# Patient Record
Sex: FEMALE | Race: WHITE | NOT HISPANIC OR LATINO | URBAN - METROPOLITAN AREA
[De-identification: names, ages, dates, MRNs, and addresses within clinical notes are randomized per-mention and may not be internally consistent; named-entity substitution may affect disease eponyms.]

---

## 2017-01-31 ENCOUNTER — OUTPATIENT (OUTPATIENT)
Dept: OUTPATIENT SERVICES | Facility: HOSPITAL | Age: 77
LOS: 1 days | Discharge: HOME | End: 2017-01-31

## 2017-01-31 DIAGNOSIS — C85.88 OTHER SPECIFIED TYPES OF NON-HODGKIN LYMPHOMA, LYMPH NODES OF MULTIPLE SITES: ICD-10-CM

## 2017-01-31 DIAGNOSIS — Z98.89 OTHER SPECIFIED POSTPROCEDURAL STATES: Chronic | ICD-10-CM

## 2017-01-31 DIAGNOSIS — L72.9 FOLLICULAR CYST OF THE SKIN AND SUBCUTANEOUS TISSUE, UNSPECIFIED: Chronic | ICD-10-CM

## 2017-02-01 ENCOUNTER — RESULT REVIEW (OUTPATIENT)
Age: 77
End: 2017-02-01

## 2017-02-02 ENCOUNTER — APPOINTMENT (OUTPATIENT)
Dept: HEMATOLOGY ONCOLOGY | Facility: CLINIC | Age: 77
End: 2017-02-02

## 2017-02-02 VITALS
BODY MASS INDEX: 31.87 KG/M2 | OXYGEN SATURATION: 100 % | DIASTOLIC BLOOD PRESSURE: 71 MMHG | WEIGHT: 147.27 LBS | SYSTOLIC BLOOD PRESSURE: 143 MMHG | HEART RATE: 59 BPM | RESPIRATION RATE: 16 BRPM | TEMPERATURE: 98.3 F

## 2017-02-02 LAB
BASOPHILS # BLD AUTO: 0 K/UL — SIGNIFICANT CHANGE UP (ref 0–0.2)
BASOPHILS NFR BLD AUTO: 0.1 % — SIGNIFICANT CHANGE UP (ref 0–2)
EOSINOPHIL # BLD AUTO: 0.2 K/UL — SIGNIFICANT CHANGE UP (ref 0–0.5)
EOSINOPHIL NFR BLD AUTO: 2.8 % — SIGNIFICANT CHANGE UP (ref 0–6)
HCT VFR BLD CALC: 44.3 % — SIGNIFICANT CHANGE UP (ref 34.5–45)
HGB BLD-MCNC: 14.3 G/DL — SIGNIFICANT CHANGE UP (ref 11.5–15.5)
LYMPHOCYTES # BLD AUTO: 1.4 K/UL — SIGNIFICANT CHANGE UP (ref 1–3.3)
LYMPHOCYTES # BLD AUTO: 21.5 % — SIGNIFICANT CHANGE UP (ref 13–44)
MCHC RBC-ENTMCNC: 28.4 PG — SIGNIFICANT CHANGE UP (ref 27–34)
MCHC RBC-ENTMCNC: 32.2 GM/DL — SIGNIFICANT CHANGE UP (ref 32–36)
MCV RBC AUTO: 88.1 FL — SIGNIFICANT CHANGE UP (ref 80–100)
MONOCYTES # BLD AUTO: 0.5 K/UL — SIGNIFICANT CHANGE UP (ref 0–0.9)
MONOCYTES NFR BLD AUTO: 7.7 % — SIGNIFICANT CHANGE UP (ref 2–14)
NEUTROPHILS # BLD AUTO: 4.6 K/UL — SIGNIFICANT CHANGE UP (ref 1.8–7.4)
NEUTROPHILS NFR BLD AUTO: 67.8 % — SIGNIFICANT CHANGE UP (ref 43–77)
PLATELET # BLD AUTO: 235 K/UL — SIGNIFICANT CHANGE UP (ref 150–400)
RBC # BLD: 5.02 M/UL — SIGNIFICANT CHANGE UP (ref 3.8–5.2)
RBC # FLD: 12.3 % — SIGNIFICANT CHANGE UP (ref 10.3–14.5)
WBC # BLD: 6.8 K/UL — SIGNIFICANT CHANGE UP (ref 3.8–10.5)
WBC # FLD AUTO: 6.8 K/UL — SIGNIFICANT CHANGE UP (ref 3.8–10.5)

## 2017-02-04 LAB
ALBUMIN MFR SERPL ELPH: 61.1 %
ALBUMIN SERPL ELPH-MCNC: 4.2 G/DL
ALBUMIN SERPL-MCNC: 3.9 G/DL
ALBUMIN/GLOB SERPL: 1.6 RATIO
ALP BLD-CCNC: 58 U/L
ALPHA1 GLOB MFR SERPL ELPH: 4.5 %
ALPHA1 GLOB SERPL ELPH-MCNC: 0.3 G/DL
ALPHA2 GLOB MFR SERPL ELPH: 10.4 %
ALPHA2 GLOB SERPL ELPH-MCNC: 0.7 G/DL
ALT SERPL-CCNC: 18 U/L
ANION GAP SERPL CALC-SCNC: 14 MMOL/L
AST SERPL-CCNC: 18 U/L
B-GLOBULIN MFR SERPL ELPH: 11.6 %
B-GLOBULIN SERPL ELPH-MCNC: 0.7 G/DL
B2 MICROGLOB SERPL-MCNC: 2 MG/L
BILIRUB SERPL-MCNC: 0.4 MG/DL
BUN SERPL-MCNC: 17 MG/DL
CALCIUM SERPL-MCNC: 9.4 MG/DL
CHLORIDE SERPL-SCNC: 101 MMOL/L
CO2 SERPL-SCNC: 29 MMOL/L
CREAT SERPL-MCNC: 0.64 MG/DL
GAMMA GLOB FLD ELPH-MCNC: 0.8 G/DL
GAMMA GLOB MFR SERPL ELPH: 12.4 %
GLUCOSE SERPL-MCNC: 149 MG/DL
IGA SER QL IEP: 219 MG/DL
IGG SER QL IEP: 722 MG/DL
IGM SER QL IEP: 183 MG/DL
INTERPRETATION SERPL IEP-IMP: NORMAL
LDH SERPL-CCNC: 110 U/L
POTASSIUM SERPL-SCNC: 4.1 MMOL/L
PROT SERPL-MCNC: 6.4 G/DL
SODIUM SERPL-SCNC: 144 MMOL/L

## 2017-04-25 ENCOUNTER — APPOINTMENT (OUTPATIENT)
Dept: MAMMOGRAPHY | Facility: IMAGING CENTER | Age: 77
End: 2017-04-25

## 2017-04-25 ENCOUNTER — APPOINTMENT (OUTPATIENT)
Dept: CT IMAGING | Facility: IMAGING CENTER | Age: 77
End: 2017-04-25

## 2017-04-25 ENCOUNTER — OUTPATIENT (OUTPATIENT)
Dept: OUTPATIENT SERVICES | Facility: HOSPITAL | Age: 77
LOS: 1 days | End: 2017-04-25
Payer: MEDICARE

## 2017-04-25 DIAGNOSIS — Z00.8 ENCOUNTER FOR OTHER GENERAL EXAMINATION: ICD-10-CM

## 2017-04-25 DIAGNOSIS — Z98.89 OTHER SPECIFIED POSTPROCEDURAL STATES: Chronic | ICD-10-CM

## 2017-04-25 DIAGNOSIS — L72.9 FOLLICULAR CYST OF THE SKIN AND SUBCUTANEOUS TISSUE, UNSPECIFIED: Chronic | ICD-10-CM

## 2017-04-25 PROCEDURE — 82565 ASSAY OF CREATININE: CPT

## 2017-04-25 PROCEDURE — 74177 CT ABD & PELVIS W/CONTRAST: CPT

## 2017-04-25 PROCEDURE — 77067 SCR MAMMO BI INCL CAD: CPT

## 2017-04-25 PROCEDURE — 77063 BREAST TOMOSYNTHESIS BI: CPT

## 2017-07-31 ENCOUNTER — OUTPATIENT (OUTPATIENT)
Dept: OUTPATIENT SERVICES | Facility: HOSPITAL | Age: 77
LOS: 1 days | Discharge: HOME | End: 2017-07-31

## 2017-07-31 DIAGNOSIS — L72.9 FOLLICULAR CYST OF THE SKIN AND SUBCUTANEOUS TISSUE, UNSPECIFIED: Chronic | ICD-10-CM

## 2017-07-31 DIAGNOSIS — C85.88 OTHER SPECIFIED TYPES OF NON-HODGKIN LYMPHOMA, LYMPH NODES OF MULTIPLE SITES: ICD-10-CM

## 2017-07-31 DIAGNOSIS — Z98.89 OTHER SPECIFIED POSTPROCEDURAL STATES: Chronic | ICD-10-CM

## 2017-08-03 ENCOUNTER — LABORATORY RESULT (OUTPATIENT)
Age: 77
End: 2017-08-03

## 2017-08-03 ENCOUNTER — RESULT REVIEW (OUTPATIENT)
Age: 77
End: 2017-08-03

## 2017-08-03 ENCOUNTER — APPOINTMENT (OUTPATIENT)
Dept: HEMATOLOGY ONCOLOGY | Facility: CLINIC | Age: 77
End: 2017-08-03
Payer: MEDICARE

## 2017-08-03 VITALS
SYSTOLIC BLOOD PRESSURE: 152 MMHG | DIASTOLIC BLOOD PRESSURE: 74 MMHG | RESPIRATION RATE: 16 BRPM | TEMPERATURE: 98.1 F | HEART RATE: 57 BPM | OXYGEN SATURATION: 96 % | HEIGHT: 57.01 IN | WEIGHT: 151.68 LBS | BODY MASS INDEX: 32.72 KG/M2

## 2017-08-03 LAB
BASOPHILS # BLD AUTO: 0 K/UL — SIGNIFICANT CHANGE UP (ref 0–0.2)
BASOPHILS NFR BLD AUTO: 0.3 % — SIGNIFICANT CHANGE UP (ref 0–2)
EOSINOPHIL # BLD AUTO: 0.2 K/UL — SIGNIFICANT CHANGE UP (ref 0–0.5)
EOSINOPHIL NFR BLD AUTO: 3.1 % — SIGNIFICANT CHANGE UP (ref 0–6)
HCT VFR BLD CALC: 43 % — SIGNIFICANT CHANGE UP (ref 34.5–45)
HGB BLD-MCNC: 14 G/DL — SIGNIFICANT CHANGE UP (ref 11.5–15.5)
LYMPHOCYTES # BLD AUTO: 1.3 K/UL — SIGNIFICANT CHANGE UP (ref 1–3.3)
LYMPHOCYTES # BLD AUTO: 19.1 % — SIGNIFICANT CHANGE UP (ref 13–44)
MCHC RBC-ENTMCNC: 28.6 PG — SIGNIFICANT CHANGE UP (ref 27–34)
MCHC RBC-ENTMCNC: 32.5 G/DL — SIGNIFICANT CHANGE UP (ref 32–36)
MCV RBC AUTO: 87.8 FL — SIGNIFICANT CHANGE UP (ref 80–100)
MONOCYTES # BLD AUTO: 0.8 K/UL — SIGNIFICANT CHANGE UP (ref 0–0.9)
MONOCYTES NFR BLD AUTO: 11.5 % — SIGNIFICANT CHANGE UP (ref 2–14)
NEUTROPHILS # BLD AUTO: 4.4 K/UL — SIGNIFICANT CHANGE UP (ref 1.8–7.4)
NEUTROPHILS NFR BLD AUTO: 66.1 % — SIGNIFICANT CHANGE UP (ref 43–77)
PLATELET # BLD AUTO: 220 K/UL — SIGNIFICANT CHANGE UP (ref 150–400)
RBC # BLD: 4.9 M/UL — SIGNIFICANT CHANGE UP (ref 3.8–5.2)
RBC # FLD: 13.6 % — SIGNIFICANT CHANGE UP (ref 10.3–14.5)
WBC # BLD: 6.6 K/UL — SIGNIFICANT CHANGE UP (ref 3.8–10.5)
WBC # FLD AUTO: 6.6 K/UL — SIGNIFICANT CHANGE UP (ref 3.8–10.5)

## 2017-08-03 PROCEDURE — 99213 OFFICE O/P EST LOW 20 MIN: CPT

## 2018-01-31 ENCOUNTER — OUTPATIENT (OUTPATIENT)
Dept: OUTPATIENT SERVICES | Facility: HOSPITAL | Age: 78
LOS: 1 days | Discharge: HOME | End: 2018-01-31

## 2018-01-31 DIAGNOSIS — Z98.89 OTHER SPECIFIED POSTPROCEDURAL STATES: Chronic | ICD-10-CM

## 2018-01-31 DIAGNOSIS — L72.9 FOLLICULAR CYST OF THE SKIN AND SUBCUTANEOUS TISSUE, UNSPECIFIED: Chronic | ICD-10-CM

## 2018-01-31 DIAGNOSIS — C85.88 OTHER SPECIFIED TYPES OF NON-HODGKIN LYMPHOMA, LYMPH NODES OF MULTIPLE SITES: ICD-10-CM

## 2018-02-05 ENCOUNTER — RESULT REVIEW (OUTPATIENT)
Age: 78
End: 2018-02-05

## 2018-02-05 ENCOUNTER — APPOINTMENT (OUTPATIENT)
Dept: HEMATOLOGY ONCOLOGY | Facility: CLINIC | Age: 78
End: 2018-02-05
Payer: MEDICARE

## 2018-02-05 VITALS
RESPIRATION RATE: 16 BRPM | DIASTOLIC BLOOD PRESSURE: 70 MMHG | SYSTOLIC BLOOD PRESSURE: 156 MMHG | BODY MASS INDEX: 31.95 KG/M2 | HEART RATE: 55 BPM | OXYGEN SATURATION: 98 % | TEMPERATURE: 97.9 F | WEIGHT: 147.71 LBS

## 2018-02-05 LAB
ALBUMIN SERPL ELPH-MCNC: 4 G/DL
ALP BLD-CCNC: 57 U/L
ALT SERPL-CCNC: 18 U/L
ANION GAP SERPL CALC-SCNC: 12 MMOL/L
AST SERPL-CCNC: 19 U/L
B2 MICROGLOB SERPL-MCNC: 2 MG/L
BASOPHILS # BLD AUTO: 0 K/UL — SIGNIFICANT CHANGE UP (ref 0–0.2)
BASOPHILS NFR BLD AUTO: 0.1 % — SIGNIFICANT CHANGE UP (ref 0–2)
BILIRUB SERPL-MCNC: 0.4 MG/DL
BUN SERPL-MCNC: 14 MG/DL
CALCIUM SERPL-MCNC: 9.5 MG/DL
CHLORIDE SERPL-SCNC: 102 MMOL/L
CO2 SERPL-SCNC: 30 MMOL/L
CREAT SERPL-MCNC: 0.62 MG/DL
EOSINOPHIL # BLD AUTO: 0.2 K/UL — SIGNIFICANT CHANGE UP (ref 0–0.5)
EOSINOPHIL NFR BLD AUTO: 3.5 % — SIGNIFICANT CHANGE UP (ref 0–6)
GLUCOSE SERPL-MCNC: 137 MG/DL
HCT VFR BLD CALC: 41.7 % — SIGNIFICANT CHANGE UP (ref 34.5–45)
HGB BLD-MCNC: 14.5 G/DL — SIGNIFICANT CHANGE UP (ref 11.5–15.5)
LDH SERPL-CCNC: 103 U/L
LYMPHOCYTES # BLD AUTO: 1.2 K/UL — SIGNIFICANT CHANGE UP (ref 1–3.3)
LYMPHOCYTES # BLD AUTO: 18.5 % — SIGNIFICANT CHANGE UP (ref 13–44)
MCHC RBC-ENTMCNC: 30.3 PG — SIGNIFICANT CHANGE UP (ref 27–34)
MCHC RBC-ENTMCNC: 34.7 G/DL — SIGNIFICANT CHANGE UP (ref 32–36)
MCV RBC AUTO: 87.3 FL — SIGNIFICANT CHANGE UP (ref 80–100)
MONOCYTES # BLD AUTO: 0.7 K/UL — SIGNIFICANT CHANGE UP (ref 0–0.9)
MONOCYTES NFR BLD AUTO: 10.5 % — SIGNIFICANT CHANGE UP (ref 2–14)
NEUTROPHILS # BLD AUTO: 4.3 K/UL — SIGNIFICANT CHANGE UP (ref 1.8–7.4)
NEUTROPHILS NFR BLD AUTO: 67.3 % — SIGNIFICANT CHANGE UP (ref 43–77)
PLATELET # BLD AUTO: 205 K/UL — SIGNIFICANT CHANGE UP (ref 150–400)
POTASSIUM SERPL-SCNC: 4.5 MMOL/L
PROT SERPL-MCNC: 6.3 G/DL
RBC # BLD: 4.78 M/UL — SIGNIFICANT CHANGE UP (ref 3.8–5.2)
RBC # FLD: 12.2 % — SIGNIFICANT CHANGE UP (ref 10.3–14.5)
SODIUM SERPL-SCNC: 144 MMOL/L
WBC # BLD: 6.3 K/UL — SIGNIFICANT CHANGE UP (ref 3.8–10.5)
WBC # FLD AUTO: 6.3 K/UL — SIGNIFICANT CHANGE UP (ref 3.8–10.5)

## 2018-02-05 PROCEDURE — 99214 OFFICE O/P EST MOD 30 MIN: CPT

## 2018-02-09 ENCOUNTER — FORM ENCOUNTER (OUTPATIENT)
Age: 78
End: 2018-02-09

## 2018-02-10 ENCOUNTER — APPOINTMENT (OUTPATIENT)
Dept: CT IMAGING | Facility: IMAGING CENTER | Age: 78
End: 2018-02-10
Payer: MEDICARE

## 2018-02-10 ENCOUNTER — OUTPATIENT (OUTPATIENT)
Dept: OUTPATIENT SERVICES | Facility: HOSPITAL | Age: 78
LOS: 1 days | End: 2018-02-10
Payer: MEDICARE

## 2018-02-10 DIAGNOSIS — C82.00 FOLLICULAR LYMPHOMA GRADE I, UNSPECIFIED SITE: ICD-10-CM

## 2018-02-10 DIAGNOSIS — Z98.89 OTHER SPECIFIED POSTPROCEDURAL STATES: Chronic | ICD-10-CM

## 2018-02-10 DIAGNOSIS — L72.9 FOLLICULAR CYST OF THE SKIN AND SUBCUTANEOUS TISSUE, UNSPECIFIED: Chronic | ICD-10-CM

## 2018-02-10 PROCEDURE — 74177 CT ABD & PELVIS W/CONTRAST: CPT | Mod: 26

## 2018-02-10 PROCEDURE — 71260 CT THORAX DX C+: CPT

## 2018-02-10 PROCEDURE — 74177 CT ABD & PELVIS W/CONTRAST: CPT

## 2018-02-10 PROCEDURE — 71260 CT THORAX DX C+: CPT | Mod: 26

## 2018-05-05 ENCOUNTER — APPOINTMENT (OUTPATIENT)
Dept: MAMMOGRAPHY | Facility: IMAGING CENTER | Age: 78
End: 2018-05-05
Payer: MEDICARE

## 2018-05-05 ENCOUNTER — OUTPATIENT (OUTPATIENT)
Dept: OUTPATIENT SERVICES | Facility: HOSPITAL | Age: 78
LOS: 1 days | End: 2018-05-05
Payer: MEDICARE

## 2018-05-05 DIAGNOSIS — Z00.8 ENCOUNTER FOR OTHER GENERAL EXAMINATION: ICD-10-CM

## 2018-05-05 DIAGNOSIS — Z98.89 OTHER SPECIFIED POSTPROCEDURAL STATES: Chronic | ICD-10-CM

## 2018-05-05 DIAGNOSIS — L72.9 FOLLICULAR CYST OF THE SKIN AND SUBCUTANEOUS TISSUE, UNSPECIFIED: Chronic | ICD-10-CM

## 2018-05-05 PROCEDURE — 77063 BREAST TOMOSYNTHESIS BI: CPT | Mod: 26

## 2018-05-05 PROCEDURE — 77067 SCR MAMMO BI INCL CAD: CPT | Mod: 26

## 2018-05-05 PROCEDURE — 77063 BREAST TOMOSYNTHESIS BI: CPT

## 2018-05-05 PROCEDURE — 77067 SCR MAMMO BI INCL CAD: CPT

## 2018-07-19 ENCOUNTER — OUTPATIENT (OUTPATIENT)
Dept: OUTPATIENT SERVICES | Facility: HOSPITAL | Age: 78
LOS: 1 days | Discharge: HOME | End: 2018-07-19

## 2018-07-19 DIAGNOSIS — L72.9 FOLLICULAR CYST OF THE SKIN AND SUBCUTANEOUS TISSUE, UNSPECIFIED: Chronic | ICD-10-CM

## 2018-07-19 DIAGNOSIS — Z98.89 OTHER SPECIFIED POSTPROCEDURAL STATES: Chronic | ICD-10-CM

## 2018-07-19 DIAGNOSIS — C85.88 OTHER SPECIFIED TYPES OF NON-HODGKIN LYMPHOMA, LYMPH NODES OF MULTIPLE SITES: ICD-10-CM

## 2018-07-25 ENCOUNTER — RECORD ABSTRACTING (OUTPATIENT)
Age: 78
End: 2018-07-25

## 2018-07-25 ENCOUNTER — RESULT REVIEW (OUTPATIENT)
Age: 78
End: 2018-07-25

## 2018-07-25 DIAGNOSIS — M94.9 DISORDER OF BONE, UNSPECIFIED: ICD-10-CM

## 2018-07-25 DIAGNOSIS — M89.9 DISORDER OF BONE, UNSPECIFIED: ICD-10-CM

## 2018-07-25 DIAGNOSIS — I51.9 HEART DISEASE, UNSPECIFIED: ICD-10-CM

## 2018-07-25 DIAGNOSIS — I05.9 RHEUMATIC MITRAL VALVE DISEASE, UNSPECIFIED: ICD-10-CM

## 2018-07-25 LAB
ALBUMIN MFR SERPL ELPH: 60.4 %
ALBUMIN SERPL-MCNC: 3.8 G/DL
ALBUMIN/GLOB SERPL: 1.5 RATIO
ALPHA1 GLOB MFR SERPL ELPH: 4.8 %
ALPHA1 GLOB SERPL ELPH-MCNC: 0.3 G/DL
ALPHA2 GLOB MFR SERPL ELPH: 10.9 %
ALPHA2 GLOB SERPL ELPH-MCNC: 0.7 G/DL
B-GLOBULIN MFR SERPL ELPH: 11.7 %
B-GLOBULIN SERPL ELPH-MCNC: 0.7 G/DL
DEPRECATED KAPPA LC FREE/LAMBDA SER: 0.87 RATIO
GAMMA GLOB FLD ELPH-MCNC: 0.8 G/DL
GAMMA GLOB MFR SERPL ELPH: 12.2 %
IGA SER QL IEP: 214 MG/DL
IGG SER QL IEP: 749 MG/DL
IGM SER QL IEP: 188 MG/DL
INTERPRETATION SERPL IEP-IMP: NORMAL
KAPPA LC CSF-MCNC: 1.39 MG/DL
KAPPA LC SERPL-MCNC: 1.21 MG/DL
PROT SERPL-MCNC: 6.3 G/DL
PROT SERPL-MCNC: 6.3 G/DL

## 2018-07-26 ENCOUNTER — RESULT REVIEW (OUTPATIENT)
Age: 78
End: 2018-07-26

## 2018-07-26 ENCOUNTER — APPOINTMENT (OUTPATIENT)
Dept: HEMATOLOGY ONCOLOGY | Facility: CLINIC | Age: 78
End: 2018-07-26
Payer: MEDICARE

## 2018-07-26 VITALS
DIASTOLIC BLOOD PRESSURE: 70 MMHG | RESPIRATION RATE: 18 BRPM | BODY MASS INDEX: 32.91 KG/M2 | SYSTOLIC BLOOD PRESSURE: 140 MMHG | WEIGHT: 152.12 LBS | OXYGEN SATURATION: 95 % | TEMPERATURE: 98.2 F | HEART RATE: 61 BPM

## 2018-07-26 DIAGNOSIS — Z82.49 FAMILY HISTORY OF ISCHEMIC HEART DISEASE AND OTHER DISEASES OF THE CIRCULATORY SYSTEM: ICD-10-CM

## 2018-07-26 LAB
BASOPHILS # BLD AUTO: 0 K/UL — SIGNIFICANT CHANGE UP (ref 0–0.2)
BASOPHILS NFR BLD AUTO: 0.6 % — SIGNIFICANT CHANGE UP (ref 0–2)
EOSINOPHIL # BLD AUTO: 0.2 K/UL — SIGNIFICANT CHANGE UP (ref 0–0.5)
EOSINOPHIL NFR BLD AUTO: 3.1 % — SIGNIFICANT CHANGE UP (ref 0–6)
HCT VFR BLD CALC: 42.4 % — SIGNIFICANT CHANGE UP (ref 34.5–45)
HGB BLD-MCNC: 14.2 G/DL — SIGNIFICANT CHANGE UP (ref 11.5–15.5)
LYMPHOCYTES # BLD AUTO: 1.4 K/UL — SIGNIFICANT CHANGE UP (ref 1–3.3)
LYMPHOCYTES # BLD AUTO: 21.4 % — SIGNIFICANT CHANGE UP (ref 13–44)
MCHC RBC-ENTMCNC: 29.7 PG — SIGNIFICANT CHANGE UP (ref 27–34)
MCHC RBC-ENTMCNC: 33.5 G/DL — SIGNIFICANT CHANGE UP (ref 32–36)
MCV RBC AUTO: 88.4 FL — SIGNIFICANT CHANGE UP (ref 80–100)
MONOCYTES # BLD AUTO: 0.7 K/UL — SIGNIFICANT CHANGE UP (ref 0–0.9)
MONOCYTES NFR BLD AUTO: 10.5 % — SIGNIFICANT CHANGE UP (ref 2–14)
NEUTROPHILS # BLD AUTO: 4.1 K/UL — SIGNIFICANT CHANGE UP (ref 1.8–7.4)
NEUTROPHILS NFR BLD AUTO: 64.4 % — SIGNIFICANT CHANGE UP (ref 43–77)
PLATELET # BLD AUTO: 226 K/UL — SIGNIFICANT CHANGE UP (ref 150–400)
RBC # BLD: 4.8 M/UL — SIGNIFICANT CHANGE UP (ref 3.8–5.2)
RBC # FLD: 12.3 % — SIGNIFICANT CHANGE UP (ref 10.3–14.5)
WBC # BLD: 6.4 K/UL — SIGNIFICANT CHANGE UP (ref 3.8–10.5)
WBC # FLD AUTO: 6.4 K/UL — SIGNIFICANT CHANGE UP (ref 3.8–10.5)

## 2018-07-26 PROCEDURE — 99214 OFFICE O/P EST MOD 30 MIN: CPT

## 2018-08-13 ENCOUNTER — RX RENEWAL (OUTPATIENT)
Age: 78
End: 2018-08-13

## 2018-08-14 ENCOUNTER — APPOINTMENT (OUTPATIENT)
Dept: PULMONOLOGY | Facility: CLINIC | Age: 78
End: 2018-08-14
Payer: MEDICARE

## 2018-08-14 VITALS
TEMPERATURE: 98.1 F | OXYGEN SATURATION: 96 % | RESPIRATION RATE: 16 BRPM | DIASTOLIC BLOOD PRESSURE: 69 MMHG | WEIGHT: 150 LBS | HEIGHT: 57.1 IN | SYSTOLIC BLOOD PRESSURE: 126 MMHG | BODY MASS INDEX: 32.36 KG/M2 | HEART RATE: 59 BPM

## 2018-08-14 PROCEDURE — 99213 OFFICE O/P EST LOW 20 MIN: CPT | Mod: 25

## 2018-08-14 PROCEDURE — 36415 COLL VENOUS BLD VENIPUNCTURE: CPT

## 2018-08-15 LAB
ALBUMIN SERPL ELPH-MCNC: 4.3 G/DL
ALP BLD-CCNC: 62 U/L
ALT SERPL-CCNC: 18 U/L
ANION GAP SERPL CALC-SCNC: 16 MMOL/L
AST SERPL-CCNC: 19 U/L
BILIRUB SERPL-MCNC: 0.4 MG/DL
BUN SERPL-MCNC: 15 MG/DL
CALCIUM SERPL-MCNC: 9.6 MG/DL
CHLORIDE SERPL-SCNC: 101 MMOL/L
CHOLEST SERPL-MCNC: 167 MG/DL
CHOLEST/HDLC SERPL: 3.2 RATIO
CO2 SERPL-SCNC: 25 MMOL/L
CREAT SERPL-MCNC: 0.62 MG/DL
GLUCOSE SERPL-MCNC: 103 MG/DL
HBA1C MFR BLD HPLC: 6.1 %
HDLC SERPL-MCNC: 52 MG/DL
LDLC SERPL CALC-MCNC: 86 MG/DL
POTASSIUM SERPL-SCNC: 4.2 MMOL/L
PROT SERPL-MCNC: 6.2 G/DL
SODIUM SERPL-SCNC: 142 MMOL/L
TRIGL SERPL-MCNC: 146 MG/DL

## 2018-09-24 ENCOUNTER — APPOINTMENT (OUTPATIENT)
Dept: PULMONOLOGY | Facility: CLINIC | Age: 78
End: 2018-09-24
Payer: MEDICARE

## 2018-09-24 VITALS
TEMPERATURE: 98.1 F | SYSTOLIC BLOOD PRESSURE: 138 MMHG | HEIGHT: 57 IN | BODY MASS INDEX: 32.36 KG/M2 | OXYGEN SATURATION: 97 % | RESPIRATION RATE: 16 BRPM | DIASTOLIC BLOOD PRESSURE: 72 MMHG | WEIGHT: 150 LBS | HEART RATE: 60 BPM

## 2018-09-24 PROCEDURE — 90662 IIV NO PRSV INCREASED AG IM: CPT

## 2018-09-24 PROCEDURE — G0008: CPT

## 2018-09-24 PROCEDURE — 99213 OFFICE O/P EST LOW 20 MIN: CPT | Mod: 25

## 2018-12-03 ENCOUNTER — RX RENEWAL (OUTPATIENT)
Age: 78
End: 2018-12-03

## 2018-12-24 ENCOUNTER — RX RENEWAL (OUTPATIENT)
Age: 78
End: 2018-12-24

## 2019-01-07 ENCOUNTER — OUTPATIENT (OUTPATIENT)
Dept: OUTPATIENT SERVICES | Facility: HOSPITAL | Age: 79
LOS: 1 days | Discharge: HOME | End: 2019-01-07

## 2019-01-07 DIAGNOSIS — L72.9 FOLLICULAR CYST OF THE SKIN AND SUBCUTANEOUS TISSUE, UNSPECIFIED: Chronic | ICD-10-CM

## 2019-01-07 DIAGNOSIS — Z98.89 OTHER SPECIFIED POSTPROCEDURAL STATES: Chronic | ICD-10-CM

## 2019-01-07 DIAGNOSIS — C85.88 OTHER SPECIFIED TYPES OF NON-HODGKIN LYMPHOMA, LYMPH NODES OF MULTIPLE SITES: ICD-10-CM

## 2019-01-10 ENCOUNTER — RESULT REVIEW (OUTPATIENT)
Age: 79
End: 2019-01-10

## 2019-01-10 ENCOUNTER — APPOINTMENT (OUTPATIENT)
Dept: HEMATOLOGY ONCOLOGY | Facility: CLINIC | Age: 79
End: 2019-01-10
Payer: MEDICARE

## 2019-01-10 VITALS
HEART RATE: 60 BPM | DIASTOLIC BLOOD PRESSURE: 76 MMHG | TEMPERATURE: 98.2 F | RESPIRATION RATE: 16 BRPM | SYSTOLIC BLOOD PRESSURE: 125 MMHG | OXYGEN SATURATION: 98 % | BODY MASS INDEX: 32.39 KG/M2 | WEIGHT: 149.67 LBS

## 2019-01-10 DIAGNOSIS — Z86.018 PERSONAL HISTORY OF OTHER BENIGN NEOPLASM: ICD-10-CM

## 2019-01-10 LAB
ALBUMIN SERPL ELPH-MCNC: 4.2 G/DL
ALP BLD-CCNC: 57 U/L
ALT SERPL-CCNC: 19 U/L
ANION GAP SERPL CALC-SCNC: 15 MMOL/L
AST SERPL-CCNC: 21 U/L
B2 MICROGLOB SERPL-MCNC: 2.1 MG/L
BASOPHILS # BLD AUTO: 0 K/UL — SIGNIFICANT CHANGE UP (ref 0–0.2)
BASOPHILS NFR BLD AUTO: 0.2 % — SIGNIFICANT CHANGE UP (ref 0–2)
BILIRUB SERPL-MCNC: 0.4 MG/DL
BUN SERPL-MCNC: 17 MG/DL
CALCIUM SERPL-MCNC: 9.3 MG/DL
CHLORIDE SERPL-SCNC: 101 MMOL/L
CO2 SERPL-SCNC: 27 MMOL/L
CREAT SERPL-MCNC: 0.63 MG/DL
EOSINOPHIL # BLD AUTO: 0.2 K/UL — SIGNIFICANT CHANGE UP (ref 0–0.5)
EOSINOPHIL NFR BLD AUTO: 2.6 % — SIGNIFICANT CHANGE UP (ref 0–6)
GLUCOSE SERPL-MCNC: 119 MG/DL
HCT VFR BLD CALC: 44.5 % — SIGNIFICANT CHANGE UP (ref 34.5–45)
HGB BLD-MCNC: 15 G/DL — SIGNIFICANT CHANGE UP (ref 11.5–15.5)
LDH SERPL-CCNC: 125 U/L
LYMPHOCYTES # BLD AUTO: 1.4 K/UL — SIGNIFICANT CHANGE UP (ref 1–3.3)
LYMPHOCYTES # BLD AUTO: 15.3 % — SIGNIFICANT CHANGE UP (ref 13–44)
MCHC RBC-ENTMCNC: 29.1 PG — SIGNIFICANT CHANGE UP (ref 27–34)
MCHC RBC-ENTMCNC: 33.7 G/DL — SIGNIFICANT CHANGE UP (ref 32–36)
MCV RBC AUTO: 86.3 FL — SIGNIFICANT CHANGE UP (ref 80–100)
MONOCYTES # BLD AUTO: 0.8 K/UL — SIGNIFICANT CHANGE UP (ref 0–0.9)
MONOCYTES NFR BLD AUTO: 8.6 % — SIGNIFICANT CHANGE UP (ref 2–14)
NEUTROPHILS # BLD AUTO: 6.8 K/UL — SIGNIFICANT CHANGE UP (ref 1.8–7.4)
NEUTROPHILS NFR BLD AUTO: 73.2 % — SIGNIFICANT CHANGE UP (ref 43–77)
PLATELET # BLD AUTO: 251 K/UL — SIGNIFICANT CHANGE UP (ref 150–400)
POTASSIUM SERPL-SCNC: 4 MMOL/L
PROT SERPL-MCNC: 6.3 G/DL
RBC # BLD: 5.16 M/UL — SIGNIFICANT CHANGE UP (ref 3.8–5.2)
RBC # FLD: 12.2 % — SIGNIFICANT CHANGE UP (ref 10.3–14.5)
SODIUM SERPL-SCNC: 143 MMOL/L
WBC # BLD: 9.3 K/UL — SIGNIFICANT CHANGE UP (ref 3.8–10.5)
WBC # FLD AUTO: 9.3 K/UL — SIGNIFICANT CHANGE UP (ref 3.8–10.5)

## 2019-01-10 PROCEDURE — 99214 OFFICE O/P EST MOD 30 MIN: CPT

## 2019-01-13 PROBLEM — Z86.018 HISTORY OF ADENOMA OF RIGHT ADRENAL GLAND: Status: RESOLVED | Noted: 2019-01-13 | Resolved: 2019-01-13

## 2019-01-13 LAB
ALBUMIN SERPL ELPH-MCNC: 4.3 G/DL
ALP BLD-CCNC: 64 U/L
ALT SERPL-CCNC: 18 U/L
ANION GAP SERPL CALC-SCNC: 17 MMOL/L
AST SERPL-CCNC: 16 U/L
B2 MICROGLOB SERPL-MCNC: 2.1 MG/L
BILIRUB SERPL-MCNC: 0.3 MG/DL
BUN SERPL-MCNC: 16 MG/DL
CALCIUM SERPL-MCNC: 9.3 MG/DL
CHLORIDE SERPL-SCNC: 101 MMOL/L
CO2 SERPL-SCNC: 25 MMOL/L
CREAT SERPL-MCNC: 0.58 MG/DL
DEPRECATED KAPPA LC FREE/LAMBDA SER: 0.88 RATIO
GLUCOSE SERPL-MCNC: 113 MG/DL
IGA SER QL IEP: 167 MG/DL
IGG SER QL IEP: 699 MG/DL
IGM SER QL IEP: 193 MG/DL
KAPPA LC CSF-MCNC: 1.39 MG/DL
KAPPA LC SERPL-MCNC: 1.22 MG/DL
LDH SERPL-CCNC: 107 U/L
POTASSIUM SERPL-SCNC: 4.4 MMOL/L
PROT SERPL-MCNC: 6.3 G/DL
SODIUM SERPL-SCNC: 143 MMOL/L

## 2019-01-13 NOTE — HISTORY OF PRESENT ILLNESS
[Disease:__________________________] : Disease: [unfilled] [de-identified] : Mrs. Upton is a 74 year old woman with follicular lymphoma, grade 1-2, stage IA, who received radiotherapy to a > 5 cm left inguinal node in late 2014.  In October, 2014 she noted a left inguinal lump associated with some discomfort.  CT scan showed a 4.8 x 3,2 cm left inguinal node.  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  PET/CT showed that the inguinal node had grown over a few weeks to 5.2 cm.  SUV was 10.1.  The adrenal nodule was FDG(-).  Marrow was (-). Iron was present.  Excisional biopsy of the inguinal node showed findings most c/w FL, with dim CD10(+), CD20, BCL-6 and BCL-2  (partial).  FISH for BCL-IgH was (-). \par She was treated with RT which was well tolerated.\par \par  [de-identified] : IA [de-identified] : FL, gr 1-2 [de-identified] : Radiotherapy was completed more than four years ago; she was treated by Dr. Brian Decker, 30 Gy in 15 fractions completed 1/9/15.\par Baseline CT scans showed small renal  (1.3 cm);,splenic artery aneurysms. \par CT scans C/A/P 2/10/18 - no adenopathy, H/S megaly.  Splenic and renal artery aneurysms unchanged.\par HTN well controlled with HCTZ\par She reports a stable energy level and appetite without any recent weight loss. \par She has right sided back pain- saw ortho in 6/2017.  Takes tylenol PRN and went for PT with some improvement in pain. \par No constitutional c/o. \par WBC 9.3, Hgb 15, Plt 251,000.  Nl diff, LDH, beta-2 MG, creat, LFTs.\par HTN, hyperlipidemia medically controlled\par

## 2019-01-13 NOTE — CONSULT LETTER
[Dear  ___] : Dear  [unfilled], [Courtesy Letter:] : I had the pleasure of seeing your patient, [unfilled], in my office today. [Please see my note below.] : Please see my note below. [Consult Closing:] : Thank you very much for allowing me to participate in the care of this patient.  If you have any questions, please do not hesitate to contact me. [Sincerely,] : Sincerely, [FreeTextEntry2] : Darnell Maravilla M.D. [FreeTextEntry3] : Shaun Camacho M.D., [facility]

## 2019-01-13 NOTE — ASSESSMENT
[FreeTextEntry1] : Mrs. Upton remains clinically DUC.  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 She also had a 1 cm FDG(-) indeterminate probable adrenal adenoma.\par F/u with orthopedics for back pain if needed\par \par CBC, CMP, LDH, beta-2 microglobulin all stable\par RV 6 months\par  [Curative] : Goals of care discussed with patient: Curative [Palliative Care Plan] : not applicable at this time

## 2019-01-13 NOTE — PHYSICAL EXAM
[Fully active, able to carry on all pre-disease performance without restriction] : Status 0 - Fully active, able to carry on all pre-disease performance without restriction [Normal] : affect appropriate [de-identified] :  tr pedal edema [de-identified] : no CVAT [de-identified] : No cervical, axillary or inguinal LAD noted

## 2019-01-15 ENCOUNTER — RX RENEWAL (OUTPATIENT)
Age: 79
End: 2019-01-15

## 2019-02-15 ENCOUNTER — LABORATORY RESULT (OUTPATIENT)
Age: 79
End: 2019-02-15

## 2019-02-15 ENCOUNTER — APPOINTMENT (OUTPATIENT)
Dept: PULMONOLOGY | Facility: CLINIC | Age: 79
End: 2019-02-15
Payer: MEDICARE

## 2019-02-15 ENCOUNTER — NON-APPOINTMENT (OUTPATIENT)
Age: 79
End: 2019-02-15

## 2019-02-15 VITALS
RESPIRATION RATE: 16 BRPM | OXYGEN SATURATION: 98 % | WEIGHT: 150 LBS | SYSTOLIC BLOOD PRESSURE: 165 MMHG | DIASTOLIC BLOOD PRESSURE: 69 MMHG | BODY MASS INDEX: 29.45 KG/M2 | HEART RATE: 58 BPM | HEIGHT: 60 IN

## 2019-02-15 VITALS — DIASTOLIC BLOOD PRESSURE: 80 MMHG | SYSTOLIC BLOOD PRESSURE: 160 MMHG

## 2019-02-15 DIAGNOSIS — Z11.59 ENCOUNTER FOR SCREENING FOR OTHER VIRAL DISEASES: ICD-10-CM

## 2019-02-15 LAB
ALBUMIN: 10
BILIRUB UR QL STRIP: NEGATIVE
CLARITY UR: CLEAR
COLLECTION METHOD: NORMAL
CREATININE: 50
GLUCOSE UR-MCNC: NEGATIVE
HCG UR QL: 0.2 EU/DL
HGB UR QL STRIP.AUTO: NEGATIVE
KETONES UR-MCNC: NEGATIVE
LEUKOCYTE ESTERASE UR QL STRIP: NORMAL
MICROALBUMIN/CREAT UR TEST STR-RTO: 30
NITRITE UR QL STRIP: NEGATIVE
PH UR STRIP: 7
PROT UR STRIP-MCNC: NEGATIVE
SP GR UR STRIP: 1

## 2019-02-15 PROCEDURE — 93000 ELECTROCARDIOGRAM COMPLETE: CPT

## 2019-02-15 PROCEDURE — 36415 COLL VENOUS BLD VENIPUNCTURE: CPT

## 2019-02-15 PROCEDURE — 82570 ASSAY OF URINE CREATININE: CPT | Mod: QW

## 2019-02-15 PROCEDURE — 99215 OFFICE O/P EST HI 40 MIN: CPT | Mod: 25

## 2019-02-15 PROCEDURE — 82044 UR ALBUMIN SEMIQUANTITATIVE: CPT | Mod: QW

## 2019-02-15 PROCEDURE — 81003 URINALYSIS AUTO W/O SCOPE: CPT | Mod: QW

## 2019-02-16 NOTE — PHYSICAL EXAM
[General Appearance - Well Developed] : well developed [General Appearance - Well Nourished] : well nourished [Normal Oropharynx] : normal oropharynx [Neck Cervical Mass (___cm)] : no neck mass was observed [Jugular Venous Distention Increased] : there was no jugular-venous distention [Thyroid Diffuse Enlargement] : the thyroid was not enlarged [Heart Sounds] : normal S1 and S2 [Murmurs] : no murmurs present [Auscultation Breath Sounds / Voice Sounds] : lungs were clear to auscultation bilaterally [Lungs Percussion] : the lungs were normal to percussion [Abdomen Soft] : soft [Abdomen Tenderness] : non-tender [Abdomen Mass (___ Cm)] : no abdominal mass palpated [Skin Color & Pigmentation] : normal skin color and pigmentation [] : no rash [No Venous Stasis] : no venous stasis [Skin Lesions] : no skin lesions [No Skin Ulcers] : no skin ulcer [No Xanthoma] : no  xanthoma was observed [Deep Tendon Reflexes (DTR)] : deep tendon reflexes were 2+ and symmetric [Sensation] : the sensory exam was normal to light touch and pinprick [No Focal Deficits] : no focal deficits [Oriented To Time, Place, And Person] : oriented to person, place, and time [Impaired Insight] : insight and judgment were intact [Affect] : the affect was normal [FreeTextEntry1] : Overweight [FreeTextEntry2] : trace to 1 plus edema right. Varicose veins.

## 2019-02-16 NOTE — ASSESSMENT
[FreeTextEntry1] : Patient with multiple medical problems. All medical problems as well his medications were reviewed. Medications were renewed.\par \par There is no significant need for change in present medication or therapy.\par \par Labs drawn in office today\par \par Seeing heme onc.\par For mammo\par Refuses colonoscopy for cologuard.

## 2019-02-19 LAB
25(OH)D3 SERPL-MCNC: 42.1 NG/ML
ALBUMIN SERPL ELPH-MCNC: 4.3 G/DL
ALP BLD-CCNC: 56 U/L
ALT SERPL-CCNC: 22 U/L
ANION GAP SERPL CALC-SCNC: 13 MMOL/L
AST SERPL-CCNC: 21 U/L
BASOPHILS # BLD AUTO: 0.03 K/UL
BASOPHILS NFR BLD AUTO: 0.5 %
BILIRUB DIRECT SERPL-MCNC: 0.1 MG/DL
BILIRUB INDIRECT SERPL-MCNC: 0.3 MG/DL
BILIRUB SERPL-MCNC: 0.4 MG/DL
BUN SERPL-MCNC: 15 MG/DL
CALCIUM SERPL-MCNC: 9.8 MG/DL
CHLORIDE SERPL-SCNC: 102 MMOL/L
CHOLEST SERPL-MCNC: 171 MG/DL
CHOLEST/HDLC SERPL: 3 RATIO
CO2 SERPL-SCNC: 28 MMOL/L
CREAT SERPL-MCNC: 0.61 MG/DL
EOSINOPHIL # BLD AUTO: 0.22 K/UL
EOSINOPHIL NFR BLD AUTO: 3.5 %
GLUCOSE SERPL-MCNC: 102 MG/DL
HBA1C MFR BLD HPLC: 6.2 %
HCT VFR BLD CALC: 46.9 %
HCV AB SER QL: NONREACTIVE
HCV S/CO RATIO: 0.16 S/CO
HDLC SERPL-MCNC: 57 MG/DL
HGB BLD-MCNC: 14.4 G/DL
IMM GRANULOCYTES NFR BLD AUTO: 0.2 %
LDLC SERPL CALC-MCNC: 92 MG/DL
LYMPHOCYTES # BLD AUTO: 1.23 K/UL
LYMPHOCYTES NFR BLD AUTO: 19.4 %
MAN DIFF?: NORMAL
MCHC RBC-ENTMCNC: 28.6 PG
MCHC RBC-ENTMCNC: 30.7 GM/DL
MCV RBC AUTO: 93.1 FL
MONOCYTES # BLD AUTO: 0.56 K/UL
MONOCYTES NFR BLD AUTO: 8.8 %
NEUTROPHILS # BLD AUTO: 4.28 K/UL
NEUTROPHILS NFR BLD AUTO: 67.6 %
PLATELET # BLD AUTO: 270 K/UL
POTASSIUM SERPL-SCNC: 4.3 MMOL/L
PROT SERPL-MCNC: 6.7 G/DL
RBC # BLD: 5.04 M/UL
RBC # FLD: 14.7 %
SODIUM SERPL-SCNC: 143 MMOL/L
T3 SERPL-MCNC: 126 NG/DL
T3RU NFR SERPL: 1.1 INDEX
T4 FREE SERPL-MCNC: 1.3 NG/DL
T4 SERPL-MCNC: 8.6 UG/DL
TRIGL SERPL-MCNC: 110 MG/DL
TSH SERPL-ACNC: 1.35 UIU/ML
WBC # FLD AUTO: 6.33 K/UL

## 2019-03-13 NOTE — PHYSICAL EXAM
[Fully active, able to carry on all pre-disease performance without restriction] : Status 0 - Fully active, able to carry on all pre-disease performance without restriction [Normal] : affect appropriate [de-identified] :  trace pedal edema, multiple varicosities bilat LE [de-identified] : No cervical, axillary or inguinal LAD noted

## 2019-03-13 NOTE — ASSESSMENT
[FreeTextEntry1] : Mrs. Upton is clinically DUC.  \par Renal, splenic are small aneurysms- stable. Last imaging 2/2018. She also had a 1 cm FDG(-) adenoma.\par \par Check CBC, CMP, LDH, beta-2 microglobulin\par Continue HCTZ.  If swelling worsens, follow-up with PMD. Rec leg elevation. \par RV 6 months\par F/u with orthopedics for back pain if needed\par Patient seen with Dr. Camacho. \par

## 2019-03-13 NOTE — REASON FOR VISIT
[Follow-Up Visit] : a follow-up visit for [Lymphoma] : lymphoma [FreeTextEntry2] : follicular lymphoma, stage IA

## 2019-03-13 NOTE — REVIEW OF SYSTEMS
[Negative] : Allergic/Immunologic [Fatigue] : fatigue [FreeTextEntry2] : occasional fatigue [FreeTextEntry9] : back pain

## 2019-03-13 NOTE — HISTORY OF PRESENT ILLNESS
[Disease:__________________________] : Disease: [unfilled] [de-identified] : Mrs. Upton is a 74 year old woman with follicular lymphoma, grade 1-2, stage IA, who received radiotherapy to a > 5 cm left inguinal node in late 2014.  In October, 2014 she noted a left inguinal lump associated with some discomfort.  CT scan showed a 4.8 x 3,2 cm left inguinal node.  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  PET/CT showed that the inguinal node had grown over a few weeks to 5.2 cm.  SUV was 10.1.  The adrenal nodule was FDG(-).  Marrow was (-). Iron was present.  Excisional biopsy of the inguinal node showed findings most c/w FL, with dim CD10(+), CD20, BCL-6 and BCL-2  (partial).  FISH for BCL-IgH was (-). \par She was treated with RT which was well tolerated.\par \par  [de-identified] : IA [de-identified] : FL, gr 1-2 [de-identified] : 7/26/2018 Office Visit:\par Here for follow-up for follicular lymphoma. \par \par Patient reports occas fatigue-unchanged and a stable appetite without recent weight loss.  \par She denies any recent infections, fevers, night sweats or swollen glands.  She denies any CP, SOB, abd pain, dizziness.\par \par Radiotherapy was completed more than two years ago; she was treated by Dr. Brian Decker, 30 Gy in 15 fractions completed 1/9/15.\par Baseline CT scans (last CT scan 2/2018) showed small renal (1.3 cm), splenic artery aneurysms and stable aortic and coronary calcifications. \par HTN well controlled with HCTZ\par She has reports right sided back pain resolved. She saw ortho in 6/2017.  \par Last mammogram 5/5/2018- normal

## 2019-03-31 ENCOUNTER — RX RENEWAL (OUTPATIENT)
Age: 79
End: 2019-03-31

## 2019-05-10 ENCOUNTER — APPOINTMENT (OUTPATIENT)
Dept: PULMONOLOGY | Facility: CLINIC | Age: 79
End: 2019-05-10
Payer: MEDICARE

## 2019-05-10 VITALS
RESPIRATION RATE: 16 BRPM | HEART RATE: 61 BPM | DIASTOLIC BLOOD PRESSURE: 71 MMHG | OXYGEN SATURATION: 97 % | SYSTOLIC BLOOD PRESSURE: 147 MMHG

## 2019-05-10 VITALS — DIASTOLIC BLOOD PRESSURE: 70 MMHG | SYSTOLIC BLOOD PRESSURE: 130 MMHG

## 2019-05-10 PROCEDURE — 36415 COLL VENOUS BLD VENIPUNCTURE: CPT

## 2019-05-10 PROCEDURE — 99213 OFFICE O/P EST LOW 20 MIN: CPT | Mod: 25

## 2019-05-10 NOTE — PHYSICAL EXAM
[General Appearance - Well Developed] : well developed [General Appearance - Well Nourished] : well nourished [Normal Conjunctiva] : the conjunctiva exhibited no abnormalities [Normal Oropharynx] : normal oropharynx [Neck Cervical Mass (___cm)] : no neck mass was observed [Jugular Venous Distention Increased] : there was no jugular-venous distention [Thyroid Diffuse Enlargement] : the thyroid was not enlarged [Murmurs] : no murmurs present [Heart Sounds] : normal S1 and S2 [Lungs Percussion] : the lungs were normal to percussion [Auscultation Breath Sounds / Voice Sounds] : lungs were clear to auscultation bilaterally [Abdomen Soft] : soft [Abdomen Tenderness] : non-tender [Abdomen Mass (___ Cm)] : no abdominal mass palpated [Abnormal Walk] : normal gait [Skin Color & Pigmentation] : normal skin color and pigmentation [No Venous Stasis] : no venous stasis [] : no rash [Skin Lesions] : no skin lesions [No Skin Ulcers] : no skin ulcer [No Xanthoma] : no  xanthoma was observed [Deep Tendon Reflexes (DTR)] : deep tendon reflexes were 2+ and symmetric [Sensation] : the sensory exam was normal to light touch and pinprick [No Focal Deficits] : no focal deficits [Oriented To Time, Place, And Person] : oriented to person, place, and time [Impaired Insight] : insight and judgment were intact [Affect] : the affect was normal [FreeTextEntry1] : Overweight [FreeTextEntry2] : trace to 1 plus edema right. Varicose veins.

## 2019-05-10 NOTE — ASSESSMENT
[FreeTextEntry1] : Patient with multiple medical problems. All medical problems as well his medications were reviewed. Medications were renewed.\par \par There is no significant need for change in present medication or therapy.\par \par Labs drawn in office today\par

## 2019-05-14 LAB
ALBUMIN SERPL ELPH-MCNC: 4.3 G/DL
ALP BLD-CCNC: 57 U/L
ALT SERPL-CCNC: 19 U/L
AST SERPL-CCNC: 21 U/L
BILIRUB DIRECT SERPL-MCNC: 0.1 MG/DL
BILIRUB INDIRECT SERPL-MCNC: 0.2 MG/DL
BILIRUB SERPL-MCNC: 0.3 MG/DL
CHOLEST SERPL-MCNC: 157 MG/DL
CHOLEST/HDLC SERPL: 3.3 RATIO
HDLC SERPL-MCNC: 48 MG/DL
LDLC SERPL CALC-MCNC: 74 MG/DL
PROT SERPL-MCNC: 6.4 G/DL
TRIGL SERPL-MCNC: 177 MG/DL

## 2019-06-09 PROBLEM — I51.9 HEART DISEASE: Status: ACTIVE | Noted: 2018-07-25

## 2019-06-12 ENCOUNTER — FORM ENCOUNTER (OUTPATIENT)
Age: 79
End: 2019-06-12

## 2019-06-13 ENCOUNTER — OUTPATIENT (OUTPATIENT)
Dept: OUTPATIENT SERVICES | Facility: HOSPITAL | Age: 79
LOS: 1 days | End: 2019-06-13
Payer: MEDICARE

## 2019-06-13 ENCOUNTER — APPOINTMENT (OUTPATIENT)
Dept: MAMMOGRAPHY | Facility: IMAGING CENTER | Age: 79
End: 2019-06-13
Payer: MEDICARE

## 2019-06-13 DIAGNOSIS — L72.9 FOLLICULAR CYST OF THE SKIN AND SUBCUTANEOUS TISSUE, UNSPECIFIED: Chronic | ICD-10-CM

## 2019-06-13 DIAGNOSIS — Z00.8 ENCOUNTER FOR OTHER GENERAL EXAMINATION: ICD-10-CM

## 2019-06-13 DIAGNOSIS — Z98.89 OTHER SPECIFIED POSTPROCEDURAL STATES: Chronic | ICD-10-CM

## 2019-06-13 PROCEDURE — 77067 SCR MAMMO BI INCL CAD: CPT | Mod: 26

## 2019-06-13 PROCEDURE — 77063 BREAST TOMOSYNTHESIS BI: CPT | Mod: 26

## 2019-06-13 PROCEDURE — 77067 SCR MAMMO BI INCL CAD: CPT

## 2019-06-13 PROCEDURE — 77063 BREAST TOMOSYNTHESIS BI: CPT

## 2019-07-09 ENCOUNTER — OUTPATIENT (OUTPATIENT)
Dept: OUTPATIENT SERVICES | Facility: HOSPITAL | Age: 79
LOS: 1 days | Discharge: HOME | End: 2019-07-09

## 2019-07-09 DIAGNOSIS — L72.9 FOLLICULAR CYST OF THE SKIN AND SUBCUTANEOUS TISSUE, UNSPECIFIED: Chronic | ICD-10-CM

## 2019-07-09 DIAGNOSIS — Z98.89 OTHER SPECIFIED POSTPROCEDURAL STATES: Chronic | ICD-10-CM

## 2019-07-09 DIAGNOSIS — C85.88 OTHER SPECIFIED TYPES OF NON-HODGKIN LYMPHOMA, LYMPH NODES OF MULTIPLE SITES: ICD-10-CM

## 2019-07-11 ENCOUNTER — RESULT REVIEW (OUTPATIENT)
Age: 79
End: 2019-07-11

## 2019-07-11 ENCOUNTER — APPOINTMENT (OUTPATIENT)
Dept: HEMATOLOGY ONCOLOGY | Facility: CLINIC | Age: 79
End: 2019-07-11
Payer: MEDICARE

## 2019-07-11 VITALS
TEMPERATURE: 97.7 F | BODY MASS INDEX: 29.28 KG/M2 | RESPIRATION RATE: 16 BRPM | SYSTOLIC BLOOD PRESSURE: 132 MMHG | DIASTOLIC BLOOD PRESSURE: 78 MMHG | OXYGEN SATURATION: 98 % | WEIGHT: 149.91 LBS | HEART RATE: 62 BPM

## 2019-07-11 DIAGNOSIS — Z83.1 FAMILY HISTORY OF OTHER INFECTIOUS AND PARASITIC DISEASES: ICD-10-CM

## 2019-07-11 LAB
ALBUMIN SERPL ELPH-MCNC: 4.3 G/DL
ALP BLD-CCNC: 60 U/L
ALT SERPL-CCNC: 16 U/L
AST SERPL-CCNC: 18 U/L
B2 MICROGLOB SERPL-MCNC: 2.3 MG/L
BASOPHILS # BLD AUTO: 0 K/UL — SIGNIFICANT CHANGE UP (ref 0–0.2)
BASOPHILS NFR BLD AUTO: 0 % — SIGNIFICANT CHANGE UP (ref 0–2)
BILIRUB SERPL-MCNC: 0.4 MG/DL
BUN SERPL-MCNC: 15 MG/DL
CALCIUM SERPL-MCNC: 9.9 MG/DL
CHLORIDE SERPL-SCNC: 99 MMOL/L
CO2 SERPL-SCNC: 30 MMOL/L
CREAT SERPL-MCNC: 0.66 MG/DL
EOSINOPHIL # BLD AUTO: 0.3 K/UL — SIGNIFICANT CHANGE UP (ref 0–0.5)
EOSINOPHIL NFR BLD AUTO: 3.9 % — SIGNIFICANT CHANGE UP (ref 0–6)
GLUCOSE SERPL-MCNC: 105 MG/DL
HCT VFR BLD CALC: 42.6 % — SIGNIFICANT CHANGE UP (ref 34.5–45)
HGB BLD-MCNC: 14.3 G/DL — SIGNIFICANT CHANGE UP (ref 11.5–15.5)
LDH SERPL-CCNC: 110 U/L
LYMPHOCYTES # BLD AUTO: 1.4 K/UL — SIGNIFICANT CHANGE UP (ref 1–3.3)
LYMPHOCYTES # BLD AUTO: 19.7 % — SIGNIFICANT CHANGE UP (ref 13–44)
MCHC RBC-ENTMCNC: 29.8 PG — SIGNIFICANT CHANGE UP (ref 27–34)
MCHC RBC-ENTMCNC: 33.5 G/DL — SIGNIFICANT CHANGE UP (ref 32–36)
MCV RBC AUTO: 88.9 FL — SIGNIFICANT CHANGE UP (ref 80–100)
MONOCYTES # BLD AUTO: 0.7 K/UL — SIGNIFICANT CHANGE UP (ref 0–0.9)
MONOCYTES NFR BLD AUTO: 10.1 % — SIGNIFICANT CHANGE UP (ref 2–14)
NEUTROPHILS # BLD AUTO: 4.6 K/UL — SIGNIFICANT CHANGE UP (ref 1.8–7.4)
NEUTROPHILS NFR BLD AUTO: 66.3 % — SIGNIFICANT CHANGE UP (ref 43–77)
PLATELET # BLD AUTO: 227 K/UL — SIGNIFICANT CHANGE UP (ref 150–400)
POTASSIUM SERPL-SCNC: 4.4 MMOL/L
PROT SERPL-MCNC: 6.3 G/DL
RBC # BLD: 4.79 M/UL — SIGNIFICANT CHANGE UP (ref 3.8–5.2)
RBC # FLD: 12.1 % — SIGNIFICANT CHANGE UP (ref 10.3–14.5)
SODIUM SERPL-SCNC: 141 MMOL/L
WBC # BLD: 6.9 K/UL — SIGNIFICANT CHANGE UP (ref 3.8–10.5)
WBC # FLD AUTO: 6.9 K/UL — SIGNIFICANT CHANGE UP (ref 3.8–10.5)

## 2019-07-11 PROCEDURE — 99213 OFFICE O/P EST LOW 20 MIN: CPT

## 2019-07-11 NOTE — REVIEW OF SYSTEMS
[Lower Ext Edema] : lower extremity edema [Negative] : Neurological [Fever] : no fever [Night Sweats] : no night sweats [Chills] : no chills [Recent Change In Weight] : ~T no recent weight change [Fatigue] : no fatigue [Eye Pain] : no eye pain [Dysphagia] : no dysphagia [Nosebleeds] : no nosebleeds [Chest Pain] : no chest pain [Shortness Of Breath] : no shortness of breath [Cough] : no cough [SOB on Exertion] : no shortness of breath during exertion [Vomiting] : no vomiting [Abdominal Pain] : no abdominal pain [Skin Rash] : no skin rash [Muscle Pain] : no muscle pain [Skin Wound] : no skin wound [Depression] : no depression [Easy Bleeding] : no tendency for easy bleeding [Easy Bruising] : no tendency for easy bruising

## 2019-07-11 NOTE — PHYSICAL EXAM
[Fully active, able to carry on all pre-disease performance without restriction] : Status 0 - Fully active, able to carry on all pre-disease performance without restriction [Normal] : affect appropriate [de-identified] : No peripheral lymphadenopathy appreciated, no cervical, axillary or inguinal adenopathy appreciated on exam [de-identified] : non-pitting bilateral equal lower extremity edema [de-identified] : lower extremity varicosities. No other abnormalities observed on exposed areas

## 2019-07-11 NOTE — ASSESSMENT
[Palliative Care Plan] : not applicable at this time [FreeTextEntry1] : Mrs. Upton remains clinically DUC. Currently with no complaints. Potentially cured by RT for CSIA FL.\par Renal, splenic are small aneurysms were re imaged 2/2018 - no change in last two yrs. \par She also had a 1 cm FDG(-) indeterminate probable adrenal adenoma.\par \par CBC reviewed and stable, Beta2-microglobulin is mildly elevated to 2.3 (6 months previously was 2.1, LDH within normal limits at 110\par RV 6 months

## 2019-07-11 NOTE — HISTORY OF PRESENT ILLNESS
[de-identified] : Mrs. Upton is a 74 year old woman with follicular lymphoma, grade 1-2, stage IA, who received radiotherapy to a > 5 cm left inguinal node in late 2014. In October, 2014 she noted a left inguinal lump associated with some discomfort. CT scan showed a 4.8 x 3,2 cm left inguinal node. 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 PET/CT showed that the inguinal node had grown over a few weeks to 5.2 cm. SUV was 10.1. The adrenal nodule was FDG(-). Marrow was (-). Iron was present. Excisional biopsy of the inguinal node showed findings most c/w FL, with dim CD10(+), CD20, BCL-6 and BCL-2 (partial). FISH for BCL-IgH was (-). \par She was treated with RT which was well tolerated.\par \par  \par \par Disease: follicular lymphoma, gr 1-2 \par Stage:. IA. \par Pathology: FL, gr 1-2.  [de-identified] : Radiotherapy was completed in Jan 2015; she was treated by Dr. Brian Decker, 30 Gy in 15 fractions completed 1/9/15.\par Baseline CT scans showed small renal (1.3 cm);,splenic artery aneurysms. \par CT scans C/A/P 2/10/18 - no adenopathy, H/S megaly. Splenic and renal artery aneurysms unchanged.\par She reports a stable energy level and appetite without any recent weight loss. \par At this time she denies any pain, including back pain which she previously had reported in prior visits\par No constitutional c/o: weight stable, no fevers or recent illnesses, no night sweats\par WBC 6.9, Hgb 14.3, Plt 227,000. Nl diff\par HTN, hyperlipidemia medically controlled\par HTN well controlled with HCTZ, toprol XL, ARB

## 2019-08-14 LAB
DEPRECATED KAPPA LC FREE/LAMBDA SER: 0.99 RATIO
IGA SER QL IEP: 176 MG/DL
IGG SER QL IEP: 692 MG/DL
IGM SER QL IEP: 179 MG/DL
KAPPA LC CSF-MCNC: 1.58 MG/DL
KAPPA LC SERPL-MCNC: 1.57 MG/DL

## 2019-09-10 ENCOUNTER — APPOINTMENT (OUTPATIENT)
Dept: PULMONOLOGY | Facility: CLINIC | Age: 79
End: 2019-09-10
Payer: MEDICARE

## 2019-09-10 VITALS
WEIGHT: 149 LBS | BODY MASS INDEX: 29.25 KG/M2 | HEIGHT: 60 IN | OXYGEN SATURATION: 98 % | DIASTOLIC BLOOD PRESSURE: 74 MMHG | TEMPERATURE: 98 F | HEART RATE: 62 BPM | SYSTOLIC BLOOD PRESSURE: 138 MMHG | RESPIRATION RATE: 16 BRPM

## 2019-09-10 PROCEDURE — 90662 IIV NO PRSV INCREASED AG IM: CPT

## 2019-09-10 PROCEDURE — G0008: CPT

## 2019-09-10 PROCEDURE — 36415 COLL VENOUS BLD VENIPUNCTURE: CPT

## 2019-09-10 PROCEDURE — 99213 OFFICE O/P EST LOW 20 MIN: CPT | Mod: 25

## 2019-09-10 NOTE — PHYSICAL EXAM
[General Appearance - Well Nourished] : well nourished [General Appearance - Well Developed] : well developed [Normal Conjunctiva] : the conjunctiva exhibited no abnormalities [Normal Oropharynx] : normal oropharynx [Neck Cervical Mass (___cm)] : no neck mass was observed [Jugular Venous Distention Increased] : there was no jugular-venous distention [Heart Sounds] : normal S1 and S2 [Thyroid Diffuse Enlargement] : the thyroid was not enlarged [Murmurs] : no murmurs present [Auscultation Breath Sounds / Voice Sounds] : lungs were clear to auscultation bilaterally [Abdomen Tenderness] : non-tender [Abdomen Soft] : soft [Lungs Percussion] : the lungs were normal to percussion [Abdomen Mass (___ Cm)] : no abdominal mass palpated [Abnormal Walk] : normal gait [] : no rash [Skin Color & Pigmentation] : normal skin color and pigmentation [No Venous Stasis] : no venous stasis [Skin Lesions] : no skin lesions [No Skin Ulcers] : no skin ulcer [Deep Tendon Reflexes (DTR)] : deep tendon reflexes were 2+ and symmetric [No Xanthoma] : no  xanthoma was observed [Sensation] : the sensory exam was normal to light touch and pinprick [No Focal Deficits] : no focal deficits [Oriented To Time, Place, And Person] : oriented to person, place, and time [Affect] : the affect was normal [Impaired Insight] : insight and judgment were intact [FreeTextEntry1] : Overweight [FreeTextEntry2] : trace to 1 plus edema right. Varicose veins.

## 2019-09-11 LAB
ALBUMIN SERPL ELPH-MCNC: 4.2 G/DL
ALP BLD-CCNC: 61 U/L
ALT SERPL-CCNC: 14 U/L
AST SERPL-CCNC: 18 U/L
BILIRUB DIRECT SERPL-MCNC: 0.1 MG/DL
BILIRUB INDIRECT SERPL-MCNC: 0.2 MG/DL
BILIRUB SERPL-MCNC: 0.3 MG/DL
CHOLEST SERPL-MCNC: 157 MG/DL
CHOLEST/HDLC SERPL: 3.2 RATIO
ESTIMATED AVERAGE GLUCOSE: 131 MG/DL
HBA1C MFR BLD HPLC: 6.2 %
HDLC SERPL-MCNC: 49 MG/DL
LDLC SERPL CALC-MCNC: 80 MG/DL
PROT SERPL-MCNC: 6.5 G/DL
TRIGL SERPL-MCNC: 142 MG/DL

## 2019-09-16 ENCOUNTER — APPOINTMENT (OUTPATIENT)
Dept: PULMONOLOGY | Facility: CLINIC | Age: 79
End: 2019-09-16

## 2019-09-16 ENCOUNTER — OTHER (OUTPATIENT)
Age: 79
End: 2019-09-16

## 2019-09-16 ENCOUNTER — MEDICATION RENEWAL (OUTPATIENT)
Age: 79
End: 2019-09-16

## 2019-12-10 ENCOUNTER — APPOINTMENT (OUTPATIENT)
Dept: PULMONOLOGY | Facility: CLINIC | Age: 79
End: 2019-12-10
Payer: MEDICARE

## 2019-12-10 VITALS
HEART RATE: 63 BPM | TEMPERATURE: 98.1 F | DIASTOLIC BLOOD PRESSURE: 72 MMHG | OXYGEN SATURATION: 95 % | SYSTOLIC BLOOD PRESSURE: 135 MMHG | RESPIRATION RATE: 14 BRPM

## 2019-12-10 VITALS — WEIGHT: 140 LBS | BODY MASS INDEX: 27.34 KG/M2

## 2019-12-10 LAB — HBA1C MFR BLD HPLC: 5.8

## 2019-12-10 PROCEDURE — 36415 COLL VENOUS BLD VENIPUNCTURE: CPT

## 2019-12-10 PROCEDURE — 99213 OFFICE O/P EST LOW 20 MIN: CPT | Mod: 25

## 2019-12-10 PROCEDURE — 83036 HEMOGLOBIN GLYCOSYLATED A1C: CPT | Mod: QW

## 2019-12-10 RX ORDER — OLMESARTAN MEDOXOMIL AND HYDROCHLOROTHIAZIDE 40; 25 MG/1; MG/1
40-25 TABLET ORAL DAILY
Qty: 30 | Refills: 3 | Status: DISCONTINUED | COMMUNITY
Start: 2019-09-16 | End: 2019-12-10

## 2019-12-10 RX ORDER — LOSARTAN POTASSIUM AND HYDROCHLOROTHIAZIDE 25; 100 MG/1; MG/1
100-25 TABLET ORAL
Qty: 90 | Refills: 3 | Status: DISCONTINUED | COMMUNITY
Start: 2019-09-10 | End: 2019-12-10

## 2019-12-10 NOTE — PHYSICAL EXAM
[General Appearance - Well Developed] : well developed [General Appearance - Well Nourished] : well nourished [Normal Conjunctiva] : the conjunctiva exhibited no abnormalities [Normal Oropharynx] : normal oropharynx [Neck Cervical Mass (___cm)] : no neck mass was observed [Jugular Venous Distention Increased] : there was no jugular-venous distention [Thyroid Diffuse Enlargement] : the thyroid was not enlarged [Heart Sounds] : normal S1 and S2 [Murmurs] : no murmurs present [Auscultation Breath Sounds / Voice Sounds] : lungs were clear to auscultation bilaterally [Lungs Percussion] : the lungs were normal to percussion [Abdomen Soft] : soft [Abdomen Tenderness] : non-tender [Abdomen Mass (___ Cm)] : no abdominal mass palpated [Abnormal Walk] : normal gait [Skin Color & Pigmentation] : normal skin color and pigmentation [] : no rash [No Venous Stasis] : no venous stasis [Skin Lesions] : no skin lesions [No Skin Ulcers] : no skin ulcer [No Xanthoma] : no  xanthoma was observed [Deep Tendon Reflexes (DTR)] : deep tendon reflexes were 2+ and symmetric [Sensation] : the sensory exam was normal to light touch and pinprick [No Focal Deficits] : no focal deficits [Oriented To Time, Place, And Person] : oriented to person, place, and time [Impaired Insight] : insight and judgment were intact [Affect] : the affect was normal [FreeTextEntry1] : Overweight [FreeTextEntry2] : trace to 1 plus edema right. Varicose veins.

## 2019-12-10 NOTE — HISTORY OF PRESENT ILLNESS
[FreeTextEntry1] : patient didn’t like the way she felt had blurry vision and stopped the olmesartan/HCTZ 40/25 and just taking metoprolol and hydrochlorothiazide 25 and feels better . taking bp at home and it is under 120 /80. did lose weight with stress of

## 2019-12-10 NOTE — ASSESSMENT
[FreeTextEntry1] : Medications reviewed and renewed.\par will call if b/p increases\par Follow wt.\par r/t 3 month

## 2019-12-11 LAB
ALBUMIN SERPL ELPH-MCNC: 4.1 G/DL
ALP BLD-CCNC: 51 U/L
ALT SERPL-CCNC: 14 U/L
ANION GAP SERPL CALC-SCNC: 16 MMOL/L
AST SERPL-CCNC: 18 U/L
BILIRUB SERPL-MCNC: 0.3 MG/DL
BUN SERPL-MCNC: 20 MG/DL
CALCIUM SERPL-MCNC: 9.6 MG/DL
CHLORIDE SERPL-SCNC: 102 MMOL/L
CHOLEST SERPL-MCNC: 148 MG/DL
CHOLEST/HDLC SERPL: 3 RATIO
CO2 SERPL-SCNC: 26 MMOL/L
CREAT SERPL-MCNC: 0.81 MG/DL
GLUCOSE SERPL-MCNC: 112 MG/DL
HDLC SERPL-MCNC: 50 MG/DL
LDLC SERPL CALC-MCNC: 70 MG/DL
POTASSIUM SERPL-SCNC: 4 MMOL/L
PROT SERPL-MCNC: 6.4 G/DL
SODIUM SERPL-SCNC: 144 MMOL/L
TRIGL SERPL-MCNC: 139 MG/DL

## 2019-12-20 ENCOUNTER — APPOINTMENT (OUTPATIENT)
Dept: PULMONOLOGY | Facility: CLINIC | Age: 79
End: 2019-12-20
Payer: MEDICARE

## 2019-12-20 VITALS
WEIGHT: 140 LBS | DIASTOLIC BLOOD PRESSURE: 68 MMHG | HEIGHT: 60 IN | HEART RATE: 61 BPM | SYSTOLIC BLOOD PRESSURE: 158 MMHG | RESPIRATION RATE: 15 BRPM | BODY MASS INDEX: 27.48 KG/M2 | TEMPERATURE: 98.3 F | OXYGEN SATURATION: 97 %

## 2019-12-20 VITALS — SYSTOLIC BLOOD PRESSURE: 131 MMHG | DIASTOLIC BLOOD PRESSURE: 70 MMHG

## 2019-12-20 PROCEDURE — 99213 OFFICE O/P EST LOW 20 MIN: CPT

## 2019-12-22 NOTE — PHYSICAL EXAM
[General Appearance - Well Nourished] : well nourished [General Appearance - Well Developed] : well developed [Normal Conjunctiva] : the conjunctiva exhibited no abnormalities [Normal Oropharynx] : normal oropharynx [Jugular Venous Distention Increased] : there was no jugular-venous distention [Neck Cervical Mass (___cm)] : no neck mass was observed [Thyroid Diffuse Enlargement] : the thyroid was not enlarged [FreeTextEntry1] : Swollen gland right likely submandibular ? salivary [Murmurs] : no murmurs present [Heart Sounds] : normal S1 and S2 [Abdomen Soft] : soft [Auscultation Breath Sounds / Voice Sounds] : lungs were clear to auscultation bilaterally [Lungs Percussion] : the lungs were normal to percussion [Abdomen Tenderness] : non-tender [Abnormal Walk] : normal gait [Abdomen Mass (___ Cm)] : no abdominal mass palpated [] : no rash [FreeTextEntry2] : trace to 1 plus edema right. Varicose veins.  [Skin Color & Pigmentation] : normal skin color and pigmentation [Skin Lesions] : no skin lesions [No Venous Stasis] : no venous stasis [No Xanthoma] : no  xanthoma was observed [No Skin Ulcers] : no skin ulcer [No Focal Deficits] : no focal deficits [Sensation] : the sensory exam was normal to light touch and pinprick [Deep Tendon Reflexes (DTR)] : deep tendon reflexes were 2+ and symmetric [Impaired Insight] : insight and judgment were intact [Oriented To Time, Place, And Person] : oriented to person, place, and time [Affect] : the affect was normal

## 2019-12-22 NOTE — HISTORY OF PRESENT ILLNESS
[FreeTextEntry1] : Yesterday noticed a swelling under right jaw . Minimal pain to touch . Has not changed in size

## 2020-01-01 ENCOUNTER — FORM ENCOUNTER (OUTPATIENT)
Age: 80
End: 2020-01-01

## 2020-01-02 ENCOUNTER — APPOINTMENT (OUTPATIENT)
Dept: ULTRASOUND IMAGING | Facility: CLINIC | Age: 80
End: 2020-01-02
Payer: MEDICARE

## 2020-01-02 ENCOUNTER — OUTPATIENT (OUTPATIENT)
Dept: OUTPATIENT SERVICES | Facility: HOSPITAL | Age: 80
LOS: 1 days | End: 2020-01-02
Payer: MEDICARE

## 2020-01-02 DIAGNOSIS — R59.0 LOCALIZED ENLARGED LYMPH NODES: ICD-10-CM

## 2020-01-02 DIAGNOSIS — Z98.89 OTHER SPECIFIED POSTPROCEDURAL STATES: Chronic | ICD-10-CM

## 2020-01-02 DIAGNOSIS — L72.9 FOLLICULAR CYST OF THE SKIN AND SUBCUTANEOUS TISSUE, UNSPECIFIED: Chronic | ICD-10-CM

## 2020-01-02 PROCEDURE — 76536 US EXAM OF HEAD AND NECK: CPT | Mod: 26

## 2020-01-02 PROCEDURE — 76536 US EXAM OF HEAD AND NECK: CPT

## 2020-01-08 ENCOUNTER — OUTPATIENT (OUTPATIENT)
Dept: OUTPATIENT SERVICES | Facility: HOSPITAL | Age: 80
LOS: 1 days | Discharge: HOME | End: 2020-01-08

## 2020-01-08 DIAGNOSIS — Z98.89 OTHER SPECIFIED POSTPROCEDURAL STATES: Chronic | ICD-10-CM

## 2020-01-08 DIAGNOSIS — L72.9 FOLLICULAR CYST OF THE SKIN AND SUBCUTANEOUS TISSUE, UNSPECIFIED: Chronic | ICD-10-CM

## 2020-01-08 DIAGNOSIS — C85.88 OTHER SPECIFIED TYPES OF NON-HODGKIN LYMPHOMA, LYMPH NODES OF MULTIPLE SITES: ICD-10-CM

## 2020-01-09 ENCOUNTER — APPOINTMENT (OUTPATIENT)
Dept: HEMATOLOGY ONCOLOGY | Facility: CLINIC | Age: 80
End: 2020-01-09
Payer: MEDICARE

## 2020-01-09 ENCOUNTER — RESULT REVIEW (OUTPATIENT)
Age: 80
End: 2020-01-09

## 2020-01-09 VITALS
TEMPERATURE: 97.8 F | DIASTOLIC BLOOD PRESSURE: 74 MMHG | RESPIRATION RATE: 17 BRPM | BODY MASS INDEX: 26.78 KG/M2 | SYSTOLIC BLOOD PRESSURE: 157 MMHG | WEIGHT: 137.13 LBS | OXYGEN SATURATION: 98 % | HEART RATE: 57 BPM

## 2020-01-09 DIAGNOSIS — R59.0 LOCALIZED ENLARGED LYMPH NODES: ICD-10-CM

## 2020-01-09 LAB
BASOPHILS # BLD AUTO: 0 K/UL — SIGNIFICANT CHANGE UP (ref 0–0.2)
BASOPHILS NFR BLD AUTO: 0.4 % — SIGNIFICANT CHANGE UP (ref 0–2)
EOSINOPHIL # BLD AUTO: 0.2 K/UL — SIGNIFICANT CHANGE UP (ref 0–0.5)
EOSINOPHIL NFR BLD AUTO: 2.9 % — SIGNIFICANT CHANGE UP (ref 0–6)
HCT VFR BLD CALC: 44.1 % — SIGNIFICANT CHANGE UP (ref 34.5–45)
HGB BLD-MCNC: 14.9 G/DL — SIGNIFICANT CHANGE UP (ref 11.5–15.5)
LYMPHOCYTES # BLD AUTO: 1.4 K/UL — SIGNIFICANT CHANGE UP (ref 1–3.3)
LYMPHOCYTES # BLD AUTO: 18.5 % — SIGNIFICANT CHANGE UP (ref 13–44)
MCHC RBC-ENTMCNC: 30.1 PG — SIGNIFICANT CHANGE UP (ref 27–34)
MCHC RBC-ENTMCNC: 33.7 G/DL — SIGNIFICANT CHANGE UP (ref 32–36)
MCV RBC AUTO: 89.3 FL — SIGNIFICANT CHANGE UP (ref 80–100)
MONOCYTES # BLD AUTO: 0.7 K/UL — SIGNIFICANT CHANGE UP (ref 0–0.9)
MONOCYTES NFR BLD AUTO: 9.5 % — SIGNIFICANT CHANGE UP (ref 2–14)
NEUTROPHILS # BLD AUTO: 5.2 K/UL — SIGNIFICANT CHANGE UP (ref 1.8–7.4)
NEUTROPHILS NFR BLD AUTO: 68.6 % — SIGNIFICANT CHANGE UP (ref 43–77)
PLATELET # BLD AUTO: 204 K/UL — SIGNIFICANT CHANGE UP (ref 150–400)
RBC # BLD: 4.94 M/UL — SIGNIFICANT CHANGE UP (ref 3.8–5.2)
RBC # FLD: 12.1 % — SIGNIFICANT CHANGE UP (ref 10.3–14.5)
WBC # BLD: 7.6 K/UL — SIGNIFICANT CHANGE UP (ref 3.8–10.5)
WBC # FLD AUTO: 7.6 K/UL — SIGNIFICANT CHANGE UP (ref 3.8–10.5)

## 2020-01-09 PROCEDURE — 99214 OFFICE O/P EST MOD 30 MIN: CPT

## 2020-03-10 ENCOUNTER — EMERGENCY (EMERGENCY)
Facility: HOSPITAL | Age: 80
LOS: 1 days | Discharge: HOME | End: 2020-03-10
Admitting: EMERGENCY MEDICINE
Payer: MEDICARE

## 2020-03-10 VITALS
HEART RATE: 60 BPM | TEMPERATURE: 98 F | RESPIRATION RATE: 18 BRPM | SYSTOLIC BLOOD PRESSURE: 151 MMHG | OXYGEN SATURATION: 98 % | DIASTOLIC BLOOD PRESSURE: 64 MMHG

## 2020-03-10 DIAGNOSIS — L72.9 FOLLICULAR CYST OF THE SKIN AND SUBCUTANEOUS TISSUE, UNSPECIFIED: Chronic | ICD-10-CM

## 2020-03-10 DIAGNOSIS — Z98.89 OTHER SPECIFIED POSTPROCEDURAL STATES: Chronic | ICD-10-CM

## 2020-03-10 LAB
BASOPHILS # BLD AUTO: 0.04 K/UL — SIGNIFICANT CHANGE UP (ref 0–0.2)
BASOPHILS NFR BLD AUTO: 0.4 % — SIGNIFICANT CHANGE UP (ref 0–2)
EOSINOPHIL # BLD AUTO: 0.27 K/UL — SIGNIFICANT CHANGE UP (ref 0–0.5)
EOSINOPHIL NFR BLD AUTO: 2.9 % — SIGNIFICANT CHANGE UP (ref 0–6)
HCT VFR BLD CALC: 44.3 % — SIGNIFICANT CHANGE UP (ref 34.5–45)
HGB BLD-MCNC: 14.2 G/DL — SIGNIFICANT CHANGE UP (ref 11.5–15.5)
IMM GRANULOCYTES NFR BLD AUTO: 0.1 % — SIGNIFICANT CHANGE UP (ref 0–1.5)
LYMPHOCYTES # BLD AUTO: 1.54 K/UL — SIGNIFICANT CHANGE UP (ref 1–3.3)
LYMPHOCYTES # BLD AUTO: 16.6 % — SIGNIFICANT CHANGE UP (ref 13–44)
MCHC RBC-ENTMCNC: 28 PG — SIGNIFICANT CHANGE UP (ref 27–34)
MCHC RBC-ENTMCNC: 32.1 % — SIGNIFICANT CHANGE UP (ref 32–36)
MCV RBC AUTO: 87.2 FL — SIGNIFICANT CHANGE UP (ref 80–100)
MONOCYTES # BLD AUTO: 1.22 K/UL — HIGH (ref 0–0.9)
MONOCYTES NFR BLD AUTO: 13.1 % — SIGNIFICANT CHANGE UP (ref 2–14)
NEUTROPHILS # BLD AUTO: 6.2 K/UL — SIGNIFICANT CHANGE UP (ref 1.8–7.4)
NEUTROPHILS NFR BLD AUTO: 66.9 % — SIGNIFICANT CHANGE UP (ref 43–77)
NRBC # FLD: 0 K/UL — SIGNIFICANT CHANGE UP (ref 0–0)
PLATELET # BLD AUTO: 240 K/UL — SIGNIFICANT CHANGE UP (ref 150–400)
PMV BLD: 13.1 FL — HIGH (ref 7–13)
RBC # BLD: 5.08 M/UL — SIGNIFICANT CHANGE UP (ref 3.8–5.2)
RBC # FLD: 13.3 % — SIGNIFICANT CHANGE UP (ref 10.3–14.5)
WBC # BLD: 9.28 K/UL — SIGNIFICANT CHANGE UP (ref 3.8–10.5)
WBC # FLD AUTO: 9.28 K/UL — SIGNIFICANT CHANGE UP (ref 3.8–10.5)

## 2020-03-10 PROCEDURE — 99284 EMERGENCY DEPT VISIT MOD MDM: CPT | Mod: 25

## 2020-03-10 PROCEDURE — 10060 I&D ABSCESS SIMPLE/SINGLE: CPT

## 2020-03-10 RX ADMIN — Medication 1 TABLET(S): at 23:41

## 2020-03-10 NOTE — ED PROVIDER NOTE - NSFOLLOWUPCLINICS_GEN_ALL_ED_FT
[facility]  General Surgery  [address] [place] [address] - [address] [place] [address]  Phone: [phone]  Fax:   Follow Up Time:

## 2020-03-10 NOTE — ED PROVIDER NOTE - PATIENT PORTAL LINK FT
You can access the FollowMyHealth Patient Portal offered by [facility] by registering at the following website: [email or web address]. By joining [employer]’s FollowMyHealth portal, you will also be able to view your health information using other applications (apps) compatible with our system.

## 2020-03-10 NOTE — ED ADULT TRIAGE NOTE - CHIEF COMPLAINT QUOTE
Patient send to ED by GYN for abscess in buttock x 4 days, was told needs IV antibiotics. Denies fevers or chills. Hx HTN, HDL, on ASA.

## 2020-03-10 NOTE — ED ADULT NURSE NOTE - OBJECTIVE STATEMENT
pt a&ox3, calm and cooperative, c/o of abscess to buttock x 4 days, bloody discharge today. pt denies fever and chills. Respirations even/unlabored, nad noted. 20g iv to right ac, labs drawn and sent.

## 2020-03-10 NOTE — ED PROVIDER NOTE - OBJECTIVE STATEMENT
Patient is a 80 y/o F with pmhx of HTN presenting with c/o right gluteal abscess x 2 days. She denies hx of abscess, recenting shaving, trauma, insect bite prior to the onset of symptoms. She notes that she has drainage from wound She additionally denies fever, chills, n/c,

## 2020-03-10 NOTE — ED PROVIDER NOTE - SKIN, MLM
right buttock: 4cm x5cm erythema and induration with 1cm central fluctuance with spontanous drainage

## 2020-03-10 NOTE — ED PROVIDER NOTE - CLINICAL SUMMARY MEDICAL DECISION MAKING FREE TEXT BOX
right glutueal abscess 80 y/o F with rght gluteal abscess x 2 days. plan for routine labs, CT of pelvis, PO bactrim, I&D. patient will rx bactrim and advised to f/u in surgical clinic for wound check in 2 days.

## 2020-03-11 VITALS
HEART RATE: 67 BPM | TEMPERATURE: 98 F | RESPIRATION RATE: 14 BRPM | DIASTOLIC BLOOD PRESSURE: 82 MMHG | OXYGEN SATURATION: 98 % | SYSTOLIC BLOOD PRESSURE: 159 MMHG

## 2020-03-11 LAB
ALBUMIN SERPL ELPH-MCNC: 4 G/DL — SIGNIFICANT CHANGE UP (ref 3.3–5)
ALP SERPL-CCNC: 55 U/L — SIGNIFICANT CHANGE UP (ref 40–120)
ALT FLD-CCNC: 16 U/L — SIGNIFICANT CHANGE UP (ref 4–33)
ANION GAP SERPL CALC-SCNC: 14 MMO/L — SIGNIFICANT CHANGE UP (ref 7–14)
AST SERPL-CCNC: 17 U/L — SIGNIFICANT CHANGE UP (ref 4–32)
BILIRUB SERPL-MCNC: 0.5 MG/DL — SIGNIFICANT CHANGE UP (ref 0.2–1.2)
BUN SERPL-MCNC: 19 MG/DL — SIGNIFICANT CHANGE UP (ref 7–23)
CALCIUM SERPL-MCNC: 9.6 MG/DL — SIGNIFICANT CHANGE UP (ref 8.4–10.5)
CHLORIDE SERPL-SCNC: 96 MMOL/L — LOW (ref 98–107)
CO2 SERPL-SCNC: 29 MMOL/L — SIGNIFICANT CHANGE UP (ref 22–31)
CREAT SERPL-MCNC: 0.54 MG/DL — SIGNIFICANT CHANGE UP (ref 0.5–1.3)
GLUCOSE SERPL-MCNC: 107 MG/DL — HIGH (ref 70–99)
POTASSIUM SERPL-MCNC: 3.6 MMOL/L — SIGNIFICANT CHANGE UP (ref 3.5–5.3)
POTASSIUM SERPL-SCNC: 3.6 MMOL/L — SIGNIFICANT CHANGE UP (ref 3.5–5.3)
PROT SERPL-MCNC: 7.1 G/DL — SIGNIFICANT CHANGE UP (ref 6–8.3)
SODIUM SERPL-SCNC: 139 MMOL/L — SIGNIFICANT CHANGE UP (ref 135–145)

## 2020-03-11 PROCEDURE — 72193 CT PELVIS W/DYE: CPT | Mod: 26

## 2020-03-11 RX ORDER — IBUPROFEN 200 MG
1 TABLET ORAL
Qty: 20 | Refills: 0
Start: 2020-03-11

## 2020-03-12 NOTE — ED PROCEDURE NOTE - CPROC ED POST PROC CARE GUIDE1
Instructed patient/caregiver regarding signs and symptoms of infection./Verbal/written post procedure instructions were given to patient/caregiver./instructed to follow up in surgical clinic

## 2020-03-24 ENCOUNTER — RX RENEWAL (OUTPATIENT)
Age: 80
End: 2020-03-24

## 2020-04-07 PROBLEM — R59.0 ADENOPATHY, CERVICAL: Status: ACTIVE | Noted: 2019-12-20

## 2020-04-21 NOTE — ASSESSMENT
[Palliative Care Plan] : not applicable at this time [FreeTextEntry1] : Mrs. Upton remains clinically DUC. Currently with no complaints. Potentially cured by RT for CSIA FL.\par Renal, splenic are small aneurysms were re imaged 2/2018 - no change in last two yrs. \par She also had a 1 cm FDG(-) indeterminate probable adrenal adenoma.\par CBC reviewed and stable, Beta2-microglobulin is mildly elevated to 2.3 (6 months previously was 2.1, LDH within normal limits at 110\par \par Plan:\par Check CBC, CMP, LDH, B2M, immunoglobulin panel\par No further imaging needed at this time\par RV 6 months\par Patient seen with Dr. Camacho.

## 2020-04-21 NOTE — REVIEW OF SYSTEMS
[Lower Ext Edema] : lower extremity edema [Negative] : Neurological [Recent Change In Weight] : ~T recent weight change [Fever] : no fever [Night Sweats] : no night sweats [Chills] : no chills [Dysphagia] : no dysphagia [Eye Pain] : no eye pain [Fatigue] : no fatigue [Cough] : no cough [Shortness Of Breath] : no shortness of breath [Chest Pain] : no chest pain [Nosebleeds] : no nosebleeds [Abdominal Pain] : no abdominal pain [Vomiting] : no vomiting [SOB on Exertion] : no shortness of breath during exertion [Muscle Pain] : no muscle pain [Skin Rash] : no skin rash [Skin Wound] : no skin wound [Easy Bleeding] : no tendency for easy bleeding [Depression] : no depression [Easy Bruising] : no tendency for easy bruising [FreeTextEntry4] : swelling under right jaw resolved

## 2020-04-21 NOTE — HISTORY OF PRESENT ILLNESS
[de-identified] : Mrs. Upton is a 74 year old woman with follicular lymphoma, grade 1-2, stage IA, who received radiotherapy to a > 5 cm left inguinal node in late 2014. In October, 2014 she noted a left inguinal lump associated with some discomfort. CT scan showed a 4.8 x 3,2 cm left inguinal node. 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 PET/CT showed that the inguinal node had grown over a few weeks to 5.2 cm. SUV was 10.1. The adrenal nodule was FDG(-). Marrow was (-). Iron was present. Excisional biopsy of the inguinal node showed findings most c/w FL, with dim CD10(+), CD20, BCL-6 and BCL-2 (partial). FISH for BCL-IgH was (-). \par She was treated with RT which was well tolerated.\par \par  \par \par Disease: follicular lymphoma, gr 1-2 \par Stage:. IA. \par Pathology: FL, gr 1-2.  [de-identified] : 1/9/2020 Office Visit:\rohith Here for follow-up for FL.  \par \par Patient reports a stable energy level and appetite with a 10-12 pound non-deliberate weight loss over the past 6 months.  She had recent swelling under right jaw that resolved. Sonogram of neck on 1/2/2020 showed no adenopathy. She denies recent infections, fevers, CP, SOB, abd pain, frequent HAs, dizziness, night sweats or swollen glands. \par \par Radiotherapy was completed in Jan 2015; she was treated by Dr. Brian Decker, 30 Gy in 15 fractions completed 1/9/15.\par Baseline CT scans showed small renal (1.3 cm);,splenic artery aneurysms. Last CT scans C/A/P 2/10/18. She has not seen vascular. \par HTN, hyperlipidemia medically controlled\par HTN well controlled with HCTZ, Toprol XL, ARB\par Received Influenza vaccine this season and Shingrix vaccine

## 2020-04-21 NOTE — PHYSICAL EXAM
[Fully active, able to carry on all pre-disease performance without restriction] : Status 0 - Fully active, able to carry on all pre-disease performance without restriction [Normal] : no peripheral adenopathy appreciated [de-identified] : No cervical, axillary or inguinal adenopathy appreciated on exam [de-identified] : lower extremity varicosities. [de-identified] : non-pitting bilateral equal lower extremity edema

## 2020-05-11 ENCOUNTER — APPOINTMENT (OUTPATIENT)
Dept: PULMONOLOGY | Facility: CLINIC | Age: 80
End: 2020-05-11
Payer: MEDICARE

## 2020-05-11 PROCEDURE — 99213 OFFICE O/P EST LOW 20 MIN: CPT | Mod: 95

## 2020-05-11 NOTE — HISTORY OF PRESENT ILLNESS
[TextBox_4] : This visit was provided via telehealth using real-time 2-way audio visual technology. The patient, ARON SCHAEFER , was located at home, [address] [facility]\[place] [address]  at the time of the visit.\par The provider, Darnell Maravilla, was located at office [address] [place] [address] at the time of the visit. \par \par  The patient, Ms. ARON SCHAEFER  and Physician Darnell Bynum participated in the telehealth encounter.\par \par Verbal consent obtained by  from patient\par \par Staying home, staying safe.\par /63\par \par No complaints.\par \par Compliant to meds.

## 2020-05-11 NOTE — ASSESSMENT
[FreeTextEntry1] : Medications reviewed and renewed.\par Office f/u after pandemic.\par \par Duration discussion and decision making 15 minutes.\par

## 2020-07-14 ENCOUNTER — APPOINTMENT (OUTPATIENT)
Dept: PULMONOLOGY | Facility: CLINIC | Age: 80
End: 2020-07-14
Payer: MEDICARE

## 2020-07-14 VITALS
HEIGHT: 60 IN | HEART RATE: 56 BPM | BODY MASS INDEX: 27.48 KG/M2 | DIASTOLIC BLOOD PRESSURE: 70 MMHG | TEMPERATURE: 98 F | WEIGHT: 140 LBS | OXYGEN SATURATION: 96 % | SYSTOLIC BLOOD PRESSURE: 154 MMHG

## 2020-07-14 VITALS — SYSTOLIC BLOOD PRESSURE: 149 MMHG | DIASTOLIC BLOOD PRESSURE: 72 MMHG

## 2020-07-14 PROCEDURE — 99213 OFFICE O/P EST LOW 20 MIN: CPT | Mod: 25

## 2020-07-14 PROCEDURE — 36415 COLL VENOUS BLD VENIPUNCTURE: CPT

## 2020-07-14 NOTE — HISTORY OF PRESENT ILLNESS
[Never] : never [TextBox_4] : Feeling well \par only c/o right leg pain with walking , has varicose veins\par did go to [facility] for gluteal abscess on ct and treated with Bactrim,sent by GYN. resolved saw gyn

## 2020-07-14 NOTE — PHYSICAL EXAM
[No Acute Distress] : no acute distress [Normal Oropharynx] : normal oropharynx [Supple] : supple [No JVD] : no jvd [Normal S1, S2] : normal s1, s2 [Clear to Auscultation Bilaterally] : clear to auscultation bilaterally [Normal to Percussion] : normal to percussion [Benign] : benign [No Clubbing] : no clubbing [No Cyanosis] : no cyanosis [No Edema] : no edema

## 2020-07-14 NOTE — ASSESSMENT
[FreeTextEntry1] : Medications reviewed and renewed.\par Labs drawn in office today\par \par \par \par

## 2020-07-15 LAB
ALBUMIN SERPL ELPH-MCNC: 4.6 G/DL
ALP BLD-CCNC: 61 U/L
ALT SERPL-CCNC: 22 U/L
ANION GAP SERPL CALC-SCNC: 13 MMOL/L
AST SERPL-CCNC: 25 U/L
BILIRUB SERPL-MCNC: 0.3 MG/DL
BUN SERPL-MCNC: 20 MG/DL
CALCIUM SERPL-MCNC: 9.7 MG/DL
CHLORIDE SERPL-SCNC: 99 MMOL/L
CHOLEST SERPL-MCNC: 164 MG/DL
CHOLEST/HDLC SERPL: 2.8 RATIO
CO2 SERPL-SCNC: 32 MMOL/L
CREAT SERPL-MCNC: 0.63 MG/DL
ESTIMATED AVERAGE GLUCOSE: 128 MG/DL
GLUCOSE SERPL-MCNC: 86 MG/DL
HBA1C MFR BLD HPLC: 6.1 %
HDLC SERPL-MCNC: 59 MG/DL
LDLC SERPL CALC-MCNC: 83 MG/DL
POTASSIUM SERPL-SCNC: 4.1 MMOL/L
PROT SERPL-MCNC: 6.7 G/DL
SODIUM SERPL-SCNC: 144 MMOL/L
TRIGL SERPL-MCNC: 109 MG/DL

## 2020-07-16 ENCOUNTER — APPOINTMENT (OUTPATIENT)
Dept: HEMATOLOGY ONCOLOGY | Facility: CLINIC | Age: 80
End: 2020-07-16

## 2020-08-24 ENCOUNTER — RX RENEWAL (OUTPATIENT)
Age: 80
End: 2020-08-24

## 2020-09-14 ENCOUNTER — RX RENEWAL (OUTPATIENT)
Age: 80
End: 2020-09-14

## 2020-10-12 ENCOUNTER — APPOINTMENT (OUTPATIENT)
Dept: PULMONOLOGY | Facility: CLINIC | Age: 80
End: 2020-10-12
Payer: MEDICARE

## 2020-10-12 VITALS — DIASTOLIC BLOOD PRESSURE: 73 MMHG | SYSTOLIC BLOOD PRESSURE: 196 MMHG

## 2020-10-12 VITALS — DIASTOLIC BLOOD PRESSURE: 76 MMHG | SYSTOLIC BLOOD PRESSURE: 144 MMHG

## 2020-10-12 PROCEDURE — 90662 IIV NO PRSV INCREASED AG IM: CPT

## 2020-10-12 PROCEDURE — 99214 OFFICE O/P EST MOD 30 MIN: CPT | Mod: 25

## 2020-10-12 PROCEDURE — 36415 COLL VENOUS BLD VENIPUNCTURE: CPT

## 2020-10-12 PROCEDURE — G0008: CPT

## 2020-10-13 LAB
ALBUMIN SERPL ELPH-MCNC: 4.1 G/DL
ALP BLD-CCNC: 61 U/L
ALT SERPL-CCNC: 18 U/L
ANION GAP SERPL CALC-SCNC: 11 MMOL/L
AST SERPL-CCNC: 22 U/L
BILIRUB SERPL-MCNC: 0.2 MG/DL
BUN SERPL-MCNC: 17 MG/DL
CALCIUM SERPL-MCNC: 9.6 MG/DL
CHLORIDE SERPL-SCNC: 99 MMOL/L
CHOLEST SERPL-MCNC: 159 MG/DL
CHOLEST/HDLC SERPL: 2.7 RATIO
CO2 SERPL-SCNC: 32 MMOL/L
CREAT SERPL-MCNC: 0.53 MG/DL
ESTIMATED AVERAGE GLUCOSE: 128 MG/DL
GLUCOSE SERPL-MCNC: 85 MG/DL
HBA1C MFR BLD HPLC: 6.1 %
HDLC SERPL-MCNC: 58 MG/DL
LDLC SERPL CALC-MCNC: 78 MG/DL
POTASSIUM SERPL-SCNC: 3.9 MMOL/L
PROT SERPL-MCNC: 6.3 G/DL
SODIUM SERPL-SCNC: 141 MMOL/L
TRIGL SERPL-MCNC: 111 MG/DL
TSH SERPL-ACNC: 1.04 UIU/ML

## 2020-10-15 NOTE — HISTORY OF PRESENT ILLNESS
[Never] : never [TextBox_4] : Lost  recently , having a hard time coming into office. Has 2 daughters helping\par Medically with no c/o\par needs flu shot and labs

## 2020-10-15 NOTE — ASSESSMENT
[FreeTextEntry1] : Medications reviewed and renewed.\par Labs drawn in office today\par ADA diet.\par r/t PE in February \par \par \par

## 2020-10-15 NOTE — DISCUSSION/SUMMARY
[FreeTextEntry1] : HTN.  Probable component of white coat hypertension.\par Hyperlipidemia\par Prediabetes.

## 2020-10-15 NOTE — PHYSICAL EXAM
[No Acute Distress] : no acute distress [Normal Oropharynx] : normal oropharynx [Supple] : supple [No JVD] : no jvd [Normal S1, S2] : normal s1, s2 [Clear to Auscultation Bilaterally] : clear to auscultation bilaterally [Normal to Percussion] : normal to percussion [Benign] : benign [No Clubbing] : no clubbing [No Cyanosis] : no cyanosis [No Edema] : no edema [Normal Appearance] : normal appearance

## 2021-01-01 LAB
ALBUMIN SERPL ELPH-MCNC: 4.2 G/DL
ALP BLD-CCNC: 51 U/L
ALT SERPL-CCNC: 19 U/L
ANION GAP SERPL CALC-SCNC: 15 MMOL/L
AST SERPL-CCNC: 21 U/L
B2 MICROGLOB SERPL-MCNC: 2.2 MG/L
BILIRUB SERPL-MCNC: 0.3 MG/DL
BUN SERPL-MCNC: 16 MG/DL
CALCIUM SERPL-MCNC: 9.5 MG/DL
CHLORIDE SERPL-SCNC: 99 MMOL/L
CO2 SERPL-SCNC: 28 MMOL/L
CREAT SERPL-MCNC: 0.58 MG/DL
DEPRECATED KAPPA LC FREE/LAMBDA SER: 1 RATIO
GLUCOSE SERPL-MCNC: 93 MG/DL
IGA SER QL IEP: 185 MG/DL
IGG SER QL IEP: 736 MG/DL
IGM SER QL IEP: 186 MG/DL
KAPPA LC CSF-MCNC: 1.42 MG/DL
KAPPA LC SERPL-MCNC: 1.42 MG/DL
LDH SERPL-CCNC: 115 U/L
POTASSIUM SERPL-SCNC: 4.1 MMOL/L
PROT SERPL-MCNC: 6.4 G/DL
SODIUM SERPL-SCNC: 142 MMOL/L

## 2021-02-09 ENCOUNTER — RESULT CHARGE (OUTPATIENT)
Age: 81
End: 2021-02-09

## 2021-02-09 ENCOUNTER — APPOINTMENT (OUTPATIENT)
Dept: PULMONOLOGY | Facility: CLINIC | Age: 81
End: 2021-02-09
Payer: MEDICARE

## 2021-02-09 ENCOUNTER — LABORATORY RESULT (OUTPATIENT)
Age: 81
End: 2021-02-09

## 2021-02-09 ENCOUNTER — NON-APPOINTMENT (OUTPATIENT)
Age: 81
End: 2021-02-09

## 2021-02-09 VITALS — DIASTOLIC BLOOD PRESSURE: 70 MMHG | SYSTOLIC BLOOD PRESSURE: 140 MMHG

## 2021-02-09 VITALS
HEART RATE: 61 BPM | TEMPERATURE: 98.3 F | DIASTOLIC BLOOD PRESSURE: 67 MMHG | SYSTOLIC BLOOD PRESSURE: 158 MMHG | OXYGEN SATURATION: 95 %

## 2021-02-09 DIAGNOSIS — Z87.39 PERSONAL HISTORY OF OTHER DISEASES OF THE MUSCULOSKELETAL SYSTEM AND CONNECTIVE TISSUE: ICD-10-CM

## 2021-02-09 LAB
ALBUMIN: 10
BILIRUB UR QL STRIP: NORMAL
CLARITY UR: CLEAR
COLLECTION METHOD: NORMAL
CREATININE: 50
GLUCOSE UR-MCNC: NORMAL
HCG UR QL: 0.2 EU/DL
HGB UR QL STRIP.AUTO: NORMAL
KETONES UR-MCNC: NORMAL
LEUKOCYTE ESTERASE UR QL STRIP: NORMAL
MICROALBUMIN/CREAT UR TEST STR-RTO: 30
NITRITE UR QL STRIP: NORMAL
PH UR STRIP: 5.5
PROT UR STRIP-MCNC: NORMAL
SP GR UR STRIP: 1.02

## 2021-02-09 PROCEDURE — 82044 UR ALBUMIN SEMIQUANTITATIVE: CPT | Mod: QW

## 2021-02-09 PROCEDURE — 99214 OFFICE O/P EST MOD 30 MIN: CPT | Mod: 25

## 2021-02-09 PROCEDURE — 93000 ELECTROCARDIOGRAM COMPLETE: CPT

## 2021-02-09 PROCEDURE — 77085 DXA BONE DENSITY AXL VRT FX: CPT

## 2021-02-09 PROCEDURE — 36415 COLL VENOUS BLD VENIPUNCTURE: CPT

## 2021-02-09 NOTE — ASSESSMENT
[FreeTextEntry1] : Has appt COVID vaccine\par Medications reviewed and renewed.\par Labs drawn in office today\par Should repeat echo and carotids.\par Calcium/VITamin D/Exercise as treatment for bone loss discussed.\par \par Follow-up in 6 months or sooner on a as needed basis.\par

## 2021-02-09 NOTE — HISTORY OF PRESENT ILLNESS
[TextBox_4] : cologaurd 2 years ago\par Colonoscopy refuses\par Mammo 2 years ago\par GYN going next month\par Optho needs to go\par Derm never\par BMD today\par \par seeing oncologist \par Notices mole on  left side of face, wants to see a dermatologist\par \par feeling ok , less grief after husbands death

## 2021-02-09 NOTE — DISCUSSION/SUMMARY
[FreeTextEntry1] : Pretension on therapy.\par Hyperlipidemia on treatment protocol including medications, diet, and exercise program as tolerated.\par Continue present therapy\par Lymphoma followed by oncology.\par Osteopenia

## 2021-02-09 NOTE — PHYSICAL EXAM
[General Appearance - Well Developed] : well developed [General Appearance - Well Nourished] : well nourished [Normal Oropharynx] : normal oropharynx [Neck Cervical Mass (___cm)] : no neck mass was observed [Jugular Venous Distention Increased] : there was no jugular-venous distention [Thyroid Diffuse Enlargement] : the thyroid was not enlarged [Heart Sounds] : normal S1 and S2 [Auscultation Breath Sounds / Voice Sounds] : lungs were clear to auscultation bilaterally [Lungs Percussion] : the lungs were normal to percussion [Abdomen Soft] : soft [Abdomen Tenderness] : non-tender [Abdomen Mass (___ Cm)] : no abdominal mass palpated [] : no rash [Skin Color & Pigmentation] : normal skin color and pigmentation [No Venous Stasis] : no venous stasis [Skin Lesions] : no skin lesions [No Skin Ulcers] : no skin ulcer [No Xanthoma] : no  xanthoma was observed [Deep Tendon Reflexes (DTR)] : deep tendon reflexes were 2+ and symmetric [Sensation] : the sensory exam was normal to light touch and pinprick [No Focal Deficits] : no focal deficits [Oriented To Time, Place, And Person] : oriented to person, place, and time [Impaired Insight] : insight and judgment were intact [Affect] : the affect was normal [Nail Clubbing] : no clubbing of the fingernails [Cyanosis, Localized] : no localized cyanosis [FreeTextEntry1] : 1-2/6 syst. m [FreeTextEntry2] :  Varicose veins.

## 2021-02-09 NOTE — PROCEDURE
[FreeTextEntry1] : Venipuncture for labs\par Echocardiogram reveals normal sinus rhythm with no significant change from prior examination.\par \par BMD attached osteopenia

## 2021-02-10 LAB
25(OH)D3 SERPL-MCNC: 42.1 NG/ML
ALBUMIN SERPL ELPH-MCNC: 4.4 G/DL
ALP BLD-CCNC: 67 U/L
ALT SERPL-CCNC: 22 U/L
ANION GAP SERPL CALC-SCNC: 16 MMOL/L
AST SERPL-CCNC: 22 U/L
BASOPHILS # BLD AUTO: 0.04 K/UL
BASOPHILS NFR BLD AUTO: 0.6 %
BILIRUB DIRECT SERPL-MCNC: 0.1 MG/DL
BILIRUB INDIRECT SERPL-MCNC: 0.2 MG/DL
BILIRUB SERPL-MCNC: 0.2 MG/DL
BUN SERPL-MCNC: 20 MG/DL
CALCIUM SERPL-MCNC: 9.7 MG/DL
CHLORIDE SERPL-SCNC: 101 MMOL/L
CHOLEST SERPL-MCNC: 165 MG/DL
CO2 SERPL-SCNC: 26 MMOL/L
CREAT SERPL-MCNC: 0.66 MG/DL
EOSINOPHIL # BLD AUTO: 0.25 K/UL
EOSINOPHIL NFR BLD AUTO: 3.9 %
ESTIMATED AVERAGE GLUCOSE: 126 MG/DL
GLUCOSE SERPL-MCNC: 95 MG/DL
HBA1C MFR BLD HPLC: 6 %
HCT VFR BLD CALC: 44.3 %
HDLC SERPL-MCNC: 63 MG/DL
HGB BLD-MCNC: 14 G/DL
IMM GRANULOCYTES NFR BLD AUTO: 0.2 %
LDLC SERPL CALC-MCNC: 84 MG/DL
LYMPHOCYTES # BLD AUTO: 1.47 K/UL
LYMPHOCYTES NFR BLD AUTO: 22.7 %
MAN DIFF?: NORMAL
MCHC RBC-ENTMCNC: 28.5 PG
MCHC RBC-ENTMCNC: 31.6 GM/DL
MCV RBC AUTO: 90.2 FL
MONOCYTES # BLD AUTO: 0.75 K/UL
MONOCYTES NFR BLD AUTO: 11.6 %
NEUTROPHILS # BLD AUTO: 3.95 K/UL
NEUTROPHILS NFR BLD AUTO: 61 %
NONHDLC SERPL-MCNC: 102 MG/DL
PLATELET # BLD AUTO: 221 K/UL
POTASSIUM SERPL-SCNC: 4.3 MMOL/L
PROT SERPL-MCNC: 6.2 G/DL
RBC # BLD: 4.91 M/UL
RBC # FLD: 13.4 %
SODIUM SERPL-SCNC: 142 MMOL/L
T3 SERPL-MCNC: 119 NG/DL
T3RU NFR SERPL: 1.1 TBI
T4 FREE SERPL-MCNC: 1.1 NG/DL
T4 SERPL-MCNC: 7.7 UG/DL
TRIGL SERPL-MCNC: 90 MG/DL
TSH SERPL-ACNC: 1.31 UIU/ML
WBC # FLD AUTO: 6.47 K/UL

## 2021-03-17 ENCOUNTER — EMERGENCY (EMERGENCY)
Facility: HOSPITAL | Age: 81
LOS: 1 days | Discharge: HOME | End: 2021-03-17
Attending: EMERGENCY MEDICINE
Payer: MEDICARE

## 2021-03-17 VITALS
DIASTOLIC BLOOD PRESSURE: 74 MMHG | WEIGHT: 145.06 LBS | TEMPERATURE: 98 F | SYSTOLIC BLOOD PRESSURE: 175 MMHG | RESPIRATION RATE: 18 BRPM | HEART RATE: 60 BPM | HEIGHT: 57 IN | OXYGEN SATURATION: 97 %

## 2021-03-17 VITALS
RESPIRATION RATE: 16 BRPM | OXYGEN SATURATION: 99 % | HEART RATE: 67 BPM | SYSTOLIC BLOOD PRESSURE: 172 MMHG | TEMPERATURE: 98 F | DIASTOLIC BLOOD PRESSURE: 73 MMHG

## 2021-03-17 DIAGNOSIS — L72.9 FOLLICULAR CYST OF THE SKIN AND SUBCUTANEOUS TISSUE, UNSPECIFIED: Chronic | ICD-10-CM

## 2021-03-17 DIAGNOSIS — Z98.89 OTHER SPECIFIED POSTPROCEDURAL STATES: Chronic | ICD-10-CM

## 2021-03-17 PROCEDURE — 71045 X-RAY EXAM CHEST 1 VIEW: CPT | Mod: 26

## 2021-03-17 PROCEDURE — 71045 X-RAY EXAM CHEST 1 VIEW: CPT

## 2021-03-17 PROCEDURE — 72170 X-RAY EXAM OF PELVIS: CPT

## 2021-03-17 PROCEDURE — 72125 CT NECK SPINE W/O DYE: CPT | Mod: 26,MH

## 2021-03-17 PROCEDURE — 70450 CT HEAD/BRAIN W/O DYE: CPT | Mod: 26,MH

## 2021-03-17 PROCEDURE — 70450 CT HEAD/BRAIN W/O DYE: CPT

## 2021-03-17 PROCEDURE — 99284 EMERGENCY DEPT VISIT MOD MDM: CPT | Mod: 25

## 2021-03-17 PROCEDURE — 99284 EMERGENCY DEPT VISIT MOD MDM: CPT

## 2021-03-17 PROCEDURE — 72125 CT NECK SPINE W/O DYE: CPT

## 2021-03-17 PROCEDURE — 72170 X-RAY EXAM OF PELVIS: CPT | Mod: 26

## 2021-03-17 NOTE — ED PROVIDER NOTE - PATIENT PORTAL LINK FT
You can access the FollowMyHealth Patient Portal offered by [facility] by registering at the following website: [email or web address]. By joining [employer]’s FollowMyHealth portal, you will also be able to view your health information using other applications (apps) compatible with our system.

## 2021-03-17 NOTE — ED PROVIDER NOTE - ATTENDING CONTRIBUTION TO CARE
79 y/o f with pmhx HTN, HLD, on ASA, presents by EMS from MD office for episode of fall. Patient is Maltese speaking and states she was at the office putting her pants on, lost her balance and fell backwards and hit the back of her head. no loc. witnessed episode. no fever or chills no numbness. no incontinence of bowel or bladder. patient denies any chest pain.   GCS: 15  Primary Survey ABCDE intact  Secondary Survey:  Gen:  No respiratory Distress  /no distress from pain, well appearing elderly woman.   HEENT: pupils 3 mm reactive to light equally,   EOMI  NO Raccoon Eyes/ Coe Sign/ Neck: C- spine non tender. no step off or deformity. tm clear. no scalp hematoma palpated  Lungs: breath sounds:   CVS: S1S2,    Distal Pulses: 2+ radial  Abd: soft non tender no distention  Extremities: no edema or erythema. no tenderness.   MSK: strength: 5/5 b/l upper and lower ext, moving all ext spontaneously  Back: no midline tend or step off  Neuro: aaox3 no foal deficits.

## 2021-03-17 NOTE — ED ADULT TRIAGE NOTE - CHIEF COMPLAINT QUOTE
Mechanical trip and fall while getting dressed after Gyno appointment, hit back of head on door, no LOC, pt is on asa.  No dizziness, chest pain, palpitations prior. Pt currently denies headache, dizziness, vision changes.

## 2021-03-17 NOTE — ED PROVIDER NOTE - CARE PLAN
Principal Discharge DX:	Fall   Principal Discharge DX:	Blunt head injury, initial encounter  Secondary Diagnosis:	Fall

## 2021-03-17 NOTE — ED ADULT NURSE NOTE - OBJECTIVE STATEMENT
81 y/o female reports head injury.  Pt reports she was at the MD's office, while getting dressed, she was putting on her pants when she fell back and hit the back oh her head on the door.  Pt reports no LOC, no dizziness and no lightheadedness prior to the fall, no vision changes, no chest pain, no SOB, no fever, no chills.  Respiration easy and unlabored.  Extremities mobile, sensation intact, full ROM.  No acute respiratory distress noted. 79 y/o female reports head injury.  Pt reports she was at the MD's office, while getting dressed, she was putting on her pants when she fell back and hit the back oh her head on the door.  No scalp laceration noted.  Pt reports no LOC, no dizziness and no lightheadedness prior to the fall, no vision changes, no chest pain, no SOB, no fever, no chills.  Respiration easy and unlabored.  Extremities mobile, sensation intact, full ROM.  No acute respiratory distress noted. 81 y/o female reports head injury.  Pt reports she was at the MD's office, while getting dressed, she was putting on her pants when she fell back and hit the back oh her head on the door.  No head laceration noted.  Pt reports no LOC, no dizziness and no lightheadedness prior to the fall, no vision changes, no chest pain, no SOB, no fever, no chills.  Respiration easy and unlabored.  Extremities mobile, sensation intact, full ROM.  No acute respiratory distress noted. 81 y/o female reports head injury.  Pt reports she was at the MD's office, while getting dressed, she was putting on her pants when she fell back and hit the back oh her head on the door.  No head laceration noted.  Pt reports no LOC, no dizziness, no lightheadedness, no vision changes, no chest pain, no SOB, no fever, no chills.  Respiration easy and unlabored.  Extremities mobile, sensation intact, full ROM.  No acute respiratory distress noted.

## 2021-03-17 NOTE — ED PROVIDER NOTE - OBJECTIVE STATEMENT
81 y/o female PMHx HTN, HLD on ASA now presenting to the ED after witnessed mechanical this morning while in doctors office. Patient reported she was putting her pants on and then fell backwards hitting the 81 y/o female PMHx HTN, HLD on ASA now presenting to the ED after witnessed mechanical this morning while in doctors office. Patient reported she was putting her pants on and then fell backwards hitting the back of her head without laceration or abrasion. Patient denied CP, SOB, abdominal pain, N/V/D/, dizziness, headache, blurry vision, back pain, urinary or fecal incontinence

## 2021-03-17 NOTE — ED ADULT NURSE NOTE - CAS EDN DISCHARGE ASSESSMENT
Duration Of Freeze Thaw-Cycle (Seconds): 0
Post-Care Instructions: I reviewed with the patient in detail post-care instructions. Patient is to wear sunprotection, and avoid picking at any of the treated lesions. Pt may apply Vaseline to crusted or scabbing areas.
Number Of Freeze-Thaw Cycles: 2 freeze-thaw cycles
Render Post-Care Instructions In Note?: no
Consent: The patient's consent was obtained including but not limited to risks of crusting, scabbing, blistering, scarring, darker or lighter pigmentary change, recurrence, incomplete removal and infection.
Detail Level: Detailed
Alert and oriented to person, place and time

## 2021-04-07 ENCOUNTER — RX RENEWAL (OUTPATIENT)
Age: 81
End: 2021-04-07

## 2021-05-27 ENCOUNTER — APPOINTMENT (OUTPATIENT)
Dept: CARDIOLOGY | Facility: CLINIC | Age: 81
End: 2021-05-27
Payer: MEDICARE

## 2021-05-27 ENCOUNTER — NON-APPOINTMENT (OUTPATIENT)
Age: 81
End: 2021-05-27

## 2021-05-27 VITALS
HEIGHT: 60 IN | HEART RATE: 62 BPM | OXYGEN SATURATION: 98 % | DIASTOLIC BLOOD PRESSURE: 79 MMHG | BODY MASS INDEX: 29.06 KG/M2 | WEIGHT: 148 LBS | SYSTOLIC BLOOD PRESSURE: 130 MMHG

## 2021-05-27 PROCEDURE — 99204 OFFICE O/P NEW MOD 45 MIN: CPT

## 2021-05-27 PROCEDURE — 93000 ELECTROCARDIOGRAM COMPLETE: CPT

## 2021-05-27 NOTE — PHYSICAL EXAM
[Well Developed] : well developed [Well Nourished] : well nourished [No Acute Distress] : no acute distress [Normal Conjunctiva] : normal conjunctiva [Normal Venous Pressure] : normal venous pressure [No Carotid Bruit] : no carotid bruit [Normal S1, S2] : normal S1, S2 [No Rub] : no rub [No Gallop] : no gallop [Clear Lung Fields] : clear lung fields [Good Air Entry] : good air entry [No Respiratory Distress] : no respiratory distress  [Soft] : abdomen soft [Non Tender] : non-tender [No Masses/organomegaly] : no masses/organomegaly [Normal Bowel Sounds] : normal bowel sounds [Normal Gait] : normal gait [No Edema] : no edema [No Cyanosis] : no cyanosis [No Clubbing] : no clubbing [No Varicosities] : no varicosities [No Rash] : no rash [No Skin Lesions] : no skin lesions [Moves all extremities] : moves all extremities [No Focal Deficits] : no focal deficits [Normal Speech] : normal speech [Alert and Oriented] : alert and oriented [Normal memory] : normal memory [de-identified] : 1/6  diastolic murmur llsb

## 2021-05-27 NOTE — HISTORY OF PRESENT ILLNESS
[FreeTextEntry1] : This is an 80 year old lady with a PMH of HTN, HLD, DM, Osteopenia, Lymphoma, AV Disorder and Carotid Atherosclerosis presents today for cardiac evaluation. Patient was referred by her PMD. Patient states that she is feeling good and has no complaints at this time. Patient denies dyspnea, palpitations, chest pain, nausea, vomiting, dizziness and lightheadedness.\par

## 2021-05-28 ENCOUNTER — APPOINTMENT (OUTPATIENT)
Dept: CARDIOLOGY | Facility: CLINIC | Age: 81
End: 2021-05-28
Payer: MEDICARE

## 2021-05-28 PROCEDURE — 93306 TTE W/DOPPLER COMPLETE: CPT

## 2021-05-28 PROCEDURE — 93880 EXTRACRANIAL BILAT STUDY: CPT

## 2021-06-08 ENCOUNTER — APPOINTMENT (OUTPATIENT)
Dept: PULMONOLOGY | Facility: CLINIC | Age: 81
End: 2021-06-08
Payer: MEDICARE

## 2021-06-08 VITALS
TEMPERATURE: 98.3 F | HEART RATE: 65 BPM | OXYGEN SATURATION: 93 % | DIASTOLIC BLOOD PRESSURE: 66 MMHG | SYSTOLIC BLOOD PRESSURE: 149 MMHG

## 2021-06-08 DIAGNOSIS — K82.8 OTHER SPECIFIED DISEASES OF GALLBLADDER: ICD-10-CM

## 2021-06-08 DIAGNOSIS — I65.23 OCCLUSION AND STENOSIS OF BILATERAL CAROTID ARTERIES: ICD-10-CM

## 2021-06-08 PROCEDURE — 36415 COLL VENOUS BLD VENIPUNCTURE: CPT

## 2021-06-08 PROCEDURE — 83036 HEMOGLOBIN GLYCOSYLATED A1C: CPT | Mod: QW

## 2021-06-08 PROCEDURE — 99213 OFFICE O/P EST LOW 20 MIN: CPT | Mod: 25

## 2021-06-09 LAB
ALBUMIN SERPL ELPH-MCNC: 4.4 G/DL
ALP BLD-CCNC: 73 U/L
ALT SERPL-CCNC: 15 U/L
ANION GAP SERPL CALC-SCNC: 14 MMOL/L
AST SERPL-CCNC: 17 U/L
BILIRUB SERPL-MCNC: 0.3 MG/DL
BUN SERPL-MCNC: 19 MG/DL
CALCIUM SERPL-MCNC: 10.1 MG/DL
CHLORIDE SERPL-SCNC: 101 MMOL/L
CHOLEST SERPL-MCNC: 164 MG/DL
CO2 SERPL-SCNC: 28 MMOL/L
CREAT SERPL-MCNC: 0.65 MG/DL
ESTIMATED AVERAGE GLUCOSE: 137 MG/DL
GLUCOSE SERPL-MCNC: 96 MG/DL
HBA1C MFR BLD HPLC: 6.4 %
HDLC SERPL-MCNC: 55 MG/DL
LDLC SERPL CALC-MCNC: 89 MG/DL
NONHDLC SERPL-MCNC: 109 MG/DL
POTASSIUM SERPL-SCNC: 4.2 MMOL/L
PROT SERPL-MCNC: 6.9 G/DL
SODIUM SERPL-SCNC: 144 MMOL/L
TRIGL SERPL-MCNC: 98 MG/DL

## 2021-06-09 NOTE — DISCUSSION/SUMMARY
[FreeTextEntry1] : Hypertension on therapy.\par Hyperlipidemia on treatment protocol including medications, diet, and exercise program as tolerated.\par Continue present therapy\par Lymphoma followed by oncology.\par Osteopenia\par DM

## 2021-06-09 NOTE — PHYSICAL EXAM
[General Appearance - Well Developed] : well developed [General Appearance - Well Nourished] : well nourished [Normal Oropharynx] : normal oropharynx [Neck Cervical Mass (___cm)] : no neck mass was observed [Jugular Venous Distention Increased] : there was no jugular-venous distention [Thyroid Diffuse Enlargement] : the thyroid was not enlarged [Heart Sounds] : normal S1 and S2 [Auscultation Breath Sounds / Voice Sounds] : lungs were clear to auscultation bilaterally [Lungs Percussion] : the lungs were normal to percussion [Abdomen Soft] : soft [Abdomen Tenderness] : non-tender [Abdomen Mass (___ Cm)] : no abdominal mass palpated [Nail Clubbing] : no clubbing of the fingernails [Cyanosis, Localized] : no localized cyanosis [Skin Color & Pigmentation] : normal skin color and pigmentation [] : no rash [No Venous Stasis] : no venous stasis [Skin Lesions] : no skin lesions [No Skin Ulcers] : no skin ulcer [No Xanthoma] : no  xanthoma was observed [Deep Tendon Reflexes (DTR)] : deep tendon reflexes were 2+ and symmetric [No Focal Deficits] : no focal deficits [Sensation] : the sensory exam was normal to light touch and pinprick [Oriented To Time, Place, And Person] : oriented to person, place, and time [Impaired Insight] : insight and judgment were intact [Affect] : the affect was normal [FreeTextEntry1] : 1-2/6 syst. m [FreeTextEntry2] :  Varicose veins.

## 2021-06-10 ENCOUNTER — OUTPATIENT (OUTPATIENT)
Dept: OUTPATIENT SERVICES | Facility: HOSPITAL | Age: 81
LOS: 1 days | End: 2021-06-10
Payer: MEDICARE

## 2021-06-10 ENCOUNTER — APPOINTMENT (OUTPATIENT)
Dept: MAMMOGRAPHY | Facility: IMAGING CENTER | Age: 81
End: 2021-06-10
Payer: MEDICARE

## 2021-06-10 DIAGNOSIS — L72.9 FOLLICULAR CYST OF THE SKIN AND SUBCUTANEOUS TISSUE, UNSPECIFIED: Chronic | ICD-10-CM

## 2021-06-10 DIAGNOSIS — Z00.8 ENCOUNTER FOR OTHER GENERAL EXAMINATION: ICD-10-CM

## 2021-06-10 DIAGNOSIS — Z98.89 OTHER SPECIFIED POSTPROCEDURAL STATES: Chronic | ICD-10-CM

## 2021-06-10 PROCEDURE — 77063 BREAST TOMOSYNTHESIS BI: CPT

## 2021-06-10 PROCEDURE — 77067 SCR MAMMO BI INCL CAD: CPT | Mod: 26

## 2021-06-10 PROCEDURE — 77067 SCR MAMMO BI INCL CAD: CPT

## 2021-06-10 PROCEDURE — 77063 BREAST TOMOSYNTHESIS BI: CPT | Mod: 26

## 2021-06-25 ENCOUNTER — APPOINTMENT (OUTPATIENT)
Dept: PULMONOLOGY | Facility: CLINIC | Age: 81
End: 2021-06-25
Payer: MEDICARE

## 2021-06-25 VITALS
BODY MASS INDEX: 28.66 KG/M2 | DIASTOLIC BLOOD PRESSURE: 69 MMHG | SYSTOLIC BLOOD PRESSURE: 143 MMHG | HEIGHT: 60 IN | HEART RATE: 78 BPM | TEMPERATURE: 98 F | OXYGEN SATURATION: 95 % | WEIGHT: 146 LBS

## 2021-06-25 LAB
BILIRUB UR QL STRIP: NEGATIVE
CLARITY UR: CLEAR
COLLECTION METHOD: NORMAL
GLUCOSE UR-MCNC: NEGATIVE
HCG UR QL: 0.2 EU/DL
HGB UR QL STRIP.AUTO: NEGATIVE
KETONES UR-MCNC: NEGATIVE
LEUKOCYTE ESTERASE UR QL STRIP: NORMAL
NITRITE UR QL STRIP: NEGATIVE
PH UR STRIP: 5.5
PROT UR STRIP-MCNC: NEGATIVE
SP GR UR STRIP: 1.01

## 2021-06-25 PROCEDURE — 81003 URINALYSIS AUTO W/O SCOPE: CPT | Mod: QW

## 2021-06-25 PROCEDURE — 71046 X-RAY EXAM CHEST 2 VIEWS: CPT

## 2021-06-25 PROCEDURE — 99214 OFFICE O/P EST MOD 30 MIN: CPT | Mod: 25

## 2021-06-25 PROCEDURE — 36415 COLL VENOUS BLD VENIPUNCTURE: CPT

## 2021-06-25 NOTE — DISCUSSION/SUMMARY
[FreeTextEntry1] : 3 days of fever with no definitive source.\par May be viral in origin.\par Did have Covid vaccine.

## 2021-06-25 NOTE — ASSESSMENT
[FreeTextEntry1] : Close observation.\par Labs drawn in office today\par RVP\par Urine culture.\par Decision on antibiotics pending results and clinical status.

## 2021-06-25 NOTE — HISTORY OF PRESENT ILLNESS
[TextBox_4] : With few days of fever.\par T-max is 101.  Today 100.\par Response to Tylenol.\par No significant cough, wheezing, chest pain or SOB.\par Denies GI or urinary symptoms.\par No recent dental work.

## 2021-06-26 ENCOUNTER — TRANSCRIPTION ENCOUNTER (OUTPATIENT)
Age: 81
End: 2021-06-26

## 2021-06-28 LAB
ANION GAP SERPL CALC-SCNC: 12 MMOL/L
APPEARANCE: CLEAR
BACTERIA UR CULT: NORMAL
BACTERIA: NEGATIVE
BASOPHILS # BLD AUTO: 0.04 K/UL
BASOPHILS NFR BLD AUTO: 0.7 %
BILIRUBIN URINE: NEGATIVE
BLOOD URINE: NEGATIVE
BUN SERPL-MCNC: 17 MG/DL
CALCIUM SERPL-MCNC: 10.2 MG/DL
CHLORIDE SERPL-SCNC: 99 MMOL/L
CO2 SERPL-SCNC: 30 MMOL/L
COLOR: YELLOW
CREAT SERPL-MCNC: 0.6 MG/DL
CREAT SPEC-SCNC: 72 MG/DL
EOSINOPHIL # BLD AUTO: 0.23 K/UL
EOSINOPHIL NFR BLD AUTO: 3.9 %
GLUCOSE QUALITATIVE U: NEGATIVE
GLUCOSE SERPL-MCNC: 124 MG/DL
HCT VFR BLD CALC: 42.6 %
HGB BLD-MCNC: 13.9 G/DL
HYALINE CASTS: 2 /LPF
IMM GRANULOCYTES NFR BLD AUTO: 0.3 %
KETONES URINE: NEGATIVE
LEUKOCYTE ESTERASE URINE: ABNORMAL
LYMPHOCYTES # BLD AUTO: 1.3 K/UL
LYMPHOCYTES NFR BLD AUTO: 22.1 %
MAN DIFF?: NORMAL
MCHC RBC-ENTMCNC: 30.1 PG
MCHC RBC-ENTMCNC: 32.6 GM/DL
MCV RBC AUTO: 92.2 FL
MICROALBUMIN 24H UR DL<=1MG/L-MCNC: 1.7 MG/DL
MICROALBUMIN/CREAT 24H UR-RTO: 24 MG/G
MICROSCOPIC-UA: NORMAL
MONOCYTES # BLD AUTO: 0.72 K/UL
MONOCYTES NFR BLD AUTO: 12.2 %
NEUTROPHILS # BLD AUTO: 3.58 K/UL
NEUTROPHILS NFR BLD AUTO: 60.8 %
NITRITE URINE: NEGATIVE
PH URINE: 6
PLATELET # BLD AUTO: 294 K/UL
POTASSIUM SERPL-SCNC: 4.3 MMOL/L
PROTEIN URINE: NEGATIVE
RAPID RVP RESULT: NOT DETECTED
RBC # BLD: 4.62 M/UL
RBC # FLD: 13.2 %
RED BLOOD CELLS URINE: 1 /HPF
SARS-COV-2 RNA PNL RESP NAA+PROBE: NOT DETECTED
SODIUM SERPL-SCNC: 141 MMOL/L
SPECIFIC GRAVITY URINE: 1.02
SQUAMOUS EPITHELIAL CELLS: 2 /HPF
UROBILINOGEN URINE: NORMAL
WBC # FLD AUTO: 5.89 K/UL
WHITE BLOOD CELLS URINE: 4 /HPF

## 2021-06-29 ENCOUNTER — APPOINTMENT (OUTPATIENT)
Dept: PULMONOLOGY | Facility: CLINIC | Age: 81
End: 2021-06-29
Payer: MEDICARE

## 2021-06-29 PROCEDURE — 99213 OFFICE O/P EST LOW 20 MIN: CPT | Mod: 25

## 2021-06-29 PROCEDURE — 81003 URINALYSIS AUTO W/O SCOPE: CPT | Mod: QW

## 2021-06-29 NOTE — ASSESSMENT
[FreeTextEntry1] :  await u/a\par told to but new thermometer\par Close observation.\par \par advised pt daughter to make appt. to f/u with oncology\par \par \par \par

## 2021-06-29 NOTE — HISTORY OF PRESENT ILLNESS
[TextBox_4] : patient brought in thermometer from store and has a memory that shows temperatures of 103, 104\par  patient in office temperature was normal, used the thermometer on self also over 102\par No significant cough, wheezing, chest pain or SOB.\par Denies GI or urinary symptoms, but last weeks urine was contaminated \par No recent dental work.

## 2021-06-30 LAB
BILIRUB UR QL STRIP: NORMAL
CLARITY UR: CLEAR
COLLECTION METHOD: NORMAL
GLUCOSE UR-MCNC: NORMAL
HCG UR QL: 0.2 EU/DL
HGB UR QL STRIP.AUTO: NORMAL
KETONES UR-MCNC: NORMAL
LEUKOCYTE ESTERASE UR QL STRIP: NORMAL
NITRITE UR QL STRIP: NORMAL
PH UR STRIP: 5.5
PROT UR STRIP-MCNC: NORMAL
SP GR UR STRIP: 1.01

## 2021-07-02 LAB — BACTERIA UR CULT: NORMAL

## 2021-07-03 ENCOUNTER — APPOINTMENT (OUTPATIENT)
Dept: ULTRASOUND IMAGING | Facility: IMAGING CENTER | Age: 81
End: 2021-07-03
Payer: MEDICARE

## 2021-07-03 ENCOUNTER — OUTPATIENT (OUTPATIENT)
Dept: OUTPATIENT SERVICES | Facility: HOSPITAL | Age: 81
LOS: 1 days | End: 2021-07-03
Payer: MEDICARE

## 2021-07-03 DIAGNOSIS — K82.8 OTHER SPECIFIED DISEASES OF GALLBLADDER: ICD-10-CM

## 2021-07-03 DIAGNOSIS — L72.9 FOLLICULAR CYST OF THE SKIN AND SUBCUTANEOUS TISSUE, UNSPECIFIED: Chronic | ICD-10-CM

## 2021-07-03 DIAGNOSIS — Z98.89 OTHER SPECIFIED POSTPROCEDURAL STATES: Chronic | ICD-10-CM

## 2021-07-03 PROCEDURE — 76700 US EXAM ABDOM COMPLETE: CPT | Mod: 26

## 2021-07-03 PROCEDURE — 76700 US EXAM ABDOM COMPLETE: CPT

## 2021-07-06 ENCOUNTER — APPOINTMENT (OUTPATIENT)
Dept: ULTRASOUND IMAGING | Facility: IMAGING CENTER | Age: 81
End: 2021-07-06
Payer: MEDICARE

## 2021-07-06 ENCOUNTER — OUTPATIENT (OUTPATIENT)
Dept: OUTPATIENT SERVICES | Facility: HOSPITAL | Age: 81
LOS: 1 days | End: 2021-07-06
Payer: MEDICARE

## 2021-07-06 DIAGNOSIS — Z00.8 ENCOUNTER FOR OTHER GENERAL EXAMINATION: ICD-10-CM

## 2021-07-06 DIAGNOSIS — Z98.89 OTHER SPECIFIED POSTPROCEDURAL STATES: Chronic | ICD-10-CM

## 2021-07-06 DIAGNOSIS — L72.9 FOLLICULAR CYST OF THE SKIN AND SUBCUTANEOUS TISSUE, UNSPECIFIED: Chronic | ICD-10-CM

## 2021-07-06 PROCEDURE — 76641 ULTRASOUND BREAST COMPLETE: CPT

## 2021-07-06 PROCEDURE — 76641 ULTRASOUND BREAST COMPLETE: CPT | Mod: 26,50

## 2021-07-14 RX ORDER — LOSARTAN POTASSIUM 50 MG/1
50 TABLET, FILM COATED ORAL
Qty: 90 | Refills: 3 | Status: DISCONTINUED | COMMUNITY
Start: 2021-06-29 | End: 2021-07-14

## 2021-08-04 ENCOUNTER — OUTPATIENT (OUTPATIENT)
Dept: OUTPATIENT SERVICES | Facility: HOSPITAL | Age: 81
LOS: 1 days | Discharge: HOME | End: 2021-08-04

## 2021-08-04 DIAGNOSIS — C85.88 OTHER SPECIFIED TYPES OF NON-HODGKIN LYMPHOMA, LYMPH NODES OF MULTIPLE SITES: ICD-10-CM

## 2021-08-04 DIAGNOSIS — Z98.89 OTHER SPECIFIED POSTPROCEDURAL STATES: Chronic | ICD-10-CM

## 2021-08-04 DIAGNOSIS — L72.9 FOLLICULAR CYST OF THE SKIN AND SUBCUTANEOUS TISSUE, UNSPECIFIED: Chronic | ICD-10-CM

## 2021-08-05 ENCOUNTER — APPOINTMENT (OUTPATIENT)
Dept: HEMATOLOGY ONCOLOGY | Facility: CLINIC | Age: 81
End: 2021-08-05
Payer: MEDICARE

## 2021-08-05 ENCOUNTER — RESULT REVIEW (OUTPATIENT)
Age: 81
End: 2021-08-05

## 2021-08-05 VITALS
SYSTOLIC BLOOD PRESSURE: 145 MMHG | BODY MASS INDEX: 29.28 KG/M2 | RESPIRATION RATE: 14 BRPM | TEMPERATURE: 96.4 F | HEART RATE: 61 BPM | HEIGHT: 58.58 IN | DIASTOLIC BLOOD PRESSURE: 75 MMHG | WEIGHT: 143.3 LBS | OXYGEN SATURATION: 97 %

## 2021-08-05 LAB
BASOPHILS # BLD AUTO: 0.04 K/UL — SIGNIFICANT CHANGE UP (ref 0–0.2)
BASOPHILS NFR BLD AUTO: 0.6 % — SIGNIFICANT CHANGE UP (ref 0–2)
EOSINOPHIL # BLD AUTO: 0.25 K/UL — SIGNIFICANT CHANGE UP (ref 0–0.5)
EOSINOPHIL NFR BLD AUTO: 3.5 % — SIGNIFICANT CHANGE UP (ref 0–6)
HCT VFR BLD CALC: 45.2 % — HIGH (ref 34.5–45)
HGB BLD-MCNC: 14.5 G/DL — SIGNIFICANT CHANGE UP (ref 11.5–15.5)
IMM GRANULOCYTES NFR BLD AUTO: 0.3 % — SIGNIFICANT CHANGE UP (ref 0–1.5)
LYMPHOCYTES # BLD AUTO: 1.22 K/UL — SIGNIFICANT CHANGE UP (ref 1–3.3)
LYMPHOCYTES # BLD AUTO: 17.1 % — SIGNIFICANT CHANGE UP (ref 13–44)
MCHC RBC-ENTMCNC: 28.4 PG — SIGNIFICANT CHANGE UP (ref 27–34)
MCHC RBC-ENTMCNC: 32.1 G/DL — SIGNIFICANT CHANGE UP (ref 32–36)
MCV RBC AUTO: 88.5 FL — SIGNIFICANT CHANGE UP (ref 80–100)
MONOCYTES # BLD AUTO: 0.64 K/UL — SIGNIFICANT CHANGE UP (ref 0–0.9)
MONOCYTES NFR BLD AUTO: 9 % — SIGNIFICANT CHANGE UP (ref 2–14)
NEUTROPHILS # BLD AUTO: 4.95 K/UL — SIGNIFICANT CHANGE UP (ref 1.8–7.4)
NEUTROPHILS NFR BLD AUTO: 69.5 % — SIGNIFICANT CHANGE UP (ref 43–77)
NRBC # BLD: 0 /100 WBCS — SIGNIFICANT CHANGE UP (ref 0–0)
PLATELET # BLD AUTO: 260 K/UL — SIGNIFICANT CHANGE UP (ref 150–400)
RBC # BLD: 5.11 M/UL — SIGNIFICANT CHANGE UP (ref 3.8–5.2)
RBC # FLD: 13.3 % — SIGNIFICANT CHANGE UP (ref 10.3–14.5)
WBC # BLD: 7.12 K/UL — SIGNIFICANT CHANGE UP (ref 3.8–10.5)
WBC # FLD AUTO: 7.12 K/UL — SIGNIFICANT CHANGE UP (ref 3.8–10.5)

## 2021-08-05 PROCEDURE — 99213 OFFICE O/P EST LOW 20 MIN: CPT

## 2021-08-05 RX ORDER — AMLODIPINE BESYLATE 5 MG/1
5 TABLET ORAL
Qty: 90 | Refills: 3 | Status: DISCONTINUED | COMMUNITY
Start: 2021-06-08 | End: 2021-08-05

## 2021-08-05 NOTE — PHYSICAL EXAM
[Fully active, able to carry on all pre-disease performance without restriction] : Status 0 - Fully active, able to carry on all pre-disease performance without restriction [Normal] : no JVD, no calf tenderness, venous stasis changes, varices [de-identified] : bilateral lower extremity edema [de-identified] : No cervical, axillary or inguinal adenopathy appreciated on exam [de-identified] : lower extremity varicosities.

## 2021-08-05 NOTE — REVIEW OF SYSTEMS
[Recent Change In Weight] : ~T recent weight change [Lower Ext Edema] : lower extremity edema [Negative] : Allergic/Immunologic [Fever] : no fever [Chills] : no chills [Night Sweats] : no night sweats [Fatigue] : no fatigue [Eye Pain] : no eye pain [Dysphagia] : no dysphagia [Nosebleeds] : no nosebleeds [Chest Pain] : no chest pain [Shortness Of Breath] : no shortness of breath [Cough] : no cough [SOB on Exertion] : no shortness of breath during exertion [Abdominal Pain] : no abdominal pain [Vomiting] : no vomiting [Muscle Pain] : no muscle pain [Skin Rash] : no skin rash [Skin Wound] : no skin wound [Depression] : no depression [Easy Bleeding] : no tendency for easy bleeding [Easy Bruising] : no tendency for easy bruising

## 2021-08-05 NOTE — HISTORY OF PRESENT ILLNESS
[de-identified] : Mrs. Upton is a 74 year old woman with follicular lymphoma, grade 1-2, stage IA, who received radiotherapy to a > 5 cm left inguinal node in late 2014. In October, 2014 she noted a left inguinal lump associated with some discomfort. CT scan showed a 4.8 x 3,2 cm left inguinal node. 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 PET/CT showed that the inguinal node had grown over a few weeks to 5.2 cm. SUV was 10.1. The adrenal nodule was FDG(-). Marrow was (-). Iron was present. Excisional biopsy of the inguinal node showed findings most c/w FL, with dim CD10(+), CD20, BCL-6 and BCL-2 (partial). FISH for BCL-IgH was (-). \par She was treated with RT which was well tolerated.\par \par  \par \par Disease: follicular lymphoma, gr 1-2 \par Stage:. IA. \par Pathology: FL, gr 1-2.  [de-identified] : Doing well except incr in LE edema.  Recently started taking lisinopril.\par /75 today\par [place] warm few wks ago, had temp at home, ? if thermometer accurate, feels well now\par Wt stable\par  \par Radiotherapy to left inguinal node - 30 Gy in 15 fractions -completed 1/9/15.\par Baseline CT scans showed small renal (1.3 cm);,splenic artery aneurysms. Last CT scans C/A/P 2/10/18. \par  \par

## 2021-08-05 NOTE — ASSESSMENT
[Palliative Care Plan] : not applicable at this time [FreeTextEntry1] : Clinically DUC. Currently with no complaints. 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 \par She also had a 1 cm FDG(-) indeterminate probable adrenal adenoma.\par CBC reviewed and stable, WBC 7.12, Hgb 14.5, Plt 260K\par Received Pfizer Covid vaccines 2/17/21 and 3/10/21\par CMP, LDH, B2M, immunoglobulin panel\par Consider repeat abd imaging post next visit\par to see internist re edema ? related to lisinopril\par \par RV 6 months\par .

## 2021-08-23 ENCOUNTER — RX RENEWAL (OUTPATIENT)
Age: 81
End: 2021-08-23

## 2021-09-03 ENCOUNTER — APPOINTMENT (OUTPATIENT)
Dept: PULMONOLOGY | Facility: CLINIC | Age: 81
End: 2021-09-03
Payer: MEDICARE

## 2021-09-03 VITALS — DIASTOLIC BLOOD PRESSURE: 70 MMHG | SYSTOLIC BLOOD PRESSURE: 164 MMHG

## 2021-09-03 VITALS
WEIGHT: 145 LBS | HEIGHT: 58 IN | HEART RATE: 56 BPM | TEMPERATURE: 97.6 F | BODY MASS INDEX: 30.44 KG/M2 | OXYGEN SATURATION: 98 %

## 2021-09-03 DIAGNOSIS — M79.89 OTHER SPECIFIED SOFT TISSUE DISORDERS: ICD-10-CM

## 2021-09-03 LAB
GLUCOSE BLDC GLUCOMTR-MCNC: 95
HBA1C MFR BLD HPLC: 6.4

## 2021-09-03 PROCEDURE — 99213 OFFICE O/P EST LOW 20 MIN: CPT | Mod: 25

## 2021-09-03 PROCEDURE — 82962 GLUCOSE BLOOD TEST: CPT

## 2021-09-03 PROCEDURE — 83036 HEMOGLOBIN GLYCOSYLATED A1C: CPT | Mod: QW

## 2021-09-03 NOTE — DISCUSSION/SUMMARY
[FreeTextEntry1] : Hypertension ,elevated\par Hyperlipidemia on treatment protocol including medications, diet, and exercise program as tolerated.\par Continue present therapy\par Lymphoma followed by oncology.\par Osteopenia\par DM mild elevation

## 2021-09-03 NOTE — HISTORY OF PRESENT ILLNESS
[TextBox_4] : In general feeling well\par  some ankle swelling\par  b/p higher in office\par  Had changed b/p medication because could not tolerate losartan and was changed to lisinopril 10 mg\par \par

## 2021-09-03 NOTE — ASSESSMENT
[FreeTextEntry1] : Medications reviewed and renewed.\par Change to brand Toprol\par increase lisinopril to 20 mg daily\par ADA diet\par \par r/t 3 months\par \par \par

## 2021-09-13 ENCOUNTER — APPOINTMENT (OUTPATIENT)
Dept: PULMONOLOGY | Facility: CLINIC | Age: 81
End: 2021-09-13

## 2021-10-26 ENCOUNTER — APPOINTMENT (OUTPATIENT)
Dept: PULMONOLOGY | Facility: CLINIC | Age: 81
End: 2021-10-26
Payer: MEDICARE

## 2021-10-26 VITALS — SYSTOLIC BLOOD PRESSURE: 160 MMHG | DIASTOLIC BLOOD PRESSURE: 70 MMHG

## 2021-10-26 VITALS
OXYGEN SATURATION: 99 % | SYSTOLIC BLOOD PRESSURE: 184 MMHG | DIASTOLIC BLOOD PRESSURE: 80 MMHG | HEART RATE: 61 BPM | TEMPERATURE: 98 F

## 2021-10-26 PROCEDURE — 83036 HEMOGLOBIN GLYCOSYLATED A1C: CPT | Mod: QW

## 2021-10-26 PROCEDURE — G0008: CPT

## 2021-10-26 PROCEDURE — 90662 IIV NO PRSV INCREASED AG IM: CPT

## 2021-10-26 PROCEDURE — 99213 OFFICE O/P EST LOW 20 MIN: CPT | Mod: 25

## 2021-10-26 RX ORDER — METOPROLOL SUCCINATE 100 MG/1
100 TABLET, EXTENDED RELEASE ORAL
Qty: 90 | Refills: 3 | Status: DISCONTINUED | COMMUNITY
Start: 2019-09-10 | End: 2021-10-26

## 2021-10-26 RX ORDER — LISINOPRIL 20 MG/1
20 TABLET ORAL
Qty: 90 | Refills: 2 | Status: DISCONTINUED | COMMUNITY
Start: 2021-07-14 | End: 2021-10-26

## 2021-10-26 RX ORDER — LISINOPRIL 10 MG/1
10 TABLET ORAL TWICE DAILY
Qty: 60 | Refills: 0 | Status: DISCONTINUED | COMMUNITY
Start: 2021-09-13 | End: 2021-10-26

## 2021-10-27 LAB
ALBUMIN SERPL ELPH-MCNC: 4.6 G/DL
ALP BLD-CCNC: 60 U/L
ALT SERPL-CCNC: 23 U/L
ANION GAP SERPL CALC-SCNC: 17 MMOL/L
AST SERPL-CCNC: 25 U/L
BILIRUB SERPL-MCNC: 0.4 MG/DL
BUN SERPL-MCNC: 17 MG/DL
CALCIUM SERPL-MCNC: 9.8 MG/DL
CHLORIDE SERPL-SCNC: 99 MMOL/L
CHOLEST SERPL-MCNC: 192 MG/DL
CO2 SERPL-SCNC: 25 MMOL/L
CREAT SERPL-MCNC: 0.63 MG/DL
GLUCOSE SERPL-MCNC: 89 MG/DL
HBA1C MFR BLD HPLC: 6.2
HDLC SERPL-MCNC: 60 MG/DL
LDLC SERPL CALC-MCNC: 104 MG/DL
NONHDLC SERPL-MCNC: 132 MG/DL
POTASSIUM SERPL-SCNC: 4.3 MMOL/L
PROT SERPL-MCNC: 7 G/DL
SODIUM SERPL-SCNC: 141 MMOL/L
TRIGL SERPL-MCNC: 141 MG/DL

## 2021-10-27 NOTE — DISCUSSION/SUMMARY
[FreeTextEntry1] : Hypertension ,elevated in office but normal repeatedly at home.  Component of whitecoat.\par Hyperlipidemia on treatment protocol including medications, diet, and exercise program as tolerated.\par Continue present therapy\par Lymphoma followed by oncology.\par Osteopenia\par DM

## 2021-10-27 NOTE — HISTORY OF PRESENT ILLNESS
[TextBox_4] : having some back and right foot issues, seeing chiro and podiatry\par did not tolerate lisinopril 10 and stopped it\par b/p elevated in office but at home 124 -132/58- 61. Followed regularly and good. \par \par  b/p higher in office\par  Had changed b/p medication because could not tolerate losartan and was changed to lisinopril 10 mg\par \par

## 2021-10-27 NOTE — ASSESSMENT
[FreeTextEntry1] : Medications reviewed and renewed.\par ADA diet\par Continue meds and follow BP. \par \par \par \par \par

## 2021-10-27 NOTE — PHYSICAL EXAM
[No Acute Distress] : no acute distress [Supple] : supple [No JVD] : no jvd [Normal S1, S2] : normal s1, s2 [No Murmurs] : no murmurs [Clear to Auscultation Bilaterally] : clear to auscultation bilaterally [Normal to Percussion] : normal to percussion [No Abnormalities] : no abnormalities [Benign] : benign [No Clubbing] : no clubbing [No Cyanosis] : no cyanosis [Oriented x3] : oriented x3 [TextBox_105] : trace- 1 plus edema bilat.

## 2022-02-14 ENCOUNTER — APPOINTMENT (OUTPATIENT)
Dept: PULMONOLOGY | Facility: CLINIC | Age: 82
End: 2022-02-14
Payer: MEDICARE

## 2022-02-14 ENCOUNTER — LABORATORY RESULT (OUTPATIENT)
Age: 82
End: 2022-02-14

## 2022-02-14 ENCOUNTER — NON-APPOINTMENT (OUTPATIENT)
Age: 82
End: 2022-02-14

## 2022-02-14 VITALS
OXYGEN SATURATION: 93 % | WEIGHT: 144 LBS | HEART RATE: 74 BPM | HEIGHT: 58 IN | TEMPERATURE: 97.6 F | BODY MASS INDEX: 30.23 KG/M2

## 2022-02-14 DIAGNOSIS — E66.3 OVERWEIGHT: ICD-10-CM

## 2022-02-14 DIAGNOSIS — M62.838 OTHER MUSCLE SPASM: ICD-10-CM

## 2022-02-14 PROCEDURE — 99214 OFFICE O/P EST MOD 30 MIN: CPT | Mod: 25

## 2022-02-14 PROCEDURE — 36415 COLL VENOUS BLD VENIPUNCTURE: CPT

## 2022-02-14 PROCEDURE — 81003 URINALYSIS AUTO W/O SCOPE: CPT | Mod: QW

## 2022-02-14 PROCEDURE — 82044 UR ALBUMIN SEMIQUANTITATIVE: CPT | Mod: QW

## 2022-02-14 PROCEDURE — 93000 ELECTROCARDIOGRAM COMPLETE: CPT

## 2022-02-14 NOTE — PHYSICAL EXAM
[General Appearance - Well Developed] : well developed [General Appearance - Well Nourished] : well nourished [Normal Oropharynx] : normal oropharynx [Neck Cervical Mass (___cm)] : no neck mass was observed [Jugular Venous Distention Increased] : there was no jugular-venous distention [Thyroid Diffuse Enlargement] : the thyroid was not enlarged [Heart Sounds] : normal S1 and S2 [Auscultation Breath Sounds / Voice Sounds] : lungs were clear to auscultation bilaterally [Lungs Percussion] : the lungs were normal to percussion [Abdomen Tenderness] : non-tender [Abdomen Soft] : soft [Abdomen Mass (___ Cm)] : no abdominal mass palpated [Nail Clubbing] : no clubbing of the fingernails [Cyanosis, Localized] : no localized cyanosis [Skin Color & Pigmentation] : normal skin color and pigmentation [] : no rash [No Venous Stasis] : no venous stasis [Skin Lesions] : no skin lesions [No Skin Ulcers] : no skin ulcer [No Xanthoma] : no  xanthoma was observed [Deep Tendon Reflexes (DTR)] : deep tendon reflexes were 2+ and symmetric [Sensation] : the sensory exam was normal to light touch and pinprick [No Focal Deficits] : no focal deficits [Oriented To Time, Place, And Person] : oriented to person, place, and time [Impaired Insight] : insight and judgment were intact [Affect] : the affect was normal [1+ Pitting] : 1+  pitting [FreeTextEntry1] : 1-2/6 syst. m [FreeTextEntry2] :  Varicose veins.

## 2022-02-14 NOTE — DISCUSSION/SUMMARY
[FreeTextEntry1] : Hypertension on therapy.  Elevation systolic pressure today.\par Hyperlipidemia on treatment protocol including medications, diet, and exercise program as tolerated.\par Continue present therapy\par Lymphoma followed by oncology.\par Osteopenia

## 2022-02-14 NOTE — HISTORY OF PRESENT ILLNESS
[TextBox_4] : cologuard 3 years ago\par Colonoscopy refuses\par Mammo last year\par GYN last year\par Optho Y\par Derm never\par BMD last year 2021\par \par last year had echo and carotids\par \par seeing oncologist \par \par using cane now pain left leg seeing neuro sending for MRI\par feeling ok , less grief after husbands death\par \par BP at home 135/82. Pulse 70

## 2022-02-14 NOTE — ASSESSMENT
[FreeTextEntry1] : Follow-up blood pressure at home and monitor.  May need increase in therapy.\par Medications reviewed and renewed.\par Labs drawn in office today\par \par Calcium/VITamin D/Exercise as treatment for bone loss discussed.\par \par Follow-up in 6 months or sooner on a as needed basis.\par \par Duration of evaluation and management 35 minutes.\par

## 2022-02-14 NOTE — PROCEDURE
[FreeTextEntry1] : Venipuncture for labs\par Echocardiogram reveals normal sinus rhythm with no significant change from prior examination.\par \par \par Echo and carotids reviewed with patient.\par \par

## 2022-02-16 ENCOUNTER — OUTPATIENT (OUTPATIENT)
Dept: OUTPATIENT SERVICES | Facility: HOSPITAL | Age: 82
LOS: 1 days | Discharge: HOME | End: 2022-02-16

## 2022-02-16 DIAGNOSIS — Z98.89 OTHER SPECIFIED POSTPROCEDURAL STATES: Chronic | ICD-10-CM

## 2022-02-16 DIAGNOSIS — L72.9 FOLLICULAR CYST OF THE SKIN AND SUBCUTANEOUS TISSUE, UNSPECIFIED: Chronic | ICD-10-CM

## 2022-02-16 DIAGNOSIS — C85.88 OTHER SPECIFIED TYPES OF NON-HODGKIN LYMPHOMA, LYMPH NODES OF MULTIPLE SITES: ICD-10-CM

## 2022-02-16 LAB
25(OH)D3 SERPL-MCNC: 34.5 NG/ML
ALBUMIN SERPL ELPH-MCNC: 4.4 G/DL
ALBUMIN: 10
ALP BLD-CCNC: 61 U/L
ALT SERPL-CCNC: 13 U/L
ANION GAP SERPL CALC-SCNC: 11 MMOL/L
AST SERPL-CCNC: 19 U/L
BASOPHILS # BLD AUTO: 0.03 K/UL
BASOPHILS NFR BLD AUTO: 0.4 %
BILIRUB DIRECT SERPL-MCNC: 0.1 MG/DL
BILIRUB INDIRECT SERPL-MCNC: 0.2 MG/DL
BILIRUB SERPL-MCNC: 0.3 MG/DL
BILIRUB UR QL STRIP: NEGATIVE
BUN SERPL-MCNC: 17 MG/DL
CALCIUM SERPL-MCNC: 9.5 MG/DL
CHLORIDE SERPL-SCNC: 102 MMOL/L
CHOLEST SERPL-MCNC: 151 MG/DL
CLARITY UR: CLEAR
CO2 SERPL-SCNC: 29 MMOL/L
COLLECTION METHOD: NORMAL
CREAT SERPL-MCNC: 0.55 MG/DL
CREATININE: 50
EOSINOPHIL # BLD AUTO: 0.26 K/UL
EOSINOPHIL NFR BLD AUTO: 3.8 %
ESTIMATED AVERAGE GLUCOSE: 131 MG/DL
GLUCOSE SERPL-MCNC: 97 MG/DL
GLUCOSE UR-MCNC: NEGATIVE
HBA1C MFR BLD HPLC: 6.2 %
HCG UR QL: 0.2 EU/DL
HCT VFR BLD CALC: 42.9 %
HDLC SERPL-MCNC: 51 MG/DL
HGB BLD-MCNC: 13.9 G/DL
HGB UR QL STRIP.AUTO: NORMAL
IMM GRANULOCYTES NFR BLD AUTO: 0.3 %
KETONES UR-MCNC: NEGATIVE
LDLC SERPL CALC-MCNC: 53 MG/DL
LEUKOCYTE ESTERASE UR QL STRIP: NORMAL
LYMPHOCYTES # BLD AUTO: 1.22 K/UL
LYMPHOCYTES NFR BLD AUTO: 17.7 %
MAN DIFF?: NORMAL
MCHC RBC-ENTMCNC: 29.5 PG
MCHC RBC-ENTMCNC: 32.4 GM/DL
MCV RBC AUTO: 91.1 FL
MICROALBUMIN/CREAT UR TEST STR-RTO: NORMAL
MONOCYTES # BLD AUTO: 0.72 K/UL
MONOCYTES NFR BLD AUTO: 10.4 %
NEUTROPHILS # BLD AUTO: 4.66 K/UL
NEUTROPHILS NFR BLD AUTO: 67.4 %
NITRITE UR QL STRIP: NEGATIVE
NONHDLC SERPL-MCNC: 100 MG/DL
PH UR STRIP: 5.5
PLATELET # BLD AUTO: 235 K/UL
POTASSIUM SERPL-SCNC: 4 MMOL/L
PROT SERPL-MCNC: 6.1 G/DL
PROT UR STRIP-MCNC: NEGATIVE
RBC # BLD: 4.71 M/UL
RBC # FLD: 13 %
SODIUM SERPL-SCNC: 143 MMOL/L
SP GR UR STRIP: 1.01
T3 SERPL-MCNC: 122 NG/DL
T3RU NFR SERPL: 1.2 TBI
T4 FREE SERPL-MCNC: 0.9 NG/DL
T4 SERPL-MCNC: 6.6 UG/DL
TRIGL SERPL-MCNC: 233 MG/DL
TSH SERPL-ACNC: 5.8 UIU/ML
WBC # FLD AUTO: 6.91 K/UL

## 2022-02-17 ENCOUNTER — RESULT REVIEW (OUTPATIENT)
Age: 82
End: 2022-02-17

## 2022-02-17 ENCOUNTER — APPOINTMENT (OUTPATIENT)
Dept: HEMATOLOGY ONCOLOGY | Facility: CLINIC | Age: 82
End: 2022-02-17
Payer: MEDICARE

## 2022-02-17 VITALS
WEIGHT: 148.81 LBS | TEMPERATURE: 97.3 F | SYSTOLIC BLOOD PRESSURE: 137 MMHG | OXYGEN SATURATION: 97 % | BODY MASS INDEX: 31.1 KG/M2 | HEART RATE: 64 BPM | DIASTOLIC BLOOD PRESSURE: 74 MMHG | RESPIRATION RATE: 16 BRPM

## 2022-02-17 LAB
BASOPHILS # BLD AUTO: 0.04 K/UL — SIGNIFICANT CHANGE UP (ref 0–0.2)
BASOPHILS NFR BLD AUTO: 0.5 % — SIGNIFICANT CHANGE UP (ref 0–2)
EOSINOPHIL # BLD AUTO: 0.22 K/UL — SIGNIFICANT CHANGE UP (ref 0–0.5)
EOSINOPHIL NFR BLD AUTO: 2.9 % — SIGNIFICANT CHANGE UP (ref 0–6)
HCT VFR BLD CALC: 43.5 % — SIGNIFICANT CHANGE UP (ref 34.5–45)
HGB BLD-MCNC: 14.1 G/DL — SIGNIFICANT CHANGE UP (ref 11.5–15.5)
IMM GRANULOCYTES NFR BLD AUTO: 0.3 % — SIGNIFICANT CHANGE UP (ref 0–1.5)
LYMPHOCYTES # BLD AUTO: 1.21 K/UL — SIGNIFICANT CHANGE UP (ref 1–3.3)
LYMPHOCYTES # BLD AUTO: 16.1 % — SIGNIFICANT CHANGE UP (ref 13–44)
MCHC RBC-ENTMCNC: 29.6 PG — SIGNIFICANT CHANGE UP (ref 27–34)
MCHC RBC-ENTMCNC: 32.4 G/DL — SIGNIFICANT CHANGE UP (ref 32–36)
MCV RBC AUTO: 91.2 FL — SIGNIFICANT CHANGE UP (ref 80–100)
MONOCYTES # BLD AUTO: 0.77 K/UL — SIGNIFICANT CHANGE UP (ref 0–0.9)
MONOCYTES NFR BLD AUTO: 10.2 % — SIGNIFICANT CHANGE UP (ref 2–14)
NEUTROPHILS # BLD AUTO: 5.27 K/UL — SIGNIFICANT CHANGE UP (ref 1.8–7.4)
NEUTROPHILS NFR BLD AUTO: 70 % — SIGNIFICANT CHANGE UP (ref 43–77)
NRBC # BLD: 0 /100 WBCS — SIGNIFICANT CHANGE UP (ref 0–0)
PLATELET # BLD AUTO: 247 K/UL — SIGNIFICANT CHANGE UP (ref 150–400)
RBC # BLD: 4.77 M/UL — SIGNIFICANT CHANGE UP (ref 3.8–5.2)
RBC # FLD: 12.7 % — SIGNIFICANT CHANGE UP (ref 10.3–14.5)
WBC # BLD: 7.53 K/UL — SIGNIFICANT CHANGE UP (ref 3.8–10.5)
WBC # FLD AUTO: 7.53 K/UL — SIGNIFICANT CHANGE UP (ref 3.8–10.5)

## 2022-02-17 PROCEDURE — 99213 OFFICE O/P EST LOW 20 MIN: CPT

## 2022-02-17 RX ORDER — METAXALONE 800 MG/1
800 TABLET ORAL TWICE DAILY
Qty: 60 | Refills: 0 | Status: COMPLETED | COMMUNITY
Start: 2021-11-09 | End: 2022-02-17

## 2022-02-17 RX ORDER — NEOMYCIN SULFATE, POLYMYXIN B SULFATE AND DEXAMETHASONE 3.5; 10000; 1 MG/ML; [USP'U]/ML; MG/ML
3.5-10000-0.1 SUSPENSION OPHTHALMIC
Qty: 5 | Refills: 0 | Status: DISCONTINUED | COMMUNITY
Start: 2021-05-28 | End: 2022-02-17

## 2022-02-19 ENCOUNTER — APPOINTMENT (OUTPATIENT)
Dept: MRI IMAGING | Facility: IMAGING CENTER | Age: 82
End: 2022-02-19
Payer: MEDICARE

## 2022-02-19 ENCOUNTER — OUTPATIENT (OUTPATIENT)
Dept: OUTPATIENT SERVICES | Facility: HOSPITAL | Age: 82
LOS: 1 days | End: 2022-02-19
Payer: MEDICARE

## 2022-02-19 DIAGNOSIS — Z98.89 OTHER SPECIFIED POSTPROCEDURAL STATES: Chronic | ICD-10-CM

## 2022-02-19 DIAGNOSIS — Z00.8 ENCOUNTER FOR OTHER GENERAL EXAMINATION: ICD-10-CM

## 2022-02-19 DIAGNOSIS — L72.9 FOLLICULAR CYST OF THE SKIN AND SUBCUTANEOUS TISSUE, UNSPECIFIED: Chronic | ICD-10-CM

## 2022-02-19 PROCEDURE — 72148 MRI LUMBAR SPINE W/O DYE: CPT | Mod: MH

## 2022-02-19 PROCEDURE — 72148 MRI LUMBAR SPINE W/O DYE: CPT | Mod: 26,MH

## 2022-03-02 NOTE — HISTORY OF PRESENT ILLNESS
[de-identified] : Mrs. Upton is a 74 year old woman with follicular lymphoma, grade 1-2, stage IA, who received radiotherapy to a > 5 cm left inguinal node in late 2014. In October, 2014 she noted a left inguinal lump associated with some discomfort. CT scan showed a 4.8 x 3,2 cm left inguinal node. 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 PET/CT showed that the inguinal node had grown over a few weeks to 5.2 cm. SUV was 10.1. The adrenal nodule was FDG(-). Marrow was (-). Iron was present. Excisional biopsy of the inguinal node showed findings most c/w FL, with dim CD10(+), CD20, BCL-6 and BCL-2 (partial). FISH for BCL-IgH was (-). \par She was treated with RT which was well tolerated.\par \par  \par \par Disease: follicular lymphoma, gr 1-2 \par Stage:. IA. \par Pathology: FL, gr 1-2.  [de-identified] : Doing well .  on Toprol and HCTZ  had MRI for foot and ankle \par COVID vaccine 11/1721 booster\par \par  \par Radiotherapy to left inguinal node - 30 Gy in 15 fractions -completed 1/9/15.\par Baseline CT scans showed small renal (1.3 cm);,splenic artery aneurysms. Last CT scans C/A/P 2/10/18. denies any constitutional issues\par  \par

## 2022-03-02 NOTE — ASSESSMENT
[Palliative Care Plan] : not applicable at this time [FreeTextEntry1] : Clinically DUC. Currently with no complaints. 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 \par She also had a 1 cm FDG(-) indeterminate probable adrenal adenoma.\par CBC reviewed and stable\par Received Pfizer Covid vaccines 2/17/21 and 3/10/21, booster 11/17/21 \par CMP, LDH, B2M, immunoglobulin panel\par schedule Ct scan of abd/pelvic with contrast\par \par RV 6 months\par .  [Palliative] : Goals of care discussed with patient: Palliative

## 2022-03-21 ENCOUNTER — RESULT REVIEW (OUTPATIENT)
Age: 82
End: 2022-03-21

## 2022-04-02 ENCOUNTER — APPOINTMENT (OUTPATIENT)
Dept: CT IMAGING | Facility: IMAGING CENTER | Age: 82
End: 2022-04-02
Payer: MEDICARE

## 2022-04-02 ENCOUNTER — OUTPATIENT (OUTPATIENT)
Dept: OUTPATIENT SERVICES | Facility: HOSPITAL | Age: 82
LOS: 1 days | End: 2022-04-02
Payer: MEDICARE

## 2022-04-02 DIAGNOSIS — L72.9 FOLLICULAR CYST OF THE SKIN AND SUBCUTANEOUS TISSUE, UNSPECIFIED: Chronic | ICD-10-CM

## 2022-04-02 DIAGNOSIS — C82.00 FOLLICULAR LYMPHOMA GRADE I, UNSPECIFIED SITE: ICD-10-CM

## 2022-04-02 DIAGNOSIS — Z98.89 OTHER SPECIFIED POSTPROCEDURAL STATES: Chronic | ICD-10-CM

## 2022-04-02 PROCEDURE — 74177 CT ABD & PELVIS W/CONTRAST: CPT | Mod: MG

## 2022-04-02 PROCEDURE — 74177 CT ABD & PELVIS W/CONTRAST: CPT | Mod: 26,MG

## 2022-04-02 PROCEDURE — G1004: CPT

## 2022-04-04 ENCOUNTER — TRANSCRIPTION ENCOUNTER (OUTPATIENT)
Age: 82
End: 2022-04-04

## 2022-04-04 ENCOUNTER — RX RENEWAL (OUTPATIENT)
Age: 82
End: 2022-04-04

## 2022-05-16 ENCOUNTER — LABORATORY RESULT (OUTPATIENT)
Age: 82
End: 2022-05-16

## 2022-05-16 ENCOUNTER — APPOINTMENT (OUTPATIENT)
Dept: PULMONOLOGY | Facility: CLINIC | Age: 82
End: 2022-05-16
Payer: MEDICARE

## 2022-05-16 VITALS
TEMPERATURE: 97.8 F | RESPIRATION RATE: 16 BRPM | SYSTOLIC BLOOD PRESSURE: 150 MMHG | OXYGEN SATURATION: 97 % | HEART RATE: 61 BPM | DIASTOLIC BLOOD PRESSURE: 70 MMHG

## 2022-05-16 VITALS
OXYGEN SATURATION: 97 % | DIASTOLIC BLOOD PRESSURE: 70 MMHG | SYSTOLIC BLOOD PRESSURE: 150 MMHG | HEART RATE: 61 BPM | RESPIRATION RATE: 18 BRPM

## 2022-05-16 DIAGNOSIS — Z20.822 CONTACT WITH AND (SUSPECTED) EXPOSURE TO COVID-19: ICD-10-CM

## 2022-05-16 PROCEDURE — 36415 COLL VENOUS BLD VENIPUNCTURE: CPT

## 2022-05-16 PROCEDURE — 99213 OFFICE O/P EST LOW 20 MIN: CPT | Mod: CS,25

## 2022-05-16 RX ORDER — METOPROLOL SUCCINATE 100 MG/1
100 TABLET, EXTENDED RELEASE ORAL
Qty: 90 | Refills: 3 | Status: DISCONTINUED | COMMUNITY
Start: 2021-09-14 | End: 2022-05-16

## 2022-05-16 NOTE — ASSESSMENT
[FreeTextEntry1] : Follow-up blood pressure at home and monitor.  May need increase in therapy.\par Medications reviewed and renewed.\par Labs drawn in office today\par See endo\par \par \par Follow-up in 6 months  for PE\par \par \par

## 2022-05-16 NOTE — PHYSICAL EXAM
[General Appearance - Well Developed] : well developed [General Appearance - Well Nourished] : well nourished [Normal Oropharynx] : normal oropharynx [Neck Cervical Mass (___cm)] : no neck mass was observed [Jugular Venous Distention Increased] : there was no jugular-venous distention [Thyroid Diffuse Enlargement] : the thyroid was not enlarged [Heart Sounds] : normal S1 and S2 [Auscultation Breath Sounds / Voice Sounds] : lungs were clear to auscultation bilaterally [Lungs Percussion] : the lungs were normal to percussion [Abdomen Soft] : soft [Abdomen Tenderness] : non-tender [Abdomen Mass (___ Cm)] : no abdominal mass palpated [Nail Clubbing] : no clubbing of the fingernails [Cyanosis, Localized] : no localized cyanosis [1+ Pitting] : 1+  pitting [Skin Color & Pigmentation] : normal skin color and pigmentation [] : no rash [No Venous Stasis] : no venous stasis [Skin Lesions] : no skin lesions [No Skin Ulcers] : no skin ulcer [No Xanthoma] : no  xanthoma was observed [Deep Tendon Reflexes (DTR)] : deep tendon reflexes were 2+ and symmetric [Sensation] : the sensory exam was normal to light touch and pinprick [No Focal Deficits] : no focal deficits [Oriented To Time, Place, And Person] : oriented to person, place, and time [Impaired Insight] : insight and judgment were intact [Affect] : the affect was normal [FreeTextEntry1] : 1-2/6 syst. m [FreeTextEntry2] :  Varicose veins.

## 2022-05-16 NOTE — HISTORY OF PRESENT ILLNESS
[TextBox_4] : \par seeing oncologist \par biggest c/o fatigue , today feeling better\par b/p at home 128/64\par elevated in office\par \par \par

## 2022-05-18 LAB
ALBUMIN SERPL ELPH-MCNC: 4.5 G/DL
ALP BLD-CCNC: 59 U/L
ALT SERPL-CCNC: 27 U/L
ANION GAP SERPL CALC-SCNC: 11 MMOL/L
AST SERPL-CCNC: 25 U/L
BASOPHILS # BLD AUTO: 0.04 K/UL
BASOPHILS NFR BLD AUTO: 0.7 %
BILIRUB SERPL-MCNC: 0.3 MG/DL
BUN SERPL-MCNC: 15 MG/DL
CALCIUM SERPL-MCNC: 9.6 MG/DL
CHLORIDE SERPL-SCNC: 101 MMOL/L
CHOLEST SERPL-MCNC: 159 MG/DL
CO2 SERPL-SCNC: 29 MMOL/L
COVID-19 NUCLEOCAPSID  GAM ANTIBODY INTERPRETATION: NEGATIVE
COVID-19 SPIKE DOMAIN ANTIBODY INTERPRETATION: POSITIVE
CREAT SERPL-MCNC: 0.57 MG/DL
EGFR: 91 ML/MIN/1.73M2
EOSINOPHIL # BLD AUTO: 0.22 K/UL
EOSINOPHIL NFR BLD AUTO: 3.9 %
ESTIMATED AVERAGE GLUCOSE: 137 MG/DL
GLUCOSE SERPL-MCNC: 103 MG/DL
HBA1C MFR BLD HPLC: 6.4 %
HCT VFR BLD CALC: 45.1 %
HDLC SERPL-MCNC: 54 MG/DL
HGB BLD-MCNC: 14.4 G/DL
IMM GRANULOCYTES NFR BLD AUTO: 0.4 %
LDLC SERPL CALC-MCNC: 82 MG/DL
LYMPHOCYTES # BLD AUTO: 1.38 K/UL
LYMPHOCYTES NFR BLD AUTO: 24.2 %
MAN DIFF?: NORMAL
MCHC RBC-ENTMCNC: 28.7 PG
MCHC RBC-ENTMCNC: 31.9 GM/DL
MCV RBC AUTO: 89.8 FL
MONOCYTES # BLD AUTO: 0.63 K/UL
MONOCYTES NFR BLD AUTO: 11.1 %
NEUTROPHILS # BLD AUTO: 3.41 K/UL
NEUTROPHILS NFR BLD AUTO: 59.7 %
NONHDLC SERPL-MCNC: 105 MG/DL
PLATELET # BLD AUTO: 244 K/UL
POTASSIUM SERPL-SCNC: 4.4 MMOL/L
PROT SERPL-MCNC: 6.6 G/DL
RBC # BLD: 5.02 M/UL
RBC # FLD: 13.3 %
SARS-COV-2 AB SERPL IA-ACNC: >250 U/ML
SARS-COV-2 AB SERPL QL IA: 0.07 INDEX
SODIUM SERPL-SCNC: 141 MMOL/L
T3 SERPL-MCNC: 121 NG/DL
T3RU NFR SERPL: 1.2 TBI
T4 FREE SERPL-MCNC: 1.1 NG/DL
T4 SERPL-MCNC: 8.1 UG/DL
TRIGL SERPL-MCNC: 118 MG/DL
TSH SERPL-ACNC: 2.48 UIU/ML
WBC # FLD AUTO: 5.7 K/UL

## 2022-07-04 ENCOUNTER — RX RENEWAL (OUTPATIENT)
Age: 82
End: 2022-07-04

## 2022-07-05 ENCOUNTER — NON-APPOINTMENT (OUTPATIENT)
Age: 82
End: 2022-07-05

## 2022-07-05 ENCOUNTER — APPOINTMENT (OUTPATIENT)
Dept: CARDIOLOGY | Facility: CLINIC | Age: 82
End: 2022-07-05

## 2022-07-05 VITALS
WEIGHT: 143.44 LBS | SYSTOLIC BLOOD PRESSURE: 160 MMHG | DIASTOLIC BLOOD PRESSURE: 70 MMHG | BODY MASS INDEX: 30.11 KG/M2 | HEIGHT: 58 IN | OXYGEN SATURATION: 96 % | HEART RATE: 62 BPM | TEMPERATURE: 98 F

## 2022-07-05 DIAGNOSIS — R03.0 ELEVATED BLOOD-PRESSURE READING, W/OUT DIAGNOSIS OF HYPERTENSION: ICD-10-CM

## 2022-07-05 DIAGNOSIS — R00.1 BRADYCARDIA, UNSPECIFIED: ICD-10-CM

## 2022-07-05 PROCEDURE — 99214 OFFICE O/P EST MOD 30 MIN: CPT

## 2022-07-05 PROCEDURE — 93000 ELECTROCARDIOGRAM COMPLETE: CPT

## 2022-07-05 NOTE — HISTORY OF PRESENT ILLNESS
[FreeTextEntry1] : This is an 81 year old female with PMHX of HTN, HLD, DM, carotid atherosclerosis here today for fu.  Pt reports neck pain. Pt denies chest pain, dyspnea, palpitations or dizziness.

## 2022-07-09 ENCOUNTER — APPOINTMENT (OUTPATIENT)
Dept: PULMONOLOGY | Facility: CLINIC | Age: 82
End: 2022-07-09

## 2022-07-09 PROCEDURE — 99442: CPT | Mod: CS,95

## 2022-07-11 ENCOUNTER — APPOINTMENT (OUTPATIENT)
Dept: PULMONOLOGY | Facility: CLINIC | Age: 82
End: 2022-07-11

## 2022-07-11 PROCEDURE — 99442: CPT | Mod: CS,95

## 2022-07-14 NOTE — HISTORY OF PRESENT ILLNESS
[FreeTextEntry1] : Telephone visit.\par \par Became ill 3 days ago\par Called and obtained Paxlovid for 5 days\par Feeling OK\par No fever\par Some cough\par No SOB.\par Oxygen levels good\par Holding Simvastatin\par Denies GI symptoms or loss of taste or smell.\par \par COVID virus infection on Paxlovid\par Comorbidity hypertension hyperlipidemia mitral valve prolapse borderline diabetes carotid disease.\par \par Close observation.\par Complete course of therapy.\par Holding simvastatin.\par Follow oxygen saturation parameters discussed.\par Will follow.\par

## 2022-07-15 ENCOUNTER — NON-APPOINTMENT (OUTPATIENT)
Age: 82
End: 2022-07-15

## 2022-07-15 LAB
ALBUMIN SERPL ELPH-MCNC: 4.2 G/DL
ALP BLD-CCNC: 61 U/L
ALT SERPL-CCNC: 19 U/L
ANION GAP SERPL CALC-SCNC: 14 MMOL/L
AST SERPL-CCNC: 22 U/L
BILIRUB DIRECT SERPL-MCNC: 0.1 MG/DL
BILIRUB INDIRECT SERPL-MCNC: 0.2 MG/DL
BILIRUB SERPL-MCNC: 0.3 MG/DL
BUN SERPL-MCNC: 15 MG/DL
CALCIUM SERPL-MCNC: 9.3 MG/DL
CHLORIDE SERPL-SCNC: 100 MMOL/L
CHOLEST SERPL-MCNC: 169 MG/DL
CK SERPL-CCNC: 58 U/L
CO2 SERPL-SCNC: 28 MMOL/L
CREAT SERPL-MCNC: 0.59 MG/DL
EGFR: 90 ML/MIN/1.73M2
ESTIMATED AVERAGE GLUCOSE: 131 MG/DL
GLUCOSE SERPL-MCNC: 74 MG/DL
HBA1C MFR BLD HPLC: 6.2 %
HDLC SERPL-MCNC: 55 MG/DL
LDLC SERPL CALC-MCNC: 91 MG/DL
LDLC SERPL DIRECT ASSAY-MCNC: 92 MG/DL
NONHDLC SERPL-MCNC: 114 MG/DL
POTASSIUM SERPL-SCNC: 3.6 MMOL/L
PROT SERPL-MCNC: 7 G/DL
SODIUM SERPL-SCNC: 142 MMOL/L
TRIGL SERPL-MCNC: 114 MG/DL

## 2022-07-22 ENCOUNTER — APPOINTMENT (OUTPATIENT)
Dept: CARDIOLOGY | Facility: CLINIC | Age: 82
End: 2022-07-22

## 2022-07-22 PROCEDURE — 93306 TTE W/DOPPLER COMPLETE: CPT

## 2022-08-17 ENCOUNTER — NON-APPOINTMENT (OUTPATIENT)
Age: 82
End: 2022-08-17

## 2022-08-17 ENCOUNTER — APPOINTMENT (OUTPATIENT)
Dept: CARDIOLOGY | Facility: CLINIC | Age: 82
End: 2022-08-17

## 2022-08-17 ENCOUNTER — APPOINTMENT (OUTPATIENT)
Dept: PULMONOLOGY | Facility: CLINIC | Age: 82
End: 2022-08-17

## 2022-08-17 VITALS — DIASTOLIC BLOOD PRESSURE: 60 MMHG | SYSTOLIC BLOOD PRESSURE: 140 MMHG

## 2022-08-17 VITALS
TEMPERATURE: 97.3 F | HEIGHT: 58 IN | SYSTOLIC BLOOD PRESSURE: 180 MMHG | HEART RATE: 66 BPM | BODY MASS INDEX: 29.81 KG/M2 | WEIGHT: 142 LBS | DIASTOLIC BLOOD PRESSURE: 70 MMHG | OXYGEN SATURATION: 98 %

## 2022-08-17 PROCEDURE — 71046 X-RAY EXAM CHEST 2 VIEWS: CPT

## 2022-08-17 PROCEDURE — 93000 ELECTROCARDIOGRAM COMPLETE: CPT

## 2022-08-17 PROCEDURE — 99214 OFFICE O/P EST MOD 30 MIN: CPT | Mod: CS

## 2022-08-17 NOTE — PHYSICAL EXAM

## 2022-08-17 NOTE — HISTORY OF PRESENT ILLNESS
[FreeTextEntry1] : This is an 83 y/o female with a pmhx of HTN, HLD, DM, carotid atherosclerosis here today for a follow up. Pt was was last seen in the office on 7/5/22 with elevated BP. BP reports BP at home as 120s/60s. Pt reports she had covid infection 7/9/22, was treated with paxlovid and was following with pulm, Dr. cortez. Pt reports she is feeling fatigued since infection. Pt also reports edema in b/L LE. Denies pain. Patient denies chest pain, dyspnea, palpitations, dizziness, syncope, changes in bowel/bladder habits or appetite.

## 2022-08-17 NOTE — REVIEW OF SYSTEMS
[Negative] : Heme/Lymph [Feeling Fatigued] : feeling fatigued [Lower Ext Edema] : lower extremity edema [Fever] : no fever [Headache] : no headache [Chills] : no chills [SOB] : no shortness of breath [Dyspnea on exertion] : not dyspnea during exertion [Chest Discomfort] : no chest discomfort [Palpitations] : no palpitations

## 2022-08-18 ENCOUNTER — OUTPATIENT (OUTPATIENT)
Dept: OUTPATIENT SERVICES | Facility: HOSPITAL | Age: 82
LOS: 1 days | End: 2022-08-18
Payer: MEDICARE

## 2022-08-18 ENCOUNTER — APPOINTMENT (OUTPATIENT)
Dept: MAMMOGRAPHY | Facility: IMAGING CENTER | Age: 82
End: 2022-08-18

## 2022-08-18 ENCOUNTER — APPOINTMENT (OUTPATIENT)
Dept: ULTRASOUND IMAGING | Facility: IMAGING CENTER | Age: 82
End: 2022-08-18

## 2022-08-18 ENCOUNTER — APPOINTMENT (OUTPATIENT)
Dept: CARDIOLOGY | Facility: CLINIC | Age: 82
End: 2022-08-18

## 2022-08-18 DIAGNOSIS — Z00.8 ENCOUNTER FOR OTHER GENERAL EXAMINATION: ICD-10-CM

## 2022-08-18 DIAGNOSIS — Z98.89 OTHER SPECIFIED POSTPROCEDURAL STATES: Chronic | ICD-10-CM

## 2022-08-18 DIAGNOSIS — L72.9 FOLLICULAR CYST OF THE SKIN AND SUBCUTANEOUS TISSUE, UNSPECIFIED: Chronic | ICD-10-CM

## 2022-08-18 PROCEDURE — 77067 SCR MAMMO BI INCL CAD: CPT

## 2022-08-18 PROCEDURE — 76641 ULTRASOUND BREAST COMPLETE: CPT

## 2022-08-18 PROCEDURE — 77067 SCR MAMMO BI INCL CAD: CPT | Mod: 26

## 2022-08-18 PROCEDURE — 76641 ULTRASOUND BREAST COMPLETE: CPT | Mod: 26,50

## 2022-08-18 PROCEDURE — 77063 BREAST TOMOSYNTHESIS BI: CPT | Mod: 26

## 2022-08-18 PROCEDURE — 93970 EXTREMITY STUDY: CPT

## 2022-08-18 PROCEDURE — 77063 BREAST TOMOSYNTHESIS BI: CPT

## 2022-09-01 ENCOUNTER — OUTPATIENT (OUTPATIENT)
Dept: OUTPATIENT SERVICES | Facility: HOSPITAL | Age: 82
LOS: 1 days | Discharge: HOME | End: 2022-09-01

## 2022-09-01 DIAGNOSIS — L72.9 FOLLICULAR CYST OF THE SKIN AND SUBCUTANEOUS TISSUE, UNSPECIFIED: Chronic | ICD-10-CM

## 2022-09-01 DIAGNOSIS — Z98.89 OTHER SPECIFIED POSTPROCEDURAL STATES: Chronic | ICD-10-CM

## 2022-09-01 DIAGNOSIS — C85.88 OTHER SPECIFIED TYPES OF NON-HODGKIN LYMPHOMA, LYMPH NODES OF MULTIPLE SITES: ICD-10-CM

## 2022-09-06 ENCOUNTER — APPOINTMENT (OUTPATIENT)
Dept: HEMATOLOGY ONCOLOGY | Facility: CLINIC | Age: 82
End: 2022-09-06

## 2022-09-06 ENCOUNTER — RESULT REVIEW (OUTPATIENT)
Age: 82
End: 2022-09-06

## 2022-09-06 DIAGNOSIS — I73.9 PERIPHERAL VASCULAR DISEASE, UNSPECIFIED: ICD-10-CM

## 2022-09-06 LAB
BASOPHILS # BLD AUTO: 0.04 K/UL — SIGNIFICANT CHANGE UP (ref 0–0.2)
BASOPHILS NFR BLD AUTO: 0.6 % — SIGNIFICANT CHANGE UP (ref 0–2)
EOSINOPHIL # BLD AUTO: 0.29 K/UL — SIGNIFICANT CHANGE UP (ref 0–0.5)
EOSINOPHIL NFR BLD AUTO: 4.5 % — SIGNIFICANT CHANGE UP (ref 0–6)
HCT VFR BLD CALC: 42.1 % — SIGNIFICANT CHANGE UP (ref 34.5–45)
HGB BLD-MCNC: 13.7 G/DL — SIGNIFICANT CHANGE UP (ref 11.5–15.5)
IMM GRANULOCYTES NFR BLD AUTO: 0.3 % — SIGNIFICANT CHANGE UP (ref 0–1.5)
LYMPHOCYTES # BLD AUTO: 1.32 K/UL — SIGNIFICANT CHANGE UP (ref 1–3.3)
LYMPHOCYTES # BLD AUTO: 20.3 % — SIGNIFICANT CHANGE UP (ref 13–44)
MCHC RBC-ENTMCNC: 29 PG — SIGNIFICANT CHANGE UP (ref 27–34)
MCHC RBC-ENTMCNC: 32.5 G/DL — SIGNIFICANT CHANGE UP (ref 32–36)
MCV RBC AUTO: 89 FL — SIGNIFICANT CHANGE UP (ref 80–100)
MONOCYTES # BLD AUTO: 0.63 K/UL — SIGNIFICANT CHANGE UP (ref 0–0.9)
MONOCYTES NFR BLD AUTO: 9.7 % — SIGNIFICANT CHANGE UP (ref 2–14)
NEUTROPHILS # BLD AUTO: 4.21 K/UL — SIGNIFICANT CHANGE UP (ref 1.8–7.4)
NEUTROPHILS NFR BLD AUTO: 64.6 % — SIGNIFICANT CHANGE UP (ref 43–77)
NRBC # BLD: 0 /100 WBCS — SIGNIFICANT CHANGE UP (ref 0–0)
PLATELET # BLD AUTO: 229 K/UL — SIGNIFICANT CHANGE UP (ref 150–400)
RBC # BLD: 4.73 M/UL — SIGNIFICANT CHANGE UP (ref 3.8–5.2)
RBC # FLD: 13.6 % — SIGNIFICANT CHANGE UP (ref 10.3–14.5)
WBC # BLD: 6.51 K/UL — SIGNIFICANT CHANGE UP (ref 3.8–10.5)
WBC # FLD AUTO: 6.51 K/UL — SIGNIFICANT CHANGE UP (ref 3.8–10.5)

## 2022-09-06 PROCEDURE — 99213 OFFICE O/P EST LOW 20 MIN: CPT | Mod: CS

## 2022-09-06 RX ORDER — FUROSEMIDE 40 MG/1
40 TABLET ORAL
Qty: 30 | Refills: 1 | Status: DISCONTINUED | COMMUNITY
Start: 2022-08-17 | End: 2022-09-06

## 2022-09-09 LAB
ALBUMIN SERPL ELPH-MCNC: 4 G/DL
ALBUMIN SERPL ELPH-MCNC: 4.2 G/DL
ALBUMIN SERPL ELPH-MCNC: 4.3 G/DL
ALP BLD-CCNC: 51 U/L
ALP BLD-CCNC: 57 U/L
ALP BLD-CCNC: 62 U/L
ALT SERPL-CCNC: 17 U/L
ALT SERPL-CCNC: 18 U/L
ALT SERPL-CCNC: 23 U/L
ANION GAP SERPL CALC-SCNC: 11 MMOL/L
ANION GAP SERPL CALC-SCNC: 11 MMOL/L
ANION GAP SERPL CALC-SCNC: 14 MMOL/L
AST SERPL-CCNC: 19 U/L
AST SERPL-CCNC: 22 U/L
AST SERPL-CCNC: 24 U/L
B2 MICROGLOB SERPL-MCNC: 2.4 MG/L
BILIRUB SERPL-MCNC: 0.2 MG/DL
BILIRUB SERPL-MCNC: 0.3 MG/DL
BILIRUB SERPL-MCNC: 0.3 MG/DL
BUN SERPL-MCNC: 15 MG/DL
BUN SERPL-MCNC: 16 MG/DL
BUN SERPL-MCNC: 17 MG/DL
CALCIUM SERPL-MCNC: 9.4 MG/DL
CALCIUM SERPL-MCNC: 9.6 MG/DL
CALCIUM SERPL-MCNC: 9.8 MG/DL
CHLORIDE SERPL-SCNC: 100 MMOL/L
CHLORIDE SERPL-SCNC: 101 MMOL/L
CHLORIDE SERPL-SCNC: 102 MMOL/L
CO2 SERPL-SCNC: 28 MMOL/L
CO2 SERPL-SCNC: 30 MMOL/L
CO2 SERPL-SCNC: 30 MMOL/L
CREAT SERPL-MCNC: 0.54 MG/DL
CREAT SERPL-MCNC: 0.59 MG/DL
CREAT SERPL-MCNC: 0.64 MG/DL
DEPRECATED KAPPA LC FREE/LAMBDA SER: 1.1 RATIO
DEPRECATED KAPPA LC FREE/LAMBDA SER: 1.1 RATIO
DEPRECATED KAPPA LC FREE/LAMBDA SER: 1.17 RATIO
EGFR: 92 ML/MIN/1.73M2
GLUCOSE SERPL-MCNC: 100 MG/DL
GLUCOSE SERPL-MCNC: 93 MG/DL
GLUCOSE SERPL-MCNC: 94 MG/DL
IGA SER QL IEP: 153 MG/DL
IGA SER QL IEP: 178 MG/DL
IGA SER QL IEP: 178 MG/DL
IGG SER QL IEP: 698 MG/DL
IGG SER QL IEP: 721 MG/DL
IGG SER QL IEP: 844 MG/DL
IGM SER QL IEP: 203 MG/DL
IGM SER QL IEP: 209 MG/DL
IGM SER QL IEP: 211 MG/DL
KAPPA LC CSF-MCNC: 1.36 MG/DL
KAPPA LC CSF-MCNC: 1.36 MG/DL
KAPPA LC CSF-MCNC: 1.37 MG/DL
KAPPA LC SERPL-MCNC: 1.5 MG/DL
KAPPA LC SERPL-MCNC: 1.51 MG/DL
KAPPA LC SERPL-MCNC: 1.59 MG/DL
LDH SERPL-CCNC: 114 U/L
LDH SERPL-CCNC: 116 U/L
LDH SERPL-CCNC: 119 U/L
POTASSIUM SERPL-SCNC: 3.6 MMOL/L
POTASSIUM SERPL-SCNC: 4.1 MMOL/L
POTASSIUM SERPL-SCNC: 4.5 MMOL/L
PROT SERPL-MCNC: 5.9 G/DL
PROT SERPL-MCNC: 6.4 G/DL
PROT SERPL-MCNC: 6.6 G/DL
SODIUM SERPL-SCNC: 141 MMOL/L
SODIUM SERPL-SCNC: 143 MMOL/L
SODIUM SERPL-SCNC: 143 MMOL/L

## 2022-09-09 NOTE — ASSESSMENT
[Palliative] : Goals of care discussed with patient: Palliative [Palliative Care Plan] : not applicable at this time [FreeTextEntry1] : Clinically DUC. Currently with no complaints. 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 are small aneurysms were re imaged 2/2018 - no change in prior two yrs. Reimaged 2/2022\par with no change vs. 2014.\par She alsohas  had a 1 cm FDG(-) indeterminate probable adrenal adenoma.\par CBC reviewed and d/w pt:\par WBC 6.51, Hgb 13.7, Plt \par Received Pfizer Covid vaccines 2/17/21 and 3/10/21, booster 11/17/21 \par CMP, LDH, B2M, immunoglobulin panel\par \par RV 6 months\par .

## 2022-09-09 NOTE — HISTORY OF PRESENT ILLNESS
[de-identified] : Mrs. Upton is a 74 year old woman with follicular lymphoma, grade 1-2, stage IA, who received radiotherapy to a > 5 cm left inguinal node in late 2014. In October, 2014 she noted a left inguinal lump associated with some discomfort. CT scan showed a 4.8 x 3,2 cm left inguinal node. 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 PET/CT showed that the inguinal node had grown over a few weeks to 5.2 cm. SUV was 10.1. The adrenal nodule was FDG(-). Marrow was (-). Iron was present. Excisional biopsy of the inguinal node showed findings most c/w FL, with dim CD10(+), CD20, BCL-6 and BCL-2 (partial). FISH for BCL-IgH was (-). \par She was treated with RT which was well tolerated.\par \par  \par \par Disease: follicular lymphoma, gr 1-2 \par Stage:. IA. \par Pathology: FL, gr 1-2.  [de-identified] : Remains w/o constitutional c/o\par Had  uncomplicated Covid 7/2022; paxlovid taken x 5d  \par \par Radiotherapy to left inguinal node - 30 Gy in 15 fractions -completed 1/9/15.\par Baseline CT scans showed small renal (1.3 cm);,splenic artery aneurysms. \par CT A/P 4/2/2022: vs 2014 no change in 1.7 cm right renal a. aneurysm or\par in 0.8 cm splenic a. aneurysm. No LAD or H/S megaly.\par \par

## 2022-09-09 NOTE — REVIEW OF SYSTEMS
[Negative] : Allergic/Immunologic [Fever] : no fever [Night Sweats] : no night sweats [Fatigue] : fatigue

## 2022-09-09 NOTE — PHYSICAL EXAM
[Fully active, able to carry on all pre-disease performance without restriction] : Status 0 - Fully active, able to carry on all pre-disease performance without restriction [Normal] : affect appropriate [de-identified] : Mild LE edema bilat, + venous stasis chages

## 2022-09-28 ENCOUNTER — APPOINTMENT (OUTPATIENT)
Dept: CARDIOLOGY | Facility: CLINIC | Age: 82
End: 2022-09-28

## 2022-09-28 VITALS
BODY MASS INDEX: 29.6 KG/M2 | SYSTOLIC BLOOD PRESSURE: 140 MMHG | HEIGHT: 58 IN | OXYGEN SATURATION: 97 % | WEIGHT: 141 LBS | HEART RATE: 64 BPM | TEMPERATURE: 97.9 F | DIASTOLIC BLOOD PRESSURE: 75 MMHG

## 2022-09-28 PROCEDURE — 99213 OFFICE O/P EST LOW 20 MIN: CPT

## 2022-09-28 NOTE — HISTORY OF PRESENT ILLNESS
[FreeTextEntry1] : This is an 83 y/o female with a pmhx of  HTN, HLD, DM, carotid atherosclerosis here today for a follow up. Pt was last seen on 8/17/22 and was noted to have edema on last visit, hctz was changed to lasix 40. Pt stated she felt confused on medication and stopped it and restarted HCTZ.

## 2022-10-06 ENCOUNTER — NON-APPOINTMENT (OUTPATIENT)
Age: 82
End: 2022-10-06

## 2022-10-06 LAB
ANION GAP SERPL CALC-SCNC: 12 MMOL/L
BUN SERPL-MCNC: 19 MG/DL
CALCIUM SERPL-MCNC: 9.6 MG/DL
CHLORIDE SERPL-SCNC: 101 MMOL/L
CO2 SERPL-SCNC: 29 MMOL/L
CREAT SERPL-MCNC: 0.61 MG/DL
EGFR: 89 ML/MIN/1.73M2
GLUCOSE SERPL-MCNC: 103 MG/DL
NT-PROBNP SERPL-MCNC: 465 PG/ML
POTASSIUM SERPL-SCNC: 4.2 MMOL/L
SODIUM SERPL-SCNC: 143 MMOL/L

## 2022-10-10 ENCOUNTER — APPOINTMENT (OUTPATIENT)
Dept: PULMONOLOGY | Facility: CLINIC | Age: 82
End: 2022-10-10

## 2022-10-10 ENCOUNTER — LABORATORY RESULT (OUTPATIENT)
Age: 82
End: 2022-10-10

## 2022-10-10 VITALS — HEART RATE: 67 BPM | DIASTOLIC BLOOD PRESSURE: 69 MMHG | OXYGEN SATURATION: 97 % | SYSTOLIC BLOOD PRESSURE: 152 MMHG

## 2022-10-10 VITALS — DIASTOLIC BLOOD PRESSURE: 60 MMHG | SYSTOLIC BLOOD PRESSURE: 150 MMHG

## 2022-10-10 PROCEDURE — 99213 OFFICE O/P EST LOW 20 MIN: CPT | Mod: 25

## 2022-10-10 PROCEDURE — G0008: CPT

## 2022-10-10 PROCEDURE — 90662 IIV NO PRSV INCREASED AG IM: CPT

## 2022-10-12 LAB
ALBUMIN SERPL ELPH-MCNC: 4.1 G/DL
ALP BLD-CCNC: 59 U/L
ALT SERPL-CCNC: 15 U/L
AST SERPL-CCNC: 20 U/L
BILIRUB DIRECT SERPL-MCNC: 0.1 MG/DL
BILIRUB INDIRECT SERPL-MCNC: 0.2 MG/DL
BILIRUB SERPL-MCNC: 0.3 MG/DL
CHOLEST SERPL-MCNC: 172 MG/DL
ESTIMATED AVERAGE GLUCOSE: 128 MG/DL
HBA1C MFR BLD HPLC: 6.1 %
HDLC SERPL-MCNC: 52 MG/DL
LDLC SERPL CALC-MCNC: 96 MG/DL
NONHDLC SERPL-MCNC: 119 MG/DL
PROT SERPL-MCNC: 6.2 G/DL
T3 SERPL-MCNC: 108 NG/DL
T3RU NFR SERPL: 1.1 TBI
T4 SERPL-MCNC: 7 UG/DL
TRIGL SERPL-MCNC: 119 MG/DL
TSH SERPL-ACNC: 2.38 UIU/ML

## 2022-10-16 NOTE — DISCUSSION/SUMMARY
[FreeTextEntry1] : Hypertension on therapy.  Mild elevation systolic pressure today.\par Hyperlipidemia on treatment protocol including medications, diet, and exercise program as tolerated.\par Continue present therapy\par Lymphoma followed by oncology.\par Osteopenia\par Status post COVID virus infection without evidence of sequela.

## 2022-10-16 NOTE — HISTORY OF PRESENT ILLNESS
[TextBox_4] : Had COVID in July took paxloivd\par had negative CXR\par no breathing issues\par saw cardio \par seeing oncologist \par , today feeling better\par b/p at home is 130/70\par saw Dr Dozier and tried Lasix but could not tolerate it\par elevated in office\par \par \par

## 2022-12-05 ENCOUNTER — APPOINTMENT (OUTPATIENT)
Dept: CARDIOLOGY | Facility: CLINIC | Age: 82
End: 2022-12-05

## 2022-12-09 ENCOUNTER — RX RENEWAL (OUTPATIENT)
Age: 82
End: 2022-12-09

## 2022-12-22 ENCOUNTER — NON-APPOINTMENT (OUTPATIENT)
Age: 82
End: 2022-12-22

## 2022-12-22 ENCOUNTER — APPOINTMENT (OUTPATIENT)
Dept: CARDIOLOGY | Facility: CLINIC | Age: 82
End: 2022-12-22

## 2022-12-22 VITALS
SYSTOLIC BLOOD PRESSURE: 155 MMHG | WEIGHT: 144 LBS | TEMPERATURE: 97.9 F | OXYGEN SATURATION: 91 % | HEIGHT: 58 IN | DIASTOLIC BLOOD PRESSURE: 70 MMHG | HEART RATE: 56 BPM | BODY MASS INDEX: 30.23 KG/M2

## 2022-12-22 DIAGNOSIS — R53.83 OTHER FATIGUE: ICD-10-CM

## 2022-12-22 PROCEDURE — 99213 OFFICE O/P EST LOW 20 MIN: CPT

## 2022-12-22 PROCEDURE — 93000 ELECTROCARDIOGRAM COMPLETE: CPT

## 2022-12-22 RX ORDER — NIRMATRELVIR AND RITONAVIR 300-100 MG
20 X 150 MG & KIT ORAL
Qty: 1 | Refills: 0 | Status: DISCONTINUED | COMMUNITY
Start: 2022-07-09 | End: 2022-12-22

## 2022-12-22 NOTE — HISTORY OF PRESENT ILLNESS
[FreeTextEntry1] : This is an 83 y/o female with a pmhx of of HTN, HLD, DM, carotid atherosclerosis here today for a follow up. PAtient reports LE edema is improving. Patient denies chest pain, dyspnea, palpitations, dizziness, syncope, changes in bowel/bladder habits or appetite. pt with rare fatigue \par

## 2022-12-22 NOTE — PHYSICAL EXAM

## 2022-12-27 ENCOUNTER — APPOINTMENT (OUTPATIENT)
Dept: PULMONOLOGY | Facility: CLINIC | Age: 82
End: 2022-12-27
Payer: MEDICARE

## 2022-12-27 VITALS — SYSTOLIC BLOOD PRESSURE: 190 MMHG | HEART RATE: 68 BPM | DIASTOLIC BLOOD PRESSURE: 69 MMHG | OXYGEN SATURATION: 98 %

## 2022-12-27 VITALS — SYSTOLIC BLOOD PRESSURE: 160 MMHG | DIASTOLIC BLOOD PRESSURE: 78 MMHG

## 2022-12-27 VITALS — DIASTOLIC BLOOD PRESSURE: 82 MMHG | SYSTOLIC BLOOD PRESSURE: 160 MMHG

## 2022-12-27 DIAGNOSIS — H26.9 UNSPECIFIED CATARACT: ICD-10-CM

## 2022-12-27 LAB — HBA1C MFR BLD HPLC: 6

## 2022-12-27 PROCEDURE — 83036 HEMOGLOBIN GLYCOSYLATED A1C: CPT | Mod: QW

## 2022-12-27 PROCEDURE — 99214 OFFICE O/P EST MOD 30 MIN: CPT | Mod: 25

## 2022-12-27 PROCEDURE — 36415 COLL VENOUS BLD VENIPUNCTURE: CPT

## 2022-12-30 DIAGNOSIS — R35.0 FREQUENCY OF MICTURITION: ICD-10-CM

## 2022-12-30 NOTE — DISCUSSION/SUMMARY
[FreeTextEntry1] : Preop cataract surgery.\par Hypertension on therapy. Probable component of white coat. \par Hyperlipidemia on treatment protocol including medications, diet, and exercise program as tolerated.\par Continue present therapy\par Lymphoma followed by oncology.\par Osteopenia\par Status post COVID virus infection without evidence of sequela.

## 2022-12-30 NOTE — ASSESSMENT
[FreeTextEntry1] : Follow-up blood pressure at home and monitor.  May need increase in therapy.\par Medications reviewed and renewed.\par Labs drawn in office today\par Endocrinology evaluation.\par \par For repeat UA and culture.  We will treat accordingly.\par \par Medical problems as above. Medical status maximized. No medical contraindications to surgery.\par \par \par \par \par

## 2022-12-30 NOTE — HISTORY OF PRESENT ILLNESS
[TextBox_4] : Needs medical clearance for cataract sx on 1/16/22 , first left and 1 month  later ,right Dr Jarred Mitchell fax [phone]\par had negative CXR\par no breathing issues\par saw cardio ,had recent labs \par seeing oncologist \par b/p at home is 130/70, 127/70\par elevated in office\par Had COVID in July took paxloivd, mild\par \par

## 2023-01-03 LAB
APPEARANCE: CLEAR
BACTERIA UR CULT: NORMAL
BACTERIA: NEGATIVE
BILIRUBIN URINE: NEGATIVE
BLOOD URINE: NEGATIVE
COLOR: YELLOW
GLUCOSE QUALITATIVE U: NEGATIVE
HYALINE CASTS: 0 /LPF
KETONES URINE: NEGATIVE
LEUKOCYTE ESTERASE URINE: ABNORMAL
MICROSCOPIC-UA: NORMAL
NITRITE URINE: NEGATIVE
PH URINE: 6
PROTEIN URINE: NORMAL
RED BLOOD CELLS URINE: 1 /HPF
SPECIFIC GRAVITY URINE: 1.01
SQUAMOUS EPITHELIAL CELLS: 2 /HPF
UROBILINOGEN URINE: NORMAL
WHITE BLOOD CELLS URINE: 2 /HPF

## 2023-03-01 ENCOUNTER — OUTPATIENT (OUTPATIENT)
Dept: OUTPATIENT SERVICES | Facility: HOSPITAL | Age: 83
LOS: 1 days | Discharge: HOME | End: 2023-03-01

## 2023-03-01 DIAGNOSIS — Z98.89 OTHER SPECIFIED POSTPROCEDURAL STATES: Chronic | ICD-10-CM

## 2023-03-01 DIAGNOSIS — C85.88 OTHER SPECIFIED TYPES OF NON-HODGKIN LYMPHOMA, LYMPH NODES OF MULTIPLE SITES: ICD-10-CM

## 2023-03-01 DIAGNOSIS — L72.9 FOLLICULAR CYST OF THE SKIN AND SUBCUTANEOUS TISSUE, UNSPECIFIED: Chronic | ICD-10-CM

## 2023-03-08 ENCOUNTER — INPATIENT (INPATIENT)
Facility: HOSPITAL | Age: 83
LOS: 0 days | Discharge: HOME | DRG: 310 | End: 2023-03-09
Attending: INTERNAL MEDICINE | Admitting: INTERNAL MEDICINE
Payer: COMMERCIAL

## 2023-03-08 VITALS
TEMPERATURE: 98 F | HEIGHT: 60 IN | SYSTOLIC BLOOD PRESSURE: 152 MMHG | WEIGHT: 145.95 LBS | DIASTOLIC BLOOD PRESSURE: 111 MMHG | HEART RATE: 136 BPM | RESPIRATION RATE: 20 BRPM | OXYGEN SATURATION: 96 %

## 2023-03-08 DIAGNOSIS — Z98.89 OTHER SPECIFIED POSTPROCEDURAL STATES: Chronic | ICD-10-CM

## 2023-03-08 DIAGNOSIS — I48.0 PAROXYSMAL ATRIAL FIBRILLATION: ICD-10-CM

## 2023-03-08 DIAGNOSIS — Z87.898 PERSONAL HISTORY OF OTHER SPECIFIED CONDITIONS: ICD-10-CM

## 2023-03-08 DIAGNOSIS — R91.8 OTHER NONSPECIFIC ABNORMAL FINDING OF LUNG FIELD: ICD-10-CM

## 2023-03-08 DIAGNOSIS — I48.91 UNSPECIFIED ATRIAL FIBRILLATION: ICD-10-CM

## 2023-03-08 DIAGNOSIS — R53.83 OTHER FATIGUE: ICD-10-CM

## 2023-03-08 DIAGNOSIS — L72.9 FOLLICULAR CYST OF THE SKIN AND SUBCUTANEOUS TISSUE, UNSPECIFIED: Chronic | ICD-10-CM

## 2023-03-08 DIAGNOSIS — E87.6 HYPOKALEMIA: ICD-10-CM

## 2023-03-08 LAB
ALBUMIN SERPL ELPH-MCNC: 4.1 G/DL — SIGNIFICANT CHANGE UP (ref 3.3–5)
ALP SERPL-CCNC: 66 U/L — SIGNIFICANT CHANGE UP (ref 40–120)
ALT FLD-CCNC: 42 U/L — SIGNIFICANT CHANGE UP (ref 10–45)
ANION GAP SERPL CALC-SCNC: 12 MMOL/L — SIGNIFICANT CHANGE UP (ref 5–17)
ANION GAP SERPL CALC-SCNC: 8 MMOL/L — SIGNIFICANT CHANGE UP (ref 5–17)
APPEARANCE UR: CLEAR — SIGNIFICANT CHANGE UP
APTT BLD: 27.7 SEC — SIGNIFICANT CHANGE UP (ref 27.5–35.5)
AST SERPL-CCNC: 32 U/L — SIGNIFICANT CHANGE UP (ref 10–40)
BACTERIA # UR AUTO: NEGATIVE — SIGNIFICANT CHANGE UP
BASOPHILS # BLD AUTO: 0.03 K/UL — SIGNIFICANT CHANGE UP (ref 0–0.2)
BASOPHILS # BLD AUTO: 0.03 K/UL — SIGNIFICANT CHANGE UP (ref 0–0.2)
BASOPHILS NFR BLD AUTO: 0.4 % — SIGNIFICANT CHANGE UP (ref 0–2)
BASOPHILS NFR BLD AUTO: 0.5 % — SIGNIFICANT CHANGE UP (ref 0–2)
BILIRUB SERPL-MCNC: 0.2 MG/DL — SIGNIFICANT CHANGE UP (ref 0.2–1.2)
BILIRUB UR-MCNC: NEGATIVE — SIGNIFICANT CHANGE UP
BUN SERPL-MCNC: 10 MG/DL — SIGNIFICANT CHANGE UP (ref 7–23)
BUN SERPL-MCNC: 14 MG/DL — SIGNIFICANT CHANGE UP (ref 7–23)
CALCIUM SERPL-MCNC: 9.3 MG/DL — SIGNIFICANT CHANGE UP (ref 8.4–10.5)
CALCIUM SERPL-MCNC: 9.3 MG/DL — SIGNIFICANT CHANGE UP (ref 8.4–10.5)
CHLORIDE SERPL-SCNC: 104 MMOL/L — SIGNIFICANT CHANGE UP (ref 96–108)
CHLORIDE SERPL-SCNC: 105 MMOL/L — SIGNIFICANT CHANGE UP (ref 96–108)
CK MB CFR SERPL CALC: 4.3 NG/ML — HIGH (ref 0–3.8)
CK MB CFR SERPL CALC: 5.1 NG/ML — HIGH (ref 0–3.8)
CK SERPL-CCNC: 72 U/L — SIGNIFICANT CHANGE UP (ref 25–170)
CK SERPL-CCNC: 82 U/L — SIGNIFICANT CHANGE UP (ref 25–170)
CO2 SERPL-SCNC: 26 MMOL/L — SIGNIFICANT CHANGE UP (ref 22–31)
CO2 SERPL-SCNC: 30 MMOL/L — SIGNIFICANT CHANGE UP (ref 22–31)
COLOR SPEC: COLORLESS — SIGNIFICANT CHANGE UP
CREAT SERPL-MCNC: 0.51 MG/DL — SIGNIFICANT CHANGE UP (ref 0.5–1.3)
CREAT SERPL-MCNC: 0.52 MG/DL — SIGNIFICANT CHANGE UP (ref 0.5–1.3)
DIFF PNL FLD: NEGATIVE — SIGNIFICANT CHANGE UP
EGFR: 93 ML/MIN/1.73M2 — SIGNIFICANT CHANGE UP
EGFR: 93 ML/MIN/1.73M2 — SIGNIFICANT CHANGE UP
EOSINOPHIL # BLD AUTO: 0.17 K/UL — SIGNIFICANT CHANGE UP (ref 0–0.5)
EOSINOPHIL # BLD AUTO: 0.25 K/UL — SIGNIFICANT CHANGE UP (ref 0–0.5)
EOSINOPHIL NFR BLD AUTO: 2.8 % — SIGNIFICANT CHANGE UP (ref 0–6)
EOSINOPHIL NFR BLD AUTO: 3.4 % — SIGNIFICANT CHANGE UP (ref 0–6)
EPI CELLS # UR: 1 /HPF — SIGNIFICANT CHANGE UP
GLUCOSE SERPL-MCNC: 118 MG/DL — HIGH (ref 70–99)
GLUCOSE SERPL-MCNC: 130 MG/DL — HIGH (ref 70–99)
GLUCOSE UR QL: NEGATIVE — SIGNIFICANT CHANGE UP
HCT VFR BLD CALC: 45.1 % — HIGH (ref 34.5–45)
HCT VFR BLD CALC: 46 % — HIGH (ref 34.5–45)
HGB BLD-MCNC: 14.5 G/DL — SIGNIFICANT CHANGE UP (ref 11.5–15.5)
HGB BLD-MCNC: 15.1 G/DL — SIGNIFICANT CHANGE UP (ref 11.5–15.5)
HYALINE CASTS # UR AUTO: 1 /LPF — SIGNIFICANT CHANGE UP (ref 0–2)
IMM GRANULOCYTES NFR BLD AUTO: 0.3 % — SIGNIFICANT CHANGE UP (ref 0–0.9)
IMM GRANULOCYTES NFR BLD AUTO: 0.3 % — SIGNIFICANT CHANGE UP (ref 0–0.9)
INR BLD: 1.05 RATIO — SIGNIFICANT CHANGE UP (ref 0.88–1.16)
KETONES UR-MCNC: NEGATIVE — SIGNIFICANT CHANGE UP
LEUKOCYTE ESTERASE UR-ACNC: ABNORMAL
LYMPHOCYTES # BLD AUTO: 1.2 K/UL — SIGNIFICANT CHANGE UP (ref 1–3.3)
LYMPHOCYTES # BLD AUTO: 1.43 K/UL — SIGNIFICANT CHANGE UP (ref 1–3.3)
LYMPHOCYTES # BLD AUTO: 19.7 % — SIGNIFICANT CHANGE UP (ref 13–44)
LYMPHOCYTES # BLD AUTO: 20 % — SIGNIFICANT CHANGE UP (ref 13–44)
MAGNESIUM SERPL-MCNC: 1.8 MG/DL — SIGNIFICANT CHANGE UP (ref 1.6–2.6)
MCHC RBC-ENTMCNC: 28.7 PG — SIGNIFICANT CHANGE UP (ref 27–34)
MCHC RBC-ENTMCNC: 28.7 PG — SIGNIFICANT CHANGE UP (ref 27–34)
MCHC RBC-ENTMCNC: 32.2 GM/DL — SIGNIFICANT CHANGE UP (ref 32–36)
MCHC RBC-ENTMCNC: 32.8 GM/DL — SIGNIFICANT CHANGE UP (ref 32–36)
MCV RBC AUTO: 87.3 FL — SIGNIFICANT CHANGE UP (ref 80–100)
MCV RBC AUTO: 89.3 FL — SIGNIFICANT CHANGE UP (ref 80–100)
MONOCYTES # BLD AUTO: 0.56 K/UL — SIGNIFICANT CHANGE UP (ref 0–0.9)
MONOCYTES # BLD AUTO: 0.68 K/UL — SIGNIFICANT CHANGE UP (ref 0–0.9)
MONOCYTES NFR BLD AUTO: 9.3 % — SIGNIFICANT CHANGE UP (ref 2–14)
MONOCYTES NFR BLD AUTO: 9.4 % — SIGNIFICANT CHANGE UP (ref 2–14)
NEUTROPHILS # BLD AUTO: 4.03 K/UL — SIGNIFICANT CHANGE UP (ref 1.8–7.4)
NEUTROPHILS # BLD AUTO: 4.84 K/UL — SIGNIFICANT CHANGE UP (ref 1.8–7.4)
NEUTROPHILS NFR BLD AUTO: 66.8 % — SIGNIFICANT CHANGE UP (ref 43–77)
NEUTROPHILS NFR BLD AUTO: 67.1 % — SIGNIFICANT CHANGE UP (ref 43–77)
NITRITE UR-MCNC: NEGATIVE — SIGNIFICANT CHANGE UP
NRBC # BLD: 0 /100 WBCS — SIGNIFICANT CHANGE UP (ref 0–0)
NRBC # BLD: 0 /100 WBCS — SIGNIFICANT CHANGE UP (ref 0–0)
PH UR: 7 — SIGNIFICANT CHANGE UP (ref 5–8)
PHOSPHATE SERPL-MCNC: 2.9 MG/DL — SIGNIFICANT CHANGE UP (ref 2.5–4.5)
PLATELET # BLD AUTO: 241 K/UL — SIGNIFICANT CHANGE UP (ref 150–400)
PLATELET # BLD AUTO: 257 K/UL — SIGNIFICANT CHANGE UP (ref 150–400)
POTASSIUM SERPL-MCNC: 3.2 MMOL/L — LOW (ref 3.5–5.3)
POTASSIUM SERPL-MCNC: 4 MMOL/L — SIGNIFICANT CHANGE UP (ref 3.5–5.3)
POTASSIUM SERPL-SCNC: 3.2 MMOL/L — LOW (ref 3.5–5.3)
POTASSIUM SERPL-SCNC: 4 MMOL/L — SIGNIFICANT CHANGE UP (ref 3.5–5.3)
PROCALCITONIN SERPL-MCNC: <0.03 NG/ML — SIGNIFICANT CHANGE UP (ref 0.02–0.1)
PROT SERPL-MCNC: 6.6 G/DL — SIGNIFICANT CHANGE UP (ref 6–8.3)
PROT UR-MCNC: NEGATIVE — SIGNIFICANT CHANGE UP
PROTHROM AB SERPL-ACNC: 12.2 SEC — SIGNIFICANT CHANGE UP (ref 10.5–13.4)
RAPID RVP RESULT: SIGNIFICANT CHANGE UP
RBC # BLD: 5.05 M/UL — SIGNIFICANT CHANGE UP (ref 3.8–5.2)
RBC # BLD: 5.27 M/UL — HIGH (ref 3.8–5.2)
RBC # FLD: 13.2 % — SIGNIFICANT CHANGE UP (ref 10.3–14.5)
RBC # FLD: 13.3 % — SIGNIFICANT CHANGE UP (ref 10.3–14.5)
RBC CASTS # UR COMP ASSIST: 0 /HPF — SIGNIFICANT CHANGE UP (ref 0–4)
SARS-COV-2 RNA SPEC QL NAA+PROBE: SIGNIFICANT CHANGE UP
SODIUM SERPL-SCNC: 142 MMOL/L — SIGNIFICANT CHANGE UP (ref 135–145)
SODIUM SERPL-SCNC: 143 MMOL/L — SIGNIFICANT CHANGE UP (ref 135–145)
SP GR SPEC: 1 — LOW (ref 1.01–1.02)
TROPONIN T, HIGH SENSITIVITY RESULT: 13 NG/L — SIGNIFICANT CHANGE UP (ref 0–51)
TROPONIN T, HIGH SENSITIVITY RESULT: 23 NG/L — SIGNIFICANT CHANGE UP (ref 0–51)
TROPONIN T, HIGH SENSITIVITY RESULT: 27 NG/L — SIGNIFICANT CHANGE UP (ref 0–51)
UROBILINOGEN FLD QL: NEGATIVE — SIGNIFICANT CHANGE UP
WBC # BLD: 6.01 K/UL — SIGNIFICANT CHANGE UP (ref 3.8–10.5)
WBC # BLD: 7.25 K/UL — SIGNIFICANT CHANGE UP (ref 3.8–10.5)
WBC # FLD AUTO: 6.01 K/UL — SIGNIFICANT CHANGE UP (ref 3.8–10.5)
WBC # FLD AUTO: 7.25 K/UL — SIGNIFICANT CHANGE UP (ref 3.8–10.5)
WBC UR QL: 2 /HPF — SIGNIFICANT CHANGE UP (ref 0–5)

## 2023-03-08 PROCEDURE — 71250 CT THORAX DX C-: CPT | Mod: 26

## 2023-03-08 PROCEDURE — 71045 X-RAY EXAM CHEST 1 VIEW: CPT | Mod: 26

## 2023-03-08 PROCEDURE — 93306 TTE W/DOPPLER COMPLETE: CPT | Mod: 26

## 2023-03-08 PROCEDURE — 70450 CT HEAD/BRAIN W/O DYE: CPT | Mod: 26

## 2023-03-08 PROCEDURE — 99223 1ST HOSP IP/OBS HIGH 75: CPT

## 2023-03-08 PROCEDURE — 99285 EMERGENCY DEPT VISIT HI MDM: CPT | Mod: FS

## 2023-03-08 PROCEDURE — 93010 ELECTROCARDIOGRAM REPORT: CPT

## 2023-03-08 RX ORDER — SIMVASTATIN 20 MG/1
10 TABLET, FILM COATED ORAL AT BEDTIME
Refills: 0 | Status: DISCONTINUED | OUTPATIENT
Start: 2023-03-08 | End: 2023-03-09

## 2023-03-08 RX ORDER — METOPROLOL TARTRATE 50 MG
100 TABLET ORAL DAILY
Refills: 0 | Status: DISCONTINUED | OUTPATIENT
Start: 2023-03-08 | End: 2023-03-09

## 2023-03-08 RX ORDER — ASPIRIN/CALCIUM CARB/MAGNESIUM 324 MG
81 TABLET ORAL DAILY
Refills: 0 | Status: DISCONTINUED | OUTPATIENT
Start: 2023-03-08 | End: 2023-03-09

## 2023-03-08 RX ORDER — POTASSIUM CHLORIDE 20 MEQ
40 PACKET (EA) ORAL ONCE
Refills: 0 | Status: COMPLETED | OUTPATIENT
Start: 2023-03-08 | End: 2023-03-08

## 2023-03-08 RX ORDER — SODIUM CHLORIDE 9 MG/ML
500 INJECTION INTRAMUSCULAR; INTRAVENOUS; SUBCUTANEOUS ONCE
Refills: 0 | Status: COMPLETED | OUTPATIENT
Start: 2023-03-08 | End: 2023-03-08

## 2023-03-08 RX ADMIN — Medication 81 MILLIGRAM(S): at 13:05

## 2023-03-08 RX ADMIN — SODIUM CHLORIDE 500 MILLILITER(S): 9 INJECTION INTRAMUSCULAR; INTRAVENOUS; SUBCUTANEOUS at 04:25

## 2023-03-08 RX ADMIN — Medication 40 MILLIEQUIVALENT(S): at 04:25

## 2023-03-08 RX ADMIN — Medication 100 MILLIGRAM(S): at 23:00

## 2023-03-08 RX ADMIN — SIMVASTATIN 10 MILLIGRAM(S): 20 TABLET, FILM COATED ORAL at 23:00

## 2023-03-08 NOTE — ED PROVIDER NOTE - CROS ED RESP ALL NEG
General Sunscreen Counseling: I recommended a broad spectrum sunscreen with a SPF of 30 or higher. Detail Level: Zone negative...

## 2023-03-08 NOTE — H&P ADULT - NSICDXPASTMEDICALHX_GEN_ALL_CORE_FT
PAST MEDICAL HISTORY:  HTN (hypertension)     Hyperlipidemia     Lymph node enlargement left inguinal    Lymphoma     Osteoporosis

## 2023-03-08 NOTE — ED PROVIDER NOTE - NSICDXFAMILYHX_GEN_ALL_CORE_FT
FAMILY HISTORY:  Father  Still living? No  Family history of hypertension, Age at diagnosis: Age Unknown    Mother  Still living? No  Family history of tuberculosis, Age at diagnosis: Age Unknown

## 2023-03-08 NOTE — CONSULT NOTE ADULT - ASSESSMENT
Ms. Upton is a 83 y/o female with HTN, presents to the ED with complaint of elevated BP. states she woke up around 12am and states she "didn't feel right." states took her BP and it was elevated, and came to the ER.  Upon arrival, she was found to be in AF with fast rates, though converted to sr on her own.    - initially in af, now into sr  - has had gi symptoms of N/V and diarrhea lately, so most likely dehydration triggered  - no prior history of af per patient and daughter  - monitor on telemetry  - echocardiogram  - cont bb  - if recurrs, will need ac    - cxr with possible pna (though no convincing symptoms), and would consider ct chest  to confirm    - bp labile, though better  - cont with her home regimen of diuretic and bb    - dvt prophylaxis  - trend creatinine and electrolytes. Keep K>4, mg>2  - will follow with you

## 2023-03-08 NOTE — CONSULT NOTE ADULT - CONSULT REASON
"The following letter pertains to your most recent diagnostic tests:    -Your HIV test is negative.     -The PSA continues to climb.  This is very suspicious for prostate cancer.       -Your total cholesterol is 219 which is above your goal of total cholesterol less than 200.    -Your triglycerides are 100 which are at your goal of triglycerides less than 150.    -Your HDL or \"good cholesterol\" is 54 which is at your goal of HDL cholesterol greater than 40.    -Your LDL cholesterol or \"bad cholesterol\" is 145 which is above your goal of LDL cholesterol less than <130.  Your LDL goal is based on your risk factors for artery disease.     -Liver and gallbladder tests are normal for you. (ALT,AST, Alk phos, bilirubin), kidney function is normal for you (Creatinine, GFR), Sodium is normal, Potassium is normal for you, Calcium is normal for you, Glucose (blood sugar) is normal for you.      -Your complete blood counts including your hemoglobin returned normal for you.         Bottom line:      1.  The PSA continues to elevate.  I am concerned for prostate cancer.  I think the MRI will add useful information, but I recommend that you consult with a urologist to discuss the results and appropriate next steps.  I recommend a Dr. Dewitt or Dr. Hampton.  Please call [facility] [phone] ([email or web address]) to schedule an appointment with either of those providers.       2.  The cholesterol is a little higher than last check.  You can reduce your total and LDL cholesterol levels by eating less saturated fats.  This means you should eat less fried foods and meat.  If you eat meat, you should try to eat more chicken and fish and less beef or pork.  Also, you should increase dietary fiber intake by eating more fruits, vegetables and whole grains.  A diet high in fiber reduces total and LDL cholesterol levels. We should recheck in one year.   I think you can get this down to where " it has been in the past without using medications.          Follow up:  Schedule your prostate MRI as soon as you can and schedule an appointment with a urologist to discuss the results and next steps.   Even if you have prostate cancer, it is entirely possible that surgery or radiation may not be necessary, but I think you should at least get a solid diagnosis so you know the prognosis and can exercise all your options.       Schedule an appointment with me for a physical examination with fasting blood tests in one year's time, or return sooner if new questions, symptoms or problems arise.       Sincerely,    Dr. Tineo    The 10-year ASCVD risk score ([place] TAYE Jaime., et al., 2013) is: 13.3%   af, htn

## 2023-03-08 NOTE — H&P ADULT - HISTORY OF PRESENT ILLNESS
82 F h/o HTN 82 F h/o HTN, lymphoma presented bec of "high readings" for BP and HR on her home BP monitor. Pt reports that she developed a 2-3/10, left sided HA at midnight, lasting a few minutes and resolved after acetaminophen. She was concerned that the HA was bec of high blood pressure so she used a BP monitor to check her BP which output "high reading" for BP and HR so patient presented here. Pt reports that 4-5 days PTA she had 1 episode of NB/NB emesis and few episodes of diarrhea which has all resolved. +fatigue since diarrhea but improving. No cough/MARTIN/CP/abd pain/back pain or dysuria. No focal neurologic deficits. In ED, found with rapid Afib 130s-140s.

## 2023-03-08 NOTE — ED PROVIDER NOTE - NSICDXPASTMEDICALHX_GEN_ALL_CORE_FT
PAST MEDICAL HISTORY:  HTN (hypertension)     Hyperlipidemia     Lymph node enlargement left inguinal    Osteoporosis

## 2023-03-08 NOTE — H&P ADULT - PROBLEM SELECTOR PLAN 1
tele. repeat trop. echo. cards eval reviewed. cont Metoprolol ER. tele. repeat trop. echo. cards eval reviewed. cont Metoprolol ER. check EKG as none in chart

## 2023-03-08 NOTE — ED ADULT TRIAGE NOTE - CHIEF COMPLAINT QUOTE
patient states tonight that she "didn't feel well", and when she took her BP she was hypertensive. denies CP/SOB

## 2023-03-08 NOTE — ED ADULT NURSE REASSESSMENT NOTE - NS ED NURSE REASSESS COMMENT FT1
Report received from GILBERT Lui . Pt AAOx4, NAD, resp nonlabored, skin warm/dry, resting comfortably in bed with family at bedside. Pt presented to ED c/o Hypertension. Pt denies headache, dizziness, chest pain, palpitations, SOB, abd pain, n/v/d, urinary symptoms, fevers, chills, weakness at this time. Pt awaiting bed in tele. PT on CM, normal sinus noted. Safety maintained with call bell within reach.

## 2023-03-08 NOTE — CONSULT NOTE ADULT - SUBJECTIVE AND OBJECTIVE BOX
[facility] - Hayder Pickett Pannella, Patel, Savella  Office Number: [phone]    Initial Consult Note    CHIEF COMPLAINT: Patient is a 82y old  Female who presents with a chief complaint of feeling unwell.    HPI:  81 y/o female, hx of HTN, presents to the ED with complaint of elevated BP. states she woke up around 12am and states she "didn't feel right." states took her  BP and it was elevated but does not remember what it was. denies any other complaints at time. denies f/n/v/d, CP, SOB, LOC, abdominal pain, HA, dizziness, numbness, tingling. Of note, patient states several days ago she had a "stomach bug" that lasted a couple of days with vomiting and diarrhea, now mostly resolved.    initially found to be in af with rvr, converted to sr on own.  now feeling well  gi symptoms resolved  reports chronic le edema    PAST MEDICAL & SURGICAL HISTORY:  HTN (hypertension)      Hyperlipidemia      Osteoporosis      Lymph node enlargement  left inguinal      History of appendectomy      Cyst of skin  abdomen          SOCIAL HISTORY:  No tobacco, ethanol, or drug abuse.    FAMILY HISTORY:  Family history of tuberculosis (Mother)  mother  at 25 yr old    Family history of hypertension (Father)  father  at 86 yr old        MEDICATIONS  (STANDING):    MEDICATIONS  (PRN):      Allergies    No Known Allergies    Intolerances        REVIEW OF SYSTEMS:    CONSTITUTIONAL: No weakness, fevers or chills  EYES/ENT: No visual changes;  No vertigo or throat pain   NECK: No pain or stiffness  RESPIRATORY: No cough, wheezing, hemoptysis; No shortness of breath  CARDIOVASCULAR: No chest pain or palpitations  GASTROINTESTINAL: No abdominal pain. No nausea, vomiting, or hematemesis; No diarrhea or constipation. No melena or hematochezia.  GENITOURINARY: No dysuria, frequency or hematuria  NEUROLOGICAL: No numbness or weakness  SKIN: No itching or rash  All other review of systems is negative unless indicated above    VITAL SIGNS:   Vital Signs Last 24 Hrs  T(C): 36.6 (08 Mar 2023 08:15), Max: 37.1 (08 Mar 2023 07:27)  T(F): 97.9 (08 Mar 2023 08:15), Max: 98.7 (08 Mar 2023 07:27)  HR: 61 (08 Mar 2023 08:15) (61 - 143)  BP: 155/79 (08 Mar 2023 08:15) (133/54 - 155/79)  BP(mean): 75 (08 Mar 2023 03:30) (75 - 75)  RR: 18 (08 Mar 2023 08:15) (17 - 25)  SpO2: 97% (08 Mar 2023 08:15) (96% - 99%)    Parameters below as of 08 Mar 2023 08:15  Patient On (Oxygen Delivery Method): room air        I&O's Summary      On Exam:    Constitutional: NAD, alert and oriented x 3  Lungs:  Non-labored, breath sounds are clear bilaterally, No wheezing, rales or rhonchi  Cardiovascular: RRR.  S1 and S2 positive.  No murmurs, rubs, gallops or clicks  Gastrointestinal: Bowel Sounds present, soft, nontender.   Lymph: mild peripheral edema. No cervical lymphadenopathy.  Neurological: Alert, no focal deficits  Skin: No rashes or ulcers   Psych:  Mood & affect appropriate.    LABS: All Labs Reviewed:                        15.1   7.25  )-----------( 257      ( 08 Mar 2023 02:00 )             46.0     08 Mar 2023 02:00    143    |  105    |  14     ----------------------------<  130    3.2     |  26     |  0.52     Ca    9.3        08 Mar 2023 02:00  Phos  3.1       08 Mar 2023 02:00  Mg     1.8       08 Mar 2023 02:00    TPro  6.6    /  Alb  4.1    /  TBili  0.2    /  DBili  x      /  AST  32     /  ALT  42     /  AlkPhos  66     08 Mar 2023 02:00    PT/INR - ( 08 Mar 2023 02:00 )   PT: 12.2 sec;   INR: 1.05 ratio         PTT - ( 08 Mar 2023 02:00 )  PTT:27.7 sec      Blood Culture:         RADIOLOGY:    tele: sr  initially in af, though converted to sr

## 2023-03-08 NOTE — ED ADULT NURSE NOTE - OBJECTIVE STATEMENT
83 y/o Female presents to ED from home with c/o HTN. PMH of HTN. Pt states she was feeling "off" so she decided to check her Blood pressure noticing it was high and came to ED. Pt took 1 BP medication prescribed at home. Denies SOB, CP, HA, fever, chills, cough, dizziness, vision changes, N/V/D, abd pain, back pain, urinary s/s. Pt is A&Ox3, well appearing/ speaking full sentences without difficulty. Airway patent with spontaneous unlabored breathing, skin is warm and normal color for race. Pt placed on continuous cardiac monitor. IV inserted labs drawn and sent. Safety maintained bed is in the lowest position, locked and call bell in reach.

## 2023-03-08 NOTE — H&P ADULT - ASSESSMENT
82 F h/o HTN, lymphoma presented bec of "high readings" for BP and HR on her home BP monitor. Found with rapid Afib and RLL opacity

## 2023-03-08 NOTE — H&P ADULT - GASTROINTESTINAL
Admission Reconciliation is Completed  Discharge Reconciliation is Completed soft/nontender/nondistended/normal active bowel sounds/no guarding/no rigidity/no masses palpable

## 2023-03-08 NOTE — H&P ADULT - NSHPREVIEWOFSYSTEMS_GEN_ALL_CORE
REVIEW OF SYMPTOMS:  CONSTITUTIONAL: No fever, weight loss, or fatigue  EYES: No eye pain, visual disturbances, or discharge  ENMT:  No difficulty hearing, tinnitus, vertigo; No sinus or throat pain  NECK: No pain or stiffness  BREASTS: No pain, masses, or nipple discharge  RESPIRATORY: No cough, wheezing, chills or hemoptysis; No shortness of breath  CARDIOVASCULAR: No chest pain, palpitations, dizziness, or leg swelling  GASTROINTESTINAL: per HPI  GENITOURINARY: No dysuria, frequency, hematuria, or incontinence  NEUROLOGICAL: per HPI  SKIN: No itching, burning, rashes, or lesions   LYMPH NODES: No enlarged glands  ENDOCRINE: No heat or cold intolerance; No hair loss  MUSCULOSKELETAL: No joint pain or swelling; No muscle, back, or extremity pain  PSYCHIATRIC: No depression, anxiety, mood swings, or difficulty sleeping  HEME/LYMPH: No easy bruising, or bleeding gums  ALLERGIC AND IMMUNOLOGIC: No hives or eczema

## 2023-03-08 NOTE — PATIENT PROFILE ADULT - NSPROPTRIGHTBILLOFRIGHTS_GEN_A_NUR
patient Crescentic Advancement Flap Text: The defect edges were debeveled with a #15 scalpel blade.  Given the location of the defect and the proximity to free margins a crescentic advancement flap was deemed most appropriate.  Using a sterile surgical marker, the appropriate advancement flap was drawn incorporating the defect and placing the expected incisions within the relaxed skin tension lines where possible.    The area thus outlined was incised deep to adipose tissue with a #15 scalpel blade.  The skin margins were undermined to an appropriate distance in all directions utilizing iris scissors.

## 2023-03-08 NOTE — H&P ADULT - PROBLEM SELECTOR PLAN 2
check CT chest. If sig for PNA, will start Ceftriaxone/Doxy and check urine legionella. check CT chest. If sig for PNA, will start Ceftriaxone/Doxy. check urine legionella.

## 2023-03-08 NOTE — PATIENT PROFILE ADULT - FALL HARM RISK - FALLEN IN PAST
MEDICARE WELLNESS VISIT + NOTE    CHIEF COMPLAINT:  Rocio Stinson presents for her Subsequent Annual Medicare Wellness Visit.   Her additional complaints or concerns are addressed below.     Patient Care Team:  Deyanira Angel MD as PCP - General (Family Practice)  See Michael MD (Neurological Surgery)        Patient Active Problem List   Diagnosis   • Essential hypertension   • Other and unspecified hyperlipidemia   • Hypertension   • Back pain   • Degeneration of lumbar or lumbosacral intervertebral disc   • Lumbar radiculitis   • Spondylolisthesis, grade I anteriolisthesis L4-5, trace retrolisthesis at L3-4 and L5-S1   • Lumbar facet joint pain   • Muscle spasms of low back   • Dizziness   • Bilateral shoulder pain   • Pseudophakia of both eyes   • ERM OD (epiretinal membrane, right eye)   • Dry ARMD   • Syncope   • [employer] loop recorder   • Sacroiliac joint disease   • Other specified inflammatory spondylopathies, lumbar region (CMS/HCC)   • Sick sinus syndrome (CMS/HCC)         Past Medical History:   Diagnosis Date   • AF (atrial fibrillation) (CMS/[facility])    • Age-related macular degeneration    • BRVO (branch retinal vein occlusion)     OS  (diagnosed in June 2003)   • Cataracts, bilateral    • Colon polyps    • Diverticula, colon    • Internal hemorrhoid    • Osteoporosis, unspecified 12/15/2008   • Other and unspecified hyperlipidemia    • Pseudophakia of both eyes     s/p YAG cap: OD (Jan 12, 2007)   • PVD (posterior vitreous detachment), both eyes    • Renal artery stenosis (CMS/HCC)    • Seborrheic keratosis     neck and arms   • Unspecified essential hypertension    • Urinary incontinence          Past Surgical History:   Procedure Laterality Date   • Appendectomy     • Cataract extraction w/ intraocular lens implant  10/24/2006    left eye   • Cataract extraction w/ intraocular lens implant  08/22/2006    right eye   • Cholecystectomy     • Colonoscopy diagnostic  01/14/2008   • Dexa bone  density axial skeleton  12/19/2008   • Echocardiogram     • Hysterectomy     • Incontinence surgery  03/02/2005   • Mammo screening bilateral  01/11/2012   • Ovarian cyst removal     • Pain clinic     • Tonsillectomy and adenoidectomy           Social History     Tobacco Use   • Smoking status: Never Smoker   • Smokeless tobacco: Never Used   Substance Use Topics   • Alcohol use: No     Comment: 12/11/18: occasional glass of wine   • Drug use: No     Comment: 12/11/18: no     Family History   Problem Relation Age of Onset   • Cancer Mother    • Heart disease Father          Current Outpatient Medications   Medication Sig Dispense Refill   • amLODIPine (NORVASC) 10 MG tablet Take 1 tablet by mouth daily. 90 tablet 3   • metoPROLOL succinate (TOPROL-XL) 50 MG 24 hr tablet Take 1 tablet by mouth nightly. 90 tablet 3   • valsartan (DIOVAN) 40 MG tablet Take 1 tablet by mouth daily. 90 tablet 3   • HYDROcodone-acetaminophen (NORCO) 5-325 MG per tablet Take 1 tablet by mouth 3 times daily as needed for Pain. 90 tablet 0   • Xarelto 15 MG Tab TAKE ONE TABLET BY MOUTH DAILY WITH DINNER 90 tablet 2   • clobetasol (TEMOVATE) 0.05 % cream See instructions. Apply to affected areas daily as needed. 30 g 3   • acetaminophen (TYLENOL) 500 MG tablet Take 500 mg by mouth every 6 hours as needed for Pain.     • Calcium Citrate-Vitamin D (CITRACAL MAXIMUM PO) Take  by mouth every other day.     • Multiple Vitamins-Minerals (OCUVITE PO) Take  by mouth daily.     • Sennosides-Docusate Sodium (STOOL SOFTENER LAXATIVE PO) Take by mouth as needed.      • Diphenhydramine-APAP, sleep, (TYLENOL PM EXTRA STRENGTH)  MG/30ML LIQD Take 30 mLs by mouth nightly.       No current facility-administered medications for this visit.        The following items on the Medicare Health Risk Assessment were found to be positive  6 a.) How many servings of Fruits and Vegetables do you have each day ( 1 serving = 1 piece of fruit, 1/2 cup fruits or  vegetables): 1 per day     6 b.) How many servings of High Fiber / Whole Grain Foods to you have each day ( 1 serving = 1 cup cold cereal, 1/2 cup cooked cereal, 1 slice bread): 1 per day     11a.) Bladder Control problems (urine leaking): Always     11b.) Bowel control problems: Sometimes     11d.) Bodily pain: Always     11e.) Tiredness or Fatigue: Often     11g.) Anger or frustration: Always     13.) Do you need help with any of the following activities?: Get to places outside of walking distance (can't drive alone, or take a bus/taxi alone), Go shopping for groceries or clothes, Do your housework or laundry         Vision and Hearing screens: No exam data present    Advance Directive:   The patient has the following documents:  Power of  for Health Care    Cognitive/Functional Status: mini cog score 5.     Opioid Review: Rocio is taking an opioid but it is prescribed elsewhere.  Pain Scale: 8/10 pain currently.   Opioid Risk Tool Total Score:     (Score 0-3 = Low Risk, 4-7 = Moderate Risk, 8+ = High Risk)  I have discussed alternates to opioid medications including topical analgesics, acetaminophen/ NSAIDs as appropriate, anticonvulsants and antidepressants.    Recent PHQ 2/9 Score:    PHQ 2:  Date Adult PHQ 2 Score Adult PHQ 2 Interpretation   12/23/2021 0 No further screening needed       PHQ 9:  Date Adult PHQ 9 Score Adult PHQ 9 Interpretation   12/23/2021 4 Minimal Depression       DEPRESSION ASSESSMENT/PLAN:  Depression screening is negative no further plan needed.     Body mass index is 26.17 kg/m².    BMI ASSESSMENT/PLAN:  Patient BMI is within normal range.     See Patient Instructions section.   Return in about 1 year (around 12/23/2022) for Medicare Wellness Visit.      OUTPATIENT PROGRESS NOTE    Subjective   Chief Complaint Medicare Wellness Visit    HPI     Patient is overall doing well. She is able to cook and clean and feels good doing the same.  Patient stretches regularly and stays  active with house work.     Patient states she take a half a tablet at a time of hydrocodone. She can take that up to 4 times per day. She states she mostly takes it at night time.    Patient denies fever, chills, chest pain, shortness of breath, cough, congestion, abdominal pain, vaginal concerns, vision change, headaches, constipation, diarrhea, urinary incontinence, and blood in stool. No mood concerns.    Denies any falls or recent injuries.     Medications  Medications were reviewed and updated today.    Histories  I have personally reviewed and updated the patient's past medical, past surgical, family and social histories during today's visit.    Review of Systems:   All 10 points review of system done and negative except those mentioned above in HPI.     Objective   Visit Vitals  /78   Pulse 86   Resp 18   Ht 5' 2\" (1.575 m)   Wt 64.9 kg (143 lb 1.6 oz)   BMI 26.17 kg/m²     Physical Exam  General appearance: alert, cooperative and no distress  Head: Normocephalic, without obvious abnormality, atraumatic  Eyes: Conjunctivae/sclerae normal. No erythema, edema or exudate.  Ears: normal tympanic membranes and external ear canals both ears  Nose: Nares normal. Septum midline. Mucosa normal. No drainage or sinus tenderness.  Throat: lips, mucosa, and tongue normal; teeth and gums normal  Neck: no adenopathy, no carotid bruit, no JVD, supple, symmetrical, trachea midline and thyroid not enlarged, symmetric, no tenderness/mass/nodules  Back: Back symmetric, no curvature. Range of motion normal. No costovertebral angle tenderness.  Lungs: clear to auscultation bilaterally  Heart: regular rate and rhythm, S1, S2 normal. Irregular heart beat.  Abdomen: Soft, non-tender; bowel sounds normal; no masses, no hepatosplenomegaly  Extremities: extremities normal, atraumatic, no cyanosis or edema  Pulses: 2+ and symmetric  Skin: Skin color, texture, turgor normal. No rashes or lesions  Lymph nodes: Cervical,  supraclavicular, and axillary nodes normal.  Neurologic: Alert and oriented x3, normal strength and tone. Normal symmetric reflexes. Normal coordination and gait    Laboratory  I have reviewed the pertinent laboratory tests. These are the pertinent findings:  · No labs.     Imaging  I have reviewed the pertinent imaging study reports. These are the pertinent findings:  · No imaging.    Assessment & Plan     1. Medicare annual wellness visit, subsequent    HRAs discussed. Patient is no longer getting mammograms or colonoscopies. Other HRA concerns discussed.     2. Atrial fibrillation, unspecified type (CMS/[facility])  3. S/P placement of cardiac pacemaker    Sees Cardiology and Electrophysiology. Continue on Xarelto 15 mg daily and Metoprolol 50 mg daily.    4. Essential hypertension    Controlled, blood pressure is under goal. Continue on Amlodipine 10 mg daily, Valsartan 40 mg daily, Metoprolol 50 mg daily. Discussed good oral hydration.     5. Mixed hyperlipidemia    Declined statin in the past.    6. Arthritis    Sees Pain Management, continue on the current regimen.      On 12/22/2021, IMichelle scribed the services personally performed by Deyanira Angel MD  The documentation recorded by the scribe accurately and completely reflects the service(s) I personally performed and the decisions made by me.        No

## 2023-03-08 NOTE — ED PROVIDER NOTE - OBJECTIVE STATEMENT
81 y/o female, hx of HTN, presents to the ED with complaint of elevated BP. states she woke up around 12am and states she "didn't feel right." states took her  BP and it was elevated but does not remember what it was. denies any other complaints at time. denies f/n/v/d, CP, SOB, LOC, abdominal pain, HA, dizziness, numbness, tingling. 83 y/o female, hx of HTN, presents to the ED with complaint of elevated BP. states she woke up around 12am and states she "didn't feel right." states took her  BP and it was elevated but does not remember what it was. denies any other complaints at time. denies f/n/v/d, CP, SOB, LOC, abdominal pain, HA, dizziness, numbness, tingling.    Of note, patient states several days ago she had a "stomach bug" that lasted a couple of days with vomiting and diarrhea, now mostly resolved.

## 2023-03-08 NOTE — ED PROVIDER NOTE - NS ED ATTENDING STATEMENT MOD
This was a shared visit with the ADENIKE. I reviewed and verified the documentation and independently performed the documented:

## 2023-03-08 NOTE — PATIENT PROFILE ADULT - FALL HARM RISK - HARM RISK INTERVENTIONS

## 2023-03-08 NOTE — ED PROVIDER NOTE - CLINICAL SUMMARY MEDICAL DECISION MAKING FREE TEXT BOX
Dr. Fernández's Note: Patient with history of hypertension, presents due to feeling unwell particularly with dizziness, head pressure, and subsequently finding out she had elevated blood pressure.  She denies having shortness of breath or chest pain at any point.  On arrival to the ED she was found to be in A-fib with RVR to a rate of 130s.  Patient self converted in the ED without intervention, although she does admit to taking an extra dose of her metoprolol prior to coming in. Workup done to rule out acute infectious, metabolic, cardiac condition that could have triggered new onset afib in addition to possible dehydration from her recent illness.  Labs grossly unremarkable other than mild hypokalemia which she got replenished.  Due to age and risk factors, patient will be placed in the CDU for telemetry monitoring, echo, cards consult for medication management of new onset paroxysmal A-fib. pt will get dose of lovenox for AC while awaiting cards recs.

## 2023-03-08 NOTE — ED PROVIDER NOTE - PROGRESS NOTE DETAILS
Toya Cerda PA-C: labs reviewed. patient initially presented with A fib with rvr that broke on its own. will admit patient to medicine for further cardiac workup. discussed with ED attending. Toya Cerda PA-C: labs reviewed. patient initially presented with A fib with rvr that broke on its own. cxr showing right lower lobe patchy opacity, stating likely pna. however, patient is afebrile, no leukocytosis, no cough or SOB. spoke with Dr. Paz. recommends getting pro-cecilia. will admit patient to medicine for further cardiac workup. discussed with ED attending.

## 2023-03-09 ENCOUNTER — TRANSCRIPTION ENCOUNTER (OUTPATIENT)
Age: 83
End: 2023-03-09

## 2023-03-09 VITALS
DIASTOLIC BLOOD PRESSURE: 76 MMHG | HEART RATE: 83 BPM | OXYGEN SATURATION: 92 % | SYSTOLIC BLOOD PRESSURE: 148 MMHG | RESPIRATION RATE: 18 BRPM | TEMPERATURE: 98 F

## 2023-03-09 LAB
ALBUMIN SERPL ELPH-MCNC: 3.8 G/DL — SIGNIFICANT CHANGE UP (ref 3.3–5)
ALP SERPL-CCNC: 57 U/L — SIGNIFICANT CHANGE UP (ref 40–120)
ALT FLD-CCNC: 32 U/L — SIGNIFICANT CHANGE UP (ref 10–45)
ANION GAP SERPL CALC-SCNC: 12 MMOL/L — SIGNIFICANT CHANGE UP (ref 5–17)
AST SERPL-CCNC: 26 U/L — SIGNIFICANT CHANGE UP (ref 10–40)
BASOPHILS # BLD AUTO: 0.04 K/UL — SIGNIFICANT CHANGE UP (ref 0–0.2)
BASOPHILS NFR BLD AUTO: 0.5 % — SIGNIFICANT CHANGE UP (ref 0–2)
BILIRUB SERPL-MCNC: 0.4 MG/DL — SIGNIFICANT CHANGE UP (ref 0.2–1.2)
BUN SERPL-MCNC: 15 MG/DL — SIGNIFICANT CHANGE UP (ref 7–23)
CALCIUM SERPL-MCNC: 9.1 MG/DL — SIGNIFICANT CHANGE UP (ref 8.4–10.5)
CHLORIDE SERPL-SCNC: 102 MMOL/L — SIGNIFICANT CHANGE UP (ref 96–108)
CO2 SERPL-SCNC: 26 MMOL/L — SIGNIFICANT CHANGE UP (ref 22–31)
CREAT SERPL-MCNC: 0.61 MG/DL — SIGNIFICANT CHANGE UP (ref 0.5–1.3)
CULTURE RESULTS: SIGNIFICANT CHANGE UP
EGFR: 89 ML/MIN/1.73M2 — SIGNIFICANT CHANGE UP
EOSINOPHIL # BLD AUTO: 0.27 K/UL — SIGNIFICANT CHANGE UP (ref 0–0.5)
EOSINOPHIL NFR BLD AUTO: 3.2 % — SIGNIFICANT CHANGE UP (ref 0–6)
GLUCOSE SERPL-MCNC: 95 MG/DL — SIGNIFICANT CHANGE UP (ref 70–99)
HCT VFR BLD CALC: 46 % — HIGH (ref 34.5–45)
HGB BLD-MCNC: 14.7 G/DL — SIGNIFICANT CHANGE UP (ref 11.5–15.5)
IMM GRANULOCYTES NFR BLD AUTO: 0.4 % — SIGNIFICANT CHANGE UP (ref 0–0.9)
LEGIONELLA AG UR QL: NEGATIVE — SIGNIFICANT CHANGE UP
LYMPHOCYTES # BLD AUTO: 1.48 K/UL — SIGNIFICANT CHANGE UP (ref 1–3.3)
LYMPHOCYTES # BLD AUTO: 17.5 % — SIGNIFICANT CHANGE UP (ref 13–44)
MAGNESIUM SERPL-MCNC: 1.8 MG/DL — SIGNIFICANT CHANGE UP (ref 1.6–2.6)
MCHC RBC-ENTMCNC: 27.9 PG — SIGNIFICANT CHANGE UP (ref 27–34)
MCHC RBC-ENTMCNC: 32 GM/DL — SIGNIFICANT CHANGE UP (ref 32–36)
MCV RBC AUTO: 87.5 FL — SIGNIFICANT CHANGE UP (ref 80–100)
MONOCYTES # BLD AUTO: 0.77 K/UL — SIGNIFICANT CHANGE UP (ref 0–0.9)
MONOCYTES NFR BLD AUTO: 9.1 % — SIGNIFICANT CHANGE UP (ref 2–14)
NEUTROPHILS # BLD AUTO: 5.88 K/UL — SIGNIFICANT CHANGE UP (ref 1.8–7.4)
NEUTROPHILS NFR BLD AUTO: 69.3 % — SIGNIFICANT CHANGE UP (ref 43–77)
NRBC # BLD: 0 /100 WBCS — SIGNIFICANT CHANGE UP (ref 0–0)
PHOSPHATE SERPL-MCNC: 3.7 MG/DL — SIGNIFICANT CHANGE UP (ref 2.5–4.5)
PLATELET # BLD AUTO: 248 K/UL — SIGNIFICANT CHANGE UP (ref 150–400)
POTASSIUM SERPL-MCNC: 3.8 MMOL/L — SIGNIFICANT CHANGE UP (ref 3.5–5.3)
POTASSIUM SERPL-SCNC: 3.8 MMOL/L — SIGNIFICANT CHANGE UP (ref 3.5–5.3)
PROT SERPL-MCNC: 6.5 G/DL — SIGNIFICANT CHANGE UP (ref 6–8.3)
RBC # BLD: 5.26 M/UL — HIGH (ref 3.8–5.2)
RBC # FLD: 13.3 % — SIGNIFICANT CHANGE UP (ref 10.3–14.5)
SODIUM SERPL-SCNC: 140 MMOL/L — SIGNIFICANT CHANGE UP (ref 135–145)
SPECIMEN SOURCE: SIGNIFICANT CHANGE UP
WBC # BLD: 8.47 K/UL — SIGNIFICANT CHANGE UP (ref 3.8–10.5)
WBC # FLD AUTO: 8.47 K/UL — SIGNIFICANT CHANGE UP (ref 3.8–10.5)

## 2023-03-09 PROCEDURE — 80048 BASIC METABOLIC PNL TOTAL CA: CPT

## 2023-03-09 PROCEDURE — 80053 COMPREHEN METABOLIC PANEL: CPT

## 2023-03-09 PROCEDURE — 99232 SBSQ HOSP IP/OBS MODERATE 35: CPT

## 2023-03-09 PROCEDURE — 82553 CREATINE MB FRACTION: CPT

## 2023-03-09 PROCEDURE — 82550 ASSAY OF CK (CPK): CPT

## 2023-03-09 PROCEDURE — 99285 EMERGENCY DEPT VISIT HI MDM: CPT | Mod: 25

## 2023-03-09 PROCEDURE — 85025 COMPLETE CBC W/AUTO DIFF WBC: CPT

## 2023-03-09 PROCEDURE — 84484 ASSAY OF TROPONIN QUANT: CPT

## 2023-03-09 PROCEDURE — 87040 BLOOD CULTURE FOR BACTERIA: CPT

## 2023-03-09 PROCEDURE — 85730 THROMBOPLASTIN TIME PARTIAL: CPT

## 2023-03-09 PROCEDURE — 36415 COLL VENOUS BLD VENIPUNCTURE: CPT

## 2023-03-09 PROCEDURE — 84100 ASSAY OF PHOSPHORUS: CPT

## 2023-03-09 PROCEDURE — 81001 URINALYSIS AUTO W/SCOPE: CPT

## 2023-03-09 PROCEDURE — 84145 PROCALCITONIN (PCT): CPT

## 2023-03-09 PROCEDURE — 87449 NOS EACH ORGANISM AG IA: CPT

## 2023-03-09 PROCEDURE — 87086 URINE CULTURE/COLONY COUNT: CPT

## 2023-03-09 PROCEDURE — 70450 CT HEAD/BRAIN W/O DYE: CPT

## 2023-03-09 PROCEDURE — 85610 PROTHROMBIN TIME: CPT

## 2023-03-09 PROCEDURE — 71045 X-RAY EXAM CHEST 1 VIEW: CPT

## 2023-03-09 PROCEDURE — 83735 ASSAY OF MAGNESIUM: CPT

## 2023-03-09 PROCEDURE — 71250 CT THORAX DX C-: CPT

## 2023-03-09 PROCEDURE — C8929: CPT

## 2023-03-09 PROCEDURE — 0225U NFCT DS DNA&RNA 21 SARSCOV2: CPT

## 2023-03-09 RX ORDER — CHLORHEXIDINE GLUCONATE 213 G/1000ML
1 SOLUTION TOPICAL
Refills: 0 | Status: DISCONTINUED | OUTPATIENT
Start: 2023-03-09 | End: 2023-03-09

## 2023-03-09 RX ADMIN — Medication 100 MILLIGRAM(S): at 05:20

## 2023-03-09 RX ADMIN — Medication 81 MILLIGRAM(S): at 11:41

## 2023-03-09 NOTE — DISCHARGE NOTE PROVIDER - NSDCMRMEDTOKEN_GEN_ALL_CORE_FT
aspirin 81 mg oral tablet: 1 tab(s) orally once a day  Centrum oral tablet: 1 tab(s) orally once a day  hydroCHLOROthiazide 25 mg oral tablet: 1 tab(s) orally once a day  Metoprolol Succinate  mg oral tablet, extended release: 1 tab(s) orally once a day  simvastatin 10 mg oral tablet: 1 tab(s) orally once a day (at bedtime)  Vitamin D3 25 mcg (1000 intl units) oral tablet: 1 tab(s) orally once a day

## 2023-03-09 NOTE — DISCHARGE NOTE PROVIDER - NSDCFUADDAPPT_GEN_ALL_CORE_FT
APPTS ARE READY TO BE MADE: [X] YES    Best Family or Patient Contact (if needed):    Additional Information about above appointments (if needed):    1: Dr. Maravilla   2: Dr. Dozier  3:     Other comments or requests:    APPTS ARE READY TO BE MADE: [X] YES    Best Family or Patient Contact (if needed):    Additional Information about above appointments (if needed):    1: Dr. Maravilla   2: Dr. Dozier  3:     Other comments or requests:   Patient was scheduled with Darnell Bermudez on (3/13/2023 12:40 PM ) at [address] [address], [place] [address]. Patient advised of appointment details.   Patient was advised of follow up requests and asked for scheduling assistance. Will await a call back from Phi Mercedes's office to reschedule current appointment on 4/24/2023 at 12 PM to an earlier date.

## 2023-03-09 NOTE — DISCHARGE NOTE PROVIDER - PROVIDER TOKENS
PROVIDER:[TOKEN:[[id number]],FOLLOWUP:[1 week]],PROVIDER:[TOKEN:[[id number]],FOLLOWUP:[1 week]]

## 2023-03-09 NOTE — DISCHARGE NOTE PROVIDER - HOSPITAL COURSE
81 y/o female with HTN, presents to the ED with complaint of elevated BP. states she woke up around 12am and states she "didn't feel right." states took her BP and it was elevated, and came to the ER.  Upon arrival, she was found to be in AF with fast rates, though converted to sr on her own.    - initially in af, now into sr  - has had gi symptoms of N/V and diarrhea lately, so most likely dehydration triggered, now resolved  - no prior history of af per patient and daughter  - monitor on telemetry  - echocardiogram with normal LV function and mild mr/ms  - cont bb  - if recurs, will need full dose ac  - cont asa    - cxr with possible pna (though no convincing symptoms) right lower lobe opacity, and Ct chest: No corresponding abnormality to right lower lung field opacity seen on   same-day chest x-ray. Clear lungs.  Outpatient follow-up with Dr. Maravilla     - bp labile, though better  - cont with her home regimen of diuretic and bb    Medically cleared for discharge with outpatient follow-up

## 2023-03-09 NOTE — DISCHARGE NOTE PROVIDER - CARE PROVIDERS DIRECT ADDRESSES
[email or web address]

## 2023-03-09 NOTE — DISCHARGE NOTE PROVIDER - CARE PROVIDER_API CALL
Darnell Maravilla  INTERNAL MEDICINE  [address], [address]  [place] [address]  Phone: [phone]  Fax: [phone]  Follow Up Time: 1 week    Phi Dozier)  Cardiology; Internal Medicine  [address]  [place] [address]  Phone: [phone]  Fax: [phone]  Follow Up Time: 1 week

## 2023-03-09 NOTE — DISCHARGE NOTE PROVIDER - NSDCFUSCHEDAPPT_GEN_ALL_CORE_FT
Darnell Maravilla  [facility] Physician [facility]  [address] [address]  Scheduled Appointment: 03/24/2023    Shaun Camacho  [facility] Physician [facility]  [facility]  Scheduled Appointment: 04/04/2023    Shaun Camacho  [facility] Physician [facility]  [facility]  Scheduled Appointment: 04/04/2023    Phi Dozier  [facility]  CARDIOLOGY [address]   Scheduled Appointment: 04/24/2023     Darnell Maravilla  [facility] Physician [facility]  [facility] [address] [facility] [address]  Scheduled Appointment: 03/13/2023    Shaun Camacho  [facility] Physician [facility]  [facility]  Scheduled Appointment: 04/04/2023    Shaun Camacho  [facility] Physician [facility]  [facility]  Scheduled Appointment: 04/04/2023    Phi Dozier  [facility]  CARDIOLOGY [address]   Scheduled Appointment: 04/24/2023

## 2023-03-09 NOTE — PROGRESS NOTE ADULT - ASSESSMENT
Ms. Upton is a 81 y/o female with HTN, presents to the ED with complaint of elevated BP. states she woke up around 12am and states she "didn't feel right." states took her BP and it was elevated, and came to the ER.  Upon arrival, she was found to be in AF with fast rates, though converted to sr on her own.    - initially in af, now into sr  - has had gi symptoms of N/V and diarrhea lately, so most likely dehydration triggered  - no prior history of af per patient and daughter  - monitor on telemetry  - echocardiogram with normal LV function and mild mr/ms  - cont bb  - if recurs, will need full dose ac  - cont asa    - cxr with possible pna (though no convincing symptoms), and Ct chest with clear lungs    - bp labile, though better  - cont with her home regimen of diuretic and bb    - dvt prophylaxis  - trend creatinine and electrolytes. Keep K>4, mg>2  - will follow with you

## 2023-03-09 NOTE — DISCHARGE NOTE NURSING/CASE MANAGEMENT/SOCIAL WORK - NSDCFUADDAPPT_GEN_ALL_CORE_FT
APPTS ARE READY TO BE MADE: [X] YES    Best Family or Patient Contact (if needed):    Additional Information about above appointments (if needed):    1: Dr. Maravilla   2: Dr. Dozier  3:     Other comments or requests:

## 2023-03-09 NOTE — DISCHARGE NOTE PROVIDER - NSDCCPCAREPLAN_GEN_ALL_CORE_FT
PRINCIPAL DISCHARGE DIAGNOSIS  Diagnosis: Paroxysmal atrial fibrillation  Assessment and Plan of Treatment: Atrial fibrillation is the most common heart rhythm problem.  The condition puts you at risk for has stroke and heart attack  It helps if you control your blood pressure, not drink more than 1-2 alcohol drinks per day, cut down on caffeine, getting treatment for over active thyroid gland, and get regular exercise  Call your doctor if you feel your heart racing or beating unusually, chest tightness or pain, lightheaded, faint, shortness of breath especially with exercise  It is important to take your heart medication as prescribed  You may be on anticoagulation which is very important to take as directed - you may need blood work to monitor drug levels        SECONDARY DISCHARGE DIAGNOSES  Diagnosis: Hypokalemia  Assessment and Plan of Treatment: Resolved    Diagnosis: Opacity of lung on imaging study  Assessment and Plan of Treatment: CT of the chest clear  Follow-up with Dr. Maravilla

## 2023-03-09 NOTE — DISCHARGE NOTE NURSING/CASE MANAGEMENT/SOCIAL WORK - NSDCPEFALRISK_GEN_ALL_CORE
For information on Fall & Injury Prevention, visit: [email or web address] OR  [email or web address] OR  https://www.cdc.gov/steadi/patient.html

## 2023-03-09 NOTE — PROGRESS NOTE ADULT - SUBJECTIVE AND OBJECTIVE BOX
pt was evaluated by hospitalist earlier during the day  discussed management plan with acp covering pt
[facility] - Hayder Pickett, [place], Viviana Esparza  Office Number:  [phone]    Patient resting comfortably in bed in NAD.  Laying flat with no respiratory distress.  No complaints of chest pain, dyspnea, palpitations, PND, or orthopnea.    ROS: negative unless otherwise mentioned.    Telemetry:  sr    MEDICATIONS  (STANDING):  aspirin  chewable 81 milliGRAM(s) Oral daily  hydrochlorothiazide 25 milliGRAM(s) Oral daily  metoprolol succinate  milliGRAM(s) Oral daily  simvastatin 10 milliGRAM(s) Oral at bedtime    MEDICATIONS  (PRN):      Allergies    No Known Allergies    Intolerances        Vital Signs Last 24 Hrs  T(C): 36.4 (09 Mar 2023 04:58), Max: 36.8 (08 Mar 2023 20:00)  T(F): 97.5 (09 Mar 2023 04:58), Max: 98.2 (08 Mar 2023 20:00)  HR: 70 (09 Mar 2023 05:22) (62 - 76)  BP: 128/87 (09 Mar 2023 04:58) (128/56 - 166/79)  BP(mean): --  RR: 18 (09 Mar 2023 04:58) (18 - 18)  SpO2: 93% (09 Mar 2023 04:58) (93% - 97%)    Parameters below as of 09 Mar 2023 04:58  Patient On (Oxygen Delivery Method): room air        I&O's Summary      ON EXAM:    Constitutional: NAD, alert and oriented x 3  Lungs:  Non-labored, breath sounds are clear bilaterally, No wheezing, rales or rhonchi  Cardiovascular: RRR.  S1 and S2 positive.  No murmurs, rubs, gallops or clicks  Gastrointestinal: Bowel Sounds present, soft, nontender.   Lymph: mild peripheral edema. No cervical lymphadenopathy.  Neurological: Alert, no focal deficits  Skin: No rashes or ulcers   Psych:  Mood & affect appropriate.    LABS: All Labs Reviewed:                        14.5   6.01  )-----------( 241      ( 08 Mar 2023 12:25 )             45.1                         15.1   7.25  )-----------( 257      ( 08 Mar 2023 02:00 )             46.0     09 Mar 2023 06:16    140    |  102    |  15     ----------------------------<  95     3.8     |  26     |  0.61   08 Mar 2023 12:25    142    |  104    |  10     ----------------------------<  118    4.0     |  30     |  0.51   08 Mar 2023 02:00    143    |  105    |  14     ----------------------------<  130    3.2     |  26     |  0.52     Ca    9.1        09 Mar 2023 06:16  Ca    9.3        08 Mar 2023 12:25  Ca    9.3        08 Mar 2023 02:00  Phos  3.7       09 Mar 2023 06:16  Phos  2.9       08 Mar 2023 12:25  Phos  3.1       08 Mar 2023 02:00  Mg     1.8       09 Mar 2023 06:16  Mg     1.8       08 Mar 2023 12:25  Mg     1.8       08 Mar 2023 02:00    TPro  6.5    /  Alb  3.8    /  TBili  0.4    /  DBili  x      /  AST  26     /  ALT  32     /  AlkPhos  57     09 Mar 2023 06:16  TPro  6.6    /  Alb  4.1    /  TBili  0.2    /  DBili  x      /  AST  32     /  ALT  42     /  AlkPhos  66     08 Mar 2023 02:00    PT/INR - ( 08 Mar 2023 02:00 )   PT: 12.2 sec;   INR: 1.05 ratio         PTT - ( 08 Mar 2023 02:00 )  PTT:27.7 sec  CARDIAC MARKERS ( 08 Mar 2023 16:40 )  x     / x     / 72 U/L / x     / 4.3 ng/mL  CARDIAC MARKERS ( 08 Mar 2023 12:25 )  x     / x     / 82 U/L / x     / 5.1 ng/mL      Blood Culture:

## 2023-03-09 NOTE — DISCHARGE NOTE NURSING/CASE MANAGEMENT/SOCIAL WORK - PATIENT PORTAL LINK FT
You can access the FollowMyHealth Patient Portal offered by [facility] by registering at the following website: [email or web address]. By joining [employer]’s FollowMyHealth portal, you will also be able to view your health information using other applications (apps) compatible with our system.

## 2023-03-10 PROBLEM — C85.90 NON-HODGKIN LYMPHOMA, UNSPECIFIED, UNSPECIFIED SITE: Chronic | Status: ACTIVE | Noted: 2023-03-08

## 2023-03-13 ENCOUNTER — APPOINTMENT (OUTPATIENT)
Dept: PULMONOLOGY | Facility: CLINIC | Age: 83
End: 2023-03-13
Payer: MEDICARE

## 2023-03-13 VITALS — HEART RATE: 65 BPM | SYSTOLIC BLOOD PRESSURE: 175 MMHG | DIASTOLIC BLOOD PRESSURE: 72 MMHG | OXYGEN SATURATION: 94 %

## 2023-03-13 VITALS — DIASTOLIC BLOOD PRESSURE: 70 MMHG | SYSTOLIC BLOOD PRESSURE: 144 MMHG

## 2023-03-13 LAB
CULTURE RESULTS: SIGNIFICANT CHANGE UP
CULTURE RESULTS: SIGNIFICANT CHANGE UP
SPECIMEN SOURCE: SIGNIFICANT CHANGE UP
SPECIMEN SOURCE: SIGNIFICANT CHANGE UP

## 2023-03-13 PROCEDURE — 99214 OFFICE O/P EST MOD 30 MIN: CPT | Mod: 25

## 2023-03-13 PROCEDURE — 36415 COLL VENOUS BLD VENIPUNCTURE: CPT

## 2023-03-14 LAB
ANION GAP SERPL CALC-SCNC: 13 MMOL/L
BUN SERPL-MCNC: 14 MG/DL
CALCIUM SERPL-MCNC: 10.2 MG/DL
CHLORIDE SERPL-SCNC: 100 MMOL/L
CO2 SERPL-SCNC: 30 MMOL/L
CREAT SERPL-MCNC: 0.6 MG/DL
EGFR: 90 ML/MIN/1.73M2
GLUCOSE SERPL-MCNC: 99 MG/DL
POTASSIUM SERPL-SCNC: 4.3 MMOL/L
SODIUM SERPL-SCNC: 142 MMOL/L

## 2023-03-16 ENCOUNTER — APPOINTMENT (OUTPATIENT)
Dept: CARDIOLOGY | Facility: CLINIC | Age: 83
End: 2023-03-16
Payer: MEDICARE

## 2023-03-16 ENCOUNTER — NON-APPOINTMENT (OUTPATIENT)
Age: 83
End: 2023-03-16

## 2023-03-16 VITALS
SYSTOLIC BLOOD PRESSURE: 140 MMHG | OXYGEN SATURATION: 98 % | BODY MASS INDEX: 30.23 KG/M2 | WEIGHT: 144 LBS | HEIGHT: 58 IN | HEART RATE: 61 BPM | TEMPERATURE: 97.8 F | DIASTOLIC BLOOD PRESSURE: 60 MMHG

## 2023-03-16 PROCEDURE — 99214 OFFICE O/P EST MOD 30 MIN: CPT

## 2023-03-16 PROCEDURE — 93000 ELECTROCARDIOGRAM COMPLETE: CPT

## 2023-03-16 RX ORDER — HYDROCHLOROTHIAZIDE 25 MG/1
25 TABLET ORAL DAILY
Refills: 0 | Status: DISCONTINUED | COMMUNITY
Start: 2022-09-06 | End: 2023-03-16

## 2023-03-16 NOTE — DISCUSSION/SUMMARY
[FreeTextEntry1] : s/p hospitalization\par Episode of atrial fibrillation.  Discharged on baby aspirin not full anticoagulation.  Does have appointment with cardiology this week to make definitive decision.\par Hypokalemia in the hospital.\par s/p bilateral cataract surgery\par Hypertension on therapy. Probable component of white coat. \par Hyperlipidemia on treatment protocol including medications, diet, and exercise program as tolerated.\par Continue present therapy\par Lymphoma followed by oncology.\par Osteopenia\par Status post COVID virus infection without evidence of sequela.

## 2023-03-16 NOTE — ASSESSMENT
[FreeTextEntry1] : Follow-up blood pressure at home and monitor.  to see cardio has appt this week.  Decision on anticoagulation.\par Medications reviewed and renewed.\par Labs drawn in office today\par BD on RTO\par \par \par \par 35 minutes spent in evaluation management and review of studies.\par \par \par \par \par \par

## 2023-03-16 NOTE — REASON FOR VISIT
[Follow-Up - From Hospitalization] : a follow-up visit after a recent hospitalization [TextBox_44] : for ? paroxysmal atrial fibrillation low k

## 2023-03-16 NOTE — PROCEDURE
[FreeTextEntry1] : Venipuncture for labs\par \par \par  ACC: [id number] EXAM:  CT CHEST   ORDERED BY: ERIC CARRERO \par \par PROCEDURE DATE:  03/08/2023  \par \par \par \par INTERPRETATION:  CLINICAL INFORMATION: Right lower lobe abnormality on \par xray\par \par COMPARISON: CT chest 3/8/2023 provides process without a comparison. CT \par chest 2/10/2018. CT abdomen pelvis 4/2/2022\par \par CONTRAST/COMPLICATIONS:\par IV Contrast: NONE\par Oral Contrast: NONE\par Complications: None reported at time of study completion\par \par PROCEDURE:\par CT of the Chest was performed.\par Sagittal and coronal reformats were performed.\par \par FINDINGS:\par \par LUNGS AND AIRWAYS: Patent central airways.  Lungs are clear. No \par corresponding abnormality to opacity seen on chest x-ray.\par PLEURA: No pleural effusion.\par MEDIASTINUM AND MICHAEL: No lymphadenopathy.\par VESSELS: Within normal limits.\par HEART: Heart size is normal. No pericardial effusion. 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 Clear lungs.\par \par \par \par --- End of Report ---\par \par \par \par \par  DOUG DUMAS MD; Resident Radiologist\par This document has been electronically signed.\par DEAN ELAM MD; Attending Radiologist\par This document has been electronically signed. Mar  9 2023  9:38AM\par Patient name: ARON SCHAEFER\par YOB: 1940   Age: 82 (F)   MR#: [id number]\par Study Date: 3/8/2023\[facility]\par Study quality: Technically difficult\par Referring Physician: Izaiah Paz MD\par Blood Pressure: 155/79 mmHg\par Height: 152 cm\par Weight: 60 kg\par BSA: 1.6 m2\par Heart Rate: 78 mmHg\par ------------------------------------------------------------------------\par PROCEDURE: Transthoracic echocardiogram with 2-D, M-Mode\par and complete spectral and color flow Doppler. Verbal\par consent was obtained for injection of  Ultrasonic Enhancing\par Agent following a discussion of risks and benefits.\par Following intravenous injection of Ultrasonic Enhancing\par Agent, harmonic imaging was performed.\par INDICATION: Unspecified atrial flutter (I48.92)\par ------------------------------------------------------------------------\par Dimensions:    Normal Values:\par LA:     4.0    2.0 - 4.0 cm\par Ao:     3.2    2.0 - 3.8 cm\par SEPTUM: 0.9    0.6 - 1.2 cm\par PWT:    1.0    0.6 - 1.1 cm\par LVIDd:  4.9    3.0 - 5.6 cm\par LVIDs:  2.8    1.8 - 4.0 cm\par Derived variables:\par LVMI: 105 g/m2\par RWT: 0.40\par Fractional short: 43 %\par EF (Modified Otero Rule): 74 %Doppler Peak Velocity\par (m/sec): MV=1.5 AoV=1.2\par ------------------------------------------------------------------------\par Observations:\par Mitral Valve: Mitral annular calcification and calcified\par mitral leaflets with decreased diastolic opening. Mild\par mitral regurgitation.  Peak mitral valve gradient equals 9\par mm Hg, mean transmitral valve gradient equals 3 mm Hg,\par consistent with mild mitral stenosis. Gradients measured at\par a HR of 77bpm. \par Aortic Valve/Aorta: Calcified trileaflet aortic valve with\par normal opening. Peak transaortic valve gradient equals 6 mm\par Hg, estimated aortic valve area equals 2.8 sqcm. Mild\par aortic regurgitation.  Peak left ventricular outflow tract\par gradient equals 6 mm Hg, mean gradient is equal to 2 mm Hg,\par LVOT velocity time integral equals 25 cm.\par Aortic Root: 3.2 cm. Ascending Aorta: 3.4 cm. LVOT\par diameter: 1.9 cm.\par Left Atrium: Normal left atrium.\par Left Ventricle: Endocardial visualization enhanced with\par intravenous injection of Ultrasonic Enhancing Agent\par (Definity). Normal left ventricular systolic function. No\par segmental wall motion abnormalities. No left ventricular\par thrombus. Normal left ventricular internal dimensions and\par wall thicknesses. Moderate diastolic dysfunction (Stage\par II).\par Right Heart: Normal right atrium. Normal right ventricular\par size and function. Normal tricuspid valve. Mild tricuspid\par regurgitation. Normal pulmonic valve. Mild pulmonic\par regurgitation.\par Pericardium/Pleura: Normal pericardium with no pericardial\par effusion.\par Hemodynamic: Estimated right atrial pressure is 8 mm Hg.\par ------------------------------------------------------------------------\par Conclusions: \par 1. Mitral annular calcification and calcified mitral\par leaflets with decreased diastolic opening. Mild mitral\par regurgitation.  Peak mitral valve gradient equals 9 mm Hg,\par mean transmitral valve gradient equals 3 mm Hg, consistent\par with mild mitral stenosis. Gradients measured at a HR of\par 77bpm. \par 2. Calcified trileaflet aortic valve with normal opening.\par Mild aortic regurgitation. \par 3. Endocardial visualization enhanced with intravenous\par injection of Ultrasonic Enhancing Agent (Definity). Normal\par left ventricular systolic function. No segmental wall\par motion abnormalities. No left ventricular thrombus.\par 4. Moderate diastolic dysfunction (Stage II).\par 5. Normal right ventricular size and function.\par *** No previous Echo exam.\par ------------------------------------------------------------------------\par Confirmed on  3/8/2023 - 15:07:47 by Sal Romero M.D.

## 2023-03-16 NOTE — HISTORY OF PRESENT ILLNESS
[TextBox_4] : last week went to ER in middle of night with high b/p and a chill\par was there for 2 days, had K replacement and initially was in rapid a -fib that converted on her own, she is on baby asa\par did have a episode of vomiting,very little once and bad diarrhea 2 days before hospitalization\par had neg ct head and ct chest\par Feeling better now\par has appt with Dr Dozier

## 2023-03-16 NOTE — HISTORY OF PRESENT ILLNESS
[FreeTextEntry1] : This is an 81 y/o female with HTN, presented to the ED 3/9/23 with complaint of elevated BP. on EKG she was found to be in AF with fast rates and convered to SR on her own. echocardiogram with normal LV function and mild mr/ms. CXR showed possible PNA, with no significant symptoms with CT chest.  No corresponding abnormality to right lower lung field opacity seen on same-day chest x-ray. Clear lungs. Patient feels well today. \par Patient denies headache, dizziness cough or SOB\par Patient denies chest pain, dyspnea, palpitations, dizziness, syncope, changes in bowel/bladder habits or appetite. \par \par  \par

## 2023-03-17 ENCOUNTER — NON-APPOINTMENT (OUTPATIENT)
Age: 83
End: 2023-03-17

## 2023-03-18 ENCOUNTER — TRANSCRIPTION ENCOUNTER (OUTPATIENT)
Age: 83
End: 2023-03-18

## 2023-03-19 RX ORDER — TRIAMTERENE AND HYDROCHLOROTHIAZIDE 25; 37.5 MG/1; MG/1
37.5-25 TABLET ORAL
Qty: 90 | Refills: 1 | Status: DISCONTINUED | COMMUNITY
Start: 2023-03-16 | End: 2023-03-19

## 2023-03-20 ENCOUNTER — APPOINTMENT (OUTPATIENT)
Dept: INTERNAL MEDICINE | Facility: CLINIC | Age: 83
End: 2023-03-20
Payer: MEDICARE

## 2023-03-20 ENCOUNTER — NON-APPOINTMENT (OUTPATIENT)
Age: 83
End: 2023-03-20

## 2023-03-20 VITALS
OXYGEN SATURATION: 98 % | SYSTOLIC BLOOD PRESSURE: 160 MMHG | HEART RATE: 62 BPM | DIASTOLIC BLOOD PRESSURE: 70 MMHG | HEIGHT: 58 IN | BODY MASS INDEX: 30.02 KG/M2 | WEIGHT: 143 LBS

## 2023-03-20 VITALS — DIASTOLIC BLOOD PRESSURE: 70 MMHG | SYSTOLIC BLOOD PRESSURE: 148 MMHG

## 2023-03-20 PROCEDURE — 99214 OFFICE O/P EST MOD 30 MIN: CPT

## 2023-03-20 RX ORDER — FUROSEMIDE 40 MG/1
40 TABLET ORAL
Qty: 30 | Refills: 1 | Status: DISCONTINUED | COMMUNITY
Start: 2023-03-19 | End: 2023-03-20

## 2023-03-20 RX ORDER — APIXABAN 5 MG/1
5 TABLET, FILM COATED ORAL
Qty: 60 | Refills: 3 | Status: DISCONTINUED | COMMUNITY
Start: 2023-03-16 | End: 2023-03-20

## 2023-03-21 LAB
HCT VFR BLD CALC: 47.7 %
HGB BLD-MCNC: 15 G/DL
MAGNESIUM SERPL-MCNC: 2.1 MG/DL
MCHC RBC-ENTMCNC: 29.1 PG
MCHC RBC-ENTMCNC: 31.4 GM/DL
MCV RBC AUTO: 92.6 FL
PLATELET # BLD AUTO: 275 K/UL
RBC # BLD: 5.15 M/UL
RBC # FLD: 13.3 %
WBC # FLD AUTO: 8.08 K/UL

## 2023-03-21 NOTE — PHYSICAL EXAM
[No Acute Distress] : no acute distress [Well Nourished] : well nourished [Well Developed] : well developed [Well-Appearing] : well-appearing [Normal Sclera/Conjunctiva] : normal sclera/conjunctiva [Normal Outer Ear/Nose] : the outer ears and nose were normal in appearance [Normal Oropharynx] : the oropharynx was normal [No JVD] : no jugular venous distention [No Lymphadenopathy] : no lymphadenopathy [Supple] : supple [No Respiratory Distress] : no respiratory distress  [No Accessory Muscle Use] : no accessory muscle use [Clear to Auscultation] : lungs were clear to auscultation bilaterally [Normal Rate] : normal rate  [Regular Rhythm] : with a regular rhythm [Normal S1, S2] : normal S1 and S2 [No Murmur] : no murmur heard [No Carotid Bruits] : no carotid bruits [No Varicosities] : no varicosities [Pedal Pulses Present] : the pedal pulses are present [No Edema] : there was no peripheral edema [No Extremity Clubbing/Cyanosis] : no extremity clubbing/cyanosis [Soft] : abdomen soft [Non Tender] : non-tender [Non-distended] : non-distended [No HSM] : no HSM [Normal Bowel Sounds] : normal bowel sounds [No CVA Tenderness] : no CVA  tenderness [No Spinal Tenderness] : no spinal tenderness [No Joint Swelling] : no joint swelling [Grossly Normal Strength/Tone] : grossly normal strength/tone [No Rash] : no rash [Coordination Grossly Intact] : coordination grossly intact [No Focal Deficits] : no focal deficits [Normal Gait] : normal gait [Normal Affect] : the affect was normal [Normal Insight/Judgement] : insight and judgment were intact [Normal TMs] : both tympanic membranes were normal [Alert and Oriented x3] : oriented to person, place, and time [de-identified] : nontender neck

## 2023-03-21 NOTE — HISTORY OF PRESENT ILLNESS
[Other: _____] : [unfilled] [FreeTextEntry8] : CC: itching\par \par ARON SCHAEFER is a 82 year old female with PMH of HTN, HLD, DM, carotid atherosclerosis , recently diagnosed  afib presented to the office today for whle body itching since late last week shortly after starting eliquis Pt was started on eliquis last weeks for new afib. Denies melena, hematochezia, hematemesis, or any bleeding. She then had some R sided neck pain which per daughter is aacute on chronic so she went to urgent care who gave her lidocaine cream.  Pt denies any rashes, took benadryl and itching improved but did not resolved. Pt pending stress test and EP eval \par Also of note, pt was switched for hctz to dyazide but says it did not make her pee a lot. Says has not been on lasix and does not want to take lasix. \par Patient feels well. No complaints. Denies chest pain, sob, tucker, dizziness, diaphoresis, palpitations, LE swelling, orthopnea, syncope, n/v, headache, lip/tongue swelling. \par Denies melena, hematochezia, hematemesis, or any bleeding.\par Pt denies focal weakness, vision changes, numbness/tingling, dysphagia, dysarthria.\par \par

## 2023-03-21 NOTE — REVIEW OF SYSTEMS
[Negative] : Heme/Lymph [Itching] : Itching [Mole Changes] : no mole changes [Nail Changes] : no nail changes [Skin Rash] : no skin rash

## 2023-03-22 ENCOUNTER — APPOINTMENT (OUTPATIENT)
Dept: ELECTROPHYSIOLOGY | Facility: CLINIC | Age: 83
End: 2023-03-22
Payer: MEDICARE

## 2023-03-22 ENCOUNTER — NON-APPOINTMENT (OUTPATIENT)
Age: 83
End: 2023-03-22

## 2023-03-22 ENCOUNTER — APPOINTMENT (OUTPATIENT)
Dept: INTERNAL MEDICINE | Facility: CLINIC | Age: 83
End: 2023-03-22
Payer: MEDICARE

## 2023-03-22 ENCOUNTER — EMERGENCY (EMERGENCY)
Facility: HOSPITAL | Age: 83
LOS: 1 days | Discharge: HOME | End: 2023-03-22
Attending: EMERGENCY MEDICINE
Payer: MEDICARE

## 2023-03-22 VITALS
RESPIRATION RATE: 16 BRPM | HEIGHT: 60 IN | DIASTOLIC BLOOD PRESSURE: 84 MMHG | WEIGHT: 145.06 LBS | HEART RATE: 70 BPM | SYSTOLIC BLOOD PRESSURE: 198 MMHG | TEMPERATURE: 99 F | OXYGEN SATURATION: 98 %

## 2023-03-22 VITALS
SYSTOLIC BLOOD PRESSURE: 155 MMHG | TEMPERATURE: 97.8 F | WEIGHT: 143 LBS | HEART RATE: 62 BPM | DIASTOLIC BLOOD PRESSURE: 73 MMHG | OXYGEN SATURATION: 97 % | HEIGHT: 58 IN | BODY MASS INDEX: 30.02 KG/M2

## 2023-03-22 VITALS
WEIGHT: 143 LBS | SYSTOLIC BLOOD PRESSURE: 138 MMHG | HEART RATE: 64 BPM | DIASTOLIC BLOOD PRESSURE: 70 MMHG | HEIGHT: 58 IN | BODY MASS INDEX: 30.02 KG/M2 | OXYGEN SATURATION: 98 %

## 2023-03-22 VITALS
TEMPERATURE: 98 F | DIASTOLIC BLOOD PRESSURE: 62 MMHG | HEART RATE: 66 BPM | RESPIRATION RATE: 18 BRPM | OXYGEN SATURATION: 100 % | SYSTOLIC BLOOD PRESSURE: 160 MMHG

## 2023-03-22 VITALS — DIASTOLIC BLOOD PRESSURE: 66 MMHG | SYSTOLIC BLOOD PRESSURE: 126 MMHG

## 2023-03-22 DIAGNOSIS — L72.9 FOLLICULAR CYST OF THE SKIN AND SUBCUTANEOUS TISSUE, UNSPECIFIED: Chronic | ICD-10-CM

## 2023-03-22 PROCEDURE — 93005 ELECTROCARDIOGRAM TRACING: CPT

## 2023-03-22 PROCEDURE — 99284 EMERGENCY DEPT VISIT MOD MDM: CPT | Mod: 25

## 2023-03-22 PROCEDURE — 99214 OFFICE O/P EST MOD 30 MIN: CPT

## 2023-03-22 PROCEDURE — 99284 EMERGENCY DEPT VISIT MOD MDM: CPT | Mod: GC

## 2023-03-22 PROCEDURE — 93000 ELECTROCARDIOGRAM COMPLETE: CPT

## 2023-03-22 PROCEDURE — 99204 OFFICE O/P NEW MOD 45 MIN: CPT

## 2023-03-22 RX ORDER — POTASSIUM CHLORIDE 750 MG/1
10 CAPSULE, EXTENDED RELEASE ORAL DAILY
Qty: 90 | Refills: 3 | Status: DISCONTINUED | COMMUNITY
Start: 2023-03-20 | End: 2023-03-22

## 2023-03-22 NOTE — HISTORY OF PRESENT ILLNESS
[Other: _____] : [unfilled] [FreeTextEntry8] : CC: Elevated Blood Pressure \par \par ARON SCHAEFER is a 82 year old female with PMH of HTN, HLD, DM, carotid atherosclerosis , recently diagnosed  afib presented to the office today for elevated bp, was seen by EP Dr Garibay today. \par \par Pt checked bp last night at home was in 200s (asymptomatic), took an extra toprol 100mg and went to ED last night for high blood pressure, 170s in ER where it improved further and she was sent home without intervention.\par Still complains of itching with no rash despite switching to xarelto from  Eliquis, unsure if from NOAC\par \par Pt denies focal weakness, vision changes, numbness/tingling, dysphagia, dysarthria.\par Denies chest pain, sob, tucker, dizziness, diaphoresis, palpitations, LE swelling, orthopnea, syncope, n/v, headache.\par \par \par

## 2023-03-22 NOTE — ED PROVIDER NOTE - CLINICAL SUMMARY MEDICAL DECISION MAKING FREE TEXT BOX
Sancho, PGY2 - Sancho, PGY2 - 81yo woman with recent admission for new onset afib presenting due to high BP readings. no symptoms of end organ damage. repeat metoprolol taken improved BP. high suspicion for patient needing increase in home meds. instructed to f/u with pcp. foreseeable discharge. Sancho, PGY2 - 83yo woman with recent admission for new onset afib presenting due to high BP readings. no symptoms of end organ damage. repeat metoprolol taken improved BP. high suspicion for patient needing increase in home meds. instructed to f/u with pcp. foreseeable discharge.  Attending Livia Whittaker: 81 yo female with recent admission for afib and HTN presenting with concern for elevated BP at home. upon arrival pt denies any chest pain, difficulty breathing or sob. pt well appearing. abdomen soft and nontender. pt does have mild LE edema, per famiuly is sig improved fromprior. pt is currently asymptomatic. repeat BP's in the ed improved. nonfocal neurologic exam. pt well appearing. d/e pt need to follow up today with her cardiologist and close return precautions. ekg performed without evidence of st changes or elevations

## 2023-03-22 NOTE — ED PROVIDER NOTE - PROGRESS NOTE DETAILS
Attending Livia Whittaker: pt currently denies any chest pain, sob, headaches or abdominal pain. BP has improved. has appt today with her cardiologist Sancho, PGY2 - EKG shows no ST elevations with reciprocal depressions. Patient stable for discharge. Understands the Emergency Room work-up and discharge precautions. Will follow-up with cardiology Dr. Dozier

## 2023-03-22 NOTE — ED PROVIDER NOTE - PATIENT PORTAL LINK FT
You can access the FollowMyHealth Patient Portal offered by [facility] by registering at the following website: [email or web address]. By joining [employer]’s FollowMyHealth portal, you will also be able to view your health information using other applications (apps) compatible with our system.

## 2023-03-22 NOTE — ED PROVIDER NOTE - NSFOLLOWUPINSTRUCTIONS_ED_ALL_ED_FT
You were seen in the Emergency Room today because of high blood pressure.   You may need changes to your medications.     Please keep your appointment with Dr. Dozier.     Lifestyle changes you can make to manage hypertension:   •Limit sodium (salt) as directed. Too much sodium can affect your fluid balance. Check labels to find low-sodium or no-salt-added foods. Some low-sodium foods use potassium salts for flavor. Too much potassium can also cause health problems. Your healthcare provider will tell you how much sodium and potassium are safe for you to have in a day. He or she may recommend that you limit sodium to 2,300 mg a day.  •Follow the meal plan recommended by your healthcare provider. A dietitian or your provider can give you more information on low-sodium plans or the DASH (Dietary Approaches to Stop Hypertension) eating plan. The DASH plan is low in sodium, processed sugar, unhealthy fats, and total fat. It is high in potassium, calcium, and fiber. These can be found in vegetables, fruit, and whole-grain foods.  •Be physically active throughout the day. Physical activity, such as exercise, can help control your blood pressure and your weight. Be physically active for at least 30 minutes per day, on most days of the week. Include aerobic activity, such as walking or riding a bicycle. Also include strength training at least 2 times each week. Your healthcare providers can help you create a physical activity plan.  •Decrease stress. This may help lower your blood pressure. Learn ways to relax, such as deep breathing or listening to music.  •Limit alcohol as directed. Alcohol can increase your blood pressure. A drink of alcohol is 12 ounces of beer, 5 ounces of wine, or 1½ ounces of liquor.  •Do not smoke. Nicotine and other chemicals in cigarettes and cigars can increase your blood pressure and also cause lung damage. Ask your healthcare provider for information if you currently smoke and need help to quit. E-cigarettes or smokeless tobacco still contain nicotine. Talk to your healthcare provider before you use these products.     Please return to the Emergency Room if you:   •You have chest pain, squeezing, or pressure.   •Discomfort or pain in your back, neck, jaw, stomach, or arm  •Nausea or vomiting  •You become confused or have trouble speaking.  •You suddenly feel lightheaded or have trouble breathing.  •You have a severe headache, confusion, or vision loss.  •You have weakness or numbness in an arm or leg.

## 2023-03-22 NOTE — PHYSICAL EXAM
[No Acute Distress] : no acute distress [Well Nourished] : well nourished [Well Developed] : well developed [Well-Appearing] : well-appearing [Normal Sclera/Conjunctiva] : normal sclera/conjunctiva [Normal Outer Ear/Nose] : the outer ears and nose were normal in appearance [Normal Oropharynx] : the oropharynx was normal [Normal TMs] : both tympanic membranes were normal [No JVD] : no jugular venous distention [No Lymphadenopathy] : no lymphadenopathy [Supple] : supple [No Respiratory Distress] : no respiratory distress  [No Accessory Muscle Use] : no accessory muscle use [Clear to Auscultation] : lungs were clear to auscultation bilaterally [Normal Rate] : normal rate  [Regular Rhythm] : with a regular rhythm [Normal S1, S2] : normal S1 and S2 [No Murmur] : no murmur heard [No Carotid Bruits] : no carotid bruits [No Varicosities] : no varicosities [Pedal Pulses Present] : the pedal pulses are present [No Edema] : there was no peripheral edema [No Extremity Clubbing/Cyanosis] : no extremity clubbing/cyanosis [Soft] : abdomen soft [Non Tender] : non-tender [Non-distended] : non-distended [No HSM] : no HSM [Normal Bowel Sounds] : normal bowel sounds [No CVA Tenderness] : no CVA  tenderness [No Spinal Tenderness] : no spinal tenderness [No Joint Swelling] : no joint swelling [Grossly Normal Strength/Tone] : grossly normal strength/tone [No Rash] : no rash [Coordination Grossly Intact] : coordination grossly intact [No Focal Deficits] : no focal deficits [Normal Gait] : normal gait [Normal Affect] : the affect was normal [Alert and Oriented x3] : oriented to person, place, and time [Normal Insight/Judgement] : insight and judgment were intact [de-identified] : nontender neck

## 2023-03-22 NOTE — ED ADULT NURSE NOTE - OBJECTIVE STATEMENT
Pt is a 83 yo f c/o HTN. Pt states that today her BP has been elevated (up to 200's) and was worried, so came in for eval. Pt uses metoprolol 1x per day, but saw her BP was getting high so took a second dose around 10pm. Pt states she was here 2 weeks ago and dx with new onset A-fib and noticed similar BP readings while monitoring her at home. PMH of A-fib, HTN, HLD, Lymphoma. Pt A,Ox4, Ni[place] speaking, daughter at bedside translating, denies HA, lightheadedness, dizziness, blurry vision, numbness/tingling, CP, SOB, n/v/d, urinary symptoms, fever and chills. Respirations even and unlabored, abd soft, nondistended and nontender, skin warm, dry and intact, HUERTA. EKG performed at bedside, stretcher locked in lowest position, side rails up for safety, call bell within reach.

## 2023-03-22 NOTE — ED PROVIDER NOTE - ATTENDING CONTRIBUTION TO CARE
Attending MD Livia Whittaker:  I personally have seen and examined this patient.  Resident note reviewed and agree on plan of care and except where noted.  See HPI, PE, and MDM for details.

## 2023-03-22 NOTE — ED PROVIDER NOTE - PHYSICAL EXAMINATION
Gen: NAD, AOx3, able to make needs known, non-toxic  Head: NCAT  HEENT: EOMI, normal conjunctiva  Lung: CTAB, no respiratory distress, no wheezes/rhonchi/rales B/L, speaking in full sentences  CV: RRR, no M/R/G, pulses bilaterally   Abd: soft, NTND, no guarding, no CVA tenderness  MSK: no visible bony deformities  Neuro: No focal sensory or motor deficits  Skin: Warm, well perfused, no rash  Psych: normal affect Gen: NAD, AOx3, able to make needs known, non-toxic  Head: NCAT  HEENT: EOMI, normal conjunctiva  Lung: CTAB, no respiratory distress, no wheezes/rhonchi/rales B/L, speaking in full sentences  CV: RRR, no M/R/G, pulses bilaterally   Abd: soft, NTND, no guarding, no CVA tenderness  MSK: no visible bony deformities  Neuro: No focal sensory or motor deficits  Skin: Warm, well perfused, no rash  Psych: normal affect  Attending Livia Whittaker: Gen: NAD, heent: atrauamtic, eomi, perrla, mmm, , neck; nttp, no nuchal rigidity, chest: nttp, no crepitus, cv: rrr,, lungs: ctab, abd: soft, nontender, nondistended, no peritoneal signs,  no guarding, ext: wwp, neg homans, mild 1+LE edema skin: no rash, neuro: awake and alert, following commands, speech clear, sensation and strength intact, no focal deficits

## 2023-03-22 NOTE — ED PROVIDER NOTE - NSICDXPASTMEDICALHX_GEN_ALL_CORE_FT
PAST MEDICAL HISTORY:  Atrial fibrillation     HTN (hypertension)     Hyperlipidemia     Lymph node enlargement left inguinal    Lymphoma     Osteoporosis

## 2023-03-22 NOTE — ED PROVIDER NOTE - OBJECTIVE STATEMENT
82-year-old woman with PMH hypertension on metoprolol and hydrochlorothiazide, HLD, A-fib, lymphoma, presenting due to high blood pressures at home. On chart review patient was admitted on March 8th for new onset A-fib, had a similar finding of high BP readings on her home BP monitor. Echo shows normal LV systolic function. Was discharged the next day and instructed to f/u outpatient with cardiology Dr. Dozier. 82-year-old woman with PMH hypertension on metoprolol and hydrochlorothiazide, HLD, A-fib, lymphoma, presenting due to high blood pressures at home. On chart review patient was admitted on March 8th for new onset A-fib, had a similar finding of high BP readings on her home BP monitor. Echo shows normal LV systolic function. Was discharged the next day and instructed to f/u outpatient with cardiology Dr. Dozier. Tonight highest BP was 218/94, took an extra dose of metoprolol at 10pm, triage BP is improved. Denies headache, confusion, chest pain, SOB, N/V, abd pain, changes with urination. 82-year-old woman with PMH hypertension on metoprolol and hydrochlorothiazide, HLD, A-fib, lymphoma, presenting due to high blood pressures at home. On chart review patient was admitted on March 8th for new onset A-fib, had a similar finding of high BP readings on her home BP monitor. Echo shows normal LV systolic function. Was discharged the next day and instructed to f/u outpatient with cardiology Dr. Dozier. Tonight highest BP was 218/94, took an extra dose of metoprolol at 10pm, triage BP is improved. Denies headache, confusion, chest pain, SOB, N/V, abd pain, changes with urination. Daughter at bedside translating for patient. 82-year-old woman with PMH hypertension on metoprolol and hydrochlorothiazide, HLD, A-fib, lymphoma, presenting due to high blood pressures at home. On chart review patient was admitted on March 8th for new onset A-fib, had a similar finding of high BP readings on her home BP monitor. Echo shows normal LV systolic function. Was discharged the next day and instructed to f/u outpatient with cardiology Dr. Dozier. Tonight highest BP was 218/94, took an extra dose of metoprolol at 10pm, BP now 179/70. Denies headache, confusion, chest pain, SOB, N/V, abd pain, changes with urination. Daughter at bedside translating for patient.

## 2023-03-22 NOTE — ED ADULT TRIAGE NOTE - NSWEIGHTCALCTOOLDRUG_GEN_A_CORE
Post-Anesthesia Evaluation and Assessment Patient: Tico Mack MRN: [id number]  SSN: [id number] YOB: 1955  Age: 61 y.o. Sex: male Cardiovascular Function/Vital Signs Visit Vitals  /67  Pulse 65  Temp 36.5 °C (97.7 °F)  Resp 14  Wt 83 kg (183 lb)  SpO2 96%  BMI 24.82 kg/m2 Patient is status post MAC anesthesia for Procedure(s): ENDOSCOPIC ULTRASOUND (EUS) ESOPHAGOGASTRODUODENOSCOPY (EGD). Nausea/Vomiting: None Postoperative hydration reviewed and adequate. Pain: 
Pain Scale 1: Numeric (0 - 10) (10/10/18 1104) Pain Intensity 1: 0 (10/10/18 1104) Managed Neurological Status: At baseline Mental Status and Level of Consciousness: Arousable Pulmonary Status:  
O2 Device: Room air (10/10/18 1104) Adequate oxygenation and airway patent Complications related to anesthesia: None Post-anesthesia assessment completed. No concerns Signed By: Paul Moseley MD   
 October 10, 2018
 used

## 2023-03-23 LAB
ALBUMIN SERPL ELPH-MCNC: 4.1 G/DL
ALP BLD-CCNC: 59 U/L
ALT SERPL-CCNC: 17 U/L
ANION GAP SERPL CALC-SCNC: 10 MMOL/L
AST SERPL-CCNC: 26 U/L
BASOPHILS # BLD AUTO: 0.05 K/UL
BASOPHILS NFR BLD AUTO: 0.7 %
BILIRUB SERPL-MCNC: 0.2 MG/DL
BUN SERPL-MCNC: 17 MG/DL
CALCIUM SERPL-MCNC: 9.7 MG/DL
CHLORIDE SERPL-SCNC: 101 MMOL/L
CO2 SERPL-SCNC: 29 MMOL/L
CREAT SERPL-MCNC: 0.57 MG/DL
EGFR: 91 ML/MIN/1.73M2
EOSINOPHIL # BLD AUTO: 0.25 K/UL
EOSINOPHIL NFR BLD AUTO: 3.7 %
GLUCOSE SERPL-MCNC: 103 MG/DL
HCT VFR BLD CALC: 44.1 %
HGB BLD-MCNC: 13.8 G/DL
IMM GRANULOCYTES NFR BLD AUTO: 0.1 %
LYMPHOCYTES # BLD AUTO: 1.61 K/UL
LYMPHOCYTES NFR BLD AUTO: 24.1 %
MAGNESIUM SERPL-MCNC: 2.1 MG/DL
MAN DIFF?: NORMAL
MCHC RBC-ENTMCNC: 28.5 PG
MCHC RBC-ENTMCNC: 31.3 GM/DL
MCV RBC AUTO: 90.9 FL
MONOCYTES # BLD AUTO: 0.79 K/UL
MONOCYTES NFR BLD AUTO: 11.8 %
NEUTROPHILS # BLD AUTO: 3.97 K/UL
NEUTROPHILS NFR BLD AUTO: 59.6 %
PLATELET # BLD AUTO: 283 K/UL
POTASSIUM SERPL-SCNC: 4.5 MMOL/L
PROT SERPL-MCNC: 6.7 G/DL
RBC # BLD: 4.85 M/UL
RBC # FLD: 13.5 %
SODIUM SERPL-SCNC: 140 MMOL/L
TOTAL IGE SMQN RAST: 36 KU/L
WBC # FLD AUTO: 6.68 K/UL

## 2023-03-24 ENCOUNTER — APPOINTMENT (OUTPATIENT)
Dept: PULMONOLOGY | Facility: CLINIC | Age: 83
End: 2023-03-24

## 2023-03-24 ENCOUNTER — NON-APPOINTMENT (OUTPATIENT)
Age: 83
End: 2023-03-24

## 2023-03-26 PROBLEM — I48.91 UNSPECIFIED ATRIAL FIBRILLATION: Chronic | Status: ACTIVE | Noted: 2023-03-22

## 2023-03-27 ENCOUNTER — TRANSCRIPTION ENCOUNTER (OUTPATIENT)
Age: 83
End: 2023-03-27

## 2023-03-27 ENCOUNTER — APPOINTMENT (OUTPATIENT)
Dept: CARDIOLOGY | Facility: CLINIC | Age: 83
End: 2023-03-27
Payer: MEDICARE

## 2023-03-27 PROCEDURE — 93015 CV STRESS TEST SUPVJ I&R: CPT

## 2023-03-27 PROCEDURE — 78452 HT MUSCLE IMAGE SPECT MULT: CPT

## 2023-03-27 PROCEDURE — A9500: CPT

## 2023-03-27 NOTE — DISCUSSION/SUMMARY
[FreeTextEntry1] : In summary, this is an 82 year old woman with a cardiovascular history significant for HTN and pAF. She has a recent new diagnosis of AF which occuring while she was in the hospital with an infection. Her CHADSVASC score is 4, and she would benefit from life long anticoagulation. I discussed this today with her daughters extensively, especially given that she may be going in and out of AF without our knowledge as she is asymptomatic during AF. She may have developed a rash on Eliquis, but now is itching oN Xarelto. I have asked her to stop Xarelto and start ASA 81mg for 3-4 days. After that time period, if the itching has resolved, she should resumed Xarelto and stop ASA. We will see if the itching comes back. Her BP was elevated in the ED, and is slightly elevated in the office today. I spoke with Dr Dozier who will reach out to her to arrange follow up in the clinic tomorrow. She will RTC in 1 week. \par \par Mrs. Upton appeared to understand the whole discussion and verbalized that all of her questions were answered to her satisfaction.\par  \par Thank you for allowing me to be involved in the care of this pleasant woman. Please feel free to contact me with any questions.\par  [EKG obtained to assist in diagnosis and management of assessed problem(s)] : EKG obtained to assist in diagnosis and management of assessed problem(s)

## 2023-03-27 NOTE — HISTORY OF PRESENT ILLNESS
[FreeTextEntry1] : Referring Physician: Phi Dozier MD\par \par Dear Dr. Dozier\par  \par Mrs. Upton was seen in the [facility] today. For our records, please allow me to summarize the history and my findings.\par  \par This pleasant 82 year old woman has a cardiovascular history significant for HTN and pAF. \par \par She was hospitalized on 3/8/23 for pneumonia. She was noted to be in new onselt atrial fibrillation at the time with RVR. She subsequently converted on her own. Her Echo showed normal LV function and mild MR/MS. She was seen by Dr Dozier and started on Eliquis. She developed a rash on it, and it was stopped and switched to Xarelto. She continues to have diffuse itchiness, but no rash. He is also on metoprolol 100mg XL. She went to the ED early this morning for having a BP at home of 218/94 bpm. \par  \par Mrs. Upton denies any recent history of chest pain, shortness of breath, palpitations, dizziness, or syncope.\par

## 2023-03-27 NOTE — CARDIOLOGY SUMMARY
[de-identified] : 3/22/23: Sinus rhythm at 68 bpm, LAE [de-identified] : 6/22/22 LVEF 83%\par 3/8/23 LVEF 74%

## 2023-03-28 ENCOUNTER — APPOINTMENT (OUTPATIENT)
Dept: PULMONOLOGY | Facility: CLINIC | Age: 83
End: 2023-03-28
Payer: MEDICARE

## 2023-03-28 VITALS
OXYGEN SATURATION: 96 % | HEART RATE: 72 BPM | SYSTOLIC BLOOD PRESSURE: 129 MMHG | RESPIRATION RATE: 16 BRPM | DIASTOLIC BLOOD PRESSURE: 73 MMHG

## 2023-03-28 DIAGNOSIS — I51.89 OTHER ILL-DEFINED HEART DISEASES: ICD-10-CM

## 2023-03-28 DIAGNOSIS — Z00.00 ENCOUNTER FOR GENERAL ADULT MEDICAL EXAMINATION W/OUT ABNORMAL FINDINGS: ICD-10-CM

## 2023-03-28 DIAGNOSIS — M85.80 OTHER SPECIFIED DISORDERS OF BONE DENSITY AND STRUCTURE, UNSPECIFIED SITE: ICD-10-CM

## 2023-03-28 PROCEDURE — 77085 DXA BONE DENSITY AXL VRT FX: CPT

## 2023-03-28 PROCEDURE — 99214 OFFICE O/P EST MOD 30 MIN: CPT | Mod: 25

## 2023-03-28 PROCEDURE — 36415 COLL VENOUS BLD VENIPUNCTURE: CPT

## 2023-03-28 PROCEDURE — 81003 URINALYSIS AUTO W/O SCOPE: CPT | Mod: QW

## 2023-03-28 PROCEDURE — 82044 UR ALBUMIN SEMIQUANTITATIVE: CPT | Mod: QW

## 2023-03-29 ENCOUNTER — NON-APPOINTMENT (OUTPATIENT)
Age: 83
End: 2023-03-29

## 2023-03-29 ENCOUNTER — APPOINTMENT (OUTPATIENT)
Dept: ELECTROPHYSIOLOGY | Facility: CLINIC | Age: 83
End: 2023-03-29
Payer: MEDICARE

## 2023-03-29 ENCOUNTER — APPOINTMENT (OUTPATIENT)
Dept: INTERNAL MEDICINE | Facility: CLINIC | Age: 83
End: 2023-03-29
Payer: MEDICARE

## 2023-03-29 VITALS — DIASTOLIC BLOOD PRESSURE: 65 MMHG | SYSTOLIC BLOOD PRESSURE: 132 MMHG

## 2023-03-29 VITALS
DIASTOLIC BLOOD PRESSURE: 70 MMHG | BODY MASS INDEX: 31.07 KG/M2 | WEIGHT: 148 LBS | HEART RATE: 64 BPM | SYSTOLIC BLOOD PRESSURE: 140 MMHG | HEIGHT: 58 IN | OXYGEN SATURATION: 92 %

## 2023-03-29 VITALS
HEART RATE: 61 BPM | HEIGHT: 58 IN | DIASTOLIC BLOOD PRESSURE: 72 MMHG | BODY MASS INDEX: 30.93 KG/M2 | OXYGEN SATURATION: 97 % | SYSTOLIC BLOOD PRESSURE: 153 MMHG

## 2023-03-29 VITALS — WEIGHT: 148 LBS | BODY MASS INDEX: 30.93 KG/M2

## 2023-03-29 DIAGNOSIS — M54.2 CERVICALGIA: ICD-10-CM

## 2023-03-29 DIAGNOSIS — L29.9 PRURITUS, UNSPECIFIED: ICD-10-CM

## 2023-03-29 LAB
ALBUMIN: 10
ANION GAP SERPL CALC-SCNC: 12 MMOL/L
BASOPHILS # BLD AUTO: 0.04 K/UL
BASOPHILS NFR BLD AUTO: 0.5 %
BILIRUB UR QL STRIP: NORMAL
BUN SERPL-MCNC: 18 MG/DL
CALCIUM SERPL-MCNC: 9.7 MG/DL
CHLORIDE SERPL-SCNC: 100 MMOL/L
CLARITY UR: CLEAR
CO2 SERPL-SCNC: 28 MMOL/L
COLLECTION METHOD: NORMAL
CREAT SERPL-MCNC: 0.57 MG/DL
CREATININE: 50
EGFR: 91 ML/MIN/1.73M2
EOSINOPHIL # BLD AUTO: 0.23 K/UL
EOSINOPHIL NFR BLD AUTO: 2.8 %
GLUCOSE SERPL-MCNC: 101 MG/DL
GLUCOSE UR-MCNC: NORMAL
HCG UR QL: 0.2 EU/DL
HCT VFR BLD CALC: 44.1 %
HGB BLD-MCNC: 13.9 G/DL
HGB UR QL STRIP.AUTO: NORMAL
IMM GRANULOCYTES NFR BLD AUTO: 0.4 %
KETONES UR-MCNC: NORMAL
LEUKOCYTE ESTERASE UR QL STRIP: NORMAL
LYMPHOCYTES # BLD AUTO: 1.5 K/UL
LYMPHOCYTES NFR BLD AUTO: 18.6 %
MAN DIFF?: NORMAL
MCHC RBC-ENTMCNC: 28.3 PG
MCHC RBC-ENTMCNC: 31.5 GM/DL
MCV RBC AUTO: 89.6 FL
MICROALBUMIN/CREAT UR TEST STR-RTO: ABNORMAL
MONOCYTES # BLD AUTO: 0.85 K/UL
MONOCYTES NFR BLD AUTO: 10.5 %
NEUTROPHILS # BLD AUTO: 5.43 K/UL
NEUTROPHILS NFR BLD AUTO: 67.2 %
NITRITE UR QL STRIP: NORMAL
PH UR STRIP: 6
PLATELET # BLD AUTO: 269 K/UL
POTASSIUM SERPL-SCNC: 4.2 MMOL/L
PROT UR STRIP-MCNC: NORMAL
RBC # BLD: 4.92 M/UL
RBC # FLD: 13.2 %
SODIUM SERPL-SCNC: 141 MMOL/L
SP GR UR STRIP: 1.01
WBC # FLD AUTO: 8.08 K/UL

## 2023-03-29 PROCEDURE — 93000 ELECTROCARDIOGRAM COMPLETE: CPT

## 2023-03-29 PROCEDURE — 99214 OFFICE O/P EST MOD 30 MIN: CPT

## 2023-03-29 RX ORDER — RIVAROXABAN 20 MG/1
20 TABLET, FILM COATED ORAL
Qty: 90 | Refills: 1 | Status: DISCONTINUED | COMMUNITY
Start: 2023-03-20 | End: 2023-03-29

## 2023-03-29 RX ORDER — POTASSIUM CHLORIDE 1.5 G/1.58G
20 POWDER, FOR SOLUTION ORAL DAILY
Qty: 30 | Refills: 0 | Status: DISCONTINUED | COMMUNITY
Start: 2023-03-22 | End: 2023-03-29

## 2023-03-29 RX ORDER — LORATADINE 10 MG
10 TABLET,CHEWABLE ORAL DAILY
Qty: 30 | Refills: 0 | Status: DISCONTINUED | COMMUNITY
Start: 2023-03-22 | End: 2023-03-29

## 2023-03-29 NOTE — CARDIOLOGY SUMMARY
[de-identified] : 3/22/23: Sinus rhythm at 68 bpm, LAE [de-identified] : 6/22/22 LVEF 83%\par 3/8/23 LVEF 74%

## 2023-03-29 NOTE — PHYSICAL EXAM
[No Acute Distress] : no acute distress [Well Nourished] : well nourished [Well Developed] : well developed [Well-Appearing] : well-appearing [Normal Sclera/Conjunctiva] : normal sclera/conjunctiva [Normal Outer Ear/Nose] : the outer ears and nose were normal in appearance [Normal Oropharynx] : the oropharynx was normal [Normal TMs] : both tympanic membranes were normal [No JVD] : no jugular venous distention [No Lymphadenopathy] : no lymphadenopathy [Supple] : supple [No Respiratory Distress] : no respiratory distress  [No Accessory Muscle Use] : no accessory muscle use [Clear to Auscultation] : lungs were clear to auscultation bilaterally [Normal Rate] : normal rate  [Regular Rhythm] : with a regular rhythm [Normal S1, S2] : normal S1 and S2 [No Murmur] : no murmur heard [No Carotid Bruits] : no carotid bruits [No Varicosities] : no varicosities [Pedal Pulses Present] : the pedal pulses are present [No Edema] : there was no peripheral edema [No Extremity Clubbing/Cyanosis] : no extremity clubbing/cyanosis [Soft] : abdomen soft [Non Tender] : non-tender [Non-distended] : non-distended [Normal Bowel Sounds] : normal bowel sounds [No HSM] : no HSM [No CVA Tenderness] : no CVA  tenderness [No Spinal Tenderness] : no spinal tenderness [No Joint Swelling] : no joint swelling [Grossly Normal Strength/Tone] : grossly normal strength/tone [No Rash] : no rash [Coordination Grossly Intact] : coordination grossly intact [No Focal Deficits] : no focal deficits [Normal Gait] : normal gait [Normal Affect] : the affect was normal [Alert and Oriented x3] : oriented to person, place, and time [Normal Insight/Judgement] : insight and judgment were intact [de-identified] : nontender neck

## 2023-03-29 NOTE — HISTORY OF PRESENT ILLNESS
[FreeTextEntry1] : Referring Physician: Phi Dozier MD\par \par Dear Dr. Dozier\par  \par Mrs. Upton was seen in the [facility] today. For our records, please allow me to summarize the history and my findings.\par  \par This pleasant 82 year old woman has a cardiovascular history significant for HTN and pAF. \par \par She was hospitalized on 3/8/23 for pneumonia. She was noted to be in new onselt atrial fibrillation at the time with RVR. She subsequently converted on her own. Her Echo showed normal LV function and mild MR/MS. She was seen by Dr Dozier and started on Eliquis. She developed a rash on it, and it was stopped and switched to Xarelto. She continues to have diffuse itchiness, but no rash. He is also on metoprolol 100mg XL in the morning and 50XL at night. \par \par We stopped Xarelto last week and waited for her itching to resolve, which it did. The place was to restart Xarelto and see if her symptoms reoccur, but she instead went back on Eliquis, and has been tolerating it fine. \par  \par Mrs. Upton denies any recent history of chest pain, shortness of breath, palpitations, dizziness, or syncope.\par

## 2023-03-29 NOTE — DISCUSSION/SUMMARY
[FreeTextEntry1] : In summary, this is an 82 year old woman with a cardiovascular history significant for HTN and pAF. She has a recent new diagnosis of AF which occurring while she was in the hospital with an infection. Her CHADSVASC score is 4, and she would benefit from life long anticoagulation. I discussed this today with her daughters extensively, especially given that she may be going in and out of AF without our knowledge as she is asymptomatic during AF. There was a concern Eliquis and Xarelto had caused her to itch, but she seems to be tolerating Eliquis well now. She is 67 kg, and will continue on 5mg BID until she is either less than 60 kg or if her Cr is >1.5. We discussed today ILR placement for long term monitoring if the decision was ever to stop AC, and how she may benefit from a Watchman procedure in the future if she is unable to tolerate AC. She will RTC in 6 months. \par \par Mrs. Upton appeared to understand the whole discussion and verbalized that all of her questions were answered to her satisfaction.\par  \par Thank you for allowing me to be involved in the care of this pleasant woman. Please feel free to contact me with any questions.\par  [EKG obtained to assist in diagnosis and management of assessed problem(s)] : EKG obtained to assist in diagnosis and management of assessed problem(s)

## 2023-03-29 NOTE — HISTORY OF PRESENT ILLNESS
[Other: _____] : [unfilled] [FreeTextEntry1] : follow on multiple issues [de-identified] : ARON SCHAEFER is a 82 year old female with PMH of HTN, HLD, DM, carotid atherosclerosis , recently diagnosed afib presented to the office today for followup on multiple issues. Pt at home bp readings ranging from 120-140/ 70 while taking toprol 100mg AM, 50mg PM. Pt restarted Eliquis on Saturday does not have itching any more.   SAw EP Dr Garibay today who rec ILR which pt refusing for now. \par Denies melena, hematochezia, hematemesis, or any bleeding.\par Patient feels well. No complaints. Denies chest pain, sob, tucker, dizziness, diaphoresis, palpitations, LE swelling, orthopnea, syncope, n/v, headache.\par

## 2023-03-30 NOTE — DISCUSSION/SUMMARY
[FreeTextEntry1] : Hypertension on therapy.\par new onset paroxysmal atrial fibrillation.   \par Hyperlipidemia on treatment protocol including medications, diet, and exercise program as tolerated.\par Continue present therapy\par Lymphoma followed by oncology.\par Osteopenia

## 2023-03-30 NOTE — PHYSICAL EXAM
[General Appearance - Well Developed] : well developed [General Appearance - Well Nourished] : well nourished [Neck Cervical Mass (___cm)] : no neck mass was observed [Jugular Venous Distention Increased] : there was no jugular-venous distention [Thyroid Diffuse Enlargement] : the thyroid was not enlarged [Heart Sounds] : normal S1 and S2 [Auscultation Breath Sounds / Voice Sounds] : lungs were clear to auscultation bilaterally [Lungs Percussion] : the lungs were normal to percussion [Abdomen Soft] : soft [Abdomen Tenderness] : non-tender [Abdomen Mass (___ Cm)] : no abdominal mass palpated [Nail Clubbing] : no clubbing of the fingernails [Cyanosis, Localized] : no localized cyanosis [1+ Pitting] : 1+  pitting [Skin Color & Pigmentation] : normal skin color and pigmentation [] : no rash [No Venous Stasis] : no venous stasis [Skin Lesions] : no skin lesions [No Skin Ulcers] : no skin ulcer [No Xanthoma] : no  xanthoma was observed [Deep Tendon Reflexes (DTR)] : deep tendon reflexes were 2+ and symmetric [Sensation] : the sensory exam was normal to light touch and pinprick [Oriented To Time, Place, And Person] : oriented to person, place, and time [Impaired Insight] : insight and judgment were intact [Affect] : the affect was normal [No Acute Distress] : no acute distress [Normal Oropharynx] : normal oropharynx [Normal Appearance] : normal appearance [No Neck Mass] : no neck mass [Normal Rate/Rhythm] : normal rate/rhythm [Normal S1, S2] : normal s1, s2 [No Murmurs] : no murmurs [No Resp Distress] : no resp distress [Clear to Auscultation Bilaterally] : clear to auscultation bilaterally [No Abnormalities] : no abnormalities [Benign] : benign [Normal Gait] : normal gait [No Clubbing] : no clubbing [No Cyanosis] : no cyanosis [No Edema] : no edema [FROM] : FROM [Normal Color/ Pigmentation] : normal color/ pigmentation [No Focal Deficits] : no focal deficits [Oriented x3] : oriented x3 [Normal Affect] : normal affect [FreeTextEntry1] : 1-2/6 syst. m [FreeTextEntry2] :  Varicose veins.

## 2023-03-30 NOTE — ASSESSMENT
[FreeTextEntry1] : Follow-up blood pressure at home and monitor. \par Medications reviewed and renewed.\par Labs drawn in office today\par cologaurd repeat.  Refuses colonoscopy.\par Calcium/VITamin D/Exercise as treatment for bone loss discussed.\par Seeing cardiology.\par Referred for internal medicine evaluation.\par \par Duration of evaluation and management 35 minutes.\par

## 2023-03-30 NOTE — HISTORY OF PRESENT ILLNESS
[TextBox_4] : cologuard  3 years ago\par \par Colonoscopy refuses\par Mammo last year\par GYN last year\par Optho Y\par Derm never\par BMD today\par \par stress test yesterday and had Holter\par hospitalized recently for HTN and A fib,seeing Dr Garibay (EP) and now on Eliquis and metoprolol 50 mg added in pm on 100 mg in am with help\par last year had echo and carotids\par poor sleep because of neck\par seeing oncologist   due to follow-up.\par left leg pain improved\par \par \par \par \par

## 2023-04-02 PROCEDURE — 93224 XTRNL ECG REC UP TO 48 HRS: CPT

## 2023-04-04 ENCOUNTER — APPOINTMENT (OUTPATIENT)
Dept: HEMATOLOGY ONCOLOGY | Facility: CLINIC | Age: 83
End: 2023-04-04

## 2023-04-04 ENCOUNTER — RESULT REVIEW (OUTPATIENT)
Age: 83
End: 2023-04-04

## 2023-04-04 ENCOUNTER — NON-APPOINTMENT (OUTPATIENT)
Age: 83
End: 2023-04-04

## 2023-04-04 ENCOUNTER — APPOINTMENT (OUTPATIENT)
Dept: HEMATOLOGY ONCOLOGY | Facility: CLINIC | Age: 83
End: 2023-04-04
Payer: MEDICARE

## 2023-04-04 VITALS
TEMPERATURE: 97.1 F | RESPIRATION RATE: 16 BRPM | DIASTOLIC BLOOD PRESSURE: 75 MMHG | OXYGEN SATURATION: 97 % | WEIGHT: 143.96 LBS | SYSTOLIC BLOOD PRESSURE: 164 MMHG | HEART RATE: 68 BPM | BODY MASS INDEX: 30.09 KG/M2

## 2023-04-04 DIAGNOSIS — U07.1 COVID-19: ICD-10-CM

## 2023-04-04 LAB
BASOPHILS # BLD AUTO: 0.04 K/UL — SIGNIFICANT CHANGE UP (ref 0–0.2)
BASOPHILS NFR BLD AUTO: 0.6 % — SIGNIFICANT CHANGE UP (ref 0–2)
DEPRECATED KAPPA LC FREE/LAMBDA SER: 1.16 RATIO
EOSINOPHIL # BLD AUTO: 0.22 K/UL — SIGNIFICANT CHANGE UP (ref 0–0.5)
EOSINOPHIL NFR BLD AUTO: 3.3 % — SIGNIFICANT CHANGE UP (ref 0–6)
HCT VFR BLD CALC: 43.1 % — SIGNIFICANT CHANGE UP (ref 34.5–45)
HGB BLD-MCNC: 14 G/DL — SIGNIFICANT CHANGE UP (ref 11.5–15.5)
IGA SER QL IEP: 184 MG/DL
IGG SER QL IEP: 846 MG/DL
IGM SER QL IEP: 225 MG/DL
IMM GRANULOCYTES NFR BLD AUTO: 0.3 % — SIGNIFICANT CHANGE UP (ref 0–0.9)
KAPPA LC CSF-MCNC: 1.6 MG/DL
KAPPA LC SERPL-MCNC: 1.86 MG/DL
LDH SERPL-CCNC: 105 U/L
LYMPHOCYTES # BLD AUTO: 1.47 K/UL — SIGNIFICANT CHANGE UP (ref 1–3.3)
LYMPHOCYTES # BLD AUTO: 22.3 % — SIGNIFICANT CHANGE UP (ref 13–44)
MCHC RBC-ENTMCNC: 28.6 PG — SIGNIFICANT CHANGE UP (ref 27–34)
MCHC RBC-ENTMCNC: 32.5 G/DL — SIGNIFICANT CHANGE UP (ref 32–36)
MCV RBC AUTO: 88 FL — SIGNIFICANT CHANGE UP (ref 80–100)
MONOCYTES # BLD AUTO: 0.74 K/UL — SIGNIFICANT CHANGE UP (ref 0–0.9)
MONOCYTES NFR BLD AUTO: 11.2 % — SIGNIFICANT CHANGE UP (ref 2–14)
NEUTROPHILS # BLD AUTO: 4.1 K/UL — SIGNIFICANT CHANGE UP (ref 1.8–7.4)
NEUTROPHILS NFR BLD AUTO: 62.3 % — SIGNIFICANT CHANGE UP (ref 43–77)
NRBC # BLD: 0 /100 WBCS — SIGNIFICANT CHANGE UP (ref 0–0)
PLATELET # BLD AUTO: 249 K/UL — SIGNIFICANT CHANGE UP (ref 150–400)
RBC # BLD: 4.9 M/UL — SIGNIFICANT CHANGE UP (ref 3.8–5.2)
RBC # FLD: 13.1 % — SIGNIFICANT CHANGE UP (ref 10.3–14.5)
WBC # BLD: 6.59 K/UL — SIGNIFICANT CHANGE UP (ref 3.8–10.5)
WBC # FLD AUTO: 6.59 K/UL — SIGNIFICANT CHANGE UP (ref 3.8–10.5)

## 2023-04-04 PROCEDURE — 99214 OFFICE O/P EST MOD 30 MIN: CPT | Mod: CS

## 2023-04-04 NOTE — HISTORY OF PRESENT ILLNESS
[de-identified] : Mrs. Upton is a 74 year old woman with follicular lymphoma, grade 1-2, stage IA, who received radiotherapy to a > 5 cm left inguinal node in late 2014. In October, 2014 she noted a left inguinal lump associated with some discomfort. CT scan showed a 4.8 x 3,2 cm left inguinal node. 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 PET/CT showed that the inguinal node had grown over a few weeks to 5.2 cm. SUV was 10.1. The adrenal nodule was FDG(-). Marrow was (-). Iron was present. Excisional biopsy of the inguinal node showed findings most c/w FL, with dim CD10(+), CD20, BCL-6 and BCL-2 (partial). FISH for BCL-IgH was (-). \par She was treated with RT which was well tolerated.\par \par  \par \par Disease: follicular lymphoma, gr 1-2 \par Stage:. IA. \par Pathology: FL, gr 1-2.  [de-identified] : Remains w/o constitutional c/o\par \par Radiotherapy to left inguinal node - 30 Gy in 15 fractions -completed 1/9/15.\par Baseline CT scans showed small renal (1.3 cm);,splenic artery aneurysms. \par CT A/P 4/2/2022: vs 2014 no change in 1.7 cm right renal a. aneurysm or\par in 0.8 cm splenic a. aneurysm. No LAD or H/S megaly.\par \par Had  uncomplicated Covid 7/2022; paxlovid taken x 5d  \par went to [facility] ER 3/9/23 with incr BP, was in AF with incr HR, converted to NSR, \par now on eliquis after xarelto ? caused pruritus w/o rash\par ? PNA on CXR, CT chest (-)\par thought to have been volume depleted from recent self limited illness with N/V/D\par BP better controlled with incr in toprol dose and HCTZ\par \par \par \par

## 2023-04-04 NOTE — REVIEW OF SYSTEMS
[Negative] : Constitutional [Fever] : no fever [Night Sweats] : no night sweats [Chest Pain] : no chest pain [Palpitations] : no palpitations [FreeTextEntry5] : as above

## 2023-04-04 NOTE — PHYSICAL EXAM
[Fully active, able to carry on all pre-disease performance without restriction] : Status 0 - Fully active, able to carry on all pre-disease performance without restriction [Normal] : affect appropriate [de-identified] : Mild LE edema bilat, + venous stasis chages same

## 2023-04-04 NOTE — ASSESSMENT
[Palliative] : Goals of care discussed with patient: Palliative [Palliative Care Plan] : not applicable at this time [FreeTextEntry1] : Clinically DUC. Currently with no complaints. 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 are small aneurysms were re imaged 2/2018 - no change in prior two yrs. Reimaged 2/2022\par with no change vs. 2014.\par She also has  had a 1 cm FDG(-) indeterminate probable adrenal adenoma.\par CBC reviewed and d/w pt:\par WBC 6.59, Hgb 14.0, Plt 249K, nl diff\par \par No indication for imaging directed at lymphoma but will continue to f/u\par stable renal and splenic aa aneurysms\par will see if can be visualized on U/S\par \par Cardiology, Medicine f/u of BP, arrhythmia \par \par check CMP, LDH, B2M, immunoglobulins\par \par RV 6 months\par .

## 2023-04-06 LAB
ALBUMIN SERPL ELPH-MCNC: 4.2 G/DL
ALP BLD-CCNC: 64 U/L
ALT SERPL-CCNC: 17 U/L
ANION GAP SERPL CALC-SCNC: 10 MMOL/L
AST SERPL-CCNC: 18 U/L
BILIRUB SERPL-MCNC: 0.3 MG/DL
BUN SERPL-MCNC: 17 MG/DL
CALCIUM SERPL-MCNC: 9.6 MG/DL
CHLORIDE SERPL-SCNC: 100 MMOL/L
CO2 SERPL-SCNC: 31 MMOL/L
CREAT SERPL-MCNC: 0.58 MG/DL
EGFR: 90 ML/MIN/1.73M2
GLUCOSE SERPL-MCNC: 103 MG/DL
POTASSIUM SERPL-SCNC: 4 MMOL/L
PROT SERPL-MCNC: 6.3 G/DL
SODIUM SERPL-SCNC: 141 MMOL/L

## 2023-04-06 PROCEDURE — 93224 XTRNL ECG REC UP TO 48 HRS: CPT

## 2023-04-11 ENCOUNTER — APPOINTMENT (OUTPATIENT)
Dept: CARDIOLOGY | Facility: CLINIC | Age: 83
End: 2023-04-11
Payer: MEDICARE

## 2023-04-11 VITALS
SYSTOLIC BLOOD PRESSURE: 140 MMHG | WEIGHT: 146 LBS | HEIGHT: 58 IN | HEART RATE: 66 BPM | OXYGEN SATURATION: 96 % | TEMPERATURE: 97.7 F | BODY MASS INDEX: 30.64 KG/M2 | DIASTOLIC BLOOD PRESSURE: 68 MMHG

## 2023-04-11 PROCEDURE — 99213 OFFICE O/P EST LOW 20 MIN: CPT

## 2023-04-11 NOTE — HISTORY OF PRESENT ILLNESS
[FreeTextEntry1] : This is an 83 y/o female with a pmhx of pAF, pre-DM, HLD, HTN here today for a follow up. Pt is currently taking Eliquis with no issues. pt denies any chest  pain dizziness ,lightheadedness ,nausea vomiting diaphoresis\par

## 2023-04-18 ENCOUNTER — APPOINTMENT (OUTPATIENT)
Dept: PULMONOLOGY | Facility: CLINIC | Age: 83
End: 2023-04-18

## 2023-04-19 NOTE — RESULTS/DATA
[FreeTextEntry1] : CBC today\par WBC 7.6\par HGB 14.9\par HCT 44.1\par ,000\par \par 
heart stents x3/yes(specify)

## 2023-05-11 ENCOUNTER — APPOINTMENT (OUTPATIENT)
Dept: ULTRASOUND IMAGING | Facility: IMAGING CENTER | Age: 83
End: 2023-05-11
Payer: MEDICARE

## 2023-05-11 ENCOUNTER — OUTPATIENT (OUTPATIENT)
Dept: OUTPATIENT SERVICES | Facility: HOSPITAL | Age: 83
LOS: 1 days | End: 2023-05-11
Payer: MEDICARE

## 2023-05-11 DIAGNOSIS — L72.9 FOLLICULAR CYST OF THE SKIN AND SUBCUTANEOUS TISSUE, UNSPECIFIED: Chronic | ICD-10-CM

## 2023-05-11 DIAGNOSIS — C82.00 FOLLICULAR LYMPHOMA GRADE I, UNSPECIFIED SITE: ICD-10-CM

## 2023-05-11 DIAGNOSIS — Z98.89 OTHER SPECIFIED POSTPROCEDURAL STATES: Chronic | ICD-10-CM

## 2023-05-11 LAB
APPEARANCE: CLEAR
BACTERIA: NEGATIVE
BILIRUBIN URINE: NEGATIVE
BLOOD URINE: NEGATIVE
COLOR: YELLOW
GLUCOSE QUALITATIVE U: NEGATIVE
HYALINE CASTS: 1 /LPF
KETONES URINE: NEGATIVE
LEUKOCYTE ESTERASE URINE: ABNORMAL
MICROSCOPIC-UA: NORMAL
NITRITE URINE: NEGATIVE
PH URINE: 7.5
PROTEIN URINE: NORMAL
RED BLOOD CELLS URINE: 3 /HPF
SPECIFIC GRAVITY URINE: 1.01
SQUAMOUS EPITHELIAL CELLS: 3 /HPF
UROBILINOGEN URINE: NORMAL
WHITE BLOOD CELLS URINE: 2 /HPF

## 2023-05-11 PROCEDURE — 76700 US EXAM ABDOM COMPLETE: CPT

## 2023-05-11 PROCEDURE — 76700 US EXAM ABDOM COMPLETE: CPT | Mod: 26

## 2023-06-28 ENCOUNTER — APPOINTMENT (OUTPATIENT)
Dept: INTERNAL MEDICINE | Facility: CLINIC | Age: 83
End: 2023-06-28
Payer: MEDICARE

## 2023-06-28 VITALS
OXYGEN SATURATION: 99 % | WEIGHT: 144 LBS | HEIGHT: 58 IN | DIASTOLIC BLOOD PRESSURE: 68 MMHG | BODY MASS INDEX: 30.23 KG/M2 | SYSTOLIC BLOOD PRESSURE: 160 MMHG | HEART RATE: 72 BPM

## 2023-06-28 VITALS — DIASTOLIC BLOOD PRESSURE: 60 MMHG | SYSTOLIC BLOOD PRESSURE: 160 MMHG

## 2023-06-28 PROCEDURE — 99214 OFFICE O/P EST MOD 30 MIN: CPT

## 2023-06-28 NOTE — HEALTH RISK ASSESSMENT
[0] : 2) Feeling down, depressed, or hopeless: Not at all (0) [PHQ-2 Negative - No further assessment needed] : PHQ-2 Negative - No further assessment needed [Never] : Never [[id number]] : 0

## 2023-06-28 NOTE — PHYSICAL EXAM
[No Acute Distress] : no acute distress [Well Nourished] : well nourished [Well Developed] : well developed [Well-Appearing] : well-appearing [Normal Sclera/Conjunctiva] : normal sclera/conjunctiva [Normal Outer Ear/Nose] : the outer ears and nose were normal in appearance [Normal Oropharynx] : the oropharynx was normal [No JVD] : no jugular venous distention [No Lymphadenopathy] : no lymphadenopathy [Supple] : supple [No Respiratory Distress] : no respiratory distress  [No Accessory Muscle Use] : no accessory muscle use [Clear to Auscultation] : lungs were clear to auscultation bilaterally [Normal Rate] : normal rate  [Regular Rhythm] : with a regular rhythm [Normal S1, S2] : normal S1 and S2 [No Murmur] : no murmur heard [No Carotid Bruits] : no carotid bruits [Pedal Pulses Present] : the pedal pulses are present [No Extremity Clubbing/Cyanosis] : no extremity clubbing/cyanosis [Soft] : abdomen soft [Non Tender] : non-tender [Non-distended] : non-distended [Normal Bowel Sounds] : normal bowel sounds [Normal Anterior Cervical Nodes] : no anterior cervical lymphadenopathy [No CVA Tenderness] : no CVA  tenderness [No Spinal Tenderness] : no spinal tenderness [No Joint Swelling] : no joint swelling [Grossly Normal Strength/Tone] : grossly normal strength/tone [No Rash] : no rash [Coordination Grossly Intact] : coordination grossly intact [No Focal Deficits] : no focal deficits [Normal Gait] : normal gait [Alert and Oriented x3] : oriented to person, place, and time [de-identified] : trace ankle edema and varicose veins bilaterally

## 2023-06-28 NOTE — HISTORY OF PRESENT ILLNESS
[FreeTextEntry1] : 3 month follow up [de-identified] : This is an 83 y/o female with a pmhx of pAF, pre-DM, HLD, HTN here today for a follow up. Pt reports her urine is a very yellow color, denies dysuria. Pt bp at home is usually 125-135 systolic, never gets high values\par Denies f/c, abd pain,flank pain.\par Denies chest pain, SOB, MARTIN, dizziness, diaphoresis, palpitations, LE swelling, orthopnea, syncope, n/v, headache.

## 2023-06-29 LAB
ALBUMIN SERPL ELPH-MCNC: 4.2 G/DL
ALP BLD-CCNC: 55 U/L
ALT SERPL-CCNC: 18 U/L
ANION GAP SERPL CALC-SCNC: 10 MMOL/L
APPEARANCE: CLEAR
AST SERPL-CCNC: 20 U/L
BACTERIA: NEGATIVE /HPF
BILIRUB SERPL-MCNC: 0.4 MG/DL
BILIRUBIN URINE: NEGATIVE
BLOOD URINE: NEGATIVE
BUN SERPL-MCNC: 15 MG/DL
CALCIUM SERPL-MCNC: 9.4 MG/DL
CAST: 0 /LPF
CHLORIDE SERPL-SCNC: 103 MMOL/L
CHOLEST SERPL-MCNC: 164 MG/DL
CK SERPL-CCNC: 67 U/L
CO2 SERPL-SCNC: 30 MMOL/L
COLOR: YELLOW
CREAT SERPL-MCNC: 0.59 MG/DL
EGFR: 90 ML/MIN/1.73M2
EPITHELIAL CELLS: 2 /HPF
ESTIMATED AVERAGE GLUCOSE: 137 MG/DL
GLUCOSE QUALITATIVE U: NEGATIVE MG/DL
GLUCOSE SERPL-MCNC: 98 MG/DL
HBA1C MFR BLD HPLC: 6.4 %
HDLC SERPL-MCNC: 52 MG/DL
KETONES URINE: NEGATIVE MG/DL
LDLC SERPL CALC-MCNC: 86 MG/DL
LEUKOCYTE ESTERASE URINE: ABNORMAL
MICROSCOPIC-UA: NORMAL
NITRITE URINE: NEGATIVE
NONHDLC SERPL-MCNC: 113 MG/DL
PH URINE: 7.5
POTASSIUM SERPL-SCNC: 4.3 MMOL/L
PROT SERPL-MCNC: 7 G/DL
PROTEIN URINE: NEGATIVE MG/DL
RED BLOOD CELLS URINE: 1 /HPF
REVIEW: NORMAL
SODIUM SERPL-SCNC: 143 MMOL/L
SPECIFIC GRAVITY URINE: 1.01
T4 FREE SERPL-MCNC: 1 NG/DL
TRIGL SERPL-MCNC: 134 MG/DL
TSH SERPL-ACNC: 2.79 UIU/ML
UROBILINOGEN URINE: 0.2 MG/DL
WHITE BLOOD CELLS URINE: 1 /HPF

## 2023-07-04 LAB — BACTERIA UR CULT: NORMAL

## 2023-07-11 RX ORDER — APIXABAN 5 MG/1
5 TABLET, FILM COATED ORAL
Qty: 180 | Refills: 3 | Status: ACTIVE | COMMUNITY
Start: 2023-03-29 | End: 1900-01-01

## 2023-08-09 ENCOUNTER — APPOINTMENT (OUTPATIENT)
Dept: CARDIOLOGY | Facility: CLINIC | Age: 83
End: 2023-08-09
Payer: MEDICARE

## 2023-08-09 VITALS
SYSTOLIC BLOOD PRESSURE: 175 MMHG | TEMPERATURE: 97.6 F | WEIGHT: 144 LBS | HEIGHT: 58 IN | DIASTOLIC BLOOD PRESSURE: 70 MMHG | BODY MASS INDEX: 30.23 KG/M2 | OXYGEN SATURATION: 98 % | HEART RATE: 69 BPM

## 2023-08-09 PROCEDURE — 93000 ELECTROCARDIOGRAM COMPLETE: CPT

## 2023-08-09 PROCEDURE — 99214 OFFICE O/P EST MOD 30 MIN: CPT

## 2023-08-09 NOTE — HISTORY OF PRESENT ILLNESS
[FreeTextEntry1] :  This is an 81 y/o female with a pmhx of pAF, pre-DM, HLD, HTN here today for a follow up. Pt is currently taking Eliquis with no issues. Pt is s/p STN 3/2023 with no evidence of ischemia or infarction. Pt reports a burning sensation in her stomach when she started taking eliquis.Pt also states she checks her bp at home and gets 126-66 pt denies any chest pain dizziness ,lightheadedness ,nausea vomiting diaphoresis

## 2023-08-19 ENCOUNTER — OUTPATIENT (OUTPATIENT)
Dept: OUTPATIENT SERVICES | Facility: HOSPITAL | Age: 83
LOS: 1 days | End: 2023-08-19
Payer: MEDICARE

## 2023-08-19 ENCOUNTER — APPOINTMENT (OUTPATIENT)
Dept: MAMMOGRAPHY | Facility: IMAGING CENTER | Age: 83
End: 2023-08-19
Payer: MEDICARE

## 2023-08-19 DIAGNOSIS — Z00.00 ENCOUNTER FOR GENERAL ADULT MEDICAL EXAMINATION WITHOUT ABNORMAL FINDINGS: ICD-10-CM

## 2023-08-19 DIAGNOSIS — L72.9 FOLLICULAR CYST OF THE SKIN AND SUBCUTANEOUS TISSUE, UNSPECIFIED: Chronic | ICD-10-CM

## 2023-08-19 DIAGNOSIS — Z98.89 OTHER SPECIFIED POSTPROCEDURAL STATES: Chronic | ICD-10-CM

## 2023-08-19 PROCEDURE — 77067 SCR MAMMO BI INCL CAD: CPT

## 2023-08-19 PROCEDURE — 77063 BREAST TOMOSYNTHESIS BI: CPT | Mod: 26

## 2023-08-19 PROCEDURE — 77067 SCR MAMMO BI INCL CAD: CPT | Mod: 26

## 2023-08-19 PROCEDURE — 77063 BREAST TOMOSYNTHESIS BI: CPT

## 2023-08-29 ENCOUNTER — APPOINTMENT (OUTPATIENT)
Dept: CARDIOLOGY | Facility: CLINIC | Age: 83
End: 2023-08-29
Payer: MEDICARE

## 2023-08-29 PROCEDURE — 93306 TTE W/DOPPLER COMPLETE: CPT

## 2023-09-05 ENCOUNTER — APPOINTMENT (OUTPATIENT)
Dept: INTERNAL MEDICINE | Facility: CLINIC | Age: 83
End: 2023-09-05
Payer: MEDICARE

## 2023-09-05 VITALS — SYSTOLIC BLOOD PRESSURE: 160 MMHG | DIASTOLIC BLOOD PRESSURE: 70 MMHG

## 2023-09-05 VITALS
SYSTOLIC BLOOD PRESSURE: 160 MMHG | HEIGHT: 58 IN | OXYGEN SATURATION: 97 % | BODY MASS INDEX: 30.44 KG/M2 | WEIGHT: 145 LBS | DIASTOLIC BLOOD PRESSURE: 70 MMHG | HEART RATE: 60 BPM | TEMPERATURE: 98.3 F | RESPIRATION RATE: 16 BRPM

## 2023-09-05 DIAGNOSIS — D35.01 BENIGN NEOPLASM OF RIGHT ADRENAL GLAND: ICD-10-CM

## 2023-09-05 DIAGNOSIS — I72.8 ANEURYSM OF OTHER SPECIFIED ARTERIES: ICD-10-CM

## 2023-09-05 PROCEDURE — 99213 OFFICE O/P EST LOW 20 MIN: CPT

## 2023-09-05 RX ORDER — FAMOTIDINE 20 MG/1
20 TABLET, FILM COATED ORAL
Qty: 60 | Refills: 0 | Status: DISCONTINUED | COMMUNITY
Start: 2023-08-09 | End: 2023-09-05

## 2023-09-05 NOTE — PHYSICAL EXAM
[No Acute Distress] : no acute distress [Well Nourished] : well nourished [Well Developed] : well developed [Well-Appearing] : well-appearing [Normal Sclera/Conjunctiva] : normal sclera/conjunctiva [Normal Outer Ear/Nose] : the outer ears and nose were normal in appearance [Normal Oropharynx] : the oropharynx was normal [No JVD] : no jugular venous distention [No Lymphadenopathy] : no lymphadenopathy [Supple] : supple [No Respiratory Distress] : no respiratory distress  [No Accessory Muscle Use] : no accessory muscle use [Clear to Auscultation] : lungs were clear to auscultation bilaterally [Normal Rate] : normal rate  [Regular Rhythm] : with a regular rhythm [Normal S1, S2] : normal S1 and S2 [No Murmur] : no murmur heard [No Carotid Bruits] : no carotid bruits [Pedal Pulses Present] : the pedal pulses are present [No Edema] : there was no peripheral edema [No Extremity Clubbing/Cyanosis] : no extremity clubbing/cyanosis [Soft] : abdomen soft [Non Tender] : non-tender [Non-distended] : non-distended [Normal Bowel Sounds] : normal bowel sounds [Normal Anterior Cervical Nodes] : no anterior cervical lymphadenopathy [No CVA Tenderness] : no CVA  tenderness [No Spinal Tenderness] : no spinal tenderness [No Joint Swelling] : no joint swelling [Grossly Normal Strength/Tone] : grossly normal strength/tone [No Rash] : no rash [Coordination Grossly Intact] : coordination grossly intact [No Focal Deficits] : no focal deficits [Normal Gait] : normal gait [Alert and Oriented x3] : oriented to person, place, and time [de-identified] : varicose veins bilaterally

## 2023-09-05 NOTE — HISTORY OF PRESENT ILLNESS
[FreeTextEntry1] : f/u on f/u issues [de-identified] : This is an 84 y/o female with a pmhx of pAF, pre-DM, HLD, HTN here today for a follow up. Pt's at home bp log reviewed, Bp ranges 115-125/55-65. She saw Dr. Dozier on 8/9 who added amlodipine 2.5mg daily for high bp but pt didn't take it since she says her bp is low at home. Recommending that she comes back with the bp machine due to the discrepancy between in office and at home readings.  Denies chest pain, SOB, MARTIN, dizziness, diaphoresis, palpitations, LE swelling, orthopnea, syncope, n/v, headache.

## 2023-09-05 NOTE — HEALTH RISK ASSESSMENT
[0] : 2) Feeling down, depressed, or hopeless: Not at all (0) [PHQ-2 Negative - No further assessment needed] : PHQ-2 Negative - No further assessment needed [Never] : Never [[id number]] : 0

## 2023-09-23 ENCOUNTER — RX RENEWAL (OUTPATIENT)
Age: 83
End: 2023-09-23

## 2023-09-25 ENCOUNTER — OUTPATIENT (OUTPATIENT)
Dept: OUTPATIENT SERVICES | Facility: HOSPITAL | Age: 83
LOS: 1 days | Discharge: HOME | End: 2023-09-25

## 2023-09-25 DIAGNOSIS — C85.88 OTHER SPECIFIED TYPES OF NON-HODGKIN LYMPHOMA, LYMPH NODES OF MULTIPLE SITES: ICD-10-CM

## 2023-09-25 DIAGNOSIS — Z98.89 OTHER SPECIFIED POSTPROCEDURAL STATES: Chronic | ICD-10-CM

## 2023-09-26 ENCOUNTER — APPOINTMENT (OUTPATIENT)
Dept: INTERNAL MEDICINE | Facility: CLINIC | Age: 83
End: 2023-09-26
Payer: MEDICARE

## 2023-09-26 VITALS
BODY MASS INDEX: 29.39 KG/M2 | OXYGEN SATURATION: 93 % | HEIGHT: 58 IN | SYSTOLIC BLOOD PRESSURE: 140 MMHG | WEIGHT: 140 LBS | HEART RATE: 59 BPM | DIASTOLIC BLOOD PRESSURE: 60 MMHG

## 2023-09-26 VITALS — SYSTOLIC BLOOD PRESSURE: 148 MMHG | DIASTOLIC BLOOD PRESSURE: 68 MMHG

## 2023-09-26 DIAGNOSIS — Z71.85 ENCOUNTER FOR IMMUNIZATION SAFETY COUNSELING: ICD-10-CM

## 2023-09-26 PROCEDURE — G0008: CPT

## 2023-09-26 PROCEDURE — 99213 OFFICE O/P EST LOW 20 MIN: CPT | Mod: 25

## 2023-09-26 PROCEDURE — 90662 IIV NO PRSV INCREASED AG IM: CPT

## 2023-09-29 DIAGNOSIS — R39.89 OTHER SYMPTOMS AND SIGNS INVOLVING THE GENITOURINARY SYSTEM: ICD-10-CM

## 2023-10-03 ENCOUNTER — RESULT REVIEW (OUTPATIENT)
Age: 83
End: 2023-10-03

## 2023-10-03 ENCOUNTER — APPOINTMENT (OUTPATIENT)
Dept: HEMATOLOGY ONCOLOGY | Facility: CLINIC | Age: 83
End: 2023-10-03
Payer: MEDICARE

## 2023-10-03 VITALS
HEART RATE: 55 BPM | SYSTOLIC BLOOD PRESSURE: 164 MMHG | OXYGEN SATURATION: 98 % | DIASTOLIC BLOOD PRESSURE: 66 MMHG | TEMPERATURE: 96.4 F | RESPIRATION RATE: 14 BRPM | BODY MASS INDEX: 29.72 KG/M2 | WEIGHT: 142.2 LBS

## 2023-10-03 LAB
BASOPHILS # BLD AUTO: 0.03 K/UL — SIGNIFICANT CHANGE UP (ref 0–0.2)
BASOPHILS NFR BLD AUTO: 0.4 % — SIGNIFICANT CHANGE UP (ref 0–2)
EOSINOPHIL # BLD AUTO: 0.17 K/UL — SIGNIFICANT CHANGE UP (ref 0–0.5)
EOSINOPHIL NFR BLD AUTO: 2.5 % — SIGNIFICANT CHANGE UP (ref 0–6)
HCT VFR BLD CALC: 44.6 % — SIGNIFICANT CHANGE UP (ref 34.5–45)
HGB BLD-MCNC: 14.6 G/DL — SIGNIFICANT CHANGE UP (ref 11.5–15.5)
IMM GRANULOCYTES NFR BLD AUTO: 0.1 % — SIGNIFICANT CHANGE UP (ref 0–0.9)
LYMPHOCYTES # BLD AUTO: 1.14 K/UL — SIGNIFICANT CHANGE UP (ref 1–3.3)
LYMPHOCYTES # BLD AUTO: 16.9 % — SIGNIFICANT CHANGE UP (ref 13–44)
MCHC RBC-ENTMCNC: 28.5 PG — SIGNIFICANT CHANGE UP (ref 27–34)
MCHC RBC-ENTMCNC: 32.7 G/DL — SIGNIFICANT CHANGE UP (ref 32–36)
MCV RBC AUTO: 86.9 FL — SIGNIFICANT CHANGE UP (ref 80–100)
MONOCYTES # BLD AUTO: 0.67 K/UL — SIGNIFICANT CHANGE UP (ref 0–0.9)
MONOCYTES NFR BLD AUTO: 9.9 % — SIGNIFICANT CHANGE UP (ref 2–14)
NEUTROPHILS # BLD AUTO: 4.73 K/UL — SIGNIFICANT CHANGE UP (ref 1.8–7.4)
NEUTROPHILS NFR BLD AUTO: 70.2 % — SIGNIFICANT CHANGE UP (ref 43–77)
NRBC # BLD: 0 /100 WBCS — SIGNIFICANT CHANGE UP (ref 0–0)
PLATELET # BLD AUTO: 239 K/UL — SIGNIFICANT CHANGE UP (ref 150–400)
RBC # BLD: 5.13 M/UL — SIGNIFICANT CHANGE UP (ref 3.8–5.2)
RBC # FLD: 13.2 % — SIGNIFICANT CHANGE UP (ref 10.3–14.5)
WBC # BLD: 6.75 K/UL — SIGNIFICANT CHANGE UP (ref 3.8–10.5)
WBC # FLD AUTO: 6.75 K/UL — SIGNIFICANT CHANGE UP (ref 3.8–10.5)

## 2023-10-03 PROCEDURE — 99213 OFFICE O/P EST LOW 20 MIN: CPT

## 2023-10-03 RX ORDER — METOPROLOL SUCCINATE 50 MG/1
50 TABLET, EXTENDED RELEASE ORAL
Qty: 90 | Refills: 1 | Status: DISCONTINUED | COMMUNITY
Start: 2021-09-03 | End: 2023-10-03

## 2023-10-04 ENCOUNTER — APPOINTMENT (OUTPATIENT)
Dept: ELECTROPHYSIOLOGY | Facility: CLINIC | Age: 83
End: 2023-10-04
Payer: MEDICARE

## 2023-10-04 ENCOUNTER — NON-APPOINTMENT (OUTPATIENT)
Age: 83
End: 2023-10-04

## 2023-10-04 VITALS
HEART RATE: 55 BPM | SYSTOLIC BLOOD PRESSURE: 142 MMHG | HEIGHT: 58 IN | BODY MASS INDEX: 29.81 KG/M2 | DIASTOLIC BLOOD PRESSURE: 67 MMHG | TEMPERATURE: 96 F | WEIGHT: 142 LBS | OXYGEN SATURATION: 97 %

## 2023-10-04 PROCEDURE — 93000 ELECTROCARDIOGRAM COMPLETE: CPT

## 2023-10-04 PROCEDURE — 99214 OFFICE O/P EST MOD 30 MIN: CPT

## 2023-10-08 LAB
ALBUMIN SERPL ELPH-MCNC: 4.2 G/DL
ALP BLD-CCNC: 57 U/L
ALT SERPL-CCNC: 16 U/L
ANION GAP SERPL CALC-SCNC: 9 MMOL/L
AST SERPL-CCNC: 18 U/L
BILIRUB SERPL-MCNC: 0.4 MG/DL
BUN SERPL-MCNC: 14 MG/DL
CALCIUM SERPL-MCNC: 9.6 MG/DL
CHLORIDE SERPL-SCNC: 100 MMOL/L
CO2 SERPL-SCNC: 31 MMOL/L
CREAT SERPL-MCNC: 0.58 MG/DL
DEPRECATED KAPPA LC FREE/LAMBDA SER: 1.49 RATIO
EGFR: 90 ML/MIN/1.73M2
GLUCOSE SERPL-MCNC: 112 MG/DL
IGA SER QL IEP: 198 MG/DL
IGG SER QL IEP: 767 MG/DL
IGM SER QL IEP: 225 MG/DL
KAPPA LC CSF-MCNC: 1.16 MG/DL
KAPPA LC SERPL-MCNC: 1.73 MG/DL
LDH SERPL-CCNC: 127 U/L
POTASSIUM SERPL-SCNC: 4.5 MMOL/L
PROT SERPL-MCNC: 6.5 G/DL
SODIUM SERPL-SCNC: 141 MMOL/L

## 2023-11-01 ENCOUNTER — APPOINTMENT (OUTPATIENT)
Dept: ULTRASOUND IMAGING | Facility: IMAGING CENTER | Age: 83
End: 2023-11-01
Payer: MEDICARE

## 2023-11-01 ENCOUNTER — OUTPATIENT (OUTPATIENT)
Dept: OUTPATIENT SERVICES | Facility: HOSPITAL | Age: 83
LOS: 1 days | End: 2023-11-01
Payer: MEDICARE

## 2023-11-01 DIAGNOSIS — Z98.89 OTHER SPECIFIED POSTPROCEDURAL STATES: Chronic | ICD-10-CM

## 2023-11-01 DIAGNOSIS — S80.01XA CONTUSION OF RIGHT KNEE, INITIAL ENCOUNTER: ICD-10-CM

## 2023-11-01 DIAGNOSIS — L72.9 FOLLICULAR CYST OF THE SKIN AND SUBCUTANEOUS TISSUE, UNSPECIFIED: Chronic | ICD-10-CM

## 2023-11-01 PROCEDURE — 93971 EXTREMITY STUDY: CPT

## 2023-11-01 PROCEDURE — 93971 EXTREMITY STUDY: CPT | Mod: 26,RT

## 2023-11-03 ENCOUNTER — APPOINTMENT (OUTPATIENT)
Dept: INTERNAL MEDICINE | Facility: CLINIC | Age: 83
End: 2023-11-03

## 2023-11-24 ENCOUNTER — EMERGENCY (EMERGENCY)
Facility: HOSPITAL | Age: 83
LOS: 1 days | Discharge: HOME | End: 2023-11-24
Attending: EMERGENCY MEDICINE
Payer: MEDICARE

## 2023-11-24 VITALS
HEART RATE: 58 BPM | DIASTOLIC BLOOD PRESSURE: 58 MMHG | RESPIRATION RATE: 16 BRPM | TEMPERATURE: 99 F | OXYGEN SATURATION: 98 % | SYSTOLIC BLOOD PRESSURE: 139 MMHG | WEIGHT: 143.96 LBS

## 2023-11-24 DIAGNOSIS — L72.9 FOLLICULAR CYST OF THE SKIN AND SUBCUTANEOUS TISSUE, UNSPECIFIED: Chronic | ICD-10-CM

## 2023-11-24 DIAGNOSIS — Z98.89 OTHER SPECIFIED POSTPROCEDURAL STATES: Chronic | ICD-10-CM

## 2023-11-24 LAB
ALBUMIN SERPL ELPH-MCNC: 4.1 G/DL — SIGNIFICANT CHANGE UP (ref 3.3–5)
ALBUMIN SERPL ELPH-MCNC: 4.1 G/DL — SIGNIFICANT CHANGE UP (ref 3.3–5)
ALP SERPL-CCNC: 71 U/L — SIGNIFICANT CHANGE UP (ref 40–120)
ALP SERPL-CCNC: 71 U/L — SIGNIFICANT CHANGE UP (ref 40–120)
ALT FLD-CCNC: 16 U/L — SIGNIFICANT CHANGE UP (ref 10–45)
ALT FLD-CCNC: 16 U/L — SIGNIFICANT CHANGE UP (ref 10–45)
ANION GAP SERPL CALC-SCNC: 10 MMOL/L — SIGNIFICANT CHANGE UP (ref 5–17)
ANION GAP SERPL CALC-SCNC: 10 MMOL/L — SIGNIFICANT CHANGE UP (ref 5–17)
AST SERPL-CCNC: 15 U/L — SIGNIFICANT CHANGE UP (ref 10–40)
AST SERPL-CCNC: 15 U/L — SIGNIFICANT CHANGE UP (ref 10–40)
BASOPHILS # BLD AUTO: 0.03 K/UL — SIGNIFICANT CHANGE UP (ref 0–0.2)
BASOPHILS # BLD AUTO: 0.03 K/UL — SIGNIFICANT CHANGE UP (ref 0–0.2)
BASOPHILS NFR BLD AUTO: 0.4 % — SIGNIFICANT CHANGE UP (ref 0–2)
BASOPHILS NFR BLD AUTO: 0.4 % — SIGNIFICANT CHANGE UP (ref 0–2)
BILIRUB SERPL-MCNC: 0.4 MG/DL — SIGNIFICANT CHANGE UP (ref 0.2–1.2)
BILIRUB SERPL-MCNC: 0.4 MG/DL — SIGNIFICANT CHANGE UP (ref 0.2–1.2)
BUN SERPL-MCNC: 13 MG/DL — SIGNIFICANT CHANGE UP (ref 7–23)
BUN SERPL-MCNC: 13 MG/DL — SIGNIFICANT CHANGE UP (ref 7–23)
CALCIUM SERPL-MCNC: 9.6 MG/DL — SIGNIFICANT CHANGE UP (ref 8.4–10.5)
CALCIUM SERPL-MCNC: 9.6 MG/DL — SIGNIFICANT CHANGE UP (ref 8.4–10.5)
CHLORIDE SERPL-SCNC: 100 MMOL/L — SIGNIFICANT CHANGE UP (ref 96–108)
CHLORIDE SERPL-SCNC: 100 MMOL/L — SIGNIFICANT CHANGE UP (ref 96–108)
CO2 SERPL-SCNC: 31 MMOL/L — SIGNIFICANT CHANGE UP (ref 22–31)
CO2 SERPL-SCNC: 31 MMOL/L — SIGNIFICANT CHANGE UP (ref 22–31)
CREAT SERPL-MCNC: 0.59 MG/DL — SIGNIFICANT CHANGE UP (ref 0.5–1.3)
CREAT SERPL-MCNC: 0.59 MG/DL — SIGNIFICANT CHANGE UP (ref 0.5–1.3)
EGFR: 89 ML/MIN/1.73M2 — SIGNIFICANT CHANGE UP
EGFR: 89 ML/MIN/1.73M2 — SIGNIFICANT CHANGE UP
EOSINOPHIL # BLD AUTO: 0.37 K/UL — SIGNIFICANT CHANGE UP (ref 0–0.5)
EOSINOPHIL # BLD AUTO: 0.37 K/UL — SIGNIFICANT CHANGE UP (ref 0–0.5)
EOSINOPHIL NFR BLD AUTO: 5.4 % — SIGNIFICANT CHANGE UP (ref 0–6)
EOSINOPHIL NFR BLD AUTO: 5.4 % — SIGNIFICANT CHANGE UP (ref 0–6)
GLUCOSE SERPL-MCNC: 131 MG/DL — HIGH (ref 70–99)
GLUCOSE SERPL-MCNC: 131 MG/DL — HIGH (ref 70–99)
HCT VFR BLD CALC: 46.1 % — HIGH (ref 34.5–45)
HCT VFR BLD CALC: 46.1 % — HIGH (ref 34.5–45)
HGB BLD-MCNC: 14.7 G/DL — SIGNIFICANT CHANGE UP (ref 11.5–15.5)
HGB BLD-MCNC: 14.7 G/DL — SIGNIFICANT CHANGE UP (ref 11.5–15.5)
IMM GRANULOCYTES NFR BLD AUTO: 0.3 % — SIGNIFICANT CHANGE UP (ref 0–0.9)
IMM GRANULOCYTES NFR BLD AUTO: 0.3 % — SIGNIFICANT CHANGE UP (ref 0–0.9)
LYMPHOCYTES # BLD AUTO: 1.64 K/UL — SIGNIFICANT CHANGE UP (ref 1–3.3)
LYMPHOCYTES # BLD AUTO: 1.64 K/UL — SIGNIFICANT CHANGE UP (ref 1–3.3)
LYMPHOCYTES # BLD AUTO: 24.1 % — SIGNIFICANT CHANGE UP (ref 13–44)
LYMPHOCYTES # BLD AUTO: 24.1 % — SIGNIFICANT CHANGE UP (ref 13–44)
MCHC RBC-ENTMCNC: 28.1 PG — SIGNIFICANT CHANGE UP (ref 27–34)
MCHC RBC-ENTMCNC: 28.1 PG — SIGNIFICANT CHANGE UP (ref 27–34)
MCHC RBC-ENTMCNC: 31.9 GM/DL — LOW (ref 32–36)
MCHC RBC-ENTMCNC: 31.9 GM/DL — LOW (ref 32–36)
MCV RBC AUTO: 88.1 FL — SIGNIFICANT CHANGE UP (ref 80–100)
MCV RBC AUTO: 88.1 FL — SIGNIFICANT CHANGE UP (ref 80–100)
MONOCYTES # BLD AUTO: 0.9 K/UL — SIGNIFICANT CHANGE UP (ref 0–0.9)
MONOCYTES # BLD AUTO: 0.9 K/UL — SIGNIFICANT CHANGE UP (ref 0–0.9)
MONOCYTES NFR BLD AUTO: 13.2 % — SIGNIFICANT CHANGE UP (ref 2–14)
MONOCYTES NFR BLD AUTO: 13.2 % — SIGNIFICANT CHANGE UP (ref 2–14)
NEUTROPHILS # BLD AUTO: 3.84 K/UL — SIGNIFICANT CHANGE UP (ref 1.8–7.4)
NEUTROPHILS # BLD AUTO: 3.84 K/UL — SIGNIFICANT CHANGE UP (ref 1.8–7.4)
NEUTROPHILS NFR BLD AUTO: 56.6 % — SIGNIFICANT CHANGE UP (ref 43–77)
NEUTROPHILS NFR BLD AUTO: 56.6 % — SIGNIFICANT CHANGE UP (ref 43–77)
NRBC # BLD: 0 /100 WBCS — SIGNIFICANT CHANGE UP (ref 0–0)
NRBC # BLD: 0 /100 WBCS — SIGNIFICANT CHANGE UP (ref 0–0)
NT-PROBNP SERPL-SCNC: 456 PG/ML — HIGH (ref 0–300)
NT-PROBNP SERPL-SCNC: 456 PG/ML — HIGH (ref 0–300)
PLATELET # BLD AUTO: 246 K/UL — SIGNIFICANT CHANGE UP (ref 150–400)
PLATELET # BLD AUTO: 246 K/UL — SIGNIFICANT CHANGE UP (ref 150–400)
POTASSIUM SERPL-MCNC: 3.9 MMOL/L — SIGNIFICANT CHANGE UP (ref 3.5–5.3)
POTASSIUM SERPL-MCNC: 3.9 MMOL/L — SIGNIFICANT CHANGE UP (ref 3.5–5.3)
POTASSIUM SERPL-SCNC: 3.9 MMOL/L — SIGNIFICANT CHANGE UP (ref 3.5–5.3)
POTASSIUM SERPL-SCNC: 3.9 MMOL/L — SIGNIFICANT CHANGE UP (ref 3.5–5.3)
PROT SERPL-MCNC: 7.1 G/DL — SIGNIFICANT CHANGE UP (ref 6–8.3)
PROT SERPL-MCNC: 7.1 G/DL — SIGNIFICANT CHANGE UP (ref 6–8.3)
RBC # BLD: 5.23 M/UL — HIGH (ref 3.8–5.2)
RBC # BLD: 5.23 M/UL — HIGH (ref 3.8–5.2)
RBC # FLD: 13.3 % — SIGNIFICANT CHANGE UP (ref 10.3–14.5)
RBC # FLD: 13.3 % — SIGNIFICANT CHANGE UP (ref 10.3–14.5)
SODIUM SERPL-SCNC: 141 MMOL/L — SIGNIFICANT CHANGE UP (ref 135–145)
SODIUM SERPL-SCNC: 141 MMOL/L — SIGNIFICANT CHANGE UP (ref 135–145)
TROPONIN T, HIGH SENSITIVITY RESULT: 10 NG/L — SIGNIFICANT CHANGE UP (ref 0–51)
TROPONIN T, HIGH SENSITIVITY RESULT: 10 NG/L — SIGNIFICANT CHANGE UP (ref 0–51)
WBC # BLD: 6.8 K/UL — SIGNIFICANT CHANGE UP (ref 3.8–10.5)
WBC # BLD: 6.8 K/UL — SIGNIFICANT CHANGE UP (ref 3.8–10.5)
WBC # FLD AUTO: 6.8 K/UL — SIGNIFICANT CHANGE UP (ref 3.8–10.5)
WBC # FLD AUTO: 6.8 K/UL — SIGNIFICANT CHANGE UP (ref 3.8–10.5)

## 2023-11-24 PROCEDURE — 93971 EXTREMITY STUDY: CPT | Mod: 26,RT

## 2023-11-24 PROCEDURE — 99285 EMERGENCY DEPT VISIT HI MDM: CPT | Mod: GC

## 2023-11-24 NOTE — ED PROVIDER NOTE - OBJECTIVE STATEMENT
83-year-old female with a history of hypertension hyperlipidemia atrial fibrillation on Eliquis lymphoma presenting with 2 days of right ankle and lower shin swelling warmth and pain.  Patient denies any recent trauma.   Patient notes that about a month and a half ago she had a fall and at that time had a negative DVT study and negative x-rays.  Has not had any trauma since then but states that the swelling is in the same area as it was prior.  Denies fever chest pain shortness of breath urinary symptoms nausea vomiting diarrhea

## 2023-11-24 NOTE — ED PROVIDER NOTE - NSFOLLOWUPINSTRUCTIONS_ED_ALL_ED_FT
you were seen in the emergency department with pain and swelling to your right lower leg.  you had an ultrasound performed which showed no evidence of a blood clot.  The most likley cause of this pain and swelling is a soft tissue infection called cellulitis.  You were sent a prescription for antibiotics–please take as directed on the bottle return the emergency department for any new or worsening symptoms.        DISCHARGE INSTRUCTIONS:    Return to the emergency department if:        Your wound gets larger and more painful.        You feel a crackling under your skin when you touch it.        You have purple dots or bumps on your skin, or you see bleeding under your skin.        You see red streaks coming from the infected area.    Call your doctor if:        The red, warm, swollen area gets larger.        Your fever or pain does not go away or gets worse.        The area does not get smaller after 3 days of antibiotics.        You have questions or concerns about your condition or care.    Medicines: You should start to see improvement in 3 days. If cellulitis is not treated, the infection can spread through your body and become life-threatening. You may need any of the following medicines:        Antibiotics help treat a bacterial infection.        Acetaminophen decreases pain and fever. It is available without a doctor's order. Ask how much to take and how often to take it. Follow directions. Read the labels of all other medicines you are using to see if they also contain acetaminophen, or ask your doctor or pharmacist. Acetaminophen can cause liver damage if not taken correctly.

## 2023-11-24 NOTE — ED PROVIDER NOTE - PHYSICAL EXAMINATION
Attending note (Karan): On Physical Exam:  General: well appearing, in NAD, speaking clearly in full sentences and without difficulty; cooperative with exam  HEENT: anicteric sclera, airway patent  Neck: no neck tenderness, no nuchal rigidity  Cardiac: normal s1, s2; RRR; no MGR  Lungs: CTABL  Abdomen: soft nontender/nondistended  : no bladder tenderness or distension  Extremities:  RLE mild erythema / warmth to the right shin and dorsal foot.  No calf tenderness. 1+ pitting edema, but dp/pt pulse palpable, sensation intact, soft compartments no fluctuance /crepitance; cellulitis vs dvt;

## 2023-11-24 NOTE — ED ADULT TRIAGE NOTE - CHIEF COMPLAINT QUOTE
right leg swelling x2-3 days, patient had a fall 1 month ago with right leg injury negative xray and dvt study at that time  told to come in by MD Vasquez

## 2023-11-24 NOTE — ED PROVIDER NOTE - NS ED ROS FT
Attending note (Karan): Review of Systems:  -General: no fever   -Pulmonary: no cough, no shortness of breath  -Cardiac: no chest pain, no palpitations  -Gastrointestinal: no abdominal pain, no nausea, no vomiting  -Musculoskeletal: no back or neck pain; + right leg pain  -Skin: no rashes  -Endocrine: No h/o diabetes

## 2023-11-24 NOTE — ED PROVIDER NOTE - PATIENT PORTAL LINK FT
You can access the FollowMyHealth Patient Portal offered by [facility] by registering at the following website: [email or web address]. By joining [employer]’s FollowMyHealth portal, you will also be able to view your health information using other applications (apps) compatible with our system.

## 2023-11-24 NOTE — ED PROVIDER NOTE - ATTENDING CONTRIBUTION TO CARE
Attending note (Karan): 82y/o F h/ oHTN HLD afib (on eliquis), c/o pain/swelling/warmth of the rightfoot/leg.  No CP/SOB; sent in from her PCP (Dr Dozier) to evaluate for possible infection/cellulitis vs DVT.  No fever. On exam RLE mild eryethema/warmth to the right shin and dorsal foot.  No calf tednerenss. 1+ pitting edema, but dp/pt pulse palpabe, sensation intact, soft compartments no flutuance/crepitance; cellulitis vs dvt; obtain screening labs: cbc (to evaluate for leukocytosis or anemia), CMP (to evaluate for electrolyte abnormalities or renal/liver dysfunction) and obtain US of the RLE to eval for DVT. If no dvt and no signifciant luekocytosis, consider dc with oral antibiotics and close interval pcp f/u. Attending note (Karan): 84y/o F h/o HTN HLD afib (on eliquis), c/o pain/swelling/warmth of the right foot/leg.  No CP/SOB; sent in from her PCP (Dr Dozier) to evaluate for possible infection/cellulitis vs DVT.  No fever. On exam RLE mild erythema / warmth to the right shin and dorsal foot.  No calf tenderness. 1+ pitting edema, but dp/pt pulse palpable, sensation intact, soft compartments no fluctuance /crepitance; cellulitis vs dvt; obtain screening labs: cbc (to evaluate for leukocytosis or anemia), CMP (to evaluate for electrolyte abnormalities or renal/liver dysfunction) and obtain US of the RLE to eval for DVT. If no dvt and no significant leukocytosis, consider dc with oral antibiotics and close interval pcp f/u.

## 2023-11-25 VITALS
RESPIRATION RATE: 16 BRPM | HEART RATE: 55 BPM | TEMPERATURE: 98 F | DIASTOLIC BLOOD PRESSURE: 60 MMHG | SYSTOLIC BLOOD PRESSURE: 127 MMHG | OXYGEN SATURATION: 100 %

## 2023-11-25 PROCEDURE — 82803 BLOOD GASES ANY COMBINATION: CPT

## 2023-11-25 PROCEDURE — 93005 ELECTROCARDIOGRAM TRACING: CPT

## 2023-11-25 PROCEDURE — 83605 ASSAY OF LACTIC ACID: CPT

## 2023-11-25 PROCEDURE — 82947 ASSAY GLUCOSE BLOOD QUANT: CPT

## 2023-11-25 PROCEDURE — 82435 ASSAY OF BLOOD CHLORIDE: CPT

## 2023-11-25 PROCEDURE — 93971 EXTREMITY STUDY: CPT

## 2023-11-25 PROCEDURE — 82330 ASSAY OF CALCIUM: CPT

## 2023-11-25 PROCEDURE — 85018 HEMOGLOBIN: CPT

## 2023-11-25 PROCEDURE — 84484 ASSAY OF TROPONIN QUANT: CPT

## 2023-11-25 PROCEDURE — 84132 ASSAY OF SERUM POTASSIUM: CPT

## 2023-11-25 PROCEDURE — 85014 HEMATOCRIT: CPT

## 2023-11-25 PROCEDURE — 99285 EMERGENCY DEPT VISIT HI MDM: CPT | Mod: 25

## 2023-11-25 PROCEDURE — 36415 COLL VENOUS BLD VENIPUNCTURE: CPT

## 2023-11-25 PROCEDURE — 85025 COMPLETE CBC W/AUTO DIFF WBC: CPT

## 2023-11-25 PROCEDURE — 83880 ASSAY OF NATRIURETIC PEPTIDE: CPT

## 2023-11-25 PROCEDURE — 80053 COMPREHEN METABOLIC PANEL: CPT

## 2023-11-25 PROCEDURE — 84295 ASSAY OF SERUM SODIUM: CPT

## 2023-11-25 RX ORDER — CEPHALEXIN 500 MG
500 CAPSULE ORAL DAILY
Refills: 0 | Status: DISCONTINUED | OUTPATIENT
Start: 2023-11-25 | End: 2023-11-25

## 2023-11-25 RX ORDER — CEPHALEXIN 500 MG
500 CAPSULE ORAL ONCE
Refills: 0 | Status: COMPLETED | OUTPATIENT
Start: 2023-11-25 | End: 2023-11-25

## 2023-11-25 RX ORDER — CEPHALEXIN 500 MG
1 CAPSULE ORAL
Qty: 28 | Refills: 0
Start: 2023-11-25 | End: 2023-12-01

## 2023-11-25 RX ADMIN — Medication 500 MILLIGRAM(S): at 00:38

## 2023-11-25 RX ADMIN — Medication 100 MILLIGRAM(S): at 00:39

## 2023-11-25 NOTE — ED ADULT NURSE NOTE - SUICIDE SCREENING QUESTION 3
For medication: Bumetanide, Levoxyl, Venlafaxine XR    90 Day supply requested? No    Comments:     Pharmacy loaded below: Yes   No

## 2023-11-28 ENCOUNTER — APPOINTMENT (OUTPATIENT)
Dept: INTERNAL MEDICINE | Facility: CLINIC | Age: 83
End: 2023-11-28
Payer: MEDICARE

## 2023-11-28 VITALS
DIASTOLIC BLOOD PRESSURE: 60 MMHG | WEIGHT: 138 LBS | HEART RATE: 61 BPM | HEIGHT: 58 IN | BODY MASS INDEX: 28.97 KG/M2 | OXYGEN SATURATION: 99 % | SYSTOLIC BLOOD PRESSURE: 120 MMHG

## 2023-11-28 LAB
ALBUMIN SERPL ELPH-MCNC: 4.2 G/DL
ALP BLD-CCNC: 69 U/L
ALT SERPL-CCNC: 16 U/L
ANION GAP SERPL CALC-SCNC: 10 MMOL/L
AST SERPL-CCNC: 21 U/L
BASOPHILS # BLD AUTO: 0.06 K/UL
BASOPHILS NFR BLD AUTO: 0.9 %
BILIRUB SERPL-MCNC: 0.4 MG/DL
BUN SERPL-MCNC: 17 MG/DL
CALCIUM SERPL-MCNC: 9.9 MG/DL
CHLORIDE SERPL-SCNC: 99 MMOL/L
CHOLEST SERPL-MCNC: 154 MG/DL
CK SERPL-CCNC: 67 U/L
CO2 SERPL-SCNC: 29 MMOL/L
CREAT SERPL-MCNC: 0.57 MG/DL
EGFR: 90 ML/MIN/1.73M2
EOSINOPHIL # BLD AUTO: 0.26 K/UL
EOSINOPHIL NFR BLD AUTO: 3.8 %
ESTIMATED AVERAGE GLUCOSE: 131 MG/DL
GLUCOSE SERPL-MCNC: 111 MG/DL
HBA1C MFR BLD HPLC: 6.2 %
HCT VFR BLD CALC: 45.7 %
HDLC SERPL-MCNC: 52 MG/DL
HGB BLD-MCNC: 14.6 G/DL
IMM GRANULOCYTES NFR BLD AUTO: 0.3 %
LDLC SERPL CALC-MCNC: 85 MG/DL
LYMPHOCYTES # BLD AUTO: 1.38 K/UL
LYMPHOCYTES NFR BLD AUTO: 20.1 %
MAN DIFF?: NORMAL
MCHC RBC-ENTMCNC: 28.2 PG
MCHC RBC-ENTMCNC: 31.9 GM/DL
MCV RBC AUTO: 88.4 FL
MONOCYTES # BLD AUTO: 0.83 K/UL
MONOCYTES NFR BLD AUTO: 12.1 %
NEUTROPHILS # BLD AUTO: 4.33 K/UL
NEUTROPHILS NFR BLD AUTO: 62.8 %
NONHDLC SERPL-MCNC: 102 MG/DL
NT-PROBNP SERPL-MCNC: 814 PG/ML
PLATELET # BLD AUTO: 266 K/UL
POTASSIUM SERPL-SCNC: 4 MMOL/L
PROT SERPL-MCNC: 7.1 G/DL
RBC # BLD: 5.17 M/UL
RBC # FLD: 13.4 %
SODIUM SERPL-SCNC: 138 MMOL/L
T4 FREE SERPL-MCNC: 1.2 NG/DL
TRIGL SERPL-MCNC: 93 MG/DL
TSH SERPL-ACNC: 1.79 UIU/ML
WBC # FLD AUTO: 6.88 K/UL

## 2023-11-28 PROCEDURE — 99214 OFFICE O/P EST MOD 30 MIN: CPT

## 2023-12-05 ENCOUNTER — APPOINTMENT (OUTPATIENT)
Dept: INTERNAL MEDICINE | Facility: CLINIC | Age: 83
End: 2023-12-05
Payer: MEDICARE

## 2023-12-05 ENCOUNTER — APPOINTMENT (OUTPATIENT)
Dept: CARDIOLOGY | Facility: CLINIC | Age: 83
End: 2023-12-05
Payer: MEDICARE

## 2023-12-05 VITALS
WEIGHT: 140 LBS | BODY MASS INDEX: 29.39 KG/M2 | TEMPERATURE: 97.5 F | OXYGEN SATURATION: 99 % | DIASTOLIC BLOOD PRESSURE: 60 MMHG | HEART RATE: 56 BPM | HEIGHT: 58 IN | SYSTOLIC BLOOD PRESSURE: 140 MMHG

## 2023-12-05 DIAGNOSIS — I83.90 ASYMPTOMATIC VARICOSE VEINS OF UNSPECIFIED LOWER EXTREMITY: ICD-10-CM

## 2023-12-05 LAB
ANION GAP SERPL CALC-SCNC: 10 MMOL/L
BASOPHILS # BLD AUTO: 0.05 K/UL
BASOPHILS NFR BLD AUTO: 0.7 %
BUN SERPL-MCNC: 17 MG/DL
CALCIUM SERPL-MCNC: 9.8 MG/DL
CHLORIDE SERPL-SCNC: 100 MMOL/L
CO2 SERPL-SCNC: 30 MMOL/L
CREAT SERPL-MCNC: 0.57 MG/DL
EGFR: 90 ML/MIN/1.73M2
EOSINOPHIL # BLD AUTO: 0.28 K/UL
EOSINOPHIL NFR BLD AUTO: 3.9 %
GLUCOSE SERPL-MCNC: 106 MG/DL
HCT VFR BLD CALC: 45.1 %
HGB BLD-MCNC: 14.8 G/DL
IMM GRANULOCYTES NFR BLD AUTO: 0.4 %
LYMPHOCYTES # BLD AUTO: 1.6 K/UL
LYMPHOCYTES NFR BLD AUTO: 22.1 %
MAN DIFF?: NORMAL
MCHC RBC-ENTMCNC: 29 PG
MCHC RBC-ENTMCNC: 32.8 GM/DL
MCV RBC AUTO: 88.3 FL
MONOCYTES # BLD AUTO: 0.92 K/UL
MONOCYTES NFR BLD AUTO: 12.7 %
NEUTROPHILS # BLD AUTO: 4.35 K/UL
NEUTROPHILS NFR BLD AUTO: 60.2 %
PLATELET # BLD AUTO: 244 K/UL
POTASSIUM SERPL-SCNC: 4.5 MMOL/L
RBC # BLD: 5.11 M/UL
RBC # FLD: 13.5 %
SODIUM SERPL-SCNC: 140 MMOL/L
WBC # FLD AUTO: 7.23 K/UL

## 2023-12-05 PROCEDURE — 93971 EXTREMITY STUDY: CPT

## 2023-12-05 PROCEDURE — 99214 OFFICE O/P EST MOD 30 MIN: CPT

## 2023-12-06 ENCOUNTER — APPOINTMENT (OUTPATIENT)
Dept: CARDIOLOGY | Facility: CLINIC | Age: 83
End: 2023-12-06
Payer: MEDICARE

## 2023-12-06 VITALS
HEART RATE: 57 BPM | TEMPERATURE: 97.3 F | RESPIRATION RATE: 18 BRPM | SYSTOLIC BLOOD PRESSURE: 140 MMHG | BODY MASS INDEX: 28.92 KG/M2 | DIASTOLIC BLOOD PRESSURE: 70 MMHG | WEIGHT: 137.8 LBS | HEIGHT: 58 IN | OXYGEN SATURATION: 97 %

## 2023-12-06 DIAGNOSIS — K21.9 GASTRO-ESOPHAGEAL REFLUX DISEASE W/OUT ESOPHAGITIS: ICD-10-CM

## 2023-12-06 PROCEDURE — 99214 OFFICE O/P EST MOD 30 MIN: CPT

## 2023-12-06 PROCEDURE — 93000 ELECTROCARDIOGRAM COMPLETE: CPT

## 2023-12-06 RX ORDER — HYDROCHLOROTHIAZIDE 25 MG/1
25 TABLET ORAL
Qty: 180 | Refills: 3 | Status: ACTIVE | COMMUNITY
Start: 2023-03-20 | End: 1900-01-01

## 2023-12-07 ENCOUNTER — APPOINTMENT (OUTPATIENT)
Dept: INTERNAL MEDICINE | Facility: CLINIC | Age: 83
End: 2023-12-07
Payer: MEDICARE

## 2023-12-07 VITALS
HEIGHT: 58 IN | DIASTOLIC BLOOD PRESSURE: 80 MMHG | TEMPERATURE: 98 F | BODY MASS INDEX: 28.76 KG/M2 | WEIGHT: 137 LBS | HEART RATE: 70 BPM | OXYGEN SATURATION: 98 % | SYSTOLIC BLOOD PRESSURE: 140 MMHG

## 2023-12-07 DIAGNOSIS — K59.00 CONSTIPATION, UNSPECIFIED: ICD-10-CM

## 2023-12-07 LAB
APPEARANCE: CLEAR
BACTERIA: NEGATIVE /HPF
BILIRUBIN URINE: NEGATIVE
BLOOD URINE: NEGATIVE
CAST: 0 /LPF
COLOR: YELLOW
EPITHELIAL CELLS: 2 /HPF
GLUCOSE QUALITATIVE U: NEGATIVE MG/DL
KETONES URINE: NEGATIVE MG/DL
LEUKOCYTE ESTERASE URINE: ABNORMAL
MICROSCOPIC-UA: NORMAL
NITRITE URINE: NEGATIVE
PH URINE: 6.5
PROTEIN URINE: NEGATIVE MG/DL
RED BLOOD CELLS URINE: NORMAL /HPF
REVIEW: NORMAL
SPECIFIC GRAVITY URINE: 1.01
UROBILINOGEN URINE: 0.2 MG/DL
WHITE BLOOD CELLS URINE: 1 /HPF

## 2023-12-07 PROCEDURE — 99214 OFFICE O/P EST MOD 30 MIN: CPT

## 2023-12-08 DIAGNOSIS — D72.829 ELEVATED WHITE BLOOD CELL COUNT, UNSPECIFIED: ICD-10-CM

## 2023-12-08 LAB
ALBUMIN SERPL ELPH-MCNC: 4.2 G/DL
ALP BLD-CCNC: 61 U/L
ALT SERPL-CCNC: 19 U/L
ANION GAP SERPL CALC-SCNC: 12 MMOL/L
AST SERPL-CCNC: 28 U/L
BASOPHILS # BLD AUTO: 0.03 K/UL
BASOPHILS NFR BLD AUTO: 0.2 %
BILIRUB SERPL-MCNC: 0.7 MG/DL
BUN SERPL-MCNC: 19 MG/DL
CALCIUM SERPL-MCNC: 9.6 MG/DL
CHLORIDE SERPL-SCNC: 98 MMOL/L
CO2 SERPL-SCNC: 29 MMOL/L
CREAT SERPL-MCNC: 0.7 MG/DL
EGFR: 86 ML/MIN/1.73M2
EOSINOPHIL # BLD AUTO: 0.01 K/UL
EOSINOPHIL NFR BLD AUTO: 0.1 %
GLUCOSE SERPL-MCNC: 116 MG/DL
HCT VFR BLD CALC: 46.6 %
HGB BLD-MCNC: 14.8 G/DL
IMM GRANULOCYTES NFR BLD AUTO: 0.4 %
LYMPHOCYTES # BLD AUTO: 0.99 K/UL
LYMPHOCYTES NFR BLD AUTO: 5.8 %
MAN DIFF?: NORMAL
MCHC RBC-ENTMCNC: 28.6 PG
MCHC RBC-ENTMCNC: 31.8 GM/DL
MCV RBC AUTO: 90 FL
MONOCYTES # BLD AUTO: 1.02 K/UL
MONOCYTES NFR BLD AUTO: 6 %
NEUTROPHILS # BLD AUTO: 14.97 K/UL
NEUTROPHILS NFR BLD AUTO: 87.5 %
PLATELET # BLD AUTO: 268 K/UL
POTASSIUM SERPL-SCNC: 4 MMOL/L
PROCALCITONIN SERPL-MCNC: 0.11 NG/ML
PROT SERPL-MCNC: 7.4 G/DL
RAPID RVP RESULT: NOT DETECTED
RBC # BLD: 5.18 M/UL
RBC # FLD: 13.7 %
SARS-COV-2 RNA PNL RESP NAA+PROBE: NOT DETECTED
SODIUM SERPL-SCNC: 138 MMOL/L
WBC # FLD AUTO: 17.08 K/UL

## 2023-12-09 LAB
BASOPHILS # BLD AUTO: 0.04 K/UL
BASOPHILS NFR BLD AUTO: 0.6 %
EOSINOPHIL # BLD AUTO: 0.22 K/UL
EOSINOPHIL NFR BLD AUTO: 3.3 %
HCT VFR BLD CALC: 44 %
HGB BLD-MCNC: 14.1 G/DL
IMM GRANULOCYTES NFR BLD AUTO: 0.3 %
LYMPHOCYTES # BLD AUTO: 1.06 K/UL
LYMPHOCYTES NFR BLD AUTO: 16.1 %
MAN DIFF?: NORMAL
MCHC RBC-ENTMCNC: 28.2 PG
MCHC RBC-ENTMCNC: 32 GM/DL
MCV RBC AUTO: 88 FL
MONOCYTES # BLD AUTO: 0.91 K/UL
MONOCYTES NFR BLD AUTO: 13.8 %
NEUTROPHILS # BLD AUTO: 4.35 K/UL
NEUTROPHILS NFR BLD AUTO: 65.9 %
PLATELET # BLD AUTO: 252 K/UL
RBC # BLD: 5 M/UL
RBC # FLD: 13.6 %
WBC # FLD AUTO: 6.6 K/UL

## 2023-12-11 PROBLEM — K59.00 CONSTIPATION: Status: ACTIVE | Noted: 2023-12-07

## 2023-12-13 ENCOUNTER — APPOINTMENT (OUTPATIENT)
Dept: CARDIOLOGY | Facility: CLINIC | Age: 83
End: 2023-12-13
Payer: MEDICARE

## 2023-12-13 ENCOUNTER — APPOINTMENT (OUTPATIENT)
Dept: CT IMAGING | Facility: IMAGING CENTER | Age: 83
End: 2023-12-13
Payer: MEDICARE

## 2023-12-13 ENCOUNTER — OUTPATIENT (OUTPATIENT)
Dept: OUTPATIENT SERVICES | Facility: HOSPITAL | Age: 83
LOS: 1 days | End: 2023-12-13
Payer: MEDICARE

## 2023-12-13 ENCOUNTER — APPOINTMENT (OUTPATIENT)
Dept: VASCULAR SURGERY | Facility: CLINIC | Age: 83
End: 2023-12-13

## 2023-12-13 VITALS
DIASTOLIC BLOOD PRESSURE: 60 MMHG | SYSTOLIC BLOOD PRESSURE: 150 MMHG | OXYGEN SATURATION: 98 % | TEMPERATURE: 98.4 F | BODY MASS INDEX: 28.97 KG/M2 | WEIGHT: 138 LBS | HEART RATE: 64 BPM | HEIGHT: 58 IN

## 2023-12-13 VITALS — DIASTOLIC BLOOD PRESSURE: 60 MMHG | SYSTOLIC BLOOD PRESSURE: 142 MMHG

## 2023-12-13 DIAGNOSIS — L72.9 FOLLICULAR CYST OF THE SKIN AND SUBCUTANEOUS TISSUE, UNSPECIFIED: Chronic | ICD-10-CM

## 2023-12-13 DIAGNOSIS — R60.0 LOCALIZED EDEMA: ICD-10-CM

## 2023-12-13 DIAGNOSIS — Z98.89 OTHER SPECIFIED POSTPROCEDURAL STATES: Chronic | ICD-10-CM

## 2023-12-13 LAB — BACTERIA BLD CULT: NORMAL

## 2023-12-13 PROCEDURE — 74177 CT ABD & PELVIS W/CONTRAST: CPT | Mod: 26,MH

## 2023-12-13 PROCEDURE — 74177 CT ABD & PELVIS W/CONTRAST: CPT

## 2023-12-13 PROCEDURE — 99214 OFFICE O/P EST MOD 30 MIN: CPT

## 2023-12-13 NOTE — HISTORY OF PRESENT ILLNESS
[FreeTextEntry1] : This is an 84 y/o female with a pmhx of pAF, pre-DM, HLD, HTN here today for a follow up. Patient was seen by Dr. Esparza 12/9/23 for chills and constipation, completed course of doxycycline and keflex given by ER for  cellulits of right anterior lower leg, now also completed course of Augmentin and referred to vascular. She reports the RLE redness and edema have improved. She denies fever, chills or constipation currently.   Patient denies chest pain, dyspnea, palpitations, dizziness, syncope, changes in bowel/bladder habits or appetite.

## 2023-12-13 NOTE — REVIEW OF SYSTEMS
[Negative] : Heme/Lymph [Joint Swelling] : joint swelling [de-identified] : mild redness in right LE

## 2023-12-13 NOTE — PHYSICAL EXAM

## 2023-12-14 ENCOUNTER — APPOINTMENT (OUTPATIENT)
Dept: VASCULAR SURGERY | Facility: CLINIC | Age: 83
End: 2023-12-14
Payer: MEDICARE

## 2023-12-14 VITALS
HEIGHT: 58 IN | DIASTOLIC BLOOD PRESSURE: 69 MMHG | HEART RATE: 58 BPM | TEMPERATURE: 98.6 F | WEIGHT: 138 LBS | BODY MASS INDEX: 28.97 KG/M2 | SYSTOLIC BLOOD PRESSURE: 148 MMHG

## 2023-12-14 DIAGNOSIS — L03.115 CELLULITIS OF RIGHT LOWER LIMB: ICD-10-CM

## 2023-12-14 PROCEDURE — 93971 EXTREMITY STUDY: CPT | Mod: RT

## 2023-12-14 PROCEDURE — 99203 OFFICE O/P NEW LOW 30 MIN: CPT

## 2023-12-15 PROBLEM — L03.115 CELLULITIS OF RIGHT LEG WITHOUT FOOT: Status: ACTIVE | Noted: 2023-11-28

## 2023-12-22 ENCOUNTER — APPOINTMENT (OUTPATIENT)
Dept: SURGICAL ONCOLOGY | Facility: CLINIC | Age: 83
End: 2023-12-22
Payer: MEDICARE

## 2023-12-22 ENCOUNTER — NON-APPOINTMENT (OUTPATIENT)
Age: 83
End: 2023-12-22

## 2023-12-22 VITALS
WEIGHT: 140 LBS | HEIGHT: 58 IN | OXYGEN SATURATION: 97 % | SYSTOLIC BLOOD PRESSURE: 122 MMHG | HEART RATE: 49 BPM | DIASTOLIC BLOOD PRESSURE: 70 MMHG | BODY MASS INDEX: 29.39 KG/M2

## 2023-12-22 PROCEDURE — 99204 OFFICE O/P NEW MOD 45 MIN: CPT

## 2023-12-27 NOTE — ADDENDUM
[FreeTextEntry1] : I, Supriya Benz, acted solely as a scribe for Dr. Michelet Broussard on this date 12/22/2023.

## 2023-12-27 NOTE — PHYSICAL EXAM
[Normal] : supple, no neck mass and thyroid not enlarged [Normal Neck Lymph Nodes] : normal neck lymph nodes  [Normal Supraclavicular Lymph Nodes] : normal supraclavicular lymph nodes [Normal Groin Lymph Nodes] : normal groin lymph nodes [Normal Axillary Lymph Nodes] : normal axillary lymph nodes [Normal] : oriented to person, place and time, with appropriate affect [de-identified] : us shows normal appearing right inguinal lymph nodes

## 2023-12-27 NOTE — CONSULT LETTER
[Dear  ___] : Dear  [unfilled], [Consult Letter:] : I had the pleasure of evaluating your patient, [unfilled]. [Please see my note below.] : Please see my note below. [Sincerely,] : Sincerely, [FreeTextEntry3] : Michelet Broussard MD FACS

## 2023-12-27 NOTE — ASSESSMENT
[FreeTextEntry1] : Hx of lymphoma Enlarged right groin lymph nodes possibly reactive Case discussed with Dr. Camacho  My impression is tht the right inguinal nodes are likely reactive given recent hx of cellulitis of the RLE and we have agreed to hold off needle bx at this time RTO 3 mos for exam repeat CT 6 mos

## 2023-12-27 NOTE — HISTORY OF PRESENT ILLNESS
[de-identified] : Patient is a 82 y/o female who presents an initial consultation for an enlarged right groin lymph node. Pt has hx of lymphoma in the left 2014, tx as per Dr. Camacho.  Pt was in the ER a few weeks ago for cellulitis and tx with abx. She was referred by Dr. Dozier. She presented with swelling in the RT extremity now which prompted her to get imaging.   CT A/P (12/13/23): Mildly enlarged 1.8 x 1.4 cm right groin lymph node with hypervascular cortex   US duplex venous LE (12/5/23): Right: no evidence of right lower extremity deep venous thrombosis. Enlarged lymph nodes noted in the right groin.

## 2023-12-28 PROBLEM — I83.90 VARICOSE VEINS: Status: ACTIVE | Noted: 2023-12-05

## 2023-12-28 NOTE — PHYSICAL EXAM
[de-identified] : erythema of the anterior right shin, no tenderness or warmth and unchanged from last week

## 2023-12-28 NOTE — HISTORY OF PRESENT ILLNESS
[FreeTextEntry1] : 1 week f/u [de-identified] : This is an 82 y/o female with a pmhx of pAF, pre-DM, HLD, HTN here today for 1 week f/u.  Saw Pt last week and cellulitis was improving but still on doxy and keflex at the time with residual redness. Pt states right LE redness has been improved after completing ABX but not fully resolved. Denies f/c, LE pain, tenderness, or warmth.  Denies chest pain, SOB, MARTIN, dizziness, diaphoresis, palpitations, LE swelling, orthopnea, syncope, n/v, headache.

## 2023-12-29 ENCOUNTER — APPOINTMENT (OUTPATIENT)
Dept: INTERNAL MEDICINE | Facility: CLINIC | Age: 83
End: 2023-12-29
Payer: MEDICARE

## 2023-12-29 ENCOUNTER — EMERGENCY (EMERGENCY)
Facility: HOSPITAL | Age: 83
LOS: 1 days | Discharge: HOME | End: 2023-12-29
Attending: EMERGENCY MEDICINE
Payer: MEDICARE

## 2023-12-29 ENCOUNTER — APPOINTMENT (OUTPATIENT)
Dept: CARDIOLOGY | Facility: CLINIC | Age: 83
End: 2023-12-29

## 2023-12-29 VITALS
SYSTOLIC BLOOD PRESSURE: 120 MMHG | BODY MASS INDEX: 29.06 KG/M2 | DIASTOLIC BLOOD PRESSURE: 60 MMHG | OXYGEN SATURATION: 96 % | HEIGHT: 58 IN | TEMPERATURE: 97.7 F | RESPIRATION RATE: 17 BRPM | HEART RATE: 50 BPM | WEIGHT: 138.44 LBS

## 2023-12-29 VITALS
RESPIRATION RATE: 18 BRPM | HEART RATE: 72 BPM | TEMPERATURE: 98 F | HEIGHT: 64 IN | DIASTOLIC BLOOD PRESSURE: 69 MMHG | SYSTOLIC BLOOD PRESSURE: 135 MMHG | WEIGHT: 139.99 LBS | OXYGEN SATURATION: 97 %

## 2023-12-29 DIAGNOSIS — Z98.89 OTHER SPECIFIED POSTPROCEDURAL STATES: Chronic | ICD-10-CM

## 2023-12-29 DIAGNOSIS — L72.9 FOLLICULAR CYST OF THE SKIN AND SUBCUTANEOUS TISSUE, UNSPECIFIED: Chronic | ICD-10-CM

## 2023-12-29 DIAGNOSIS — R59.0 LOCALIZED ENLARGED LYMPH NODES: ICD-10-CM

## 2023-12-29 DIAGNOSIS — R73.03 PREDIABETES.: ICD-10-CM

## 2023-12-29 DIAGNOSIS — E03.9 HYPOTHYROIDISM, UNSPECIFIED: ICD-10-CM

## 2023-12-29 DIAGNOSIS — L03.115 CELLULITIS OF RIGHT LOWER LIMB: ICD-10-CM

## 2023-12-29 DIAGNOSIS — R82.90 UNSPECIFIED ABNORMAL FINDINGS IN URINE: ICD-10-CM

## 2023-12-29 LAB
ALBUMIN SERPL ELPH-MCNC: 4 G/DL — SIGNIFICANT CHANGE UP (ref 3.3–5)
ALBUMIN SERPL ELPH-MCNC: 4 G/DL — SIGNIFICANT CHANGE UP (ref 3.3–5)
ALP SERPL-CCNC: 62 U/L — SIGNIFICANT CHANGE UP (ref 40–120)
ALP SERPL-CCNC: 62 U/L — SIGNIFICANT CHANGE UP (ref 40–120)
ALT FLD-CCNC: 15 U/L — SIGNIFICANT CHANGE UP (ref 10–45)
ALT FLD-CCNC: 15 U/L — SIGNIFICANT CHANGE UP (ref 10–45)
ANION GAP SERPL CALC-SCNC: 14 MMOL/L — SIGNIFICANT CHANGE UP (ref 5–17)
ANION GAP SERPL CALC-SCNC: 14 MMOL/L — SIGNIFICANT CHANGE UP (ref 5–17)
APTT BLD: 33.8 SEC — SIGNIFICANT CHANGE UP (ref 24.5–35.6)
APTT BLD: 33.8 SEC — SIGNIFICANT CHANGE UP (ref 24.5–35.6)
AST SERPL-CCNC: 22 U/L — SIGNIFICANT CHANGE UP (ref 10–40)
AST SERPL-CCNC: 22 U/L — SIGNIFICANT CHANGE UP (ref 10–40)
BASOPHILS # BLD AUTO: 0.02 K/UL — SIGNIFICANT CHANGE UP (ref 0–0.2)
BASOPHILS # BLD AUTO: 0.02 K/UL — SIGNIFICANT CHANGE UP (ref 0–0.2)
BASOPHILS NFR BLD AUTO: 0.3 % — SIGNIFICANT CHANGE UP (ref 0–2)
BASOPHILS NFR BLD AUTO: 0.3 % — SIGNIFICANT CHANGE UP (ref 0–2)
BILIRUB SERPL-MCNC: 0.5 MG/DL — SIGNIFICANT CHANGE UP (ref 0.2–1.2)
BILIRUB SERPL-MCNC: 0.5 MG/DL — SIGNIFICANT CHANGE UP (ref 0.2–1.2)
BUN SERPL-MCNC: 16 MG/DL — SIGNIFICANT CHANGE UP (ref 7–23)
BUN SERPL-MCNC: 16 MG/DL — SIGNIFICANT CHANGE UP (ref 7–23)
CALCIUM SERPL-MCNC: 9.5 MG/DL — SIGNIFICANT CHANGE UP (ref 8.4–10.5)
CALCIUM SERPL-MCNC: 9.5 MG/DL — SIGNIFICANT CHANGE UP (ref 8.4–10.5)
CHLORIDE SERPL-SCNC: 100 MMOL/L — SIGNIFICANT CHANGE UP (ref 96–108)
CHLORIDE SERPL-SCNC: 100 MMOL/L — SIGNIFICANT CHANGE UP (ref 96–108)
CO2 SERPL-SCNC: 25 MMOL/L — SIGNIFICANT CHANGE UP (ref 22–31)
CO2 SERPL-SCNC: 25 MMOL/L — SIGNIFICANT CHANGE UP (ref 22–31)
CREAT SERPL-MCNC: 0.55 MG/DL — SIGNIFICANT CHANGE UP (ref 0.5–1.3)
CREAT SERPL-MCNC: 0.55 MG/DL — SIGNIFICANT CHANGE UP (ref 0.5–1.3)
EGFR: 91 ML/MIN/1.73M2 — SIGNIFICANT CHANGE UP
EGFR: 91 ML/MIN/1.73M2 — SIGNIFICANT CHANGE UP
EOSINOPHIL # BLD AUTO: 0.02 K/UL — SIGNIFICANT CHANGE UP (ref 0–0.5)
EOSINOPHIL # BLD AUTO: 0.02 K/UL — SIGNIFICANT CHANGE UP (ref 0–0.5)
EOSINOPHIL NFR BLD AUTO: 0.3 % — SIGNIFICANT CHANGE UP (ref 0–6)
EOSINOPHIL NFR BLD AUTO: 0.3 % — SIGNIFICANT CHANGE UP (ref 0–6)
FLUAV AG NPH QL: SIGNIFICANT CHANGE UP
FLUAV AG NPH QL: SIGNIFICANT CHANGE UP
FLUBV AG NPH QL: SIGNIFICANT CHANGE UP
FLUBV AG NPH QL: SIGNIFICANT CHANGE UP
GLUCOSE SERPL-MCNC: 110 MG/DL — HIGH (ref 70–99)
GLUCOSE SERPL-MCNC: 110 MG/DL — HIGH (ref 70–99)
HCT VFR BLD CALC: 45.1 % — HIGH (ref 34.5–45)
HCT VFR BLD CALC: 45.1 % — HIGH (ref 34.5–45)
HGB BLD-MCNC: 14.9 G/DL — SIGNIFICANT CHANGE UP (ref 11.5–15.5)
HGB BLD-MCNC: 14.9 G/DL — SIGNIFICANT CHANGE UP (ref 11.5–15.5)
IMM GRANULOCYTES NFR BLD AUTO: 0.4 % — SIGNIFICANT CHANGE UP (ref 0–0.9)
IMM GRANULOCYTES NFR BLD AUTO: 0.4 % — SIGNIFICANT CHANGE UP (ref 0–0.9)
INR BLD: 1.28 RATIO — HIGH (ref 0.85–1.18)
INR BLD: 1.28 RATIO — HIGH (ref 0.85–1.18)
LYMPHOCYTES # BLD AUTO: 0.8 K/UL — LOW (ref 1–3.3)
LYMPHOCYTES # BLD AUTO: 0.8 K/UL — LOW (ref 1–3.3)
LYMPHOCYTES # BLD AUTO: 10.5 % — LOW (ref 13–44)
LYMPHOCYTES # BLD AUTO: 10.5 % — LOW (ref 13–44)
MCHC RBC-ENTMCNC: 29 PG — SIGNIFICANT CHANGE UP (ref 27–34)
MCHC RBC-ENTMCNC: 29 PG — SIGNIFICANT CHANGE UP (ref 27–34)
MCHC RBC-ENTMCNC: 33 GM/DL — SIGNIFICANT CHANGE UP (ref 32–36)
MCHC RBC-ENTMCNC: 33 GM/DL — SIGNIFICANT CHANGE UP (ref 32–36)
MCV RBC AUTO: 87.9 FL — SIGNIFICANT CHANGE UP (ref 80–100)
MCV RBC AUTO: 87.9 FL — SIGNIFICANT CHANGE UP (ref 80–100)
MONOCYTES # BLD AUTO: 0.52 K/UL — SIGNIFICANT CHANGE UP (ref 0–0.9)
MONOCYTES # BLD AUTO: 0.52 K/UL — SIGNIFICANT CHANGE UP (ref 0–0.9)
MONOCYTES NFR BLD AUTO: 6.8 % — SIGNIFICANT CHANGE UP (ref 2–14)
MONOCYTES NFR BLD AUTO: 6.8 % — SIGNIFICANT CHANGE UP (ref 2–14)
NEUTROPHILS # BLD AUTO: 6.26 K/UL — SIGNIFICANT CHANGE UP (ref 1.8–7.4)
NEUTROPHILS # BLD AUTO: 6.26 K/UL — SIGNIFICANT CHANGE UP (ref 1.8–7.4)
NEUTROPHILS NFR BLD AUTO: 81.7 % — HIGH (ref 43–77)
NEUTROPHILS NFR BLD AUTO: 81.7 % — HIGH (ref 43–77)
NRBC # BLD: 0 /100 WBCS — SIGNIFICANT CHANGE UP (ref 0–0)
NRBC # BLD: 0 /100 WBCS — SIGNIFICANT CHANGE UP (ref 0–0)
PLATELET # BLD AUTO: 255 K/UL — SIGNIFICANT CHANGE UP (ref 150–400)
PLATELET # BLD AUTO: 255 K/UL — SIGNIFICANT CHANGE UP (ref 150–400)
POTASSIUM SERPL-MCNC: 3.4 MMOL/L — LOW (ref 3.5–5.3)
POTASSIUM SERPL-MCNC: 3.4 MMOL/L — LOW (ref 3.5–5.3)
POTASSIUM SERPL-SCNC: 3.4 MMOL/L — LOW (ref 3.5–5.3)
POTASSIUM SERPL-SCNC: 3.4 MMOL/L — LOW (ref 3.5–5.3)
PROT SERPL-MCNC: 7 G/DL — SIGNIFICANT CHANGE UP (ref 6–8.3)
PROT SERPL-MCNC: 7 G/DL — SIGNIFICANT CHANGE UP (ref 6–8.3)
PROTHROM AB SERPL-ACNC: 14 SEC — HIGH (ref 9.5–13)
PROTHROM AB SERPL-ACNC: 14 SEC — HIGH (ref 9.5–13)
RBC # BLD: 5.13 M/UL — SIGNIFICANT CHANGE UP (ref 3.8–5.2)
RBC # BLD: 5.13 M/UL — SIGNIFICANT CHANGE UP (ref 3.8–5.2)
RBC # FLD: 13.2 % — SIGNIFICANT CHANGE UP (ref 10.3–14.5)
RBC # FLD: 13.2 % — SIGNIFICANT CHANGE UP (ref 10.3–14.5)
RSV RNA NPH QL NAA+NON-PROBE: SIGNIFICANT CHANGE UP
RSV RNA NPH QL NAA+NON-PROBE: SIGNIFICANT CHANGE UP
SARS-COV-2 RNA SPEC QL NAA+PROBE: SIGNIFICANT CHANGE UP
SARS-COV-2 RNA SPEC QL NAA+PROBE: SIGNIFICANT CHANGE UP
SODIUM SERPL-SCNC: 139 MMOL/L — SIGNIFICANT CHANGE UP (ref 135–145)
SODIUM SERPL-SCNC: 139 MMOL/L — SIGNIFICANT CHANGE UP (ref 135–145)
TROPONIN T, HIGH SENSITIVITY RESULT: 28 NG/L — SIGNIFICANT CHANGE UP (ref 0–51)
TROPONIN T, HIGH SENSITIVITY RESULT: 28 NG/L — SIGNIFICANT CHANGE UP (ref 0–51)
TROPONIN T, HIGH SENSITIVITY RESULT: 40 NG/L — SIGNIFICANT CHANGE UP (ref 0–51)
TROPONIN T, HIGH SENSITIVITY RESULT: 40 NG/L — SIGNIFICANT CHANGE UP (ref 0–51)
TROPONIN T, HIGH SENSITIVITY RESULT: 43 NG/L — SIGNIFICANT CHANGE UP (ref 0–51)
TROPONIN T, HIGH SENSITIVITY RESULT: 43 NG/L — SIGNIFICANT CHANGE UP (ref 0–51)
TSH SERPL-MCNC: 1.23 UIU/ML — SIGNIFICANT CHANGE UP (ref 0.27–4.2)
TSH SERPL-MCNC: 1.23 UIU/ML — SIGNIFICANT CHANGE UP (ref 0.27–4.2)
WBC # BLD: 7.65 K/UL — SIGNIFICANT CHANGE UP (ref 3.8–10.5)
WBC # BLD: 7.65 K/UL — SIGNIFICANT CHANGE UP (ref 3.8–10.5)
WBC # FLD AUTO: 7.65 K/UL — SIGNIFICANT CHANGE UP (ref 3.8–10.5)
WBC # FLD AUTO: 7.65 K/UL — SIGNIFICANT CHANGE UP (ref 3.8–10.5)

## 2023-12-29 PROCEDURE — 93000 ELECTROCARDIOGRAM COMPLETE: CPT

## 2023-12-29 PROCEDURE — 99215 OFFICE O/P EST HI 40 MIN: CPT

## 2023-12-29 PROCEDURE — 99223 1ST HOSP IP/OBS HIGH 75: CPT | Mod: FS

## 2023-12-29 PROCEDURE — 93306 TTE W/DOPPLER COMPLETE: CPT | Mod: 26

## 2023-12-29 PROCEDURE — 71045 X-RAY EXAM CHEST 1 VIEW: CPT | Mod: 26

## 2023-12-29 RX ORDER — POTASSIUM CHLORIDE 20 MEQ
20 PACKET (EA) ORAL ONCE
Refills: 0 | Status: DISCONTINUED | OUTPATIENT
Start: 2023-12-29 | End: 2023-12-29

## 2023-12-29 RX ORDER — POTASSIUM CHLORIDE 20 MEQ
40 PACKET (EA) ORAL ONCE
Refills: 0 | Status: COMPLETED | OUTPATIENT
Start: 2023-12-29 | End: 2023-12-29

## 2023-12-29 RX ORDER — DRONEDARONE 400 MG/1
400 TABLET, FILM COATED ORAL
Refills: 0 | Status: DISCONTINUED | OUTPATIENT
Start: 2023-12-29 | End: 2024-01-02

## 2023-12-29 RX ADMIN — Medication 40 MILLIEQUIVALENT(S): at 14:12

## 2023-12-29 RX ADMIN — DRONEDARONE 400 MILLIGRAM(S): 400 TABLET, FILM COATED ORAL at 14:42

## 2023-12-29 NOTE — ED CDU PROVIDER INITIAL DAY NOTE - OBJECTIVE STATEMENT
84 y/o female pt with PMHx of HTN, HLD, PVD (on f/u vascular surgeon),Episode of A/B on 3/2023 (on Eliquis, Metoprolol 150mg daily) was brought to ED from her cardiologist Dr. Vasquez office c/o chest pain. Reports she felt chest pressure pain with headache/nausea this morning and headache/nausea resolved now. Pt's evaluated by Dr. Vasquez, ekg showed A/fib with HR-120, and sent to ED for further evaluation. Denies SOB or palpitation. Denies radiating pain to jaw, back, or arm. Denies diaphoresis or vomiting. Denies sensory changes or weakness to extremities. Denies fever, chills, or recent sickness. Denies ABD pain. History obtained from patient and patient's daughter Sagrario at bedside.   ED course: EKG NSR. Troponin 28-40-43. Case discussed with Dr. Ramirez, with patient started on Multaq 400mg BID with plan for echocardiogram and tele monitoring in CDU. 82 y/o female pt with PMHx of HTN, HLD, PVD (on f/u vascular surgeon) Afib (on Eliquis, Metoprolol 150mg daily) was brought to ED from her cardiologist Dr. Vasquez office c/o chest pain. Reports she felt chest pressure pain with headache/nausea this morning and headache/nausea resolved now. Pt's evaluated by Dr. Vasquez, ekg showed A/fib with HR-120, and sent to ED for further evaluation. Denies SOB or palpitation. Denies radiating pain to jaw, back, or arm. Denies diaphoresis or vomiting. Denies sensory changes or weakness to extremities. Denies fever, chills, or recent sickness. Denies ABD pain. History obtained from patient and patient's daughter Sagrario at bedside.   ED course: EKG NSR. Troponin 28-40-43. Case discussed with Dr. Ramirez, with patient started on Multaq 400mg BID with plan for echocardiogram and tele monitoring in CDU. 84 y/o female pt with PMHx of HTN, HLD, PVD (on f/u vascular surgeon) Afib (on Eliquis, Metoprolol 150mg daily) was brought to ED from her cardiologist Dr. Vasquez office c/o chest pain. Reports she felt chest pressure pain with headache/nausea this morning and headache/nausea resolved now. Pt's evaluated by Dr. Vasquez, ekg showed A/fib with HR-120, and sent to ED for further evaluation. Denies SOB or palpitation. Denies radiating pain to jaw, back, or arm. Denies diaphoresis or vomiting. Denies sensory changes or weakness to extremities. Denies fever, chills, or recent sickness. Denies ABD pain. History obtained from patient and patient's daughter Sagrario at bedside.   ED course: EKG NSR. Troponin 28-40-43. Case discussed with Dr. Ramirez, with patient started on Multaq 400mg BID with plan for echocardiogram and tele monitoring in CDU.

## 2023-12-29 NOTE — ED ADULT NURSE NOTE - MODE OF DISCHARGE
Detail Level: Detailed Quality 110: Preventive Care And Screening: Influenza Immunization: Influenza immunization was not ordered or administered, reason not given Ambulatory

## 2023-12-29 NOTE — CONSULT NOTE ADULT - SUBJECTIVE AND OBJECTIVE BOX
DATE OF SERVICE: 12-29-23 @ 20:37    CHIEF COMPLAINT:Patient is a 83y old  Female who presents with a chief complaint of     HISTORY OF PRESENT ILLNESS:HPI: 82yo male pt with PMHx of HTN, HLD, PVD (on f/u vascular surgeon),Episode of A/B on 3/2023 (on Eliquis, Metoprolol 150mg daily) was brought to ED from her cardiologist Dr. Gale office c/o chest pain. Reports she felt chest pressure pain with headache/nausea this morning and headache/nausea resolved now. Pt's evaluated by Dr. Gale, ekg showed A/fib with HR-120, and sent to ED for further evaluation. Denies SOB or palpitation. Denies radiating pain to jaw, back, or arm. Denies diaphoresis or vomiting. Denies sensory changes or weakness to extremities. Denies fever, chills, or recent sickness. Denies ABD pain      PAST MEDICAL & SURGICAL HISTORY:  AF   HTN  HLD  PVD    MEDICATIONS:  dronedarone 400 milliGRAM(s) Oral two times a day           FAMILY HISTORY:      Non-contributory    SOCIAL HISTORY:    [ ] not a smoker    Allergies    No Known Allergies    Intolerances    	    REVIEW OF SYSTEMS:  CONSTITUTIONAL: No fever, +chills   EYES: No eye pain, visual disturbances, or discharge  ENMT:  No difficulty hearing, tinnitus  NECK: No pain or stiffness  RESPIRATORY: No cough, wheezing,  CARDIOVASCULAR: No chest pain, palpitations, passing out, dizziness, or leg swelling  GASTROINTESTINAL:  No nausea, vomiting, diarrhea or constipation. No melena.  GENITOURINARY: No dysuria, hematuria  NEUROLOGICAL: No stroke like symptoms  SKIN: No burning or lesions   ENDOCRINE: No heat or cold intolerance  MUSCULOSKELETAL: No joint pain or swelling  PSYCHIATRIC: No  anxiety, mood swings  HEME/LYMPH: No bleeding gums  ALLERGY AND IMMUNOLOGIC: No hives or eczema	    All other ROS negative    PHYSICAL EXAM:  T(C): 36.9 (12-29-23 @ 19:13), Max: 36.9 (12-29-23 @ 19:13)  HR: 63 (12-29-23 @ 19:13) (63 - 75)  BP: 151/78 (12-29-23 @ 19:13) (135/69 - 151/78)  RR: 18 (12-29-23 @ 19:13) (18 - 18)  SpO2: 98% (12-29-23 @ 19:13) (97% - 98%)  Wt(kg): --  I&O's Summary      Appearance: Normal	  HEENT:   Normal oral mucosa, EOMI	  Cardiovascular:  S1 S2, No JVD,    Respiratory: Lungs clear to auscultation	  Psychiatry: Alert  Gastrointestinal:  Soft, Non-tender, + BS	  Skin: No rashes   Neurologic: Non-focal  Extremities:  No edema  Vascular: Peripheral pulses palpable    	    	  	  CARDIAC MARKERS:  Labs personally reviewed by me                                  14.9   7.65  )-----------( 255      ( 29 Dec 2023 13:10 )             45.1     12-29    139  |  100  |  16  ----------------------------<  110<H>  3.4<L>   |  25  |  0.55    Ca    9.5      29 Dec 2023 13:10    TPro  7.0  /  Alb  4.0  /  TBili  0.5  /  DBili  x   /  AST  22  /  ALT  15  /  AlkPhos  62  12-29          EKG: Personally reviewed by me - NSR   Radiology: Personally reviewed by me - CXR clear lungs      TTE CONCLUSIONS:      1. Left ventricular systolic function is normal with an ejection fraction of 70 % by Otero's method of disks. There are no regional wall motion abnormalities seen.   2. There is moderate (grade 2) left ventricular diastolic dysfunction, with elevated filling pressure.   3. Normal right ventricular cavity size and systolic function. Tricuspid annular tissue Doppler S' is 10.0 cm/s (normal >10 cm/s).   4. No pericardial effusion seen.   5. The inferior vena cava is normal in size (normal <2.1cm) with normal inspiratory collapse (normal >50%) consistent with normal right atrial pressure (~3, range 0-5mmHg).        Assessment /Plan:    82yo male pt with PMHx of HTN, HLD, PVD (on f/u vascular surgeon),Episode of A/B on 3/2023 (on Eliquis, Metoprolol 150mg daily) was brought to ED from her cardiologist Dr. Gale office c/o chest pain. Reports she felt chest pressure pain with headache/nausea this morning and headache/nausea resolved now. Pt's evaluated by Dr. Dozier,, ekg showed A/fib with HR-120, and sent to ED for further evaluation. Denies SOB or palpitation. Denies radiating pain to jaw, back, or arm. Denies diaphoresis or vomiting. Denies sensory changes or weakness to extremities. Denies fever, chills, or recent sickness. Denies ABD pain.    1. PAF -- Now back in Sinus  - Discussed with OP cards Dr Dozier, will start Multaq 400mg BID  - Continue with home dose Metoprolol  - TTE unremarkable    2. HTN -- BP well controlled on home meds    3. HLD - continue with statin     4. DVT PPX - On eliquis           Differential diagnosis and plan of care discussed with patient after the evaluation. Counseling on diet, nutritional counseling, weight management, exercise and medication compliance was done.   Advanced care planning/advanced directives discussed with patient/family. DNR status including forceful chest compressions to attempt to restart the heart, ventilator support/artificial breathing, electric shock, artificial nutrition, health care proxy, Molst form all discussed with pt. Pt wishes to consider. More than fifteen minutes spent on discussing advanced directives.            Channing Ramirez DO [facility]  Cardiovascular Medicine  [address], [address]  Office [phone]  Available via call/text on [employer] Teams DATE OF SERVICE: 12-29-23 @ 20:37    CHIEF COMPLAINT:Patient is a 83y old  Female who presents with a chief complaint of     HISTORY OF PRESENT ILLNESS:HPI: 84yo male pt with PMHx of HTN, HLD, PVD (on f/u vascular surgeon),Episode of A/B on 3/2023 (on Eliquis, Metoprolol 150mg daily) was brought to ED from her cardiologist Dr. Gale office c/o chest pain. Reports she felt chest pressure pain with headache/nausea this morning and headache/nausea resolved now. Pt's evaluated by Dr. Gale, ekg showed A/fib with HR-120, and sent to ED for further evaluation. Denies SOB or palpitation. Denies radiating pain to jaw, back, or arm. Denies diaphoresis or vomiting. Denies sensory changes or weakness to extremities. Denies fever, chills, or recent sickness. Denies ABD pain      PAST MEDICAL & SURGICAL HISTORY:  AF   HTN  HLD  PVD    MEDICATIONS:  dronedarone 400 milliGRAM(s) Oral two times a day           FAMILY HISTORY:      Non-contributory    SOCIAL HISTORY:    [ ] not a smoker    Allergies    No Known Allergies    Intolerances    	    REVIEW OF SYSTEMS:  CONSTITUTIONAL: No fever, +chills   EYES: No eye pain, visual disturbances, or discharge  ENMT:  No difficulty hearing, tinnitus  NECK: No pain or stiffness  RESPIRATORY: No cough, wheezing,  CARDIOVASCULAR: No chest pain, palpitations, passing out, dizziness, or leg swelling  GASTROINTESTINAL:  No nausea, vomiting, diarrhea or constipation. No melena.  GENITOURINARY: No dysuria, hematuria  NEUROLOGICAL: No stroke like symptoms  SKIN: No burning or lesions   ENDOCRINE: No heat or cold intolerance  MUSCULOSKELETAL: No joint pain or swelling  PSYCHIATRIC: No  anxiety, mood swings  HEME/LYMPH: No bleeding gums  ALLERGY AND IMMUNOLOGIC: No hives or eczema	    All other ROS negative    PHYSICAL EXAM:  T(C): 36.9 (12-29-23 @ 19:13), Max: 36.9 (12-29-23 @ 19:13)  HR: 63 (12-29-23 @ 19:13) (63 - 75)  BP: 151/78 (12-29-23 @ 19:13) (135/69 - 151/78)  RR: 18 (12-29-23 @ 19:13) (18 - 18)  SpO2: 98% (12-29-23 @ 19:13) (97% - 98%)  Wt(kg): --  I&O's Summary      Appearance: Normal	  HEENT:   Normal oral mucosa, EOMI	  Cardiovascular:  S1 S2, No JVD,    Respiratory: Lungs clear to auscultation	  Psychiatry: Alert  Gastrointestinal:  Soft, Non-tender, + BS	  Skin: No rashes   Neurologic: Non-focal  Extremities:  No edema  Vascular: Peripheral pulses palpable    	    	  	  CARDIAC MARKERS:  Labs personally reviewed by me                                  14.9   7.65  )-----------( 255      ( 29 Dec 2023 13:10 )             45.1     12-29    139  |  100  |  16  ----------------------------<  110<H>  3.4<L>   |  25  |  0.55    Ca    9.5      29 Dec 2023 13:10    TPro  7.0  /  Alb  4.0  /  TBili  0.5  /  DBili  x   /  AST  22  /  ALT  15  /  AlkPhos  62  12-29          EKG: Personally reviewed by me - NSR   Radiology: Personally reviewed by me - CXR clear lungs      TTE CONCLUSIONS:      1. Left ventricular systolic function is normal with an ejection fraction of 70 % by Otero's method of disks. There are no regional wall motion abnormalities seen.   2. There is moderate (grade 2) left ventricular diastolic dysfunction, with elevated filling pressure.   3. Normal right ventricular cavity size and systolic function. Tricuspid annular tissue Doppler S' is 10.0 cm/s (normal >10 cm/s).   4. No pericardial effusion seen.   5. The inferior vena cava is normal in size (normal <2.1cm) with normal inspiratory collapse (normal >50%) consistent with normal right atrial pressure (~3, range 0-5mmHg).        Assessment /Plan:    84yo male pt with PMHx of HTN, HLD, PVD (on f/u vascular surgeon),Episode of A/B on 3/2023 (on Eliquis, Metoprolol 150mg daily) was brought to ED from her cardiologist Dr. Gale office c/o chest pain. Reports she felt chest pressure pain with headache/nausea this morning and headache/nausea resolved now. Pt's evaluated by Dr. Dozier,, ekg showed A/fib with HR-120, and sent to ED for further evaluation. Denies SOB or palpitation. Denies radiating pain to jaw, back, or arm. Denies diaphoresis or vomiting. Denies sensory changes or weakness to extremities. Denies fever, chills, or recent sickness. Denies ABD pain.    1. PAF -- Now back in Sinus  - Discussed with OP cards Dr Dozier, will start Multaq 400mg BID  - Continue with home dose Metoprolol  - TTE unremarkable    2. HTN -- BP well controlled on home meds    3. HLD - continue with statin     4. DVT PPX - On eliquis           Differential diagnosis and plan of care discussed with patient after the evaluation. Counseling on diet, nutritional counseling, weight management, exercise and medication compliance was done.   Advanced care planning/advanced directives discussed with patient/family. DNR status including forceful chest compressions to attempt to restart the heart, ventilator support/artificial breathing, electric shock, artificial nutrition, health care proxy, Molst form all discussed with pt. Pt wishes to consider. More than fifteen minutes spent on discussing advanced directives.            Channing Ramirez DO [facility]  Cardiovascular Medicine  [address], [address]  Office [phone]  Available via call/text on Microsoft Teams

## 2023-12-29 NOTE — ED ADULT NURSE NOTE - NSFALLHARMRISKINTERV_ED_ALL_ED
Communicate risk of Fall with Harm to all staff, patient, and family/Provide visual cue: red socks, yellow wristband, yellow gown, etc/Reinforce activity limits and safety measures with patient and family/Bed in lowest position, wheels locked, appropriate side rails in place/Call bell, personal items and telephone in reach/Instruct patient to call for assistance before getting out of bed/chair/stretcher/Non-slip footwear applied when patient is off stretcher/[place] to call system/Physically safe environment - no spills, clutter or unnecessary equipment/Purposeful Proactive Rounding/Room/bathroom lighting operational, light cord in reach Communicate risk of Fall with Harm to all staff, patient, and family/Provide visual cue: red socks, yellow wristband, yellow gown, etc/Reinforce activity limits and safety measures with patient and family/Bed in lowest position, wheels locked, appropriate side rails in place/Call bell, personal items and telephone in reach/Instruct patient to call for assistance before getting out of bed/chair/stretcher/Non-slip footwear applied when patient is off stretcher/[place] to call system/Physically safe environment - no spills, clutter or unnecessary equipment/Purposeful Proactive Rounding/Room/bathroom lighting operational, light cord in reach

## 2023-12-29 NOTE — ED PROVIDER NOTE - PROGRESS NOTE DETAILS
Spoke to Dr. Gale, NSR with HT-64 on EKG now, and recommended cardiologist Dr. Ramirez consult in ED. NSR with HT-64 on EKG. Spoke to Dr. Ramirez for consult. Pt's evaluated by Dr. Ramirez and recommended CDU transfer for observation, Multaq 400mg BID, and Echo. Pt agreed to stay in CDU. Dr. Ramirez's aware of 2nd trop-40 and pt will stay in CDU for constant cardiac observation.

## 2023-12-29 NOTE — ED CDU PROVIDER DISPOSITION NOTE - CARE PROVIDER_API CALL
Phi Dozier  Cardiology  [address], [address]  [place] [address]  Phone: [phone]  Fax: [phone]  Established Patient  Follow Up Time: 4-6 Days   Phi Dozier  Cardiology  [address], [address]  [place] [address]  Phone: [phone]  Fax: [phone]  Established Patient  Follow Up Time: 4-6 Days

## 2023-12-29 NOTE — ED PROVIDER NOTE - CARE PLAN
1 Principal Discharge DX:	Chest pain  Secondary Diagnosis:	Atrial fibrillation   Principal Discharge DX:	Acute chest pain  Secondary Diagnosis:	Atrial fibrillation

## 2023-12-29 NOTE — ED PROVIDER NOTE - ATTENDING APP SHARED VISIT CONTRIBUTION OF CARE
Attending MD Mcbride: I personally made/approved the management plan and take responsibility for the patient management.      83-year-old woman with history of A-fib on Eliquis and metoprolol, hypertension presenting for evaluation of acute onset chest pressure headache and nausea.  Patient states at the time of ED interview all symptoms have resolved except for feeling cold.  No active chest pain anymore.  Patient was seen in cardiologist office where she was found to be in rapid atrial fibrillation.    Patient's vital signs are nonactionable, she is sitting comfortably in the stretcher in no apparent distress.  Clear lungs posteriorly regular heart sounds without obvious murmur.  Extremities warm and well-perfused.  Nonpitting edema bilateral ankles.    ECG recorded at 1227 independently interpreted by me, Dr Slim Mcbride, shows normal sinus rhythm normal QRS axis normal intervals no ST elevation or ST depression.  Poor R wave progression anteriorly.    ECG from clinic visit today showed rapid atrial fibrillation rate 120, no diagnostic ischemic findings.    Patient presenting for acute chest pain nausea and headache in the setting of rapid atrial fibrillation, known history of A-fib, patient has spontaneously converted to normal sinus rhythm and most of her symptoms have completely resolved suggesting that symptoms were related to symptomatic atrial fibrillation.  No diagnostic ischemic findings on initial ECG.  Will obtain cardiac biomarkers to exclude ACS, maintain on telemetry obtain chest x-ray basic infectious workup including flu swab and patient is to be seen by cardiology Dr. Ramirez for opinion.      *The above represents an initial assessment/impression. Please refer to progress notes for potential changes in patient clinical course*

## 2023-12-29 NOTE — ED CDU PROVIDER DISPOSITION NOTE - ATTENDING CONTRIBUTION TO CARE
Attending MD Nevarez.  Pt seen and examined by me in am of 12/30 in CDU obs unit.  Pt is an 82 yo fem with pmhx of known Afib on metop/eliquis/HTN/HLD presenting from Dr. Fairchild's office with complaint of CP.  One episode yesterday that resolved thereafter but then noted to be 120's afib in office.  No sxs at that time.  Labs non-actionable in ED.  Echo with mild grade II diast dysfunction, EF 70%.  Dr. Ramirez/Dr. Fairchild agreed with multac and obs overnight with dc this am if tolerating and asymptomatic.  HR 50's in ED.  No tele events overnight.  Planned discussion with Dr. Ramirez this morning.

## 2023-12-29 NOTE — ED ADULT NURSE REASSESSMENT NOTE - NS ED NURSE REASSESS COMMENT FT1
As per [place] shift RN Hermelinda, pt took home medication of simvastatin, vitamin D, metoprolol, and ELIQUIS that was due. Provider to RN for pt to take home medication put in place by MAXIMINO Chandler As per [place] shift RN Hermelinda, pt took home medication of simvastatin, vitamin D, metoprolol, and ELIQUIS that was due. Provider to RN for pt to take home medication put in place by MAXIMINO Chandler

## 2023-12-29 NOTE — ED ADULT NURSE REASSESSMENT NOTE - NS ED NURSE REASSESS COMMENT FT1
Received report from GILBERT Shen. Pt AOx4 with stable VS. Comfort care and safety measures provided. Pending CDU admission. At 1905 ED WAR room contacted ED Blue RN that patient rhythm went to -150's for 6.3 seconds then converting to NSR, pt at time eating meal Received report from GILBETR Shen. Pt AOx4 with stable VS. Comfort care and safety measures provided. Pending CDU admission. At 1905 ED WAR room contacted ED Blue RN that patient rhythm went to -150's for 6.3 seconds then converting to NSR, pt at time eating meal

## 2023-12-29 NOTE — ED ADULT TRIAGE NOTE - NS ED NURSE AMBULANCES
[facility]

## 2023-12-29 NOTE — ED CDU PROVIDER INITIAL DAY NOTE - DETAILS
tele, echo (resulted at time of CDU acceptance), vitals every 4 hours, frequent reevaluations  cardiology following  DW ED team, vitals every 4 hours, frequent reevaluations

## 2023-12-29 NOTE — ED ADULT NURSE NOTE - OBJECTIVE STATEMENT
82 yo female with a PMH of joaquin on eliquis, hypothyroidism, HLD presents to the ED via EMS from doctor's office complaining of chest pain. Reports she woke up at 5a, usual time, and began experiencing midsternal chest pressure/pain that was non-radiating. Reports it was constant in nature so she called her daughter and daughter came to take her to Dr. Dozier's office. EKG was done in office and was sent in for abnormal EKG. Patient denies any chest pain at this time. Denies any associating SOB. Patient on cardiac monitor. Denies headache, dizziness, vision changes, shortness of breath, abdominal pain, nausea, vomiting, diarrhea, fevers, chills, dysuria, hematuria, recent illness travel or fall. 84 yo female with a PMH of joaquin on eliquis, hypothyroidism, HLD presents to the ED via EMS from doctor's office complaining of chest pain. Reports she woke up at 5a, usual time, and began experiencing midsternal chest pressure/pain that was non-radiating. Reports it was constant in nature so she called her daughter and daughter came to take her to Dr. Dozier's office. EKG was done in office and was sent in for abnormal EKG. Patient denies any chest pain at this time. Denies any associating SOB. Patient on cardiac monitor. Denies headache, dizziness, vision changes, shortness of breath, abdominal pain, nausea, vomiting, diarrhea, fevers, chills, dysuria, hematuria, recent illness travel or fall.

## 2023-12-29 NOTE — ED CDU PROVIDER DISPOSITION NOTE - CARE PROVIDERS DIRECT ADDRESSES
[email or web address]

## 2023-12-29 NOTE — ED CDU PROVIDER DISPOSITION NOTE - CLINICAL COURSE
82 y/o female pt with PMHx of HTN, HLD, PVD (on f/u vascular surgeon) Afib (on Eliquis, Metoprolol 150mg daily) was brought to ED from her cardiologist Dr. Vasquez office c/o chest pain. Reports she felt chest pressure pain with headache/nausea this morning and headache/nausea resolved now. Pt's evaluated by Dr. Vasquez, ekg showed A/fib with HR-120, and sent to ED for further evaluation. Denies SOB or palpitation. Denies radiating pain to jaw, back, or arm. Denies diaphoresis or vomiting. Denies sensory changes or weakness to extremities. Denies fever, chills, or recent sickness. Denies ABD pain. History obtained from patient and patient's daughter Sagrario at bedside.   ED course: EKG NSR. Troponin 28-40-43. Case discussed with Dr. Ramirez, with patient started on Multaq 400mg BID with plan for echocardiogram and tele monitoring in CDU.  In CDU, echocardiogram showed a moderate (grade 2) left ventricular diastolic dysfunction, with EF 70%. 84 y/o female pt with PMHx of HTN, HLD, PVD (on f/u vascular surgeon) Afib (on Eliquis, Metoprolol 150mg daily) was brought to ED from her cardiologist Dr. Vasquez office c/o chest pain. Reports she felt chest pressure pain with headache/nausea this morning and headache/nausea resolved now. Pt's evaluated by Dr. Vasquez, ekg showed A/fib with HR-120, and sent to ED for further evaluation. Denies SOB or palpitation. Denies radiating pain to jaw, back, or arm. Denies diaphoresis or vomiting. Denies sensory changes or weakness to extremities. Denies fever, chills, or recent sickness. Denies ABD pain. History obtained from patient and patient's daughter Sagrario at bedside.   ED course: EKG NSR. Troponin 28-40-43. Case discussed with Dr. Ramirez, with patient started on Multaq 400mg BID with plan for echocardiogram and tele monitoring in CDU.  In CDU, echocardiogram showed a moderate (grade 2) left ventricular diastolic dysfunction, with EF 70%. 82 y/o female pt with PMHx of HTN, HLD, PVD (on f/u vascular surgeon) Afib (on Eliquis, Metoprolol 150mg daily) was brought to ED from her cardiologist Dr. Vasquez office c/o chest pain. Reports she felt chest pressure pain with headache/nausea this morning and headache/nausea resolved now. Pt's evaluated by Dr. Vasquez, ekg showed A/fib with HR-120, and sent to ED for further evaluation. Denies SOB or palpitation. Denies radiating pain to jaw, back, or arm. Denies diaphoresis or vomiting. Denies sensory changes or weakness to extremities. Denies fever, chills, or recent sickness. Denies ABD pain. History obtained from patient and patient's daughter Sagrario at bedside.   ED course: EKG NSR. Troponin 28-40-43. Case discussed with Dr. Ramirez, with patient started on Multaq 400mg BID with plan for echocardiogram and tele monitoring in CDU.  In CDU, echocardiogram showed a moderate (grade 2) left ventricular diastolic dysfunction, with EF 70%. Patient did well in CDU overnight.  She had 1 episode of atrial tach at 6 PM which lasted ~6 seconds, otherwise no recurrent A-fib or adverse telemetry events overnight.  She remained asymptomatic overnight and into the morning.  Case was discussed with cardiologist Dr. Ramirez who cleared her for discharge home with recommendation to continue Multaq 400 mg twice daily as well as her current home meds (including metoprolol).  Case discussed with ED attending Dr. Nevarez who evaluated patient, stable for discharge home with recs as above and outpatient cardiology follow-up for continued evaluation.  Stable at discharge.

## 2023-12-29 NOTE — ED CDU PROVIDER INITIAL DAY NOTE - PROGRESS NOTE DETAILS
Echo resulted " 1. Left ventricular systolic function is normal with an ejection fraction of 70 % by Otero's method of disks. There are no regional wall motion abnormalities seen.   2. There is moderate (grade 2) left ventricular diastolic dysfunction, with elevated filling pressure.   3. Normal right ventricular cavity size and systolic function. Tricuspid annular tissue Doppler S' is 10.0 cm/s (normal >10 cm/s).   4. No pericardial effusion seen.   5. The inferior vena cava is normal in size (normal <2.1cm) with normal inspiratory collapse (normal >50%) consistent with normal right atrial pressure (~3, range 0-5mmHg)." Reviewed with Dr. Ramirez- states no acute recommendations at this time, will monitor on tele overnight. - Yvonne Washington PA-C

## 2023-12-29 NOTE — ED ADULT NURSE NOTE - ED STAT RN HANDOFF DETAILS
Report given from GILBERT Rubin to GILBERT Pascual Report given from GILBERT Rubin to GILBERT aPscual

## 2023-12-29 NOTE — ED CDU PROVIDER DISPOSITION NOTE - NSFOLLOWUPINSTRUCTIONS_ED_ALL_ED_FT
Hydrate.     Start taking Multaq 400mg twice a day.    We recommend you follow up with your primary care provider/cardiologist within the next 2-3 days, please bring all of your results with you.     Please return to the Emergency Department with new, worsening, or concerning symptoms, such as:  -Shortness of breath or trouble breathing  -Pressure, pain, tightness in chest  -Facial drooping, arm weakness, or speech difficulty   -Head injury or loss of consciousness   -Nonstop bleeding or an open wound     *More detailed information regarding your visit and discharge can be found by reviewing this packet Follow-up with your cardiologist Dr. Dozier within the next 1 week for re-evaluation.    Phi Dozier  Cardiology  [address] [facility], [address] [place] [id number]  Phone: [phone]  Follow Up Time: 4-6 Days    Phi Dozier  Cardiology  [address] [facility], [address] [place] [id number]  Phone: [phone]  Fax: [phone]  Established Patient  Follow Up Time: 4-6 Days    Start taking Multaq 400mg twice a day with meals.  Continue your other current home medications as previously prescribed.    Additionally recommend you follow-up with your primary care doctor within the next 1 week for re-evaluation.    Rest, stay hydrated.    Return to the Emergency Department immediately if you develop any new/worsening symptoms including but not limited to dizziness/lightheadedness, palpitations/heart racing, chest pain, difficulty breathing, episodes of passing out or any other concerning symptoms.    *More detailed information regarding your visit and discharge can be found by reviewing this packet Follow-up with your cardiologist Dr. Dozier within the next 1 week for re-evaluation.    Phi Dozier  Cardiology  [address] [facility], [address] [place] [id number]  Phone: [phone]  Follow Up Time: 4-6 Days    Phi Dozier  Cardiology  [address] [facility], [address] [place] [id number]  Phone: [phone]  Fax: [phone]  Established Patient  Follow Up Time: 4-6 Days    Start taking Multaq 400mg twice a day with meals.  Continue your other current home medications as previously prescribed.    Additionally recommend you follow-up with your primary care doctor within the next 1 week for re-evaluation.    Rest, stay hydrated.    Return to the Emergency Department immediately if you develop any new/worsening symptoms including but not limited to dizziness/lightheadedness, palpitations/heart racing, chest pain, difficulty breathing, episodes of passing out or any other concerning symptoms.    *More detailed information regarding your visit and discharge can be found by reviewing this packet

## 2023-12-29 NOTE — ED CDU PROVIDER DISPOSITION NOTE - PATIENT PORTAL LINK FT
You can access the FollowMyHealth Patient Portal offered by [facility] by registering at the following website: [email or web address]. By joining [employer]’s FollowMyHealth portal, you will also be able to view your health information using other applications (apps) compatible with our system. You can access the FollowMyHealth Patient Portal offered by [facility] by registering at the following website: [email or web address]. By joining [email or web address]’s FollowMyHealth portal, you will also be able to view your health information using other applications (apps) compatible with our system.

## 2023-12-29 NOTE — ED PROVIDER NOTE - PHYSICAL EXAMINATION
NAD. VSS. Afebrile. No facial drip. Neck supple. Lungs clear. ABD soft, non tender. No cva tender. +B/L peripheral trace pitting edema (R>L, chronic and no changes as per pt). N/V- intact. No focal neuro deficit.

## 2023-12-29 NOTE — ED PROVIDER NOTE - OBJECTIVE STATEMENT
84yo male pt with PMHx of HTN, HLD, PVD (on f/u vascular surgeon),Episode of A/B on 3/2023 (on Eliquis, Metoprolol 150daily) was brought to ED from her cardiologist Dr. Gale office c/o chest pain. Reports she felt chest pressure pain with headache/nausea this morning and headache/nausea resolved now. Pt's evaluated by Dr. Gale, ekg showed A/fib with HR-120, and sent to ED for further evaluation. Denies SOB. Denies radiating pain to jaw, back, or arm. Denies diaphoresis or vomiting. Denies sensory changes or weakness to extremities. Denies fever, chills, or recent sickness. Denies ABD pain. 82yo male pt with PMHx of HTN, HLD, PVD (on f/u vascular surgeon),Episode of A/B on 3/2023 (on Eliquis, Metoprolol 150daily) was brought to ED from her cardiologist Dr. Gale office c/o chest pain. Reports she felt chest pressure pain with headache/nausea this morning and headache/nausea resolved now. Pt's evaluated by Dr. Gale, ekg showed A/fib with HR-120, and sent to ED for further evaluation. Denies SOB. Denies radiating pain to jaw, back, or arm. Denies diaphoresis or vomiting. Denies sensory changes or weakness to extremities. Denies fever, chills, or recent sickness. Denies ABD pain. 84yo male pt with PMHx of HTN, HLD, PVD (on f/u vascular surgeon),Episode of A/B on 3/2023 (on Eliquis, Metoprolol 150mg daily) was brought to ED from her cardiologist Dr. Gale office c/o chest pain. Reports she felt chest pressure pain with headache/nausea this morning and headache/nausea resolved now. Pt's evaluated by Dr. Gale, ekg showed A/fib with HR-120, and sent to ED for further evaluation. Denies SOB or palpitation. Denies radiating pain to jaw, back, or arm. Denies diaphoresis or vomiting. Denies sensory changes or weakness to extremities. Denies fever, chills, or recent sickness. Denies ABD pain. 82yo male pt with PMHx of HTN, HLD, PVD (on f/u vascular surgeon),Episode of A/B on 3/2023 (on Eliquis, Metoprolol 150mg daily) was brought to ED from her cardiologist Dr. Gale office c/o chest pain. Reports she felt chest pressure pain with headache/nausea this morning and headache/nausea resolved now. Pt's evaluated by Dr. Gale, ekg showed A/fib with HR-120, and sent to ED for further evaluation. Denies SOB or palpitation. Denies radiating pain to jaw, back, or arm. Denies diaphoresis or vomiting. Denies sensory changes or weakness to extremities. Denies fever, chills, or recent sickness. Denies ABD pain.

## 2023-12-29 NOTE — ED ADULT NURSE REASSESSMENT NOTE - NS ED NURSE REASSESS COMMENT FT1
pt arrived from blue section via wc to cdu room 44 after report received from Scottie ABRAHAM RN; pt is alert and oriented; ambulatory to br upon arrival; remote tele box removed and placed on bedside cardiac monitor; pt is alert and oriented; daughter with pt; skin warm and dry; no acute distress no c/o pain or sob; SL left ac #20G; plan of care reviewed with pt and daughter.

## 2023-12-30 VITALS
OXYGEN SATURATION: 99 % | SYSTOLIC BLOOD PRESSURE: 136 MMHG | RESPIRATION RATE: 18 BRPM | DIASTOLIC BLOOD PRESSURE: 72 MMHG | TEMPERATURE: 98 F | HEART RATE: 54 BPM

## 2023-12-30 LAB
A1C WITH ESTIMATED AVERAGE GLUCOSE RESULT: 6.2 % — HIGH (ref 4–5.6)
A1C WITH ESTIMATED AVERAGE GLUCOSE RESULT: 6.2 % — HIGH (ref 4–5.6)
ANION GAP SERPL CALC-SCNC: 10 MMOL/L — SIGNIFICANT CHANGE UP (ref 5–17)
ANION GAP SERPL CALC-SCNC: 10 MMOL/L — SIGNIFICANT CHANGE UP (ref 5–17)
BUN SERPL-MCNC: 16 MG/DL — SIGNIFICANT CHANGE UP (ref 7–23)
BUN SERPL-MCNC: 16 MG/DL — SIGNIFICANT CHANGE UP (ref 7–23)
CALCIUM SERPL-MCNC: 9.3 MG/DL — SIGNIFICANT CHANGE UP (ref 8.4–10.5)
CALCIUM SERPL-MCNC: 9.3 MG/DL — SIGNIFICANT CHANGE UP (ref 8.4–10.5)
CHLORIDE SERPL-SCNC: 104 MMOL/L — SIGNIFICANT CHANGE UP (ref 96–108)
CHLORIDE SERPL-SCNC: 104 MMOL/L — SIGNIFICANT CHANGE UP (ref 96–108)
CHOLEST SERPL-MCNC: 154 MG/DL — SIGNIFICANT CHANGE UP
CHOLEST SERPL-MCNC: 154 MG/DL — SIGNIFICANT CHANGE UP
CO2 SERPL-SCNC: 28 MMOL/L — SIGNIFICANT CHANGE UP (ref 22–31)
CO2 SERPL-SCNC: 28 MMOL/L — SIGNIFICANT CHANGE UP (ref 22–31)
CREAT SERPL-MCNC: 0.68 MG/DL — SIGNIFICANT CHANGE UP (ref 0.5–1.3)
CREAT SERPL-MCNC: 0.68 MG/DL — SIGNIFICANT CHANGE UP (ref 0.5–1.3)
EGFR: 86 ML/MIN/1.73M2 — SIGNIFICANT CHANGE UP
EGFR: 86 ML/MIN/1.73M2 — SIGNIFICANT CHANGE UP
ESTIMATED AVERAGE GLUCOSE: 131 MG/DL — HIGH (ref 68–114)
ESTIMATED AVERAGE GLUCOSE: 131 MG/DL — HIGH (ref 68–114)
GLUCOSE SERPL-MCNC: 102 MG/DL — HIGH (ref 70–99)
GLUCOSE SERPL-MCNC: 102 MG/DL — HIGH (ref 70–99)
HDLC SERPL-MCNC: 52 MG/DL — SIGNIFICANT CHANGE UP
HDLC SERPL-MCNC: 52 MG/DL — SIGNIFICANT CHANGE UP
LIPID PNL WITH DIRECT LDL SERPL: 86 MG/DL — SIGNIFICANT CHANGE UP
LIPID PNL WITH DIRECT LDL SERPL: 86 MG/DL — SIGNIFICANT CHANGE UP
MAGNESIUM SERPL-MCNC: 1.9 MG/DL — SIGNIFICANT CHANGE UP (ref 1.6–2.6)
MAGNESIUM SERPL-MCNC: 1.9 MG/DL — SIGNIFICANT CHANGE UP (ref 1.6–2.6)
NON HDL CHOLESTEROL: 102 MG/DL — SIGNIFICANT CHANGE UP
NON HDL CHOLESTEROL: 102 MG/DL — SIGNIFICANT CHANGE UP
POTASSIUM SERPL-MCNC: 3.5 MMOL/L — SIGNIFICANT CHANGE UP (ref 3.5–5.3)
POTASSIUM SERPL-MCNC: 3.5 MMOL/L — SIGNIFICANT CHANGE UP (ref 3.5–5.3)
POTASSIUM SERPL-SCNC: 3.5 MMOL/L — SIGNIFICANT CHANGE UP (ref 3.5–5.3)
POTASSIUM SERPL-SCNC: 3.5 MMOL/L — SIGNIFICANT CHANGE UP (ref 3.5–5.3)
SODIUM SERPL-SCNC: 142 MMOL/L — SIGNIFICANT CHANGE UP (ref 135–145)
SODIUM SERPL-SCNC: 142 MMOL/L — SIGNIFICANT CHANGE UP (ref 135–145)
TRIGL SERPL-MCNC: 85 MG/DL — SIGNIFICANT CHANGE UP
TRIGL SERPL-MCNC: 85 MG/DL — SIGNIFICANT CHANGE UP

## 2023-12-30 PROCEDURE — 83036 HEMOGLOBIN GLYCOSYLATED A1C: CPT

## 2023-12-30 PROCEDURE — 80053 COMPREHEN METABOLIC PANEL: CPT

## 2023-12-30 PROCEDURE — 84484 ASSAY OF TROPONIN QUANT: CPT

## 2023-12-30 PROCEDURE — 99238 HOSP IP/OBS DSCHRG MGMT 30/<: CPT

## 2023-12-30 PROCEDURE — 36415 COLL VENOUS BLD VENIPUNCTURE: CPT

## 2023-12-30 PROCEDURE — 85610 PROTHROMBIN TIME: CPT

## 2023-12-30 PROCEDURE — 85025 COMPLETE CBC W/AUTO DIFF WBC: CPT

## 2023-12-30 PROCEDURE — G0378: CPT

## 2023-12-30 PROCEDURE — 80061 LIPID PANEL: CPT

## 2023-12-30 PROCEDURE — 83735 ASSAY OF MAGNESIUM: CPT

## 2023-12-30 PROCEDURE — 87637 SARSCOV2&INF A&B&RSV AMP PRB: CPT

## 2023-12-30 PROCEDURE — 99285 EMERGENCY DEPT VISIT HI MDM: CPT | Mod: 25

## 2023-12-30 PROCEDURE — 71045 X-RAY EXAM CHEST 1 VIEW: CPT

## 2023-12-30 PROCEDURE — 84443 ASSAY THYROID STIM HORMONE: CPT

## 2023-12-30 PROCEDURE — 80048 BASIC METABOLIC PNL TOTAL CA: CPT

## 2023-12-30 PROCEDURE — 85730 THROMBOPLASTIN TIME PARTIAL: CPT

## 2023-12-30 PROCEDURE — 93306 TTE W/DOPPLER COMPLETE: CPT

## 2023-12-30 RX ORDER — DRONEDARONE 400 MG/1
1 TABLET, FILM COATED ORAL
Qty: 60 | Refills: 0
Start: 2023-12-30 | End: 2024-01-28

## 2023-12-30 RX ADMIN — DRONEDARONE 400 MILLIGRAM(S): 400 TABLET, FILM COATED ORAL at 06:15

## 2023-12-30 NOTE — ED ADULT NURSE REASSESSMENT NOTE - GENERAL PATIENT STATE
comfortable appearance/cooperative/improvement verbalized/family/SO at bedside/resting/sleeping
no chest pain or sob; remains on continuous cardiac monitoring

## 2023-12-30 NOTE — ED CDU PROVIDER SUBSEQUENT DAY NOTE - PROGRESS NOTE DETAILS
Patient seen at bedside with attending. Patient in NAD.  VSS. Daughter at bedside assists with translation. Patient resting comfortably without complaints. Telemetry monitoring reviewed this morning has showed sinus rhythm/sinus bradycardia.  Patient reports feeling well this a.m.  Denies dizziness/lightheadedness, palpitations, chest pain. Has ambulated to bathroom without difficulty or assistance.  Plan to discuss with cardiologist Dr. Ramirez this a.m. regarding final dispo. - Cristino Grimm PA-C Confirmed w/ telemetry room pt had no events afib overnight. Had one 6 second episode atach at 6pm yesterday, no other events since. Discussed w/ cards Dr. Ramirez, cleared for discharge home from cards standpoint w/ rec for Multaq 400mg BID and continue other home meds. Case discussed with ED attending Dr. Nevarez who evaluated patient, stable for discharge home with outpatient PMD/cards follow-up and medication as above.  Patient and daughter at bedside are comfortable with plan for discharge at this time.  They were advised on precautions regarding new medication Multaq as well as strict return precautions.  All questions answered, stable for discharge. - Cristino Grimm PA-C

## 2023-12-30 NOTE — ED CDU PROVIDER SUBSEQUENT DAY NOTE - HISTORY
No interval changes since initial CDU provider note. Pt without acute complaint. NAD VSS. no events on tele. Plan for continued tele monitoring in the setting of episode of Afib. Cardiology following. - MAXIMINO Washington

## 2023-12-30 NOTE — ED CDU PROVIDER SUBSEQUENT DAY NOTE - NS ED ROS FT
· CARDIOVASCULAR: - - -  · Cardiovascular [+]: CHEST PAIN  · ROS STATEMENT: all other ROS negative except as per HPI

## 2023-12-30 NOTE — ED POST DISCHARGE NOTE - ADDITIONAL DOCUMENTATION
12/30 Ian Bennett PA-C: Pts dtr Sagrario called admin line. Multaq needs prior authorization. Called both pharmacy and insurance company [phone], pt ID [id number]. As per pharmacy only authorized for amiodarone. Was unable to authorize rx for Multaq as its out of normal business hours. Spoke with cards Dr. Ramirez who spoke with Dr. Dozier, rec holding multaq and amiodarone at this time and pt can f/u in office on Tuesday. Called Sagrario back with recs. Appreciative of call back. 12/30 Ian Bennett PA-C: Pts dtr Sagrario called admin line. Multaq needs prior authorization. Called both pharmacy and insurance company [phone], pt ID [id number]. As per pharmacy only authorized for amiodarone. Was unable to authorize rx for Multaq as its out of normal business hours. Spoke with cards Dr. Ramirez who spoke with Dr. Dozier, rec holding multaq and amiodarone at this time and pt can f/u in office on Tuesday. Called Sagrario back with recs. Appreciative of call back.

## 2023-12-30 NOTE — ED CDU PROVIDER SUBSEQUENT DAY NOTE - ATTENDING CONTRIBUTION TO CARE
Attending MD Nevarez.  Pt seen and examined by me in am of 12/30 in CDU obs unit.  Pt is an 84 yo fem with pmhx of known Afib on metop/eliquis/HTN/HLD presenting from Dr. Fairchild's office with complaint of CP.  One episode yesterday that resolved thereafter but then noted to be 120's afib in office.  No sxs at that time.  Labs non-actionable in ED.  Echo with mild grade II diast dysfunction, EF 70%.  Dr. Ramirez/Dr. Fairchild agreed with multac and obs overnight with dc this am if tolerating and asymptomatic.  HR 50's in ED.  No tele events overnight.  Planned discussion with Dr. Ramirez this morning. Attending MD Nevarez.  Pt seen and examined by me in am of 12/30 in CDU obs unit.  Pt is an 82 yo fem with pmhx of known Afib on metop/eliquis/HTN/HLD presenting from Dr. Fairchild's office with complaint of CP.  One episode yesterday that resolved thereafter but then noted to be 120's afib in office.  No sxs at that time.  Labs non-actionable in ED.  Echo with mild grade II diast dysfunction, EF 70%.  Dr. Ramirez/Dr. Fairchild agreed with multac and obs overnight with dc this am if tolerating and asymptomatic.  HR 50's in ED.  No tele events overnight.  Planned discussion with Dr. Ramirez this morning.

## 2023-12-30 NOTE — ED CDU PROVIDER SUBSEQUENT DAY NOTE - PHYSICAL EXAMINATION
· CONSTITUTIONAL: Well appearing, awake, alert, oriented to person, place, time/situation and in no apparent distress.  · ENMT: Airway patent.  · EYES: Clear bilaterally  · CARDIAC: Normal rate, regular rhythm.  Heart sounds S1, S2.  No murmurs  · RESPIRATORY: CTAB  · GASTROINTESTINAL: Abdomen soft, non-tender  · MUSCULOSKELETAL: +B/L peripheral trace pitting edema (R>L, chronic and no changes as per pt)  · NEUROLOGICAL: Alert and oriented, clear speech, follows commands, steady gait, moving all extremities  · SKIN: No visible rash  · PSYCHIATRIC: normal mood and affect.

## 2023-12-30 NOTE — ED ADULT NURSE REASSESSMENT NOTE - COMFORT CARE
ambulated to bathroom
darkened lights/meal provided/plan of care explained/side rails up/treatment delay explained/wait time explained/warm blanket provided

## 2023-12-31 PROBLEM — R59.0 INGUINAL ADENOPATHY: Status: ACTIVE | Noted: 2023-12-27

## 2023-12-31 NOTE — HISTORY OF PRESENT ILLNESS
[FreeTextEntry8] : CC: dizziness, nausea, chills  84 y/o female with a pmhx of pAF, pre-DM, HLD, HTN p/w with feeling unwell with dizziness, nausea, chills that started this morning  Denies chest pain, sob, tucker, orthopnea, diaphoresis, palpitations, LE swelling, orthopnea, syncope, n/v, headache. Pt denies focal weakness, vision changes, numbness/tingling, dysphagia, dysarthria. Denies abd pain, vomiting Denies dysuria, urinary frequency,  urgency, hematuria. Denies uri symptoms.  [Family Member] : family member

## 2023-12-31 NOTE — PHYSICAL EXAM
[No Acute Distress] : no acute distress [Well Nourished] : well nourished [Well Developed] : well developed [Well-Appearing] : well-appearing [Normal Sclera/Conjunctiva] : normal sclera/conjunctiva [Normal Outer Ear/Nose] : the outer ears and nose were normal in appearance [Normal Oropharynx] : the oropharynx was normal [No JVD] : no jugular venous distention [No Lymphadenopathy] : no lymphadenopathy [Supple] : supple [No Respiratory Distress] : no respiratory distress  [No Accessory Muscle Use] : no accessory muscle use [Clear to Auscultation] : lungs were clear to auscultation bilaterally [Normal S1, S2] : normal S1 and S2 [No Murmur] : no murmur heard [No Carotid Bruits] : no carotid bruits [No Varicosities] : no varicosities [Pedal Pulses Present] : the pedal pulses are present [No Edema] : there was no peripheral edema [No Extremity Clubbing/Cyanosis] : no extremity clubbing/cyanosis [Soft] : abdomen soft [Non Tender] : non-tender [Non-distended] : non-distended [Normal Bowel Sounds] : normal bowel sounds [Normal Anterior Cervical Nodes] : no anterior cervical lymphadenopathy [No CVA Tenderness] : no CVA  tenderness [No Spinal Tenderness] : no spinal tenderness [No Joint Swelling] : no joint swelling [Grossly Normal Strength/Tone] : grossly normal strength/tone [No Rash] : no rash [Coordination Grossly Intact] : coordination grossly intact [No Focal Deficits] : no focal deficits [Normal Gait] : normal gait [Alert and Oriented x3] : oriented to person, place, and time [de-identified] : nontoxic [de-identified] : irregular tachy [de-identified] : \

## 2023-12-31 NOTE — HEALTH RISK ASSESSMENT
[0] : 2) Feeling down, depressed, or hopeless: Not at all (0) [PHQ-2 Negative - No further assessment needed] : PHQ-2 Negative - No further assessment needed [[id number]] : 0 [Never] : Never

## 2024-01-03 ENCOUNTER — APPOINTMENT (OUTPATIENT)
Dept: CARDIOLOGY | Facility: CLINIC | Age: 84
End: 2024-01-03
Payer: MEDICARE

## 2024-01-03 ENCOUNTER — APPOINTMENT (OUTPATIENT)
Dept: CARDIOLOGY | Facility: CLINIC | Age: 84
End: 2024-01-03
Payer: COMMERCIAL

## 2024-01-03 VITALS
TEMPERATURE: 98.3 F | SYSTOLIC BLOOD PRESSURE: 140 MMHG | DIASTOLIC BLOOD PRESSURE: 60 MMHG | WEIGHT: 138 LBS | OXYGEN SATURATION: 98 % | BODY MASS INDEX: 28.84 KG/M2 | HEART RATE: 58 BPM

## 2024-01-03 DIAGNOSIS — I10 ESSENTIAL (PRIMARY) HYPERTENSION: ICD-10-CM

## 2024-01-03 PROCEDURE — 99214 OFFICE O/P EST MOD 30 MIN: CPT

## 2024-01-03 PROCEDURE — 93241 XTRNL ECG REC>48HR<7D: CPT

## 2024-01-03 PROCEDURE — 93000 ELECTROCARDIOGRAM COMPLETE: CPT | Mod: 59

## 2024-01-03 NOTE — PHYSICAL EXAM

## 2024-01-03 NOTE — HISTORY OF PRESENT ILLNESS
[FreeTextEntry1] : ARON is a 83 year F w/MHx of pAfib, HLD, HTN, PreDM who presents for hospital follow up. 12/29/2023 was seen in office w/ Dr. Esparza, CAIN w/ chest pain, noted to be in Afib w/ RVR. EMS called and transferred to [facility]. Self converted to SR, TTE unremarkable. Pt. was d/c'd and advised Multaq however antiarrhythmic was not started 2/2 insurance. Continues on metoprolol 100 mg daily, 50 mg qhs, Eliquis 5 mg BID. Denies chest pain, palpitations, diaphoresis, vision changes, HA, dizziness, syncope, cough, wheezing, SOB/MARTIN, edema, fatigue, fever, chills, infection.

## 2024-01-06 ENCOUNTER — NON-APPOINTMENT (OUTPATIENT)
Age: 84
End: 2024-01-06

## 2024-01-06 LAB
ALBUMIN SERPL ELPH-MCNC: 4.4 G/DL
ALP BLD-CCNC: 68 U/L
ALT SERPL-CCNC: 15 U/L
ANION GAP SERPL CALC-SCNC: 11 MMOL/L
AST SERPL-CCNC: 17 U/L
BASOPHILS # BLD AUTO: 0.04 K/UL
BASOPHILS NFR BLD AUTO: 0.6 %
BILIRUB SERPL-MCNC: 0.3 MG/DL
BUN SERPL-MCNC: 18 MG/DL
CALCIUM SERPL-MCNC: 9.7 MG/DL
CHLORIDE SERPL-SCNC: 101 MMOL/L
CO2 SERPL-SCNC: 30 MMOL/L
CREAT SERPL-MCNC: 0.67 MG/DL
EGFR: 87 ML/MIN/1.73M2
EOSINOPHIL # BLD AUTO: 0.25 K/UL
EOSINOPHIL NFR BLD AUTO: 3.7 %
ESTIMATED AVERAGE GLUCOSE: 131 MG/DL
GLUCOSE SERPL-MCNC: 94 MG/DL
HBA1C MFR BLD HPLC: 6.2 %
HCT VFR BLD CALC: 45.2 %
HGB BLD-MCNC: 14.4 G/DL
IMM GRANULOCYTES NFR BLD AUTO: 0.1 %
LYMPHOCYTES # BLD AUTO: 1.52 K/UL
LYMPHOCYTES NFR BLD AUTO: 22.7 %
MAN DIFF?: NORMAL
MCHC RBC-ENTMCNC: 28.6 PG
MCHC RBC-ENTMCNC: 31.9 GM/DL
MCV RBC AUTO: 89.9 FL
MONOCYTES # BLD AUTO: 0.87 K/UL
MONOCYTES NFR BLD AUTO: 13 %
NEUTROPHILS # BLD AUTO: 4.02 K/UL
NEUTROPHILS NFR BLD AUTO: 59.9 %
PLATELET # BLD AUTO: 271 K/UL
POTASSIUM SERPL-SCNC: 4.2 MMOL/L
PROT SERPL-MCNC: 6.7 G/DL
RBC # BLD: 5.03 M/UL
RBC # FLD: 13.4 %
SODIUM SERPL-SCNC: 143 MMOL/L
T4 FREE SERPL-MCNC: 1.2 NG/DL
TSH SERPL-ACNC: 1.49 UIU/ML
WBC # FLD AUTO: 6.71 K/UL

## 2024-01-08 NOTE — ED PROVIDER NOTE - CONSTITUTIONAL, MLM
normal... Well appearing, awake, alert, oriented to person, place, time/situation and in no apparent distress. No assistance needed

## 2024-01-09 ENCOUNTER — APPOINTMENT (OUTPATIENT)
Dept: INTERNAL MEDICINE | Facility: CLINIC | Age: 84
End: 2024-01-09
Payer: MEDICARE

## 2024-01-09 VITALS
WEIGHT: 138 LBS | DIASTOLIC BLOOD PRESSURE: 80 MMHG | OXYGEN SATURATION: 94 % | TEMPERATURE: 101.9 F | SYSTOLIC BLOOD PRESSURE: 140 MMHG | BODY MASS INDEX: 28.97 KG/M2 | HEART RATE: 72 BPM | HEIGHT: 58 IN

## 2024-01-09 PROCEDURE — 99214 OFFICE O/P EST MOD 30 MIN: CPT

## 2024-01-09 NOTE — PHYSICAL EXAM
[No Acute Distress] : no acute distress [Normal Sclera/Conjunctiva] : normal sclera/conjunctiva [Normal Outer Ear/Nose] : the outer ears and nose were normal in appearance [No Respiratory Distress] : no respiratory distress  [No Accessory Muscle Use] : no accessory muscle use [Clear to Auscultation] : lungs were clear to auscultation bilaterally [Normal Rate] : normal rate  [Normal S1, S2] : normal S1 and S2 [No Edema] : there was no peripheral edema [Soft] : abdomen soft

## 2024-01-10 LAB
APPEARANCE: CLEAR
BACTERIA: NEGATIVE /HPF
BILIRUBIN URINE: NEGATIVE
BLOOD URINE: NEGATIVE
CAST: 0 /LPF
COLOR: YELLOW
EPITHELIAL CELLS: 0 /HPF
GLUCOSE QUALITATIVE U: NEGATIVE MG/DL
KETONES URINE: NEGATIVE MG/DL
LEUKOCYTE ESTERASE URINE: NEGATIVE
MICROSCOPIC-UA: NORMAL
NITRITE URINE: NEGATIVE
PH URINE: 6.5
PROTEIN URINE: NEGATIVE MG/DL
RAPID RVP RESULT: NOT DETECTED
RED BLOOD CELLS URINE: 0 /HPF
SARS-COV-2 RNA PNL RESP NAA+PROBE: NOT DETECTED
SPECIFIC GRAVITY URINE: <1.005
UROBILINOGEN URINE: 0.2 MG/DL
WHITE BLOOD CELLS URINE: 0 /HPF

## 2024-01-10 RX ORDER — ALPRAZOLAM 0.25 MG/1
0.25 TABLET ORAL
Qty: 1 | Refills: 0 | Status: COMPLETED | COMMUNITY
Start: 2024-01-10 | End: 2024-01-12

## 2024-01-10 RX ORDER — LIDOCAINE HYDROCHLORIDE 10 MG/ML
1 INJECTION, SOLUTION INFILTRATION; PERINEURAL
Qty: 50 | Refills: 0 | Status: COMPLETED | COMMUNITY
Start: 2024-01-10 | End: 2024-01-12

## 2024-01-10 RX ORDER — SODIUM BICARBONATE 84 MG/ML
8.4 INJECTION, SOLUTION INTRAVENOUS
Qty: 5 | Refills: 0 | Status: COMPLETED | COMMUNITY
Start: 2024-01-10 | End: 2024-01-12

## 2024-01-11 ENCOUNTER — APPOINTMENT (OUTPATIENT)
Dept: ELECTROPHYSIOLOGY | Facility: CLINIC | Age: 84
End: 2024-01-11
Payer: MEDICARE

## 2024-01-11 ENCOUNTER — RESULT REVIEW (OUTPATIENT)
Age: 84
End: 2024-01-11

## 2024-01-11 ENCOUNTER — OUTPATIENT (OUTPATIENT)
Dept: OUTPATIENT SERVICES | Facility: HOSPITAL | Age: 84
LOS: 1 days | End: 2024-01-11

## 2024-01-11 ENCOUNTER — NON-APPOINTMENT (OUTPATIENT)
Age: 84
End: 2024-01-11

## 2024-01-11 ENCOUNTER — APPOINTMENT (OUTPATIENT)
Dept: INTERNAL MEDICINE | Facility: CLINIC | Age: 84
End: 2024-01-11
Payer: MEDICARE

## 2024-01-11 ENCOUNTER — APPOINTMENT (OUTPATIENT)
Dept: RADIOLOGY | Facility: IMAGING CENTER | Age: 84
End: 2024-01-11
Payer: MEDICARE

## 2024-01-11 VITALS
HEIGHT: 58 IN | SYSTOLIC BLOOD PRESSURE: 150 MMHG | HEART RATE: 76 BPM | WEIGHT: 137 LBS | BODY MASS INDEX: 28.76 KG/M2 | TEMPERATURE: 101.6 F | DIASTOLIC BLOOD PRESSURE: 60 MMHG | OXYGEN SATURATION: 93 %

## 2024-01-11 VITALS
SYSTOLIC BLOOD PRESSURE: 150 MMHG | WEIGHT: 137 LBS | BODY MASS INDEX: 28.76 KG/M2 | OXYGEN SATURATION: 95 % | DIASTOLIC BLOOD PRESSURE: 60 MMHG | HEIGHT: 58 IN | TEMPERATURE: 100.2 F | HEART RATE: 73 BPM

## 2024-01-11 DIAGNOSIS — R50.9 FEVER, UNSPECIFIED: ICD-10-CM

## 2024-01-11 DIAGNOSIS — R05.9 COUGH, UNSPECIFIED: ICD-10-CM

## 2024-01-11 DIAGNOSIS — R52 PAIN, UNSPECIFIED: ICD-10-CM

## 2024-01-11 DIAGNOSIS — L72.9 FOLLICULAR CYST OF THE SKIN AND SUBCUTANEOUS TISSUE, UNSPECIFIED: Chronic | ICD-10-CM

## 2024-01-11 DIAGNOSIS — I48.0 PAROXYSMAL ATRIAL FIBRILLATION: ICD-10-CM

## 2024-01-11 DIAGNOSIS — I10 ESSENTIAL (PRIMARY) HYPERTENSION: ICD-10-CM

## 2024-01-11 LAB — BACTERIA UR CULT: NORMAL

## 2024-01-11 PROCEDURE — 71046 X-RAY EXAM CHEST 2 VIEWS: CPT | Mod: 26

## 2024-01-11 PROCEDURE — G2211 COMPLEX E/M VISIT ADD ON: CPT

## 2024-01-11 PROCEDURE — 99214 OFFICE O/P EST MOD 30 MIN: CPT

## 2024-01-11 PROCEDURE — 93000 ELECTROCARDIOGRAM COMPLETE: CPT | Mod: PD

## 2024-01-11 RX ORDER — AMOXICILLIN AND CLAVULANATE POTASSIUM 875; 125 MG/1; MG/1
875-125 TABLET, COATED ORAL TWICE DAILY
Qty: 14 | Refills: 0 | Status: DISCONTINUED | COMMUNITY
Start: 2023-12-05 | End: 2024-01-11

## 2024-01-11 NOTE — HISTORY OF PRESENT ILLNESS
[FreeTextEntry8] : This is an 82 y/o female with a pmhx of pAF, pre-DM, HLD, HTN who returns today with c/o fever x 3 days, shaking chills, cough, myalgia since 3 days ago. The cough started today and is dry and worsening. PT was sent by EP to me. On Tuesday, pt saw Dr Drake, RVP, UA, cbc were all negative the pt was started on Cefdinir 300 mg bid for presumed UTI. Denies dysuria, urinary frequency, urgency, hematuria. Denies chest pain, sob, tucker, dizziness, diaphoresis, palpitations, LE swelling, orthopnea, syncope, n/v, headache. Denies rash.  Pt self stopped multaq a few days ago as she thought that was causing her body ache and is only on metoprolol but per EP unlikely multaq casusing body ache thus plan to start tonight and stop metoprolol [Family Member] : family member

## 2024-01-11 NOTE — REVIEW OF SYSTEMS
[Negative] : Heme/Lymph [Fever] : fever [Chills] : chills [Feeling Fatigued] : feeling fatigued [SOB] : shortness of breath [Cough] : cough

## 2024-01-11 NOTE — REVIEW OF SYSTEMS
[Fever] : fever [Chills] : chills [Cough] : cough [Muscle Pain] : muscle pain [Negative] : Heme/Lymph

## 2024-01-11 NOTE — CARDIOLOGY SUMMARY
[de-identified] : 3/22/23: Sinus rhythm at 68 bpm, LAE 10/4/2023: Sinus bradycardia at 55 bpm 1/11/2024: Sinus rhythm at 75 bpm, left atrial enlargement, poor R wave progression [de-identified] : 6/22/22 LVEF 83% 3/8/23 LVEF 74% 8/29/23: 1. Normal left ventricular cavity size. Left ventricular wall thickness is mildly increased. Left ventricular systolic function is normal with an ejection fraction of 59 % by Otero's method of disks. 2. Mild septal left ventricular hypertrophy. 3. There is mild (grade 1) left ventricular diastolic dysfunction. 4. The left atrium is moderately dilated in size. 5. The right atrium is dilated in size. 6. Mild mitral regurgitation. 7. Trace tricuspid regurgitation. 8. Mild left ventricular hypertrophy. 9. Mild mitral valve leaflet calcification. 10. Mild pulmonic regurgitation. 11. Mild-to-moderate aortic regurgitation. 12. Pericardial fat pad is seen anterior to the right ventricle cannot rule out trivial effusion. 13. The ascending aorta diameter is dilated, measuring 4.00 cm (indexed 2.53 cm/m). The aortic arch diameter is normal. 14. There is increased LV mass and concentric hypertrophy. 15. There is mild mitral valve stenosis. 16. There is moderate calcification of the mitral valve annulus. 17. Thickened mitral valve leaflets. 18. Trileaflet aortic valve with normal systolic excursion. focal calcification of the aortic valve leaflets. minimal thickening of the aortic valve leaflets. fibrocalcific aortic valve sclerosis without stenosis.

## 2024-01-11 NOTE — DISCUSSION/SUMMARY
[FreeTextEntry1] : In summary, this is an 83-year-old woman with a cardiovascular history significant for HTN and pAF. Her CHADSVASC score is 4, and she would benefit from life long anticoagulation. She seems to be tolerating Eliquis well now. She is 64.41 kg, and will continue on 5mg BID until she is either less than 60 kg or if her Cr is >1.5.  I do not believe her Multaq is resulting in her body aches.  Those appear to be related to an infection.  She does have crackles/Rales on examination in the left lung and I am concerned she has a pneumonia.  I spoke with her PCP Dr. Henrry Mann who will see her today.  She should continue Multaq to avoid going to atrial fibrillation.  Mrs. Upton appeared to understand the whole discussion and verbalized that all of her questions were answered to her satisfaction.   Thank you for allowing me to be involved in the care of this pleasant woman. Please feel free to contact me with any questions. [EKG obtained to assist in diagnosis and management of assessed problem(s)] : EKG obtained to assist in diagnosis and management of assessed problem(s)

## 2024-01-11 NOTE — HEALTH RISK ASSESSMENT
[0] : 2) Feeling down, depressed, or hopeless: Not at all (0) [PHQ-2 Negative - No further assessment needed] : PHQ-2 Negative - No further assessment needed [Never] : Never [[id number]] : 0

## 2024-01-11 NOTE — HISTORY OF PRESENT ILLNESS
[FreeTextEntry1] : Referring Physician: Phi Dozier MD  Dear Dr. Dozier   Mrs. Upton was seen in the [facility] today. For our records, please allow me to summarize the history and my findings.   This pleasant 83 year old woman has a cardiovascular history significant for HTN and pAF.   She was hospitalized on 3/8/23 for pneumonia. She was noted to be in new onselt atrial fibrillation at the time with RVR. She subsequently converted on her own. Her Echo showed normal LV function and mild MR/MS. She was seen by Dr Dozier and started on Eliquis. She developed a rash on it, and it was stopped and switched to Xarelto. She however resumed Eliquis and has not had recurrent rash since.  She recently saw 5 Dr. Dozier and was noted to be in A-fib with RVR was sent to the hospital at that time was noted to have a pneumonia.  She self converted to sinus rhythm and then was started on Multaq.  She was discharged home but stopped Multaq earlier this week due to leg pain.  However the body aches were quickly followed by fever of 101.9.  This was on January 9.  She again today in the office has a fever of 101.  She has a cough and continues to feel body aches and fatigue.  Mrs. Upton denies any recent history of chest pain, shortness of breath, palpitations, dizziness, or syncope.

## 2024-01-11 NOTE — PHYSICAL EXAM
[Well Developed] : well developed [Well Nourished] : well nourished [No Acute Distress] : no acute distress [Normal Conjunctiva] : normal conjunctiva [Normal Venous Pressure] : normal venous pressure [No Carotid Bruit] : no carotid bruit [Normal] : normal S1, S2, no murmur, no rub, no gallop [Normal S1, S2] : normal S1, S2 [No Murmur] : no murmur [No Rub] : no rub [No Gallop] : no gallop [No Respiratory Distress] : no respiratory distress  [Soft] : abdomen soft [Non Tender] : non-tender [No Masses/organomegaly] : no masses/organomegaly [Normal Bowel Sounds] : normal bowel sounds [Normal Gait] : normal gait [No Edema] : no edema [No Cyanosis] : no cyanosis [No Clubbing] : no clubbing [No Varicosities] : no varicosities [No Rash] : no rash [No Skin Lesions] : no skin lesions [Moves all extremities] : moves all extremities [No Focal Deficits] : no focal deficits [Normal Speech] : normal speech [Alert and Oriented] : alert and oriented [Normal memory] : normal memory [de-identified] : Right lung clear with good air movement, left lung with mid to basal crackles

## 2024-01-12 ENCOUNTER — APPOINTMENT (OUTPATIENT)
Dept: INTERNAL MEDICINE | Facility: CLINIC | Age: 84
End: 2024-01-12
Payer: MEDICARE

## 2024-01-12 ENCOUNTER — INPATIENT (INPATIENT)
Facility: HOSPITAL | Age: 84
LOS: 8 days | Discharge: HOME HEALTH | DRG: 193 | End: 2024-01-21
Attending: STUDENT IN AN ORGANIZED HEALTH CARE EDUCATION/TRAINING PROGRAM | Admitting: STUDENT IN AN ORGANIZED HEALTH CARE EDUCATION/TRAINING PROGRAM
Payer: MEDICARE

## 2024-01-12 ENCOUNTER — NON-APPOINTMENT (OUTPATIENT)
Age: 84
End: 2024-01-12

## 2024-01-12 VITALS
OXYGEN SATURATION: 90 % | TEMPERATURE: 100.7 F | HEIGHT: 58 IN | WEIGHT: 137 LBS | BODY MASS INDEX: 28.76 KG/M2 | DIASTOLIC BLOOD PRESSURE: 60 MMHG | SYSTOLIC BLOOD PRESSURE: 117 MMHG

## 2024-01-12 VITALS
HEIGHT: 64 IN | TEMPERATURE: 101 F | OXYGEN SATURATION: 99 % | HEART RATE: 68 BPM | RESPIRATION RATE: 18 BRPM | SYSTOLIC BLOOD PRESSURE: 115 MMHG | DIASTOLIC BLOOD PRESSURE: 69 MMHG

## 2024-01-12 DIAGNOSIS — C85.90 NON-HODGKIN LYMPHOMA, UNSPECIFIED, UNSPECIFIED SITE: ICD-10-CM

## 2024-01-12 DIAGNOSIS — Z98.89 OTHER SPECIFIED POSTPROCEDURAL STATES: Chronic | ICD-10-CM

## 2024-01-12 DIAGNOSIS — E78.5 HYPERLIPIDEMIA, UNSPECIFIED: ICD-10-CM

## 2024-01-12 DIAGNOSIS — Z02.9 ENCOUNTER FOR ADMINISTRATIVE EXAMINATIONS, UNSPECIFIED: ICD-10-CM

## 2024-01-12 DIAGNOSIS — L72.9 FOLLICULAR CYST OF THE SKIN AND SUBCUTANEOUS TISSUE, UNSPECIFIED: Chronic | ICD-10-CM

## 2024-01-12 DIAGNOSIS — R50.9 FEVER, UNSPECIFIED: ICD-10-CM

## 2024-01-12 DIAGNOSIS — J18.9 PNEUMONIA, UNSPECIFIED ORGANISM: ICD-10-CM

## 2024-01-12 DIAGNOSIS — C82.90 FOLLICULAR LYMPHOMA, UNSPECIFIED, UNSPECIFIED SITE: ICD-10-CM

## 2024-01-12 DIAGNOSIS — I48.20 CHRONIC ATRIAL FIBRILLATION, UNSPECIFIED: ICD-10-CM

## 2024-01-12 DIAGNOSIS — I10 ESSENTIAL (PRIMARY) HYPERTENSION: ICD-10-CM

## 2024-01-12 LAB
ALBUMIN SERPL ELPH-MCNC: 3.4 G/DL — SIGNIFICANT CHANGE UP (ref 3.3–5)
ALBUMIN SERPL ELPH-MCNC: 3.4 G/DL — SIGNIFICANT CHANGE UP (ref 3.3–5)
ALBUMIN SERPL ELPH-MCNC: 3.8 G/DL
ALP BLD-CCNC: 41 U/L
ALP SERPL-CCNC: 33 U/L — LOW (ref 40–120)
ALP SERPL-CCNC: 33 U/L — LOW (ref 40–120)
ALT FLD-CCNC: 21 U/L — SIGNIFICANT CHANGE UP (ref 10–45)
ALT FLD-CCNC: 21 U/L — SIGNIFICANT CHANGE UP (ref 10–45)
ALT SERPL-CCNC: 17 U/L
ANION GAP SERPL CALC-SCNC: 12 MMOL/L — SIGNIFICANT CHANGE UP (ref 5–17)
ANION GAP SERPL CALC-SCNC: 12 MMOL/L — SIGNIFICANT CHANGE UP (ref 5–17)
ANION GAP SERPL CALC-SCNC: 16 MMOL/L
APPEARANCE UR: CLEAR — SIGNIFICANT CHANGE UP
APPEARANCE UR: CLEAR — SIGNIFICANT CHANGE UP
APTT BLD: 39 SEC — HIGH (ref 24.5–35.6)
APTT BLD: 39 SEC — HIGH (ref 24.5–35.6)
AST SERPL-CCNC: 36 U/L
AST SERPL-CCNC: 61 U/L — HIGH (ref 10–40)
AST SERPL-CCNC: 61 U/L — HIGH (ref 10–40)
BACTERIA # UR AUTO: NEGATIVE /HPF — SIGNIFICANT CHANGE UP
BACTERIA # UR AUTO: NEGATIVE /HPF — SIGNIFICANT CHANGE UP
BASE EXCESS BLDV CALC-SCNC: 4.5 MMOL/L — HIGH (ref -2–3)
BASE EXCESS BLDV CALC-SCNC: 4.5 MMOL/L — HIGH (ref -2–3)
BASOPHILS # BLD AUTO: 0.01 K/UL — SIGNIFICANT CHANGE UP (ref 0–0.2)
BASOPHILS # BLD AUTO: 0.01 K/UL — SIGNIFICANT CHANGE UP (ref 0–0.2)
BASOPHILS # BLD AUTO: 0.03 K/UL
BASOPHILS NFR BLD AUTO: 0.2 % — SIGNIFICANT CHANGE UP (ref 0–2)
BASOPHILS NFR BLD AUTO: 0.2 % — SIGNIFICANT CHANGE UP (ref 0–2)
BASOPHILS NFR BLD AUTO: 0.4 %
BILIRUB SERPL-MCNC: 0.5 MG/DL
BILIRUB SERPL-MCNC: 0.6 MG/DL — SIGNIFICANT CHANGE UP (ref 0.2–1.2)
BILIRUB SERPL-MCNC: 0.6 MG/DL — SIGNIFICANT CHANGE UP (ref 0.2–1.2)
BILIRUB UR-MCNC: NEGATIVE — SIGNIFICANT CHANGE UP
BILIRUB UR-MCNC: NEGATIVE — SIGNIFICANT CHANGE UP
BUN SERPL-MCNC: 16 MG/DL
BUN SERPL-MCNC: 22 MG/DL — SIGNIFICANT CHANGE UP (ref 7–23)
BUN SERPL-MCNC: 22 MG/DL — SIGNIFICANT CHANGE UP (ref 7–23)
CA-I SERPL-SCNC: 1.08 MMOL/L — LOW (ref 1.15–1.33)
CA-I SERPL-SCNC: 1.08 MMOL/L — LOW (ref 1.15–1.33)
CALCIUM SERPL-MCNC: 8.4 MG/DL — SIGNIFICANT CHANGE UP (ref 8.4–10.5)
CALCIUM SERPL-MCNC: 8.4 MG/DL — SIGNIFICANT CHANGE UP (ref 8.4–10.5)
CALCIUM SERPL-MCNC: 8.8 MG/DL
CAST: 2 /LPF — SIGNIFICANT CHANGE UP (ref 0–4)
CAST: 2 /LPF — SIGNIFICANT CHANGE UP (ref 0–4)
CHLORIDE BLDV-SCNC: 96 MMOL/L — SIGNIFICANT CHANGE UP (ref 96–108)
CHLORIDE BLDV-SCNC: 96 MMOL/L — SIGNIFICANT CHANGE UP (ref 96–108)
CHLORIDE SERPL-SCNC: 96 MMOL/L
CHLORIDE SERPL-SCNC: 96 MMOL/L — SIGNIFICANT CHANGE UP (ref 96–108)
CHLORIDE SERPL-SCNC: 96 MMOL/L — SIGNIFICANT CHANGE UP (ref 96–108)
CO2 BLDV-SCNC: 30 MMOL/L — HIGH (ref 22–26)
CO2 BLDV-SCNC: 30 MMOL/L — HIGH (ref 22–26)
CO2 SERPL-SCNC: 24 MMOL/L
CO2 SERPL-SCNC: 26 MMOL/L — SIGNIFICANT CHANGE UP (ref 22–31)
CO2 SERPL-SCNC: 26 MMOL/L — SIGNIFICANT CHANGE UP (ref 22–31)
COLOR SPEC: SIGNIFICANT CHANGE UP
COLOR SPEC: SIGNIFICANT CHANGE UP
CREAT SERPL-MCNC: 0.68 MG/DL
CREAT SERPL-MCNC: 0.7 MG/DL — SIGNIFICANT CHANGE UP (ref 0.5–1.3)
CREAT SERPL-MCNC: 0.7 MG/DL — SIGNIFICANT CHANGE UP (ref 0.5–1.3)
DIFF PNL FLD: NEGATIVE — SIGNIFICANT CHANGE UP
DIFF PNL FLD: NEGATIVE — SIGNIFICANT CHANGE UP
EGFR: 86 ML/MIN/1.73M2
EGFR: 86 ML/MIN/1.73M2 — SIGNIFICANT CHANGE UP
EGFR: 86 ML/MIN/1.73M2 — SIGNIFICANT CHANGE UP
EOSINOPHIL # BLD AUTO: 0 K/UL
EOSINOPHIL # BLD AUTO: 0 K/UL — SIGNIFICANT CHANGE UP (ref 0–0.5)
EOSINOPHIL # BLD AUTO: 0 K/UL — SIGNIFICANT CHANGE UP (ref 0–0.5)
EOSINOPHIL NFR BLD AUTO: 0 %
EOSINOPHIL NFR BLD AUTO: 0 % — SIGNIFICANT CHANGE UP (ref 0–6)
EOSINOPHIL NFR BLD AUTO: 0 % — SIGNIFICANT CHANGE UP (ref 0–6)
FLUAV AG NPH QL: SIGNIFICANT CHANGE UP
FLUAV AG NPH QL: SIGNIFICANT CHANGE UP
FLUBV AG NPH QL: SIGNIFICANT CHANGE UP
FLUBV AG NPH QL: SIGNIFICANT CHANGE UP
GAS PNL BLDV: 129 MMOL/L — LOW (ref 136–145)
GAS PNL BLDV: 129 MMOL/L — LOW (ref 136–145)
GAS PNL BLDV: SIGNIFICANT CHANGE UP
GLUCOSE BLDV-MCNC: 109 MG/DL — HIGH (ref 70–99)
GLUCOSE BLDV-MCNC: 109 MG/DL — HIGH (ref 70–99)
GLUCOSE SERPL-MCNC: 118 MG/DL — HIGH (ref 70–99)
GLUCOSE SERPL-MCNC: 118 MG/DL — HIGH (ref 70–99)
GLUCOSE SERPL-MCNC: 95 MG/DL
GLUCOSE UR QL: NEGATIVE MG/DL — SIGNIFICANT CHANGE UP
GLUCOSE UR QL: NEGATIVE MG/DL — SIGNIFICANT CHANGE UP
HCO3 BLDV-SCNC: 29 MMOL/L — SIGNIFICANT CHANGE UP (ref 22–29)
HCO3 BLDV-SCNC: 29 MMOL/L — SIGNIFICANT CHANGE UP (ref 22–29)
HCT VFR BLD CALC: 40.8 % — SIGNIFICANT CHANGE UP (ref 34.5–45)
HCT VFR BLD CALC: 40.8 % — SIGNIFICANT CHANGE UP (ref 34.5–45)
HCT VFR BLD CALC: 44.2 %
HCT VFR BLDA CALC: 39 % — SIGNIFICANT CHANGE UP (ref 34.5–46.5)
HCT VFR BLDA CALC: 39 % — SIGNIFICANT CHANGE UP (ref 34.5–46.5)
HGB BLD CALC-MCNC: 13.1 G/DL — SIGNIFICANT CHANGE UP (ref 11.7–16.1)
HGB BLD CALC-MCNC: 13.1 G/DL — SIGNIFICANT CHANGE UP (ref 11.7–16.1)
HGB BLD-MCNC: 13.7 G/DL — SIGNIFICANT CHANGE UP (ref 11.5–15.5)
HGB BLD-MCNC: 13.7 G/DL — SIGNIFICANT CHANGE UP (ref 11.5–15.5)
HGB BLD-MCNC: 13.9 G/DL
HMPV RNA SPEC QL NAA+PROBE: DETECTED
IMM GRANULOCYTES NFR BLD AUTO: 0.1 %
IMM GRANULOCYTES NFR BLD AUTO: 0.3 % — SIGNIFICANT CHANGE UP (ref 0–0.9)
IMM GRANULOCYTES NFR BLD AUTO: 0.3 % — SIGNIFICANT CHANGE UP (ref 0–0.9)
INR BLD: 2.24 RATIO — HIGH (ref 0.85–1.18)
INR BLD: 2.24 RATIO — HIGH (ref 0.85–1.18)
KETONES UR-MCNC: ABNORMAL MG/DL
KETONES UR-MCNC: ABNORMAL MG/DL
LACTATE BLDV-MCNC: 1.7 MMOL/L — SIGNIFICANT CHANGE UP (ref 0.5–2)
LACTATE BLDV-MCNC: 1.7 MMOL/L — SIGNIFICANT CHANGE UP (ref 0.5–2)
LEUKOCYTE ESTERASE UR-ACNC: ABNORMAL
LEUKOCYTE ESTERASE UR-ACNC: ABNORMAL
LYMPHOCYTES # BLD AUTO: 0.54 K/UL — LOW (ref 1–3.3)
LYMPHOCYTES # BLD AUTO: 0.54 K/UL — LOW (ref 1–3.3)
LYMPHOCYTES # BLD AUTO: 0.63 K/UL
LYMPHOCYTES # BLD AUTO: 9 % — LOW (ref 13–44)
LYMPHOCYTES # BLD AUTO: 9 % — LOW (ref 13–44)
LYMPHOCYTES NFR BLD AUTO: 9 %
MAN DIFF?: NORMAL
MCHC RBC-ENTMCNC: 28.7 PG
MCHC RBC-ENTMCNC: 28.8 PG — SIGNIFICANT CHANGE UP (ref 27–34)
MCHC RBC-ENTMCNC: 28.8 PG — SIGNIFICANT CHANGE UP (ref 27–34)
MCHC RBC-ENTMCNC: 31.4 GM/DL
MCHC RBC-ENTMCNC: 33.6 GM/DL — SIGNIFICANT CHANGE UP (ref 32–36)
MCHC RBC-ENTMCNC: 33.6 GM/DL — SIGNIFICANT CHANGE UP (ref 32–36)
MCV RBC AUTO: 85.7 FL — SIGNIFICANT CHANGE UP (ref 80–100)
MCV RBC AUTO: 85.7 FL — SIGNIFICANT CHANGE UP (ref 80–100)
MCV RBC AUTO: 91.1 FL
MONOCYTES # BLD AUTO: 0.44 K/UL — SIGNIFICANT CHANGE UP (ref 0–0.9)
MONOCYTES # BLD AUTO: 0.44 K/UL — SIGNIFICANT CHANGE UP (ref 0–0.9)
MONOCYTES # BLD AUTO: 0.58 K/UL
MONOCYTES NFR BLD AUTO: 7.4 % — SIGNIFICANT CHANGE UP (ref 2–14)
MONOCYTES NFR BLD AUTO: 7.4 % — SIGNIFICANT CHANGE UP (ref 2–14)
MONOCYTES NFR BLD AUTO: 8.3 %
NEUTROPHILS # BLD AUTO: 4.96 K/UL — SIGNIFICANT CHANGE UP (ref 1.8–7.4)
NEUTROPHILS # BLD AUTO: 4.96 K/UL — SIGNIFICANT CHANGE UP (ref 1.8–7.4)
NEUTROPHILS # BLD AUTO: 5.72 K/UL
NEUTROPHILS NFR BLD AUTO: 82.2 %
NEUTROPHILS NFR BLD AUTO: 83.1 % — HIGH (ref 43–77)
NEUTROPHILS NFR BLD AUTO: 83.1 % — HIGH (ref 43–77)
NITRITE UR-MCNC: NEGATIVE — SIGNIFICANT CHANGE UP
NITRITE UR-MCNC: NEGATIVE — SIGNIFICANT CHANGE UP
NRBC # BLD: 0 /100 WBCS — SIGNIFICANT CHANGE UP (ref 0–0)
NRBC # BLD: 0 /100 WBCS — SIGNIFICANT CHANGE UP (ref 0–0)
PCO2 BLDV: 43 MMHG — HIGH (ref 39–42)
PCO2 BLDV: 43 MMHG — HIGH (ref 39–42)
PH BLDV: 7.44 — HIGH (ref 7.32–7.43)
PH BLDV: 7.44 — HIGH (ref 7.32–7.43)
PH UR: 5.5 — SIGNIFICANT CHANGE UP (ref 5–8)
PH UR: 5.5 — SIGNIFICANT CHANGE UP (ref 5–8)
PLATELET # BLD AUTO: 199 K/UL
PLATELET # BLD AUTO: 226 K/UL — SIGNIFICANT CHANGE UP (ref 150–400)
PLATELET # BLD AUTO: 226 K/UL — SIGNIFICANT CHANGE UP (ref 150–400)
PO2 BLDV: 31 MMHG — SIGNIFICANT CHANGE UP (ref 25–45)
PO2 BLDV: 31 MMHG — SIGNIFICANT CHANGE UP (ref 25–45)
POTASSIUM BLDV-SCNC: 3.5 MMOL/L — SIGNIFICANT CHANGE UP (ref 3.5–5.1)
POTASSIUM BLDV-SCNC: 3.5 MMOL/L — SIGNIFICANT CHANGE UP (ref 3.5–5.1)
POTASSIUM SERPL-MCNC: 4.6 MMOL/L — SIGNIFICANT CHANGE UP (ref 3.5–5.3)
POTASSIUM SERPL-MCNC: 4.6 MMOL/L — SIGNIFICANT CHANGE UP (ref 3.5–5.3)
POTASSIUM SERPL-SCNC: 3.3 MMOL/L
POTASSIUM SERPL-SCNC: 4.6 MMOL/L — SIGNIFICANT CHANGE UP (ref 3.5–5.3)
POTASSIUM SERPL-SCNC: 4.6 MMOL/L — SIGNIFICANT CHANGE UP (ref 3.5–5.3)
PROCALCITONIN SERPL-MCNC: 0.1 NG/ML
PROT SERPL-MCNC: 6.8 G/DL
PROT SERPL-MCNC: 6.8 G/DL — SIGNIFICANT CHANGE UP (ref 6–8.3)
PROT SERPL-MCNC: 6.8 G/DL — SIGNIFICANT CHANGE UP (ref 6–8.3)
PROT UR-MCNC: 100 MG/DL
PROT UR-MCNC: 100 MG/DL
PROTHROM AB SERPL-ACNC: 23 SEC — HIGH (ref 9.5–13)
PROTHROM AB SERPL-ACNC: 23 SEC — HIGH (ref 9.5–13)
RAPID RVP RESULT: DETECTED
RBC # BLD: 4.76 M/UL — SIGNIFICANT CHANGE UP (ref 3.8–5.2)
RBC # BLD: 4.76 M/UL — SIGNIFICANT CHANGE UP (ref 3.8–5.2)
RBC # BLD: 4.85 M/UL
RBC # FLD: 13.6 % — SIGNIFICANT CHANGE UP (ref 10.3–14.5)
RBC # FLD: 13.6 % — SIGNIFICANT CHANGE UP (ref 10.3–14.5)
RBC # FLD: 14 %
RBC CASTS # UR COMP ASSIST: 6 /HPF — HIGH (ref 0–4)
RBC CASTS # UR COMP ASSIST: 6 /HPF — HIGH (ref 0–4)
REVIEW: SIGNIFICANT CHANGE UP
REVIEW: SIGNIFICANT CHANGE UP
RSV RNA NPH QL NAA+NON-PROBE: SIGNIFICANT CHANGE UP
RSV RNA NPH QL NAA+NON-PROBE: SIGNIFICANT CHANGE UP
SAO2 % BLDV: 48.7 % — LOW (ref 67–88)
SAO2 % BLDV: 48.7 % — LOW (ref 67–88)
SARS-COV-2 RNA PNL RESP NAA+PROBE: NOT DETECTED
SARS-COV-2 RNA SPEC QL NAA+PROBE: SIGNIFICANT CHANGE UP
SARS-COV-2 RNA SPEC QL NAA+PROBE: SIGNIFICANT CHANGE UP
SODIUM SERPL-SCNC: 134 MMOL/L — LOW (ref 135–145)
SODIUM SERPL-SCNC: 134 MMOL/L — LOW (ref 135–145)
SODIUM SERPL-SCNC: 136 MMOL/L
SP GR SPEC: 1.02 — SIGNIFICANT CHANGE UP (ref 1–1.03)
SP GR SPEC: 1.02 — SIGNIFICANT CHANGE UP (ref 1–1.03)
SQUAMOUS # UR AUTO: 6 /HPF — HIGH (ref 0–5)
SQUAMOUS # UR AUTO: 6 /HPF — HIGH (ref 0–5)
UROBILINOGEN FLD QL: 0.2 MG/DL — SIGNIFICANT CHANGE UP (ref 0.2–1)
UROBILINOGEN FLD QL: 0.2 MG/DL — SIGNIFICANT CHANGE UP (ref 0.2–1)
WBC # BLD: 5.97 K/UL — SIGNIFICANT CHANGE UP (ref 3.8–10.5)
WBC # BLD: 5.97 K/UL — SIGNIFICANT CHANGE UP (ref 3.8–10.5)
WBC # FLD AUTO: 5.97 K/UL — SIGNIFICANT CHANGE UP (ref 3.8–10.5)
WBC # FLD AUTO: 5.97 K/UL — SIGNIFICANT CHANGE UP (ref 3.8–10.5)
WBC # FLD AUTO: 6.97 K/UL
WBC UR QL: 2 /HPF — SIGNIFICANT CHANGE UP (ref 0–5)
WBC UR QL: 2 /HPF — SIGNIFICANT CHANGE UP (ref 0–5)

## 2024-01-12 PROCEDURE — 99285 EMERGENCY DEPT VISIT HI MDM: CPT | Mod: GC

## 2024-01-12 PROCEDURE — 99223 1ST HOSP IP/OBS HIGH 75: CPT

## 2024-01-12 PROCEDURE — 99215 OFFICE O/P EST HI 40 MIN: CPT

## 2024-01-12 PROCEDURE — 71045 X-RAY EXAM CHEST 1 VIEW: CPT | Mod: 26

## 2024-01-12 PROCEDURE — G2211 COMPLEX E/M VISIT ADD ON: CPT

## 2024-01-12 RX ORDER — PIPERACILLIN AND TAZOBACTAM 4; .5 G/20ML; G/20ML
3.38 INJECTION, POWDER, LYOPHILIZED, FOR SOLUTION INTRAVENOUS EVERY 8 HOURS
Refills: 0 | Status: COMPLETED | OUTPATIENT
Start: 2024-01-13 | End: 2024-01-19

## 2024-01-12 RX ORDER — APIXABAN 2.5 MG/1
5 TABLET, FILM COATED ORAL EVERY 12 HOURS
Refills: 0 | Status: DISCONTINUED | OUTPATIENT
Start: 2024-01-13 | End: 2024-01-21

## 2024-01-12 RX ORDER — SIMVASTATIN 20 MG/1
1 TABLET, FILM COATED ORAL
Refills: 0 | DISCHARGE

## 2024-01-12 RX ORDER — APIXABAN 2.5 MG/1
1 TABLET, FILM COATED ORAL
Refills: 0 | DISCHARGE

## 2024-01-12 RX ORDER — ALBUTEROL 90 UG/1
2.5 AEROSOL, METERED ORAL EVERY 6 HOURS
Refills: 0 | Status: DISCONTINUED | OUTPATIENT
Start: 2024-01-12 | End: 2024-01-13

## 2024-01-12 RX ORDER — METOPROLOL TARTRATE 50 MG
100 TABLET ORAL DAILY
Refills: 0 | Status: DISCONTINUED | OUTPATIENT
Start: 2024-01-12 | End: 2024-01-21

## 2024-01-12 RX ORDER — METOPROLOL TARTRATE 50 MG
1.5 TABLET ORAL
Refills: 0 | DISCHARGE

## 2024-01-12 RX ORDER — CHOLECALCIFEROL (VITAMIN D3) 125 MCG
1000 CAPSULE ORAL DAILY
Refills: 0 | Status: DISCONTINUED | OUTPATIENT
Start: 2024-01-12 | End: 2024-01-21

## 2024-01-12 RX ORDER — PIPERACILLIN AND TAZOBACTAM 4; .5 G/20ML; G/20ML
3.38 INJECTION, POWDER, LYOPHILIZED, FOR SOLUTION INTRAVENOUS ONCE
Refills: 0 | Status: COMPLETED | OUTPATIENT
Start: 2024-01-12 | End: 2024-01-12

## 2024-01-12 RX ORDER — SIMVASTATIN 20 MG/1
1 TABLET, FILM COATED ORAL
Qty: 0 | Refills: 0 | DISCHARGE

## 2024-01-12 RX ORDER — HYDROCHLOROTHIAZIDE 25 MG
1 TABLET ORAL
Refills: 0 | DISCHARGE

## 2024-01-12 RX ORDER — ACETAMINOPHEN 500 MG
650 TABLET ORAL EVERY 6 HOURS
Refills: 0 | Status: DISCONTINUED | OUTPATIENT
Start: 2024-01-12 | End: 2024-01-21

## 2024-01-12 RX ORDER — SODIUM CHLORIDE 9 MG/ML
1000 INJECTION INTRAMUSCULAR; INTRAVENOUS; SUBCUTANEOUS ONCE
Refills: 0 | Status: COMPLETED | OUTPATIENT
Start: 2024-01-12 | End: 2024-01-12

## 2024-01-12 RX ORDER — METOPROLOL TARTRATE 50 MG
1 TABLET ORAL
Qty: 0 | Refills: 0 | DISCHARGE

## 2024-01-12 RX ORDER — PIPERACILLIN AND TAZOBACTAM 4; .5 G/20ML; G/20ML
3.38 INJECTION, POWDER, LYOPHILIZED, FOR SOLUTION INTRAVENOUS ONCE
Refills: 0 | Status: DISCONTINUED | OUTPATIENT
Start: 2024-01-12 | End: 2024-01-12

## 2024-01-12 RX ORDER — DRONEDARONE 400 MG/1
400 TABLET, FILM COATED ORAL
Refills: 0 | Status: DISCONTINUED | OUTPATIENT
Start: 2024-01-13 | End: 2024-01-17

## 2024-01-12 RX ORDER — SIMVASTATIN 20 MG/1
10 TABLET, FILM COATED ORAL AT BEDTIME
Refills: 0 | Status: DISCONTINUED | OUTPATIENT
Start: 2024-01-12 | End: 2024-01-21

## 2024-01-12 RX ORDER — LANOLIN ALCOHOL/MO/W.PET/CERES
3 CREAM (GRAM) TOPICAL AT BEDTIME
Refills: 0 | Status: DISCONTINUED | OUTPATIENT
Start: 2024-01-12 | End: 2024-01-21

## 2024-01-12 RX ORDER — ASPIRIN/CALCIUM CARB/MAGNESIUM 324 MG
1 TABLET ORAL
Qty: 0 | Refills: 0 | DISCHARGE

## 2024-01-12 RX ORDER — HYDROCHLOROTHIAZIDE 25 MG
1 TABLET ORAL
Qty: 0 | Refills: 0 | DISCHARGE

## 2024-01-12 RX ORDER — CHOLECALCIFEROL (VITAMIN D3) 125 MCG
1 CAPSULE ORAL
Qty: 0 | Refills: 0 | DISCHARGE

## 2024-01-12 RX ORDER — MULTIVIT-MIN/FERROUS GLUCONATE 9 MG/15 ML
1 LIQUID (ML) ORAL
Qty: 0 | Refills: 0 | DISCHARGE

## 2024-01-12 RX ORDER — ERGOCALCIFEROL 1.25 MG/1
0 CAPSULE ORAL
Refills: 0 | DISCHARGE

## 2024-01-12 RX ORDER — ACETAMINOPHEN 500 MG
650 TABLET ORAL ONCE
Refills: 0 | Status: COMPLETED | OUTPATIENT
Start: 2024-01-12 | End: 2024-01-12

## 2024-01-12 RX ADMIN — PIPERACILLIN AND TAZOBACTAM 200 GRAM(S): 4; .5 INJECTION, POWDER, LYOPHILIZED, FOR SOLUTION INTRAVENOUS at 18:35

## 2024-01-12 RX ADMIN — SODIUM CHLORIDE 1000 MILLILITER(S): 9 INJECTION INTRAMUSCULAR; INTRAVENOUS; SUBCUTANEOUS at 16:05

## 2024-01-12 RX ADMIN — Medication 650 MILLIGRAM(S): at 16:05

## 2024-01-12 RX ADMIN — PIPERACILLIN AND TAZOBACTAM 25 GRAM(S): 4; .5 INJECTION, POWDER, LYOPHILIZED, FOR SOLUTION INTRAVENOUS at 22:13

## 2024-01-12 RX ADMIN — SIMVASTATIN 10 MILLIGRAM(S): 20 TABLET, FILM COATED ORAL at 23:10

## 2024-01-12 NOTE — H&P ADULT - ASSESSMENT
83F hx AF on Eliquis, HTN, HLD, follicular lymphoma admitted with acute hypoxic respiratory failure due to LLL pneumonia

## 2024-01-12 NOTE — H&P ADULT - PROBLEM SELECTOR PLAN 5
Diagnosed and treated with radiation in 2014- patient/daughter report currently in remission  Outpatient follow up every 6 months with Dr. Shaun Camacho

## 2024-01-12 NOTE — H&P ADULT - PROBLEM SELECTOR PLAN 2
Currently in SR  Unable to locate EKG in ED- check repeat in AM upon arrival on unit  Continue Multaq, Metoprolol, Eliquis

## 2024-01-12 NOTE — H&P ADULT - PROBLEM SELECTOR PLAN 1
With acute hypoxic respiratory failure  RVP negative  Started on Zosyn in ED-  will continue given failure of outpatient cephalexin and lack of interaction with antiarrhythmic   Albuterol nebs  Supplemental O2  Mucolytics With acute hypoxic respiratory failure  RVP negative  Started on Zosyn in ED-  will continue given failure of outpatient cephalexin and lack of interaction with antiarrhythmic   Check MRSA/MSSA PCR, urine legionella  Albuterol nebs  Supplemental O2  Mucolytics

## 2024-01-12 NOTE — H&P ADULT - TIME BILLING
Time-based billing (NON-critical care).     The necessity of the time spent during the encounter on this date of service was due to:     - Ordering, reviewing, and interpreting labs, testing, and imaging.  - Independently obtaining a review of systems and performing a physical exam  - Reviewing prior hospitalization and where necessary, outpatient records.  - Counselling and educating patient and/or family regarding interpretation of aforementioned items and plan of care.

## 2024-01-12 NOTE — ED PROVIDER NOTE - CLINICAL SUMMARY MEDICAL DECISION MAKING FREE TEXT BOX
84yo F PMHx  of HTN, HLD, PVD (on f/u vascular surgeon) Afib (on Eliquis, Metoprolol 150mg daily) with known LLL pneumonia, febrile in ER and hypoxic to 88% on 4L - TBA with abx, IVF, labs, antipyretics. 82yo F PMHx  of folliculr lymphoma, HTN, HLD, PVD (on f/u vascular surgeon) Afib (on Eliquis, Metoprolol 150mg daily) with known LLL pneumonia, febrile in ER and hypoxic to 88% on 4L - TBA with abx, IVF, labs, antipyretics. 82yo F PMHx  of folliculr lymphoma, HTN, HLD, PVD (on f/u vascular surgeon) Afib (on Eliquis, Metoprolol 150mg daily) with known LLL pneumonia, febrile in ER and hypoxic to 88% on 4L - TBA with abx, IVF, labs, antipyretics.     Attending note.  Patient with few days of cough, fever and recent shortness of breath.  Patient failed outpatient treatment with cefdinir and doxycycline.  Chest x-ray yesterday showed left lower lobe infiltrate.  Patient was sent to the emergency department for admission.  IV antibiotics, labs, nasal swabs.  Admission.  Supplemental oxygen.

## 2024-01-12 NOTE — ED PROVIDER NOTE - OBJECTIVE STATEMENT
84 yo PMHx of HTN, HLD, PVD (on f/u vascular surgeon) Afib (on Eliquis, Metoprolol 150mg daily) presents with SOB - found to have LLL pneumonia on outpatient CXR today. She has had fevers 4 days - she was HMPV+ outpatient, found to be hypoxic to 88% on room air, placed on nasal cannula. Coming from primary care's office. She has been receiving antibiotics outpatient (cefdinir for presumed UTI since Tuesday). 84 yo PMHx of follicular lymphoma, HTN, HLD, PVD (on f/u vascular surgeon) Afib (on Eliquis, Metoprolol 150mg daily) presents with SOB - found to have LLL pneumonia on outpatient CXR today. She has had fevers 4 days - she was HMPV+ outpatient, found to be hypoxic to 88% on room air, placed on nasal cannula. Coming from primary care's office. She has been receiving antibiotics outpatient (cefdinir for presumed UTI since Tuesday). 82 yo PMHx of follicular lymphoma, HTN, HLD, PVD (on f/u vascular surgeon) Afib (on Eliquis, Metoprolol 150mg daily) presents with SOB - found to have LLL pneumonia on outpatient CXR today. She has had fevers 4 days - she was HMPV+ outpatient, found to be hypoxic to 88% on room air, placed on nasal cannula. Coming from primary care's office. She has been receiving antibiotics outpatient (cefdinir for presumed UTI since Tuesday). 82 yo PMHx of follicular lymphoma, HTN, HLD, PVD (on f/u vascular surgeon) Afib (on Eliquis, Metoprolol 150mg daily) presents with SOB - found to have LLL pneumonia on outpatient CXR today. She has had fevers 4 days - she was HMPV+ outpatient, found to be hypoxic to 88% on room air, placed on nasal cannula. Coming from primary care's office. She has been receiving antibiotics outpatient (cefdinir for presumed UTI since Tuesday).      Attending note.  Patient was seen in [facility] room #5.  Agree with above.  Patient is complaining of cough and fever for the last several days.  She was seen by her PCP on Tuesday and started on cefdinir.  She continues to have symptoms and was seen yesterday and prescribed doxycycline.  Patient went back to the PCP today and was sent to the emergency department for admission.  Patient had chest x-ray yesterday which showed left lower lobe pneumonia.  She has no history of prior pneumonia.  She does have a history of lymphoma, but patient's daughter states she is currently in remission.  She has a history of hypertension and hyperlipidemia.  She has no history of cancer, stroke or CAD.  She has no allergies to medication.  She has not no history of smoking.  She has past medical history of atrial fibrillation for which she takes Eliquis.  Patient reports dry nonproductive cough and some dyspnea on exertion.  She denies any chest pain, nausea.  She does have generalized weakness and fatigue and poor appetite.  Patient lives alone.  Patient was last hospitalized approximately 10 days ago for atrial fibrillation with RVR and discharged following day. 84 yo PMHx of follicular lymphoma, HTN, HLD, PVD (on f/u vascular surgeon) Afib (on Eliquis, Metoprolol 150mg daily) presents with SOB - found to have LLL pneumonia on outpatient CXR today. She has had fevers 4 days - she was HMPV+ outpatient, found to be hypoxic to 88% on room air, placed on nasal cannula. Coming from primary care's office. She has been receiving antibiotics outpatient (cefdinir for presumed UTI since Tuesday).      Attending note.  Patient was seen in [facility] room #5.  Agree with above.  Patient is complaining of cough and fever for the last several days.  She was seen by her PCP on Tuesday and started on cefdinir.  She continues to have symptoms and was seen yesterday and prescribed doxycycline.  Patient went back to the PCP today and was sent to the emergency department for admission.  Patient had chest x-ray yesterday which showed left lower lobe pneumonia.  She has no history of prior pneumonia.  She does have a history of lymphoma, but patient's daughter states she is currently in remission.  She has a history of hypertension and hyperlipidemia.  She has no history of cancer, stroke or CAD.  She has no allergies to medication.  She has not no history of smoking.  She has past medical history of atrial fibrillation for which she takes Eliquis.  Patient reports dry nonproductive cough and some dyspnea on exertion.  She denies any chest pain, nausea.  She does have generalized weakness and fatigue and poor appetite.  Patient lives alone.  Patient was last hospitalized approximately 10 days ago for atrial fibrillation with RVR and discharged following day.

## 2024-01-12 NOTE — H&P ADULT - HISTORY OF PRESENT ILLNESS
83F hx AF on Eliquis, HTN, HLD, follicular lymphoma sent to ED from PCP's office with hypoxia and PNA. Patient was seen earlier this week at PCP's office and diagnosed with LLL PNA and started on cephalexin. She returned to the office yesterday, still feeling poorly, and was started on doxycycline. She returned today and was noted to be hypoxic to the 80's and sent to the ED by EMS on supplemental oxygen for further treatment. Imaging here confirmed LLL PNA as well. Patient c/o fever/chills, weakness, and non-productive cough. Denies dyspnea

## 2024-01-12 NOTE — ED PROVIDER NOTE - PHYSICAL EXAMINATION
GENERAL: no acute distress, non-toxic appearing  HEAD: normocephalic, atraumatic  HEENT: normal conjunctiva, oral mucosa moist, neck supple  CARDIAC: regular rate and rhythm, normal S1 and S2,  no appreciable murmurs  PULM: +4L O2, clear to ascultation bilaterally, no crackles, rales, rhonchi, or wheezing  GI: abdomen nondistended, soft, nontender, no guarding or rebound tenderness  NEURO: alert and oriented x 3, normal speech, moving all extremities   MSK: no visible deformities, no peripheral edema, calf tenderness/redness/swelling  SKIN: no visible rashes, dry, well-perfused  PSYCH: appropriate mood and affect GENERAL: no acute distress, non-toxic appearing  HEAD: normocephalic, atraumatic  HEENT: normal conjunctiva, oral mucosa moist, neck supple  CARDIAC: regular rate and rhythm, normal S1 and S2,  no appreciable murmurs  PULM: +4L O2, clear to ascultation bilaterally, no crackles, rales, rhonchi, or wheezing  GI: abdomen nondistended, soft, nontender, no guarding or rebound tenderness  NEURO: alert and oriented x 3, normal speech, moving all extremities   MSK: no visible deformities, no peripheral edema, calf tenderness/redness/swelling  SKIN: no visible rashes, dry, well-perfused  PSYCH: appropriate mood and affect     Attn - alert, NAD, O2Sat 96% on 4 LPM NC.  no pallor or jaundice, PERRL 3 mm, moist mm, skin - warm and dry, Lungs - rhonchi left chest, good BS bilaterally, Cor - rr, no M, no rub, Abdo - ND, soft, NT, no HSM, no CVAT, no guarding or rebound. Extremities - no edema, no calf tenderness, distal pulses intact and symmetrical, Neuro - intact and non-focal

## 2024-01-12 NOTE — PHYSICAL EXAM
[Normal Sclera/Conjunctiva] : normal sclera/conjunctiva [PERRL] : pupils equal round and reactive to light [EOMI] : extraocular movements intact [Normal Outer Ear/Nose] : the outer ears and nose were normal in appearance [Normal Oropharynx] : the oropharynx was normal [No JVD] : no jugular venous distention [No Lymphadenopathy] : no lymphadenopathy [Supple] : supple [Thyroid Normal, No Nodules] : the thyroid was normal and there were no nodules present [No Respiratory Distress] : no respiratory distress  [No Accessory Muscle Use] : no accessory muscle use [Normal Rate] : normal rate  [Regular Rhythm] : with a regular rhythm [Normal S1, S2] : normal S1 and S2 [No Murmur] : no murmur heard [No Carotid Bruits] : no carotid bruits [No Abdominal Bruit] : a ~M bruit was not heard ~T in the abdomen [No Varicosities] : no varicosities [Pedal Pulses Present] : the pedal pulses are present [No Edema] : there was no peripheral edema [No Palpable Aorta] : no palpable aorta [No Extremity Clubbing/Cyanosis] : no extremity clubbing/cyanosis [Soft] : abdomen soft [Non Tender] : non-tender [Non-distended] : non-distended [Normal Posterior Cervical Nodes] : no posterior cervical lymphadenopathy [Normal Anterior Cervical Nodes] : no anterior cervical lymphadenopathy [No CVA Tenderness] : no CVA  tenderness [No Spinal Tenderness] : no spinal tenderness [No Joint Swelling] : no joint swelling [Grossly Normal Strength/Tone] : grossly normal strength/tone [No Rash] : no rash [Coordination Grossly Intact] : coordination grossly intact [No Focal Deficits] : no focal deficits [Normal Gait] : normal gait [Normal Affect] : the affect was normal [Deep Tendon Reflexes (DTR)] : deep tendon reflexes were 2+ and symmetric [Normal Insight/Judgement] : insight and judgment were intact [Normal Bowel Sounds] : normal bowel sounds [Alert and Oriented x3] : oriented to person, place, and time [de-identified] : fatigued appearing 02 at 90% on RA [de-identified] : 02 sat 90% on RA, crackles left side

## 2024-01-12 NOTE — HISTORY OF PRESENT ILLNESS
[Other: _____] : [unfilled] [FreeTextEntry1] : f/u eval for cough/fever [de-identified] : This is an 84 y/o female with a pmhx of pAF, pre-DM, HLD, HTN who returns today for f/u eval for cough/fever. The pt was here yesterday with c/o fever x 3 days, shaking chills, cough, myalgia over the last few days. RVP positive for human metapneumovirus, normal WBC with left shift 82% and potassium low at 3.3. CXR showed LLL PNA. At this time, the patient reports continued shaking chills, ongoing cough. The pt's appetite is decreased. Takes low dose otc Tylenol. Denies chest pain, sob, tucker, dizziness, diaphoresis, palpitations, LE swelling, orthopnea, syncope, n/v, headache. Per daughter she is still weak despite taking doxy and cefdnir.

## 2024-01-12 NOTE — HEALTH RISK ASSESSMENT
[0] : 2) Feeling down, depressed, or hopeless: Not at all (0) [PHQ-2 Negative - No further assessment needed] : PHQ-2 Negative - No further assessment needed [Never] : Never [[id number]] : 0

## 2024-01-12 NOTE — ED ADULT NURSE NOTE - OBJECTIVE STATEMENT
83yF, PMH HLD, HTN, Afib w/ RVR- on eliquis and metoprolol, presents to the ED from PMD office c/o SOB x5 days. SOB associated with dry cough. Pt was seen by PCP and got chest xray taken showing L side consolidation and RVP came back positive for hmpv yesterday. At PMD pt febrile to 100.7F. On room air pt o2 sat 89-90%. On 3L supplemental O2, pt o2 sat 94-96%. Pt had been prescribed tessalon pearles, doxycycline, and cefdinir with no improvement of symptoms. Pt presents with daughter at bedside for collateral history, pt Beninese speaking only. Gross neuro intact, no difficulty speaking in complete sentences, pulses x 4, moving all extremities, abdomen soft nontender nondistended. 83yF, PMH HLD, HTN, Afib w/ RVR- on eliquis and metoprolol, presents to the ED from PMD office c/o SOB x5 days. SOB associated with dry cough. Pt was seen by PCP and got chest xray taken showing L side consolidation and RVP came back positive for hmpv yesterday. At PMD pt febrile to 100.7F. On room air pt o2 sat 89-90%. On 3L supplemental O2, pt o2 sat 94-96%. Pt had been prescribed tessalon pearles, doxycycline, and cefdinir with no improvement of symptoms. Pt presents with daughter at bedside for collateral history, pt [place]ian speaking only. Gross neuro intact, no difficulty speaking in complete sentences, pulses x 4, moving all extremities, abdomen soft nontender nondistended.

## 2024-01-12 NOTE — ED ADULT NURSE NOTE - NSICDXPASTMEDICALHX_GEN_ALL_CORE_FT
PAST MEDICAL HISTORY:  Afib     Atrial fibrillation     HTN (hypertension)     Hyperlipidemia     Lymph node enlargement left inguinal    Lymphoma     Osteoporosis

## 2024-01-13 LAB
ALBUMIN SERPL ELPH-MCNC: 3.2 G/DL — LOW (ref 3.3–5)
ALBUMIN SERPL ELPH-MCNC: 3.2 G/DL — LOW (ref 3.3–5)
ALP SERPL-CCNC: 34 U/L — LOW (ref 40–120)
ALP SERPL-CCNC: 34 U/L — LOW (ref 40–120)
ALT FLD-CCNC: 21 U/L — SIGNIFICANT CHANGE UP (ref 10–45)
ALT FLD-CCNC: 21 U/L — SIGNIFICANT CHANGE UP (ref 10–45)
ANION GAP SERPL CALC-SCNC: 11 MMOL/L — SIGNIFICANT CHANGE UP (ref 5–17)
ANION GAP SERPL CALC-SCNC: 11 MMOL/L — SIGNIFICANT CHANGE UP (ref 5–17)
ANION GAP SERPL CALC-SCNC: 13 MMOL/L — SIGNIFICANT CHANGE UP (ref 5–17)
ANION GAP SERPL CALC-SCNC: 13 MMOL/L — SIGNIFICANT CHANGE UP (ref 5–17)
AST SERPL-CCNC: 44 U/L — HIGH (ref 10–40)
AST SERPL-CCNC: 44 U/L — HIGH (ref 10–40)
BILIRUB SERPL-MCNC: 0.5 MG/DL — SIGNIFICANT CHANGE UP (ref 0.2–1.2)
BILIRUB SERPL-MCNC: 0.5 MG/DL — SIGNIFICANT CHANGE UP (ref 0.2–1.2)
BUN SERPL-MCNC: 18 MG/DL — SIGNIFICANT CHANGE UP (ref 7–23)
BUN SERPL-MCNC: 18 MG/DL — SIGNIFICANT CHANGE UP (ref 7–23)
BUN SERPL-MCNC: 20 MG/DL — SIGNIFICANT CHANGE UP (ref 7–23)
BUN SERPL-MCNC: 20 MG/DL — SIGNIFICANT CHANGE UP (ref 7–23)
CALCIUM SERPL-MCNC: 7.8 MG/DL — LOW (ref 8.4–10.5)
CALCIUM SERPL-MCNC: 7.8 MG/DL — LOW (ref 8.4–10.5)
CALCIUM SERPL-MCNC: 7.9 MG/DL — LOW (ref 8.4–10.5)
CALCIUM SERPL-MCNC: 7.9 MG/DL — LOW (ref 8.4–10.5)
CHLORIDE SERPL-SCNC: 98 MMOL/L — SIGNIFICANT CHANGE UP (ref 96–108)
CO2 SERPL-SCNC: 26 MMOL/L — SIGNIFICANT CHANGE UP (ref 22–31)
CO2 SERPL-SCNC: 26 MMOL/L — SIGNIFICANT CHANGE UP (ref 22–31)
CO2 SERPL-SCNC: 28 MMOL/L — SIGNIFICANT CHANGE UP (ref 22–31)
CO2 SERPL-SCNC: 28 MMOL/L — SIGNIFICANT CHANGE UP (ref 22–31)
CREAT SERPL-MCNC: 0.76 MG/DL — SIGNIFICANT CHANGE UP (ref 0.5–1.3)
CREAT SERPL-MCNC: 0.76 MG/DL — SIGNIFICANT CHANGE UP (ref 0.5–1.3)
CREAT SERPL-MCNC: 0.8 MG/DL — SIGNIFICANT CHANGE UP (ref 0.5–1.3)
CREAT SERPL-MCNC: 0.8 MG/DL — SIGNIFICANT CHANGE UP (ref 0.5–1.3)
CULTURE RESULTS: SIGNIFICANT CHANGE UP
CULTURE RESULTS: SIGNIFICANT CHANGE UP
EGFR: 73 ML/MIN/1.73M2 — SIGNIFICANT CHANGE UP
EGFR: 73 ML/MIN/1.73M2 — SIGNIFICANT CHANGE UP
EGFR: 78 ML/MIN/1.73M2 — SIGNIFICANT CHANGE UP
EGFR: 78 ML/MIN/1.73M2 — SIGNIFICANT CHANGE UP
GLUCOSE SERPL-MCNC: 101 MG/DL — HIGH (ref 70–99)
GLUCOSE SERPL-MCNC: 101 MG/DL — HIGH (ref 70–99)
GLUCOSE SERPL-MCNC: 118 MG/DL — HIGH (ref 70–99)
GLUCOSE SERPL-MCNC: 118 MG/DL — HIGH (ref 70–99)
HCT VFR BLD CALC: 36.8 % — SIGNIFICANT CHANGE UP (ref 34.5–45)
HCT VFR BLD CALC: 36.8 % — SIGNIFICANT CHANGE UP (ref 34.5–45)
HGB BLD-MCNC: 11.7 G/DL — SIGNIFICANT CHANGE UP (ref 11.5–15.5)
HGB BLD-MCNC: 11.7 G/DL — SIGNIFICANT CHANGE UP (ref 11.5–15.5)
INR BLD: 2.15 RATIO — HIGH (ref 0.85–1.18)
INR BLD: 2.15 RATIO — HIGH (ref 0.85–1.18)
LEGIONELLA AG UR QL: NEGATIVE — SIGNIFICANT CHANGE UP
LEGIONELLA AG UR QL: NEGATIVE — SIGNIFICANT CHANGE UP
MCHC RBC-ENTMCNC: 28.1 PG — SIGNIFICANT CHANGE UP (ref 27–34)
MCHC RBC-ENTMCNC: 28.1 PG — SIGNIFICANT CHANGE UP (ref 27–34)
MCHC RBC-ENTMCNC: 31.8 GM/DL — LOW (ref 32–36)
MCHC RBC-ENTMCNC: 31.8 GM/DL — LOW (ref 32–36)
MCV RBC AUTO: 88.5 FL — SIGNIFICANT CHANGE UP (ref 80–100)
MCV RBC AUTO: 88.5 FL — SIGNIFICANT CHANGE UP (ref 80–100)
MRSA PCR RESULT.: SIGNIFICANT CHANGE UP
MRSA PCR RESULT.: SIGNIFICANT CHANGE UP
NRBC # BLD: 0 /100 WBCS — SIGNIFICANT CHANGE UP (ref 0–0)
NRBC # BLD: 0 /100 WBCS — SIGNIFICANT CHANGE UP (ref 0–0)
PLATELET # BLD AUTO: 154 K/UL — SIGNIFICANT CHANGE UP (ref 150–400)
PLATELET # BLD AUTO: 154 K/UL — SIGNIFICANT CHANGE UP (ref 150–400)
POTASSIUM BLDV-SCNC: 2.7 MMOL/L — CRITICAL LOW (ref 3.5–5.1)
POTASSIUM BLDV-SCNC: 2.7 MMOL/L — CRITICAL LOW (ref 3.5–5.1)
POTASSIUM SERPL-MCNC: 2.7 MMOL/L — CRITICAL LOW (ref 3.5–5.3)
POTASSIUM SERPL-MCNC: 2.7 MMOL/L — CRITICAL LOW (ref 3.5–5.3)
POTASSIUM SERPL-MCNC: 2.8 MMOL/L — CRITICAL LOW (ref 3.5–5.3)
POTASSIUM SERPL-MCNC: 2.8 MMOL/L — CRITICAL LOW (ref 3.5–5.3)
POTASSIUM SERPL-SCNC: 2.7 MMOL/L — CRITICAL LOW (ref 3.5–5.3)
POTASSIUM SERPL-SCNC: 2.7 MMOL/L — CRITICAL LOW (ref 3.5–5.3)
POTASSIUM SERPL-SCNC: 2.8 MMOL/L — CRITICAL LOW (ref 3.5–5.3)
POTASSIUM SERPL-SCNC: 2.8 MMOL/L — CRITICAL LOW (ref 3.5–5.3)
PROT SERPL-MCNC: 6 G/DL — SIGNIFICANT CHANGE UP (ref 6–8.3)
PROT SERPL-MCNC: 6 G/DL — SIGNIFICANT CHANGE UP (ref 6–8.3)
PROTHROM AB SERPL-ACNC: 23.1 SEC — HIGH (ref 9.5–13)
PROTHROM AB SERPL-ACNC: 23.1 SEC — HIGH (ref 9.5–13)
RBC # BLD: 4.16 M/UL — SIGNIFICANT CHANGE UP (ref 3.8–5.2)
RBC # BLD: 4.16 M/UL — SIGNIFICANT CHANGE UP (ref 3.8–5.2)
RBC # FLD: 13.7 % — SIGNIFICANT CHANGE UP (ref 10.3–14.5)
RBC # FLD: 13.7 % — SIGNIFICANT CHANGE UP (ref 10.3–14.5)
S AUREUS DNA NOSE QL NAA+PROBE: SIGNIFICANT CHANGE UP
S AUREUS DNA NOSE QL NAA+PROBE: SIGNIFICANT CHANGE UP
SODIUM SERPL-SCNC: 137 MMOL/L — SIGNIFICANT CHANGE UP (ref 135–145)
SPECIMEN SOURCE: SIGNIFICANT CHANGE UP
SPECIMEN SOURCE: SIGNIFICANT CHANGE UP
WBC # BLD: 5.64 K/UL — SIGNIFICANT CHANGE UP (ref 3.8–10.5)
WBC # BLD: 5.64 K/UL — SIGNIFICANT CHANGE UP (ref 3.8–10.5)
WBC # FLD AUTO: 5.64 K/UL — SIGNIFICANT CHANGE UP (ref 3.8–10.5)
WBC # FLD AUTO: 5.64 K/UL — SIGNIFICANT CHANGE UP (ref 3.8–10.5)

## 2024-01-13 PROCEDURE — 99233 SBSQ HOSP IP/OBS HIGH 50: CPT

## 2024-01-13 PROCEDURE — 71275 CT ANGIOGRAPHY CHEST: CPT | Mod: 26

## 2024-01-13 RX ORDER — POTASSIUM CHLORIDE 20 MEQ
40 PACKET (EA) ORAL ONCE
Refills: 0 | Status: COMPLETED | OUTPATIENT
Start: 2024-01-13 | End: 2024-01-13

## 2024-01-13 RX ORDER — POTASSIUM CHLORIDE 20 MEQ
10 PACKET (EA) ORAL
Refills: 0 | Status: COMPLETED | OUTPATIENT
Start: 2024-01-13 | End: 2024-01-13

## 2024-01-13 RX ORDER — SODIUM CHLORIDE 9 MG/ML
3 INJECTION INTRAMUSCULAR; INTRAVENOUS; SUBCUTANEOUS EVERY 12 HOURS
Refills: 0 | Status: COMPLETED | OUTPATIENT
Start: 2024-01-13 | End: 2024-01-14

## 2024-01-13 RX ORDER — IPRATROPIUM/ALBUTEROL SULFATE 18-103MCG
3 AEROSOL WITH ADAPTER (GRAM) INHALATION EVERY 6 HOURS
Refills: 0 | Status: DISCONTINUED | OUTPATIENT
Start: 2024-01-13 | End: 2024-01-20

## 2024-01-13 RX ORDER — SODIUM CHLORIDE 9 MG/ML
500 INJECTION, SOLUTION INTRAVENOUS
Refills: 0 | Status: DISCONTINUED | OUTPATIENT
Start: 2024-01-13 | End: 2024-01-14

## 2024-01-13 RX ADMIN — Medication 3 MILLILITER(S): at 11:23

## 2024-01-13 RX ADMIN — PIPERACILLIN AND TAZOBACTAM 25 GRAM(S): 4; .5 INJECTION, POWDER, LYOPHILIZED, FOR SOLUTION INTRAVENOUS at 21:30

## 2024-01-13 RX ADMIN — SODIUM CHLORIDE 3 MILLILITER(S): 9 INJECTION INTRAMUSCULAR; INTRAVENOUS; SUBCUTANEOUS at 17:50

## 2024-01-13 RX ADMIN — Medication 650 MILLIGRAM(S): at 05:38

## 2024-01-13 RX ADMIN — Medication 100 MILLIEQUIVALENT(S): at 13:09

## 2024-01-13 RX ADMIN — SIMVASTATIN 10 MILLIGRAM(S): 20 TABLET, FILM COATED ORAL at 21:27

## 2024-01-13 RX ADMIN — Medication 100 MILLIEQUIVALENT(S): at 16:53

## 2024-01-13 RX ADMIN — Medication 100 MILLIEQUIVALENT(S): at 11:52

## 2024-01-13 RX ADMIN — Medication 600 MILLIGRAM(S): at 17:31

## 2024-01-13 RX ADMIN — Medication 650 MILLIGRAM(S): at 13:07

## 2024-01-13 RX ADMIN — Medication 100 MILLIGRAM(S): at 05:40

## 2024-01-13 RX ADMIN — PIPERACILLIN AND TAZOBACTAM 25 GRAM(S): 4; .5 INJECTION, POWDER, LYOPHILIZED, FOR SOLUTION INTRAVENOUS at 06:03

## 2024-01-13 RX ADMIN — Medication 40 MILLIEQUIVALENT(S): at 11:23

## 2024-01-13 RX ADMIN — APIXABAN 5 MILLIGRAM(S): 2.5 TABLET, FILM COATED ORAL at 05:40

## 2024-01-13 RX ADMIN — DRONEDARONE 400 MILLIGRAM(S): 400 TABLET, FILM COATED ORAL at 17:31

## 2024-01-13 RX ADMIN — Medication 3 MILLILITER(S): at 23:36

## 2024-01-13 RX ADMIN — Medication 650 MILLIGRAM(S): at 02:50

## 2024-01-13 RX ADMIN — DRONEDARONE 400 MILLIGRAM(S): 400 TABLET, FILM COATED ORAL at 06:03

## 2024-01-13 RX ADMIN — SODIUM CHLORIDE 50 MILLILITER(S): 9 INJECTION, SOLUTION INTRAVENOUS at 11:29

## 2024-01-13 RX ADMIN — ALBUTEROL 2.5 MILLIGRAM(S): 90 AEROSOL, METERED ORAL at 02:51

## 2024-01-13 RX ADMIN — APIXABAN 5 MILLIGRAM(S): 2.5 TABLET, FILM COATED ORAL at 17:31

## 2024-01-13 RX ADMIN — Medication 600 MILLIGRAM(S): at 05:41

## 2024-01-13 RX ADMIN — Medication 1000 UNIT(S): at 11:23

## 2024-01-13 RX ADMIN — PIPERACILLIN AND TAZOBACTAM 25 GRAM(S): 4; .5 INJECTION, POWDER, LYOPHILIZED, FOR SOLUTION INTRAVENOUS at 14:56

## 2024-01-13 RX ADMIN — Medication 3 MILLILITER(S): at 17:49

## 2024-01-13 NOTE — ED ADULT NURSE REASSESSMENT NOTE - NSFALLHARMRISKINTERV_ED_ALL_ED
Assistance OOB with selected safe patient handling equipment if applicable/Assistance with ambulation/Communicate risk of Fall with Harm to all staff, patient, and family/Monitor gait and stability/Provide visual cue: red socks, yellow wristband, yellow gown, etc/Reinforce activity limits and safety measures with patient and family/Bed in lowest position, wheels locked, appropriate side rails in place/Call bell, personal items and telephone in reach/Instruct patient to call for assistance before getting out of bed/chair/stretcher/Non-slip footwear applied when patient is off stretcher/[place] to call system/Physically safe environment - no spills, clutter or unnecessary equipment/Purposeful Proactive Rounding/Room/bathroom lighting operational, light cord in reach Assistance OOB with selected safe patient handling equipment if applicable/Assistance with ambulation/Communicate risk of Fall with Harm to all staff, patient, and family/Monitor gait and stability/Provide visual cue: red socks, yellow wristband, yellow gown, etc/Reinforce activity limits and safety measures with patient and family/Bed in lowest position, wheels locked, appropriate side rails in place/Call bell, personal items and telephone in reach/Instruct patient to call for assistance before getting out of bed/chair/stretcher/Non-slip footwear applied when patient is off stretcher/[place] to call system/Physically safe environment - no spills, clutter or unnecessary equipment/Purposeful Proactive Rounding/Room/bathroom lighting operational, light cord in reach

## 2024-01-13 NOTE — CONSULT NOTE ADULT - SUBJECTIVE AND OBJECTIVE BOX
DATE OF SERVICE: 24 @ 12:34    CHIEF COMPLAINT:Patient is a 83y old  Female who presents with a chief complaint of Pneumonia (2024 17:56)      HISTORY OF PRESENT ILLNESS:HPI:  83F hx AF on Eliquis, HTN, HLD, follicular lymphoma sent to ED from PCP's office with hypoxia and PNA. Patient was seen earlier this week at PCP's office and diagnosed with LLL PNA and started on cephalexin. She returned to the office yesterday, still feeling poorly, and was started on doxycycline. She returned today and was noted to be hypoxic to the 80's and sent to the ED by EMS on supplemental oxygen for further treatment. Imaging here confirmed LLL PNA as well. Patient c/o fever/chills, weakness, and non-productive cough. Denies dyspnea (2024 17:56)      PAST MEDICAL & SURGICAL HISTORY:  HTN (hypertension)      Hyperlipidemia      Osteoporosis      Lymph node enlargement  left inguinal      Lymphoma      Atrial fibrillation      Afib      History of appendectomy      Cyst of skin  abdomen              MEDICATIONS:  apixaban 5 milliGRAM(s) Oral every 12 hours  dronedarone 400 milliGRAM(s) Oral two times a day  metoprolol succinate  milliGRAM(s) Oral daily    piperacillin/tazobactam IVPB.. 3.375 Gram(s) IV Intermittent every 8 hours    albuterol/ipratropium for Nebulization 3 milliLiter(s) Nebulizer every 6 hours  guaiFENesin  milliGRAM(s) Oral every 12 hours  sodium chloride 0.9% for Nebulization 3 milliLiter(s) Nebulizer every 12 hours    acetaminophen     Tablet .. 650 milliGRAM(s) Oral every 6 hours PRN  melatonin 3 milliGRAM(s) Oral at bedtime PRN    aluminum hydroxide/magnesium hydroxide/simethicone Suspension 30 milliLiter(s) Oral every 4 hours PRN    simvastatin 10 milliGRAM(s) Oral at bedtime    cholecalciferol 1000 Unit(s) Oral daily  lactated ringers. 500 milliLiter(s) IV Continuous <Continuous>  potassium chloride  10 mEq/100 mL IVPB 10 milliEquivalent(s) IV Intermittent every 1 hour      FAMILY HISTORY:  Family history of tuberculosis (Mother)  mother  at 25 yr old    Family history of hypertension (Father)  father  at 86 yr old        Non-contributory    SOCIAL HISTORY:    denies all     Allergies    No Known Allergies    Intolerances    	    REVIEW OF SYSTEMS:  CONSTITUTIONAL: No fever  EYES: No eye pain, visual disturbances, or discharge  ENMT:  No difficulty hearing, tinnitus  NECK: No pain or stiffness  RESPIRATORY: No cough, wheezing,  CARDIOVASCULAR: No chest pain, palpitations, passing out, dizziness, or leg swelling  GASTROINTESTINAL:  No nausea, vomiting, diarrhea or constipation. No melena.  GENITOURINARY: No dysuria, hematuria  NEUROLOGICAL: No stroke like symptoms  SKIN: No burning or lesions   ENDOCRINE: No heat or cold intolerance  MUSCULOSKELETAL: No joint pain or swelling  PSYCHIATRIC: No  anxiety, mood swings  HEME/LYMPH: No bleeding gums  ALLERGY AND IMMUNOLOGIC: No hives or eczema	    All other ROS negative    PHYSICAL EXAM:  T(C): 36.8 (24 @ 11:34), Max: 39 (24 @ 02:39)  HR: 67 (24 @ 11:34) (60 - 74)  BP: 128/60 (24 @ 11:34) (103/50 - 128/60)  RR: 20 (24 @ 11:34) (18 - 20)  SpO2: 91% (24 @ 11:34) (91% - 100%)  Wt(kg): --  I&O's Summary      Appearance: Normal	  HEENT:   Normal oral mucosa, EOMI	  Cardiovascular:  S1 S2, No JVD,    Respiratory: Lungs clear to auscultation	  Psychiatry: Alert  Gastrointestinal:  Soft, Non-tender, + BS	  Skin: No rashes   Neurologic: Non-focal  Extremities:  No edema  Vascular: Peripheral pulses palpable    	    	  	  CARDIAC MARKERS:  Labs personally reviewed by me                                  11.7   5.64  )-----------( 154      ( 2024 09:50 )             36.8         137  |  98  |  18  ----------------------------<  118<H>  2.8<LL>   |  28  |  0.76    Ca    7.9<L>      2024 11:06    TPro  6.0  /  Alb  3.2<L>  /  TBili  0.5  /  DBili  x   /  AST  44<H>  /  ALT  21  /  AlkPhos  34<L>            EKG: Personally reviewed by me - NSR Normal ECG  Radiology: Personally reviewed by me - Left lower lobe pneumonia.        Assessment /Plan:   83F hx AF on Eliquis, HTN, HLD, follicular lymphoma sent to ED from PCP's office with hypoxia and PNA. Patient was seen earlier this week at PCP's office and diagnosed with LLL PNA     1. Paroxsymal AFIB   - ECG in ED shows NSR normal ECG  -c/w Eliquis 5mg BID  -Check TSH  -CBC wnl   -ProBNP 456  -Check TTE    2. PNA  -seen on chest xray  -treat left lower lobe PNA  -5-6 L NC - patient desats on RA  -c/w Duoneb treatments     3. HTN   -controlled  -c/w home BP medication    4. HLD  LDL 89   c/w Statin     5. Hypokalemia   - on repeat still low  -treat and repeat STAT BMP    6. Prophylactic measures   - SCD's & AC      Differential diagnosis and plan of care discussed with patient after the evaluation. Counseling on diet, nutritional counseling, weight management, exercise and medication compliance was done.   Advanced care planning/advanced directives discussed with patient/family. DNR status including forceful chest compressions to attempt to restart the heart, ventilator support/artificial breathing, electric shock, artificial nutrition, health care proxy, Molst form all discussed with pt. Pt wishes to consider. More than fifteen minutes spent on discussing advanced directives.     BIRGIT Vidales DO [facility]  Cardiovascular Medicine  [address] [place] [address] Dr, [address]  Available for call or text via [employer]  Office [phone]   DATE OF SERVICE: 24 @ 12:34    CHIEF COMPLAINT:Patient is a 83y old  Female who presents with a chief complaint of Pneumonia (2024 17:56)      HISTORY OF PRESENT ILLNESS:HPI:  83F hx AF on Eliquis, HTN, HLD, follicular lymphoma sent to ED from PCP's office with hypoxia and PNA. Patient was seen earlier this week at PCP's office and diagnosed with LLL PNA and started on cephalexin. She returned to the office yesterday, still feeling poorly, and was started on doxycycline. She returned today and was noted to be hypoxic to the 80's and sent to the ED by EMS on supplemental oxygen for further treatment. Imaging here confirmed LLL PNA as well. Patient c/o fever/chills, weakness, and non-productive cough. Denies dyspnea (2024 17:56)      PAST MEDICAL & SURGICAL HISTORY:  HTN (hypertension)      Hyperlipidemia      Osteoporosis      Lymph node enlargement  left inguinal      Lymphoma      Atrial fibrillation      Afib      History of appendectomy      Cyst of skin  abdomen              MEDICATIONS:  apixaban 5 milliGRAM(s) Oral every 12 hours  dronedarone 400 milliGRAM(s) Oral two times a day  metoprolol succinate  milliGRAM(s) Oral daily    piperacillin/tazobactam IVPB.. 3.375 Gram(s) IV Intermittent every 8 hours    albuterol/ipratropium for Nebulization 3 milliLiter(s) Nebulizer every 6 hours  guaiFENesin  milliGRAM(s) Oral every 12 hours  sodium chloride 0.9% for Nebulization 3 milliLiter(s) Nebulizer every 12 hours    acetaminophen     Tablet .. 650 milliGRAM(s) Oral every 6 hours PRN  melatonin 3 milliGRAM(s) Oral at bedtime PRN    aluminum hydroxide/magnesium hydroxide/simethicone Suspension 30 milliLiter(s) Oral every 4 hours PRN    simvastatin 10 milliGRAM(s) Oral at bedtime    cholecalciferol 1000 Unit(s) Oral daily  lactated ringers. 500 milliLiter(s) IV Continuous <Continuous>  potassium chloride  10 mEq/100 mL IVPB 10 milliEquivalent(s) IV Intermittent every 1 hour      FAMILY HISTORY:  Family history of tuberculosis (Mother)  mother  at 25 yr old    Family history of hypertension (Father)  father  at 86 yr old        Non-contributory    SOCIAL HISTORY:    denies all     Allergies    No Known Allergies    Intolerances    	    REVIEW OF SYSTEMS:  CONSTITUTIONAL: No fever  EYES: No eye pain, visual disturbances, or discharge  ENMT:  No difficulty hearing, tinnitus  NECK: No pain or stiffness  RESPIRATORY: No cough, wheezing,  CARDIOVASCULAR: No chest pain, palpitations, passing out, dizziness, or leg swelling  GASTROINTESTINAL:  No nausea, vomiting, diarrhea or constipation. No melena.  GENITOURINARY: No dysuria, hematuria  NEUROLOGICAL: No stroke like symptoms  SKIN: No burning or lesions   ENDOCRINE: No heat or cold intolerance  MUSCULOSKELETAL: No joint pain or swelling  PSYCHIATRIC: No  anxiety, mood swings  HEME/LYMPH: No bleeding gums  ALLERGY AND IMMUNOLOGIC: No hives or eczema	    All other ROS negative    PHYSICAL EXAM:  T(C): 36.8 (24 @ 11:34), Max: 39 (24 @ 02:39)  HR: 67 (24 @ 11:34) (60 - 74)  BP: 128/60 (24 @ 11:34) (103/50 - 128/60)  RR: 20 (24 @ 11:34) (18 - 20)  SpO2: 91% (24 @ 11:34) (91% - 100%)  Wt(kg): --  I&O's Summary      Appearance: Normal	  HEENT:   Normal oral mucosa, EOMI	  Cardiovascular:  S1 S2, No JVD,    Respiratory: Lungs clear to auscultation	  Psychiatry: Alert  Gastrointestinal:  Soft, Non-tender, + BS	  Skin: No rashes   Neurologic: Non-focal  Extremities:  No edema  Vascular: Peripheral pulses palpable    	    	  	  CARDIAC MARKERS:  Labs personally reviewed by me                                  11.7   5.64  )-----------( 154      ( 2024 09:50 )             36.8         137  |  98  |  18  ----------------------------<  118<H>  2.8<LL>   |  28  |  0.76    Ca    7.9<L>      2024 11:06    TPro  6.0  /  Alb  3.2<L>  /  TBili  0.5  /  DBili  x   /  AST  44<H>  /  ALT  21  /  AlkPhos  34<L>            EKG: Personally reviewed by me - NSR Normal ECG  Radiology: Personally reviewed by me - Left lower lobe pneumonia.        Assessment /Plan:   83F hx AF on Eliquis, HTN, HLD, follicular lymphoma sent to ED from PCP's office with hypoxia and PNA. Patient was seen earlier this week at PCP's office and diagnosed with LLL PNA     1. Paroxsymal AFIB   - ECG in ED shows NSR normal ECG  -c/w Eliquis 5mg BID  -Check TSH  -CBC wnl   -ProBNP 456  -Check TTE    2. PNA  -seen on chest xray  -treat left lower lobe PNA  -5-6 L NC - patient desats on RA  -c/w Duoneb treatments     3. HTN   -controlled  -c/w home BP medication    4. HLD  LDL 89   c/w Statin     5. Hypokalemia   - on repeat still low  -treat and repeat STAT BMP    6. Prophylactic measures   - SCD's & AC      Differential diagnosis and plan of care discussed with patient after the evaluation. Counseling on diet, nutritional counseling, weight management, exercise and medication compliance was done.   Advanced care planning/advanced directives discussed with patient/family. DNR status including forceful chest compressions to attempt to restart the heart, ventilator support/artificial breathing, electric shock, artificial nutrition, health care proxy, Molst form all discussed with pt. Pt wishes to consider. More than fifteen minutes spent on discussing advanced directives.     BIRGIT Vidales DO [facility]  Cardiovascular Medicine  [address] [place] [address] Dr, [address]  Available for call or text via [employer]  Office [phone]

## 2024-01-13 NOTE — PHYSICAL THERAPY INITIAL EVALUATION ADULT - ADDITIONAL COMMENTS
Pt lives alone in a house with 3 steps to enter from side entrance, +HR and 1 flight of stairs to the 2nd floor and to the basement, +HR. Pt uses a cane occasionally for ambulation. Pt's 2 daughter assists her when needed. Pt will be residing with her daughter post discharge. +reading eyeglasses.

## 2024-01-13 NOTE — PHYSICAL THERAPY INITIAL EVALUATION ADULT - PLANNED THERAPY INTERVENTIONS, PT EVAL
balance training/bed mobility training/gait training/strengthening/transfer training GOAL: Stair Negotiation Training: Patient will be able to negotiate up & down 1 flight of stairs with unilateral rail, step to gait pattern, in 4 weeks./balance training/bed mobility training/gait training/strengthening/transfer training

## 2024-01-13 NOTE — ED ADULT NURSE REASSESSMENT NOTE - NS ED NURSE REASSESS COMMENT FT1
Patient remains in stable condition, tolerating High flow nasal cannula, fever subsided, CT scan resulted. Daughter at bedside, due meds given as ordered.
pt daughter gave patient night time medication from pts own meds. MD aware
pt placed on purewick and connected to suction, skin intact. Undressed and placed in gown. repositioned for comfort
received an 83F aaox3, came from PMD office yesterday and was sent here for SOB and positive for HMPV and also hypoxic at PMD office. Patient was admitted to medicine for RLL PNA, on isolation for HMPV. patient on Oxygen at 3LNC, saturating between 90-94%. Plan of care explained to the patient and verbalized understanding.

## 2024-01-13 NOTE — PHYSICAL THERAPY INITIAL EVALUATION ADULT - GENERAL OBSERVATIONS, REHAB EVAL
Pt received semisupine in stretcher with +IVL, +cardiac monitor, +pulse ox, +BP cuff, +6LNC and +ext female catheter. NAD noted.

## 2024-01-13 NOTE — PHYSICAL THERAPY INITIAL EVALUATION ADULT - PERTINENT HX OF CURRENT PROBLEM, REHAB EVAL
Pt is a 83F hx AF on Eliquis, HTN, HLD, follicular lymphoma admitted with acute hypoxic respiratory failure due to LLL pneumonia. +CXR 1/12/24: Left lower lobe pneumonia.

## 2024-01-13 NOTE — PROGRESS NOTE ADULT - SUBJECTIVE AND OBJECTIVE BOX
[facility] Division of Hospital Medicine  Erasmo Carson MD  Available via MS Teams  Pager: [phone]    SUBJECTIVE / OVERNIGHT EVENTS:  - hypoxia worsening overnight, on 6L and then highflow  - no events   ADDITIONAL REVIEW OF SYSTEMS:    MEDICATIONS  (STANDING):  albuterol/ipratropium for Nebulization 3 milliLiter(s) Nebulizer every 6 hours  apixaban 5 milliGRAM(s) Oral every 12 hours  cholecalciferol 1000 Unit(s) Oral daily  dronedarone 400 milliGRAM(s) Oral two times a day  guaiFENesin  milliGRAM(s) Oral every 12 hours  lactated ringers. 500 milliLiter(s) (50 mL/Hr) IV Continuous <Continuous>  metoprolol succinate  milliGRAM(s) Oral daily  piperacillin/tazobactam IVPB.. 3.375 Gram(s) IV Intermittent every 8 hours  potassium chloride  10 mEq/100 mL IVPB 10 milliEquivalent(s) IV Intermittent every 1 hour  simvastatin 10 milliGRAM(s) Oral at bedtime  sodium chloride 0.9% for Nebulization 3 milliLiter(s) Nebulizer every 12 hours    MEDICATIONS  (PRN):  acetaminophen     Tablet .. 650 milliGRAM(s) Oral every 6 hours PRN Temp greater or equal to 38C (100.4F), Mild Pain (1 - 3)  aluminum hydroxide/magnesium hydroxide/simethicone Suspension 30 milliLiter(s) Oral every 4 hours PRN Dyspepsia  melatonin 3 milliGRAM(s) Oral at bedtime PRN Insomnia      I&O's Summary      PHYSICAL EXAM:  Vital Signs Last 24 Hrs  T(C): 38 (13 Jan 2024 15:07), Max: 39 (13 Jan 2024 02:39)  T(F): 100.4 (13 Jan 2024 15:07), Max: 102.2 (13 Jan 2024 02:39)  HR: 77 (13 Jan 2024 15:14) (60 - 77)  BP: 120/52 (13 Jan 2024 15:14) (90/62 - 128/60)  BP(mean): 72 (13 Jan 2024 15:14) (61 - 101)  RR: 22 (13 Jan 2024 15:14) (18 - 26)  SpO2: 94% (13 Jan 2024 15:14) (91% - 95%)    Parameters below as of 13 Jan 2024 15:14  Patient On (Oxygen Delivery Method): nasal cannula, high flow      CONSTITUTIONAL: NAD, well-developed, well-groomed  EYES: PERRLA; conjunctiva and sclera clear  ENMT: Moist oral mucosa, no pharyngeal injection or exudates; normal dentition  NECK: Supple, no palpable masses; no thyromegaly  RESPIRATORY: Normal respiratory effort; lungs are clear to auscultation bilaterally  CARDIOVASCULAR: Regular rate and rhythm, normal S1 and S2, no murmur/rub/gallop; No lower extremity edema; Peripheral pulses are 2+ bilaterally  ABDOMEN: Nontender to palpation, normoactive bowel sounds, no rebound/guarding; No hepatosplenomegaly  MUSCULOSKELETAL:  Normal gait; no clubbing or cyanosis of digits; no joint swelling or tenderness to palpation  PSYCH: A+O to person, place, and time; affect appropriate  NEUROLOGY: CN 2-12 are intact and symmetric; no gross sensory deficits   SKIN: No rashes; no palpable lesions    LABS:                        11.7   5.64  )-----------( 154      ( 13 Jan 2024 09:50 )             36.8     01-13    137  |  98  |  18  ----------------------------<  118<H>  2.8<LL>   |  28  |  0.76    Ca    7.9<L>      13 Jan 2024 11:06    TPro  6.0  /  Alb  3.2<L>  /  TBili  0.5  /  DBili  x   /  AST  44<H>  /  ALT  21  /  AlkPhos  34<L>  01-13    PT/INR - ( 13 Jan 2024 09:50 )   PT: 23.1 sec;   INR: 2.15 ratio         PTT - ( 12 Jan 2024 15:35 )  PTT:39.0 sec      Urinalysis Basic - ( 13 Jan 2024 11:06 )    Color: x / Appearance: x / SG: x / pH: x  Gluc: 118 mg/dL / Ketone: x  / Bili: x / Urobili: x   Blood: x / Protein: x / Nitrite: x   Leuk Esterase: x / RBC: x / WBC x   Sq Epi: x / Non Sq Epi: x / Bacteria: x            RADIOLOGY & ADDITIONAL TESTS:  New Results Reviewed Today:   New Imaging Personally Reviewed Today:  New Electrocardiogram Personally Reviewed Today:  Prior or Outpatient Records Reviewed Today:    COMMUNICATION:  Care Discussed with Consultants/Other Providers and Details of Discussion:  Discussions with Patient/Family:  PCP Communication:     [facility] Division of Hospital Medicine  Erasmo Carson MD  Available via MS Teams  Pager: [phone]    SUBJECTIVE / OVERNIGHT EVENTS:  - hypoxia worsening overnight, on 6L and then highflow  - no events   ADDITIONAL REVIEW OF SYSTEMS:    MEDICATIONS  (STANDING):  albuterol/ipratropium for Nebulization 3 milliLiter(s) Nebulizer every 6 hours  apixaban 5 milliGRAM(s) Oral every 12 hours  cholecalciferol 1000 Unit(s) Oral daily  dronedarone 400 milliGRAM(s) Oral two times a day  guaiFENesin  milliGRAM(s) Oral every 12 hours  lactated ringers. 500 milliLiter(s) (50 mL/Hr) IV Continuous <Continuous>  metoprolol succinate  milliGRAM(s) Oral daily  piperacillin/tazobactam IVPB.. 3.375 Gram(s) IV Intermittent every 8 hours  potassium chloride  10 mEq/100 mL IVPB 10 milliEquivalent(s) IV Intermittent every 1 hour  simvastatin 10 milliGRAM(s) Oral at bedtime  sodium chloride 0.9% for Nebulization 3 milliLiter(s) Nebulizer every 12 hours    MEDICATIONS  (PRN):  acetaminophen     Tablet .. 650 milliGRAM(s) Oral every 6 hours PRN Temp greater or equal to 38C (100.4F), Mild Pain (1 - 3)  aluminum hydroxide/magnesium hydroxide/simethicone Suspension 30 milliLiter(s) Oral every 4 hours PRN Dyspepsia  melatonin 3 milliGRAM(s) Oral at bedtime PRN Insomnia      I&O's Summary      PHYSICAL EXAM:  Vital Signs Last 24 Hrs  T(C): 38 (13 Jan 2024 15:07), Max: 39 (13 Jan 2024 02:39)  T(F): 100.4 (13 Jan 2024 15:07), Max: 102.2 (13 Jan 2024 02:39)  HR: 77 (13 Jan 2024 15:14) (60 - 77)  BP: 120/52 (13 Jan 2024 15:14) (90/62 - 128/60)  BP(mean): 72 (13 Jan 2024 15:14) (61 - 101)  RR: 22 (13 Jan 2024 15:14) (18 - 26)  SpO2: 94% (13 Jan 2024 15:14) (91% - 95%)    Parameters below as of 13 Jan 2024 15:14  Patient On (Oxygen Delivery Method): nasal cannula, high flow      CONSTITUTIONAL: NAD, well-developed, well-groomed  EYES: PERRLA; conjunctiva and sclera clear  ENMT: Moist oral mucosa, no pharyngeal injection or exudates; normal dentition  NECK: Supple, no palpable masses; no thyromegaly  RESPIRATORY: Normal respiratory effort; lungs are clear to auscultation bilaterally  CARDIOVASCULAR: Regular rate and rhythm, normal S1 and S2, no murmur/rub/gallop; No lower extremity edema; Peripheral pulses are 2+ bilaterally  ABDOMEN: Nontender to palpation, normoactive bowel sounds, no rebound/guarding; No hepatosplenomegaly  MUSCULOSKELETAL:  Normal gait; no clubbing or cyanosis of digits; no joint swelling or tenderness to palpation  PSYCH: A+O to person, place, and time; affect appropriate  NEUROLOGY: CN 2-12 are intact and symmetric; no gross sensory deficits   SKIN: No rashes; no palpable lesions    LABS:                        11.7   5.64  )-----------( 154      ( 13 Jan 2024 09:50 )             36.8     01-13    137  |  98  |  18  ----------------------------<  118<H>  2.8<LL>   |  28  |  0.76    Ca    7.9<L>      13 Jan 2024 11:06    TPro  6.0  /  Alb  3.2<L>  /  TBili  0.5  /  DBili  x   /  AST  44<H>  /  ALT  21  /  AlkPhos  34<L>  01-13    PT/INR - ( 13 Jan 2024 09:50 )   PT: 23.1 sec;   INR: 2.15 ratio         PTT - ( 12 Jan 2024 15:35 )  PTT:39.0 sec      Urinalysis Basic - ( 13 Jan 2024 11:06 )    Color: x / Appearance: x / SG: x / pH: x  Gluc: 118 mg/dL / Ketone: x  / Bili: x / Urobili: x   Blood: x / Protein: x / Nitrite: x   Leuk Esterase: x / RBC: x / WBC x   Sq Epi: x / Non Sq Epi: x / Bacteria: x            RADIOLOGY & ADDITIONAL TESTS:  New Results Reviewed Today:   New Imaging Personally Reviewed Today:  New Electrocardiogram Personally Reviewed Today:  Prior or Outpatient Records Reviewed Today:    COMMUNICATION:  Care Discussed with Consultants/Other Providers and Details of Discussion:  Discussions with Patient/Family:  PCP Communication:     [facility] Division of Hospital Medicine  Erasmo Carson MD  Available via MS Teams  Pager: [phone]    SUBJECTIVE / OVERNIGHT EVENTS:  - hypoxia worsening overnight, on 6L and then highflow. has cough but no shortness of breath, denies n/v/abd pain/cp/LE swelling    ADDITIONAL REVIEW OF SYSTEMS:    MEDICATIONS  (STANDING):  albuterol/ipratropium for Nebulization 3 milliLiter(s) Nebulizer every 6 hours  apixaban 5 milliGRAM(s) Oral every 12 hours  cholecalciferol 1000 Unit(s) Oral daily  dronedarone 400 milliGRAM(s) Oral two times a day  guaiFENesin  milliGRAM(s) Oral every 12 hours  lactated ringers. 500 milliLiter(s) (50 mL/Hr) IV Continuous <Continuous>  metoprolol succinate  milliGRAM(s) Oral daily  piperacillin/tazobactam IVPB.. 3.375 Gram(s) IV Intermittent every 8 hours  potassium chloride  10 mEq/100 mL IVPB 10 milliEquivalent(s) IV Intermittent every 1 hour  simvastatin 10 milliGRAM(s) Oral at bedtime  sodium chloride 0.9% for Nebulization 3 milliLiter(s) Nebulizer every 12 hours    MEDICATIONS  (PRN):  acetaminophen     Tablet .. 650 milliGRAM(s) Oral every 6 hours PRN Temp greater or equal to 38C (100.4F), Mild Pain (1 - 3)  aluminum hydroxide/magnesium hydroxide/simethicone Suspension 30 milliLiter(s) Oral every 4 hours PRN Dyspepsia  melatonin 3 milliGRAM(s) Oral at bedtime PRN Insomnia      I&O's Summary      PHYSICAL EXAM:  Vital Signs Last 24 Hrs  T(C): 38 (13 Jan 2024 15:07), Max: 39 (13 Jan 2024 02:39)  T(F): 100.4 (13 Jan 2024 15:07), Max: 102.2 (13 Jan 2024 02:39)  HR: 77 (13 Jan 2024 15:14) (60 - 77)  BP: 120/52 (13 Jan 2024 15:14) (90/62 - 128/60)  BP(mean): 72 (13 Jan 2024 15:14) (61 - 101)  RR: 22 (13 Jan 2024 15:14) (18 - 26)  SpO2: 94% (13 Jan 2024 15:14) (91% - 95%)    Parameters below as of 13 Jan 2024 15:14  Patient On (Oxygen Delivery Method): nasal cannula, high flow      CONSTITUTIONAL: NAD, well-developed, well-groomed  ENMT: Moist oral mucosa, no pharyngeal injection or exudates  RESPIRATORY: poor inspiratory effort, rales athe right mid and right lower lobe, no crackles   CARDIOVASCULAR: Regular rate and rhythm, normal S1 and S2, no murmur/rub/gallop; Peripheral pulses are 2+ bilaterally  ABDOMEN: Nontender to palpation, normoactive bowel sounds, no rebound/guarding;   MUSCULOSKELETAL:  no clubbing or cyanosis of digits; no joint swelling or tenderness to palpation  PSYCH: A+O to person, place, and time; affect appropriate  NEUROLOGY: CN 2-12 are intact and symmetric; no gross sensory deficits   SKIN: No rashes; no palpable lesions    LABS:                        11.7   5.64  )-----------( 154      ( 13 Jan 2024 09:50 )             36.8     01-13    137  |  98  |  18  ----------------------------<  118<H>  2.8<LL>   |  28  |  0.76    Ca    7.9<L>      13 Jan 2024 11:06    TPro  6.0  /  Alb  3.2<L>  /  TBili  0.5  /  DBili  x   /  AST  44<H>  /  ALT  21  /  AlkPhos  34<L>  01-13    PT/INR - ( 13 Jan 2024 09:50 )   PT: 23.1 sec;   INR: 2.15 ratio         PTT - ( 12 Jan 2024 15:35 )  PTT:39.0 sec      Urinalysis Basic - ( 13 Jan 2024 11:06 )    Color: x / Appearance: x / SG: x / pH: x  Gluc: 118 mg/dL / Ketone: x  / Bili: x / Urobili: x   Blood: x / Protein: x / Nitrite: x   Leuk Esterase: x / RBC: x / WBC x   Sq Epi: x / Non Sq Epi: x / Bacteria: x         [facility] Division of Hospital Medicine  Erasmo Carson MD  Available via MS Teams  Pager: [phone]    SUBJECTIVE / OVERNIGHT EVENTS:  - hypoxia worsening overnight, on 6L and then highflow. has cough but no shortness of breath, denies n/v/abd pain/cp/LE swelling    ADDITIONAL REVIEW OF SYSTEMS:    MEDICATIONS  (STANDING):  albuterol/ipratropium for Nebulization 3 milliLiter(s) Nebulizer every 6 hours  apixaban 5 milliGRAM(s) Oral every 12 hours  cholecalciferol 1000 Unit(s) Oral daily  dronedarone 400 milliGRAM(s) Oral two times a day  guaiFENesin  milliGRAM(s) Oral every 12 hours  lactated ringers. 500 milliLiter(s) (50 mL/Hr) IV Continuous <Continuous>  metoprolol succinate  milliGRAM(s) Oral daily  piperacillin/tazobactam IVPB.. 3.375 Gram(s) IV Intermittent every 8 hours  potassium chloride  10 mEq/100 mL IVPB 10 milliEquivalent(s) IV Intermittent every 1 hour  simvastatin 10 milliGRAM(s) Oral at bedtime  sodium chloride 0.9% for Nebulization 3 milliLiter(s) Nebulizer every 12 hours    MEDICATIONS  (PRN):  acetaminophen     Tablet .. 650 milliGRAM(s) Oral every 6 hours PRN Temp greater or equal to 38C (100.4F), Mild Pain (1 - 3)  aluminum hydroxide/magnesium hydroxide/simethicone Suspension 30 milliLiter(s) Oral every 4 hours PRN Dyspepsia  melatonin 3 milliGRAM(s) Oral at bedtime PRN Insomnia      I&O's Summary      PHYSICAL EXAM:  Vital Signs Last 24 Hrs  T(C): 38 (13 Jan 2024 15:07), Max: 39 (13 Jan 2024 02:39)  T(F): 100.4 (13 Jan 2024 15:07), Max: 102.2 (13 Jan 2024 02:39)  HR: 77 (13 Jan 2024 15:14) (60 - 77)  BP: 120/52 (13 Jan 2024 15:14) (90/62 - 128/60)  BP(mean): 72 (13 Jan 2024 15:14) (61 - 101)  RR: 22 (13 Jan 2024 15:14) (18 - 26)  SpO2: 94% (13 Jan 2024 15:14) (91% - 95%)    Parameters below as of 13 Jan 2024 15:14  Patient On (Oxygen Delivery Method): nasal cannula, high flow      CONSTITUTIONAL: NAD, well-developed, well-groomed  ENMT: Moist oral mucosa, no pharyngeal injection or exudates  RESPIRATORY: poor inspiratory effort, rales athe right mid and right lower lobe, no crackles   CARDIOVASCULAR: Regular rate and rhythm, normal S1 and S2, no murmur/rub/gallop; Peripheral pulses are 2+ bilaterally  ABDOMEN: Nontender to palpation, normoactive bowel sounds, no rebound/guarding;   MUSCULOSKELETAL:  no clubbing or cyanosis of digits; no joint swelling or tenderness to palpation  PSYCH: A+O to person, place, and time; affect appropriate  NEUROLOGY: CN 2-12 are intact and symmetric; no gross sensory deficits   SKIN: No rashes; no palpable lesions    LABS:                        11.7   5.64  )-----------( 154      ( 13 Jan 2024 09:50 )             36.8     01-13    137  |  98  |  18  ----------------------------<  118<H>  2.8<LL>   |  28  |  0.76    Ca    7.9<L>      13 Jan 2024 11:06    TPro  6.0  /  Alb  3.2<L>  /  TBili  0.5  /  DBili  x   /  AST  44<H>  /  ALT  21  /  AlkPhos  34<L>  01-13    PT/INR - ( 13 Jan 2024 09:50 )   PT: 23.1 sec;   INR: 2.15 ratio         PTT - ( 12 Jan 2024 15:35 )  PTT:39.0 sec      Urinalysis Basic - ( 13 Jan 2024 11:06 )    Color: x / Appearance: x / SG: x / pH: x  Gluc: 118 mg/dL / Ketone: x  / Bili: x / Urobili: x   Blood: x / Protein: x / Nitrite: x   Leuk Esterase: x / RBC: x / WBC x   Sq Epi: x / Non Sq Epi: x / Bacteria: x

## 2024-01-14 DIAGNOSIS — J96.01 ACUTE RESPIRATORY FAILURE WITH HYPOXIA: ICD-10-CM

## 2024-01-14 LAB
ALBUMIN SERPL ELPH-MCNC: 3 G/DL — LOW (ref 3.3–5)
ALBUMIN SERPL ELPH-MCNC: 3 G/DL — LOW (ref 3.3–5)
ALP SERPL-CCNC: 36 U/L — LOW (ref 40–120)
ALP SERPL-CCNC: 36 U/L — LOW (ref 40–120)
ALT FLD-CCNC: 22 U/L — SIGNIFICANT CHANGE UP (ref 10–45)
ALT FLD-CCNC: 22 U/L — SIGNIFICANT CHANGE UP (ref 10–45)
ANION GAP SERPL CALC-SCNC: 10 MMOL/L — SIGNIFICANT CHANGE UP (ref 5–17)
ANION GAP SERPL CALC-SCNC: 10 MMOL/L — SIGNIFICANT CHANGE UP (ref 5–17)
ANION GAP SERPL CALC-SCNC: 12 MMOL/L — SIGNIFICANT CHANGE UP (ref 5–17)
ANION GAP SERPL CALC-SCNC: 12 MMOL/L — SIGNIFICANT CHANGE UP (ref 5–17)
AST SERPL-CCNC: 45 U/L — HIGH (ref 10–40)
AST SERPL-CCNC: 45 U/L — HIGH (ref 10–40)
BILIRUB SERPL-MCNC: 0.4 MG/DL — SIGNIFICANT CHANGE UP (ref 0.2–1.2)
BILIRUB SERPL-MCNC: 0.4 MG/DL — SIGNIFICANT CHANGE UP (ref 0.2–1.2)
BUN SERPL-MCNC: 13 MG/DL — SIGNIFICANT CHANGE UP (ref 7–23)
BUN SERPL-MCNC: 13 MG/DL — SIGNIFICANT CHANGE UP (ref 7–23)
BUN SERPL-MCNC: 14 MG/DL — SIGNIFICANT CHANGE UP (ref 7–23)
BUN SERPL-MCNC: 14 MG/DL — SIGNIFICANT CHANGE UP (ref 7–23)
CALCIUM SERPL-MCNC: 8 MG/DL — LOW (ref 8.4–10.5)
CALCIUM SERPL-MCNC: 8 MG/DL — LOW (ref 8.4–10.5)
CALCIUM SERPL-MCNC: 8.1 MG/DL — LOW (ref 8.4–10.5)
CALCIUM SERPL-MCNC: 8.1 MG/DL — LOW (ref 8.4–10.5)
CHLORIDE SERPL-SCNC: 100 MMOL/L — SIGNIFICANT CHANGE UP (ref 96–108)
CHLORIDE SERPL-SCNC: 100 MMOL/L — SIGNIFICANT CHANGE UP (ref 96–108)
CHLORIDE SERPL-SCNC: 98 MMOL/L — SIGNIFICANT CHANGE UP (ref 96–108)
CHLORIDE SERPL-SCNC: 98 MMOL/L — SIGNIFICANT CHANGE UP (ref 96–108)
CO2 SERPL-SCNC: 23 MMOL/L — SIGNIFICANT CHANGE UP (ref 22–31)
CO2 SERPL-SCNC: 23 MMOL/L — SIGNIFICANT CHANGE UP (ref 22–31)
CO2 SERPL-SCNC: 24 MMOL/L — SIGNIFICANT CHANGE UP (ref 22–31)
CO2 SERPL-SCNC: 24 MMOL/L — SIGNIFICANT CHANGE UP (ref 22–31)
CREAT SERPL-MCNC: 0.6 MG/DL — SIGNIFICANT CHANGE UP (ref 0.5–1.3)
CREAT SERPL-MCNC: 0.6 MG/DL — SIGNIFICANT CHANGE UP (ref 0.5–1.3)
CREAT SERPL-MCNC: 0.7 MG/DL — SIGNIFICANT CHANGE UP (ref 0.5–1.3)
CREAT SERPL-MCNC: 0.7 MG/DL — SIGNIFICANT CHANGE UP (ref 0.5–1.3)
EGFR: 86 ML/MIN/1.73M2 — SIGNIFICANT CHANGE UP
EGFR: 86 ML/MIN/1.73M2 — SIGNIFICANT CHANGE UP
EGFR: 89 ML/MIN/1.73M2 — SIGNIFICANT CHANGE UP
EGFR: 89 ML/MIN/1.73M2 — SIGNIFICANT CHANGE UP
GAS PNL BLDA: SIGNIFICANT CHANGE UP
GAS PNL BLDA: SIGNIFICANT CHANGE UP
GLUCOSE SERPL-MCNC: 110 MG/DL — HIGH (ref 70–99)
GLUCOSE SERPL-MCNC: 110 MG/DL — HIGH (ref 70–99)
GLUCOSE SERPL-MCNC: 131 MG/DL — HIGH (ref 70–99)
GLUCOSE SERPL-MCNC: 131 MG/DL — HIGH (ref 70–99)
HCT VFR BLD CALC: 41.6 % — SIGNIFICANT CHANGE UP (ref 34.5–45)
HCT VFR BLD CALC: 41.6 % — SIGNIFICANT CHANGE UP (ref 34.5–45)
HGB BLD-MCNC: 13.8 G/DL — SIGNIFICANT CHANGE UP (ref 11.5–15.5)
HGB BLD-MCNC: 13.8 G/DL — SIGNIFICANT CHANGE UP (ref 11.5–15.5)
HMPV RNA SPEC QL NAA+PROBE: DETECTED
HMPV RNA SPEC QL NAA+PROBE: DETECTED
LACTATE BLDV-MCNC: 2.3 MMOL/L — HIGH (ref 0.5–2)
LACTATE BLDV-MCNC: 2.3 MMOL/L — HIGH (ref 0.5–2)
MAGNESIUM SERPL-MCNC: 1.9 MG/DL — SIGNIFICANT CHANGE UP (ref 1.6–2.6)
MAGNESIUM SERPL-MCNC: 1.9 MG/DL — SIGNIFICANT CHANGE UP (ref 1.6–2.6)
MAGNESIUM SERPL-MCNC: 2 MG/DL — SIGNIFICANT CHANGE UP (ref 1.6–2.6)
MAGNESIUM SERPL-MCNC: 2 MG/DL — SIGNIFICANT CHANGE UP (ref 1.6–2.6)
MCHC RBC-ENTMCNC: 28.2 PG — SIGNIFICANT CHANGE UP (ref 27–34)
MCHC RBC-ENTMCNC: 28.2 PG — SIGNIFICANT CHANGE UP (ref 27–34)
MCHC RBC-ENTMCNC: 33.2 GM/DL — SIGNIFICANT CHANGE UP (ref 32–36)
MCHC RBC-ENTMCNC: 33.2 GM/DL — SIGNIFICANT CHANGE UP (ref 32–36)
MCV RBC AUTO: 84.9 FL — SIGNIFICANT CHANGE UP (ref 80–100)
MCV RBC AUTO: 84.9 FL — SIGNIFICANT CHANGE UP (ref 80–100)
NRBC # BLD: 0 /100 WBCS — SIGNIFICANT CHANGE UP (ref 0–0)
NRBC # BLD: 0 /100 WBCS — SIGNIFICANT CHANGE UP (ref 0–0)
PHOSPHATE SERPL-MCNC: 2.2 MG/DL — LOW (ref 2.5–4.5)
PHOSPHATE SERPL-MCNC: 2.2 MG/DL — LOW (ref 2.5–4.5)
PHOSPHATE SERPL-MCNC: 2.9 MG/DL — SIGNIFICANT CHANGE UP (ref 2.5–4.5)
PHOSPHATE SERPL-MCNC: 2.9 MG/DL — SIGNIFICANT CHANGE UP (ref 2.5–4.5)
PLATELET # BLD AUTO: 176 K/UL — SIGNIFICANT CHANGE UP (ref 150–400)
PLATELET # BLD AUTO: 176 K/UL — SIGNIFICANT CHANGE UP (ref 150–400)
POTASSIUM SERPL-MCNC: 3.2 MMOL/L — LOW (ref 3.5–5.3)
POTASSIUM SERPL-MCNC: 3.2 MMOL/L — LOW (ref 3.5–5.3)
POTASSIUM SERPL-MCNC: 3.7 MMOL/L — SIGNIFICANT CHANGE UP (ref 3.5–5.3)
POTASSIUM SERPL-MCNC: 3.7 MMOL/L — SIGNIFICANT CHANGE UP (ref 3.5–5.3)
POTASSIUM SERPL-SCNC: 3.2 MMOL/L — LOW (ref 3.5–5.3)
POTASSIUM SERPL-SCNC: 3.2 MMOL/L — LOW (ref 3.5–5.3)
POTASSIUM SERPL-SCNC: 3.7 MMOL/L — SIGNIFICANT CHANGE UP (ref 3.5–5.3)
POTASSIUM SERPL-SCNC: 3.7 MMOL/L — SIGNIFICANT CHANGE UP (ref 3.5–5.3)
PROCALCITONIN SERPL-MCNC: 0.13 NG/ML — HIGH (ref 0.02–0.1)
PROCALCITONIN SERPL-MCNC: 0.13 NG/ML — HIGH (ref 0.02–0.1)
PROT SERPL-MCNC: 6.1 G/DL — SIGNIFICANT CHANGE UP (ref 6–8.3)
PROT SERPL-MCNC: 6.1 G/DL — SIGNIFICANT CHANGE UP (ref 6–8.3)
RAPID RVP RESULT: DETECTED
RAPID RVP RESULT: DETECTED
RBC # BLD: 4.9 M/UL — SIGNIFICANT CHANGE UP (ref 3.8–5.2)
RBC # BLD: 4.9 M/UL — SIGNIFICANT CHANGE UP (ref 3.8–5.2)
RBC # FLD: 13.2 % — SIGNIFICANT CHANGE UP (ref 10.3–14.5)
RBC # FLD: 13.2 % — SIGNIFICANT CHANGE UP (ref 10.3–14.5)
SARS-COV-2 RNA SPEC QL NAA+PROBE: SIGNIFICANT CHANGE UP
SARS-COV-2 RNA SPEC QL NAA+PROBE: SIGNIFICANT CHANGE UP
SODIUM SERPL-SCNC: 133 MMOL/L — LOW (ref 135–145)
SODIUM SERPL-SCNC: 133 MMOL/L — LOW (ref 135–145)
SODIUM SERPL-SCNC: 134 MMOL/L — LOW (ref 135–145)
SODIUM SERPL-SCNC: 134 MMOL/L — LOW (ref 135–145)
WBC # BLD: 6.32 K/UL — SIGNIFICANT CHANGE UP (ref 3.8–10.5)
WBC # BLD: 6.32 K/UL — SIGNIFICANT CHANGE UP (ref 3.8–10.5)
WBC # FLD AUTO: 6.32 K/UL — SIGNIFICANT CHANGE UP (ref 3.8–10.5)
WBC # FLD AUTO: 6.32 K/UL — SIGNIFICANT CHANGE UP (ref 3.8–10.5)

## 2024-01-14 PROCEDURE — 71045 X-RAY EXAM CHEST 1 VIEW: CPT | Mod: 26

## 2024-01-14 PROCEDURE — 99223 1ST HOSP IP/OBS HIGH 75: CPT | Mod: GC

## 2024-01-14 PROCEDURE — 99233 SBSQ HOSP IP/OBS HIGH 50: CPT

## 2024-01-14 RX ORDER — ACETAMINOPHEN 500 MG
1000 TABLET ORAL ONCE
Refills: 0 | Status: COMPLETED | OUTPATIENT
Start: 2024-01-14 | End: 2024-01-14

## 2024-01-14 RX ORDER — HALOPERIDOL DECANOATE 100 MG/ML
0.5 INJECTION INTRAMUSCULAR ONCE
Refills: 0 | Status: COMPLETED | OUTPATIENT
Start: 2024-01-14 | End: 2024-01-14

## 2024-01-14 RX ORDER — POTASSIUM CHLORIDE 20 MEQ
20 PACKET (EA) ORAL
Refills: 0 | Status: COMPLETED | OUTPATIENT
Start: 2024-01-14 | End: 2024-01-14

## 2024-01-14 RX ADMIN — SODIUM CHLORIDE 3 MILLILITER(S): 9 INJECTION INTRAMUSCULAR; INTRAVENOUS; SUBCUTANEOUS at 05:58

## 2024-01-14 RX ADMIN — PIPERACILLIN AND TAZOBACTAM 25 GRAM(S): 4; .5 INJECTION, POWDER, LYOPHILIZED, FOR SOLUTION INTRAVENOUS at 21:21

## 2024-01-14 RX ADMIN — Medication 100 MILLIGRAM(S): at 21:21

## 2024-01-14 RX ADMIN — DRONEDARONE 400 MILLIGRAM(S): 400 TABLET, FILM COATED ORAL at 05:55

## 2024-01-14 RX ADMIN — Medication 3 MILLILITER(S): at 05:56

## 2024-01-14 RX ADMIN — Medication 3 MILLILITER(S): at 11:41

## 2024-01-14 RX ADMIN — APIXABAN 5 MILLIGRAM(S): 2.5 TABLET, FILM COATED ORAL at 17:40

## 2024-01-14 RX ADMIN — Medication 20 MILLIEQUIVALENT(S): at 11:42

## 2024-01-14 RX ADMIN — Medication 3 MILLILITER(S): at 17:41

## 2024-01-14 RX ADMIN — SIMVASTATIN 10 MILLIGRAM(S): 20 TABLET, FILM COATED ORAL at 21:21

## 2024-01-14 RX ADMIN — Medication 100 MILLIGRAM(S): at 10:20

## 2024-01-14 RX ADMIN — HALOPERIDOL DECANOATE 0.5 MILLIGRAM(S): 100 INJECTION INTRAMUSCULAR at 23:08

## 2024-01-14 RX ADMIN — DRONEDARONE 400 MILLIGRAM(S): 400 TABLET, FILM COATED ORAL at 17:39

## 2024-01-14 RX ADMIN — Medication 1000 UNIT(S): at 11:42

## 2024-01-14 RX ADMIN — Medication 600 MILLIGRAM(S): at 05:56

## 2024-01-14 RX ADMIN — Medication 63.75 MILLIMOLE(S): at 11:41

## 2024-01-14 RX ADMIN — Medication 600 MILLIGRAM(S): at 17:41

## 2024-01-14 RX ADMIN — APIXABAN 5 MILLIGRAM(S): 2.5 TABLET, FILM COATED ORAL at 05:56

## 2024-01-14 RX ADMIN — Medication 20 MILLIEQUIVALENT(S): at 10:20

## 2024-01-14 RX ADMIN — Medication 400 MILLIGRAM(S): at 19:32

## 2024-01-14 RX ADMIN — Medication 100 MILLIGRAM(S): at 05:56

## 2024-01-14 RX ADMIN — PIPERACILLIN AND TAZOBACTAM 25 GRAM(S): 4; .5 INJECTION, POWDER, LYOPHILIZED, FOR SOLUTION INTRAVENOUS at 14:02

## 2024-01-14 RX ADMIN — PIPERACILLIN AND TAZOBACTAM 25 GRAM(S): 4; .5 INJECTION, POWDER, LYOPHILIZED, FOR SOLUTION INTRAVENOUS at 05:56

## 2024-01-14 NOTE — PATIENT PROFILE ADULT - FUNCTIONAL ASSESSMENT - BASIC MOBILITY 6.
2-calculated by average/Not able to assess (calculate score using [facility] averaging method)  2-calculated by average/Not able to assess (calculate score using [facility] averaging method)

## 2024-01-14 NOTE — PROGRESS NOTE ADULT - SUBJECTIVE AND OBJECTIVE BOX
[facility] Division of Hospital Medicine  Erasmo Carson MD  Available via MS Teams  Pager: [phone]    SUBJECTIVE / OVERNIGHT EVENTS:    daughter at bedside reporting that patient had some delirium overnight and some hallucinations but she is back to her baseline. otherwise still with shortness of breath and cough. Denies any n/v/abd pain/cp/headaches.     MEDICATIONS  (STANDING):  albuterol/ipratropium for Nebulization 3 milliLiter(s) Nebulizer every 6 hours  apixaban 5 milliGRAM(s) Oral every 12 hours  benzonatate 100 milliGRAM(s) Oral every 8 hours  cholecalciferol 1000 Unit(s) Oral daily  dronedarone 400 milliGRAM(s) Oral two times a day  guaiFENesin  milliGRAM(s) Oral every 12 hours  metoprolol succinate  milliGRAM(s) Oral daily  piperacillin/tazobactam IVPB.. 3.375 Gram(s) IV Intermittent every 8 hours  simvastatin 10 milliGRAM(s) Oral at bedtime    MEDICATIONS  (PRN):  acetaminophen     Tablet .. 650 milliGRAM(s) Oral every 6 hours PRN Temp greater or equal to 38C (100.4F), Mild Pain (1 - 3)  aluminum hydroxide/magnesium hydroxide/simethicone Suspension 30 milliLiter(s) Oral every 4 hours PRN Dyspepsia  melatonin 3 milliGRAM(s) Oral at bedtime PRN Insomnia      I&O's Summary    13 Jan 2024 07:01  -  14 Jan 2024 07:00  --------------------------------------------------------  IN: 100 mL / OUT: 201 mL / NET: -101 mL        PHYSICAL EXAM:  Vital Signs Last 24 Hrs  T(C): 36.4 (14 Jan 2024 09:38), Max: 38.1 (14 Jan 2024 00:58)  T(F): 97.5 (14 Jan 2024 09:38), Max: 100.6 (14 Jan 2024 00:58)  HR: 69 (14 Jan 2024 13:36) (64 - 82)  BP: 121/63 (14 Jan 2024 09:38) (90/62 - 164/74)  BP(mean): 66 (13 Jan 2024 18:18) (66 - 73)  RR: 18 (14 Jan 2024 10:42) (18 - 26)  SpO2: 93% (14 Jan 2024 13:36) (90% - 98%)    Parameters below as of 14 Jan 2024 13:36  Patient On (Oxygen Delivery Method): nasal cannula, high flow  O2 Flow (L/min): 40  O2 Concentration (%): 70    CONSTITUTIONAL: NAD, well-developed, well-groomed  ENMT: Moist oral mucosa, no pharyngeal injection or exudates  RESPIRATORY: poor inspiratory effort, rales athe right mid and right lower lobe, no crackles   CARDIOVASCULAR: Regular rate and rhythm, normal S1 and S2, no murmur/rub/gallop; Peripheral pulses are 2+ bilaterally  ABDOMEN: Nontender to palpation, normoactive bowel sounds, no rebound/guarding;   MUSCULOSKELETAL:  no clubbing or cyanosis of digits; no joint swelling or tenderness to palpation  PSYCH: A+O to person, place, and time; affect appropriate  NEUROLOGY: CN 2-12 are intact and symmetric; no gross sensory deficits   SKIN: No rashes; no palpable lesions    LABS:                        13.8   6.32  )-----------( 176      ( 14 Jan 2024 07:53 )             41.6     01-14    134<L>  |  98  |  13  ----------------------------<  110<H>  3.2<L>   |  24  |  0.60    Ca    8.1<L>      14 Jan 2024 07:54  Phos  2.2     01-14  Mg     1.9     01-14    TPro  6.1  /  Alb  3.0<L>  /  TBili  0.4  /  DBili  x   /  AST  45<H>  /  ALT  22  /  AlkPhos  36<L>  01-14    PT/INR - ( 13 Jan 2024 09:50 )   PT: 23.1 sec;   INR: 2.15 ratio         PTT - ( 12 Jan 2024 15:35 )  PTT:39.0 sec      Urinalysis Basic - ( 14 Jan 2024 07:54 )    Color: x / Appearance: x / SG: x / pH: x  Gluc: 110 mg/dL / Ketone: x  / Bili: x / Urobili: x   Blood: x / Protein: x / Nitrite: x   Leuk Esterase: x / RBC: x / WBC x   Sq Epi: x / Non Sq Epi: x / Bacteria: x        Culture - Urine (collected 12 Jan 2024 16:45)  Source: Clean Catch Clean Catch (Midstream)  Final Report (13 Jan 2024 21:39):    <10,000 CFU/mL Normal Urogenital Nidia    Culture - Blood (collected 12 Jan 2024 16:27)  Source: .Blood Blood-Peripheral  Preliminary Report (13 Jan 2024 23:02):    No growth at 24 hours    Culture - Blood (collected 12 Jan 2024 15:20)  Source: .Blood Blood-Peripheral  Preliminary Report (13 Jan 2024 20:02):    No growth at 24 hours          RADIOLOGY & ADDITIONAL TESTS:  New Results Reviewed Today:   New Imaging Personally Reviewed Today:  New Electrocardiogram Personally Reviewed Today:  Prior or Outpatient Records Reviewed Today:    COMMUNICATION:  Care Discussed with Consultants/Other Providers and Details of Discussion:  Discussions with Patient/Family:  PCP Communication:     [facility] Division of Hospital Medicine  Erasmo Carson MD  Available via MS Teams  Pager: [phone]    SUBJECTIVE / OVERNIGHT EVENTS:    daughter at bedside reporting that patient had some delirium overnight and some hallucinations but she is back to her baseline. otherwise still with shortness of breath and cough. Denies any n/v/abd pain/cp/headaches.     MEDICATIONS  (STANDING):  albuterol/ipratropium for Nebulization 3 milliLiter(s) Nebulizer every 6 hours  apixaban 5 milliGRAM(s) Oral every 12 hours  benzonatate 100 milliGRAM(s) Oral every 8 hours  cholecalciferol 1000 Unit(s) Oral daily  dronedarone 400 milliGRAM(s) Oral two times a day  guaiFENesin  milliGRAM(s) Oral every 12 hours  metoprolol succinate  milliGRAM(s) Oral daily  piperacillin/tazobactam IVPB.. 3.375 Gram(s) IV Intermittent every 8 hours  simvastatin 10 milliGRAM(s) Oral at bedtime    MEDICATIONS  (PRN):  acetaminophen     Tablet .. 650 milliGRAM(s) Oral every 6 hours PRN Temp greater or equal to 38C (100.4F), Mild Pain (1 - 3)  aluminum hydroxide/magnesium hydroxide/simethicone Suspension 30 milliLiter(s) Oral every 4 hours PRN Dyspepsia  melatonin 3 milliGRAM(s) Oral at bedtime PRN Insomnia      I&O's Summary    13 Jan 2024 07:01  -  14 Jan 2024 07:00  --------------------------------------------------------  IN: 100 mL / OUT: 201 mL / NET: -101 mL        PHYSICAL EXAM:  Vital Signs Last 24 Hrs  T(C): 36.4 (14 Jan 2024 09:38), Max: 38.1 (14 Jan 2024 00:58)  T(F): 97.5 (14 Jan 2024 09:38), Max: 100.6 (14 Jan 2024 00:58)  HR: 69 (14 Jan 2024 13:36) (64 - 82)  BP: 121/63 (14 Jan 2024 09:38) (90/62 - 164/74)  BP(mean): 66 (13 Jan 2024 18:18) (66 - 73)  RR: 18 (14 Jan 2024 10:42) (18 - 26)  SpO2: 93% (14 Jan 2024 13:36) (90% - 98%)    Parameters below as of 14 Jan 2024 13:36  Patient On (Oxygen Delivery Method): nasal cannula, high flow  O2 Flow (L/min): 40  O2 Concentration (%): 70    CONSTITUTIONAL: NAD, well-developed, well-groomed  ENMT: Moist oral mucosa, no pharyngeal injection or exudates  RESPIRATORY: poor inspiratory effort, rales athe right mid and right lower lobe, no crackles   CARDIOVASCULAR: Regular rate and rhythm, normal S1 and S2, no murmur/rub/gallop; Peripheral pulses are 2+ bilaterally  ABDOMEN: Nontender to palpation, normoactive bowel sounds, no rebound/guarding;   MUSCULOSKELETAL:  no clubbing or cyanosis of digits; no joint swelling or tenderness to palpation  PSYCH: A+O to person, place, and time; affect appropriate  NEUROLOGY: CN 2-12 are intact and symmetric; no gross sensory deficits   SKIN: No rashes; no palpable lesions    LABS:                        13.8   6.32  )-----------( 176      ( 14 Jan 2024 07:53 )             41.6     01-14    134<L>  |  98  |  13  ----------------------------<  110<H>  3.2<L>   |  24  |  0.60    Ca    8.1<L>      14 Jan 2024 07:54  Phos  2.2     01-14  Mg     1.9     01-14    TPro  6.1  /  Alb  3.0<L>  /  TBili  0.4  /  DBili  x   /  AST  45<H>  /  ALT  22  /  AlkPhos  36<L>  01-14    PT/INR - ( 13 Jan 2024 09:50 )   PT: 23.1 sec;   INR: 2.15 ratio         PTT - ( 12 Jan 2024 15:35 )  PTT:39.0 sec      Urinalysis Basic - ( 14 Jan 2024 07:54 )    Color: x / Appearance: x / SG: x / pH: x  Gluc: 110 mg/dL / Ketone: x  / Bili: x / Urobili: x   Blood: x / Protein: x / Nitrite: x   Leuk Esterase: x / RBC: x / WBC x   Sq Epi: x / Non Sq Epi: x / Bacteria: x        Culture - Urine (collected 12 Jan 2024 16:45)  Source: Clean Catch Clean Catch (Midstream)  Final Report (13 Jan 2024 21:39):    <10,000 CFU/mL Normal Urogenital Nidia    Culture - Blood (collected 12 Jan 2024 16:27)  Source: .Blood Blood-Peripheral  Preliminary Report (13 Jan 2024 23:02):    No growth at 24 hours    Culture - Blood (collected 12 Jan 2024 15:20)  Source: .Blood Blood-Peripheral  Preliminary Report (13 Jan 2024 20:02):    No growth at 24 hours          RADIOLOGY & ADDITIONAL TESTS:  New Results Reviewed Today:   New Imaging Personally Reviewed Today:  New Electrocardiogram Personally Reviewed Today:  Prior or Outpatient Records Reviewed Today:    COMMUNICATION:  Care Discussed with Consultants/Other Providers and Details of Discussion:  Discussions with Patient/Family:  PCP Communication:

## 2024-01-14 NOTE — PATIENT PROFILE ADULT - FALL HARM RISK - HARM RISK INTERVENTIONS
Assistance with ambulation/Assistance OOB with selected safe patient handling equipment/Communicate Risk of Fall with Harm to all staff/Discuss with provider need for PT consult/Monitor gait and stability/Provide patient with walking aids - walker, cane, crutches/Reinforce activity limits and safety measures with patient and family/Tailored Fall Risk Interventions/Visual Cue: Yellow wristband and red socks/Bed in lowest position, wheels locked, appropriate side rails in place/Call bell, personal items and telephone in reach/Instruct patient to call for assistance before getting out of bed or chair/Non-slip footwear when patient is out of bed/[place] to call system/Physically safe environment - no spills, clutter or unnecessary equipment/Purposeful Proactive Rounding/Room/bathroom lighting operational, light cord in reach Assistance with ambulation/Assistance OOB with selected safe patient handling equipment/Communicate Risk of Fall with Harm to all staff/Discuss with provider need for PT consult/Monitor gait and stability/Provide patient with walking aids - walker, cane, crutches/Reinforce activity limits and safety measures with patient and family/Tailored Fall Risk Interventions/Visual Cue: Yellow wristband and red socks/Bed in lowest position, wheels locked, appropriate side rails in place/Call bell, personal items and telephone in reach/Instruct patient to call for assistance before getting out of bed or chair/Non-slip footwear when patient is out of bed/[place] to call system/Physically safe environment - no spills, clutter or unnecessary equipment/Purposeful Proactive Rounding/Room/bathroom lighting operational, light cord in reach

## 2024-01-14 NOTE — PROGRESS NOTE ADULT - SUBJECTIVE AND OBJECTIVE BOX
DATE OF SERVICE: 01-14-24 @ 12:24    Patient is a 83y old  Female who presents with a chief complaint of Pneumonia (13 Jan 2024 16:47)      INTERVAL HISTORY:     REVIEW OF SYSTEMS:  CONSTITUTIONAL: No weakness  EYES/ENT: No visual changes;  No throat pain   NECK: No pain or stiffness  RESPIRATORY: No cough, wheezing; No shortness of breath  CARDIOVASCULAR: No chest pain or palpitations  GASTROINTESTINAL: No abdominal  pain. No nausea, vomiting, or hematemesis  GENITOURINARY: No dysuria, frequency or hematuria  NEUROLOGICAL: No stroke like symptoms  SKIN: No rashes    TELEMETRY Personally reviewed:  	  MEDICATIONS:  dronedarone 400 milliGRAM(s) Oral two times a day  metoprolol succinate  milliGRAM(s) Oral daily        PHYSICAL EXAM:  T(C): 36.4 (01-14-24 @ 09:38), Max: 38.9 (01-13-24 @ 13:04)  HR: 68 (01-14-24 @ 09:38) (64 - 82)  BP: 121/63 (01-14-24 @ 09:38) (90/62 - 164/74)  RR: 18 (01-14-24 @ 10:42) (18 - 26)  SpO2: 91% (01-14-24 @ 10:42) (90% - 98%)  Wt(kg): --  I&O's Summary    13 Jan 2024 07:01  -  14 Jan 2024 07:00  --------------------------------------------------------  IN: 100 mL / OUT: 201 mL / NET: -101 mL          Appearance: In no distress	  HEENT:    PERRL, EOMI	  Cardiovascular:  S1 S2, No JVD  Respiratory: Lungs clear to auscultation	  Gastrointestinal:  Soft, Non-tender, + BS	  Vascularature:  No edema of LE  Psychiatric: Appropriate affect   Neuro: no acute focal deficits                               13.8   6.32  )-----------( 176      ( 14 Jan 2024 07:53 )             41.6     01-14    134<L>  |  98  |  13  ----------------------------<  110<H>  3.2<L>   |  24  |  0.60    Ca    8.1<L>      14 Jan 2024 07:54  Phos  2.2     01-14  Mg     1.9     01-14    TPro  6.1  /  Alb  3.0<L>  /  TBili  0.4  /  DBili  x   /  AST  45<H>  /  ALT  22  /  AlkPhos  36<L>  01-14     TTE 12/29/23    1. Left ventricular systolic function is normal with an ejection fraction of 70 % by Otero's method of disks. There are no regional wall motion abnormalities seen.   2. There is moderate (grade 2) left ventricular diastolic dysfunction, with elevated filling pressure.   3. Normal right ventricular cavity size and systolic function. Tricuspid annular tissue Doppler S' is 10.0 cm/s (normal >10 cm/s).   4. No pericardial effusion seen.   5. The inferior vena cava is normal in size (normal <2.1cm) with normal inspiratory collapse (normal >50%) consistent with normal right atrial pressure (~3, range 0-5mmHg).      Labs personally reviewed      ASSESSMENT/PLAN: 	  83F hx AF on Eliquis, HTN, HLD, follicular lymphoma sent to ED from PCP's office with hypoxia and PNA. Patient was seen earlier this week at PCP's office and diagnosed with LLL PNA     1. Paroxsymal AFIB   - ECG in ED shows NSR normal ECG  -c/w Eliquis 5mg BID  -Check TSH  -CBC wnl   -ProBNP 456  -TTE 12/29 EF 70% , no WMA, grade 2 LVDD    2. PNA  -seen on chest xray  -treat left lower lobe PNA  -5-6 L NC - patient desats on RA  -c/w Duoneb treatments     3. HTN   -controlled  -c/w home BP medication    4. HLD  LDL 89   c/w Statin     5. Electrolyze imbalances   - on repeat still low  -treat and repeat STAT BMP    6. Prophylactic measures   - SCD's & AC        BIRGIT Vidales, DO [facility]  Cardiovascular Medicine  [address], [address]  Available through call or text on [employer] TEAMs  Office: [phone]   DATE OF SERVICE: 01-14-24 @ 12:24    Patient is a 83y old  Female who presents with a chief complaint of Pneumonia (13 Jan 2024 16:47)      INTERVAL HISTORY:     REVIEW OF SYSTEMS:  CONSTITUTIONAL: No weakness  EYES/ENT: No visual changes;  No throat pain   NECK: No pain or stiffness  RESPIRATORY: No cough, wheezing; No shortness of breath  CARDIOVASCULAR: No chest pain or palpitations  GASTROINTESTINAL: No abdominal  pain. No nausea, vomiting, or hematemesis  GENITOURINARY: No dysuria, frequency or hematuria  NEUROLOGICAL: No stroke like symptoms  SKIN: No rashes    TELEMETRY Personally reviewed:  	  MEDICATIONS:  dronedarone 400 milliGRAM(s) Oral two times a day  metoprolol succinate  milliGRAM(s) Oral daily        PHYSICAL EXAM:  T(C): 36.4 (01-14-24 @ 09:38), Max: 38.9 (01-13-24 @ 13:04)  HR: 68 (01-14-24 @ 09:38) (64 - 82)  BP: 121/63 (01-14-24 @ 09:38) (90/62 - 164/74)  RR: 18 (01-14-24 @ 10:42) (18 - 26)  SpO2: 91% (01-14-24 @ 10:42) (90% - 98%)  Wt(kg): --  I&O's Summary    13 Jan 2024 07:01  -  14 Jan 2024 07:00  --------------------------------------------------------  IN: 100 mL / OUT: 201 mL / NET: -101 mL          Appearance: In no distress	  HEENT:    PERRL, EOMI	  Cardiovascular:  S1 S2, No JVD  Respiratory: Lungs clear to auscultation	  Gastrointestinal:  Soft, Non-tender, + BS	  Vascularature:  No edema of LE  Psychiatric: Appropriate affect   Neuro: no acute focal deficits                               13.8   6.32  )-----------( 176      ( 14 Jan 2024 07:53 )             41.6     01-14    134<L>  |  98  |  13  ----------------------------<  110<H>  3.2<L>   |  24  |  0.60    Ca    8.1<L>      14 Jan 2024 07:54  Phos  2.2     01-14  Mg     1.9     01-14    TPro  6.1  /  Alb  3.0<L>  /  TBili  0.4  /  DBili  x   /  AST  45<H>  /  ALT  22  /  AlkPhos  36<L>  01-14     TTE 12/29/23    1. Left ventricular systolic function is normal with an ejection fraction of 70 % by Otero's method of disks. There are no regional wall motion abnormalities seen.   2. There is moderate (grade 2) left ventricular diastolic dysfunction, with elevated filling pressure.   3. Normal right ventricular cavity size and systolic function. Tricuspid annular tissue Doppler S' is 10.0 cm/s (normal >10 cm/s).   4. No pericardial effusion seen.   5. The inferior vena cava is normal in size (normal <2.1cm) with normal inspiratory collapse (normal >50%) consistent with normal right atrial pressure (~3, range 0-5mmHg).      Labs personally reviewed      ASSESSMENT/PLAN: 	  83F hx AF on Eliquis, HTN, HLD, follicular lymphoma sent to ED from PCP's office with hypoxia and PNA. Patient was seen earlier this week at PCP's office and diagnosed with LLL PNA     1. Paroxsymal AFIB   - ECG in ED shows NSR normal ECG  -c/w Eliquis 5mg BID  -Check TSH  -CBC wnl   -ProBNP 456  -TTE 12/29 EF 70% , no WMA, grade 2 LVDD    2. PNA  -seen on chest xray  -treat left lower lobe PNA  -5-6 L NC - patient desats on RA  -c/w Duoneb treatments     3. HTN   -controlled  -c/w home BP medication    4. HLD  LDL 89   c/w Statin     5. Electrolyze imbalances   - on repeat still low  -treat and repeat STAT BMP    6. Prophylactic measures   - SCD's & AC        BIRGIT Vidales, DO Loc[facility]  Cardiovascular Medicine  [address], [address]  Available through call or text on [employer] TEAMs  Office: [phone]   DATE OF SERVICE: 01-14-24 @ 12:24    Patient is a 83y old  Female who presents with a chief complaint of Pneumonia (13 Jan 2024 16:47)      INTERVAL HISTORY:     REVIEW OF SYSTEMS:  CONSTITUTIONAL: No weakness  EYES/ENT: No visual changes;  No throat pain   NECK: No pain or stiffness  RESPIRATORY: No cough, wheezing; No shortness of breath  CARDIOVASCULAR: No chest pain or palpitations  GASTROINTESTINAL: No abdominal  pain. No nausea, vomiting, or hematemesis  GENITOURINARY: No dysuria, frequency or hematuria  NEUROLOGICAL: No stroke like symptoms  SKIN: No rashes    TELEMETRY Personally reviewed:  	  MEDICATIONS:  dronedarone 400 milliGRAM(s) Oral two times a day  metoprolol succinate  milliGRAM(s) Oral daily        PHYSICAL EXAM:  T(C): 36.4 (01-14-24 @ 09:38), Max: 38.9 (01-13-24 @ 13:04)  HR: 68 (01-14-24 @ 09:38) (64 - 82)  BP: 121/63 (01-14-24 @ 09:38) (90/62 - 164/74)  RR: 18 (01-14-24 @ 10:42) (18 - 26)  SpO2: 91% (01-14-24 @ 10:42) (90% - 98%)  Wt(kg): --  I&O's Summary    13 Jan 2024 07:01  -  14 Jan 2024 07:00  --------------------------------------------------------  IN: 100 mL / OUT: 201 mL / NET: -101 mL          Appearance: In no distress	  HEENT:    PERRL, EOMI	  Cardiovascular:  S1 S2, No JVD  Respiratory: Lungs clear to auscultation	  Gastrointestinal:  Soft, Non-tender, + BS	  Vascularature:  No edema of LE  Psychiatric: Appropriate affect   Neuro: no acute focal deficits                               13.8   6.32  )-----------( 176      ( 14 Jan 2024 07:53 )             41.6     01-14    134<L>  |  98  |  13  ----------------------------<  110<H>  3.2<L>   |  24  |  0.60    Ca    8.1<L>      14 Jan 2024 07:54  Phos  2.2     01-14  Mg     1.9     01-14    TPro  6.1  /  Alb  3.0<L>  /  TBili  0.4  /  DBili  x   /  AST  45<H>  /  ALT  22  /  AlkPhos  36<L>  01-14     TTE 12/29/23    1. Left ventricular systolic function is normal with an ejection fraction of 70 % by Otero's method of disks. There are no regional wall motion abnormalities seen.   2. There is moderate (grade 2) left ventricular diastolic dysfunction, with elevated filling pressure.   3. Normal right ventricular cavity size and systolic function. Tricuspid annular tissue Doppler S' is 10.0 cm/s (normal >10 cm/s).   4. No pericardial effusion seen.   5. The inferior vena cava is normal in size (normal <2.1cm) with normal inspiratory collapse (normal >50%) consistent with normal right atrial pressure (~3, range 0-5mmHg).      Labs personally reviewed      ASSESSMENT/PLAN: 	  83F hx AF on Eliquis, HTN, HLD, follicular lymphoma sent to ED from PCP's office with hypoxia and PNA. Patient was seen earlier this week at PCP's office and diagnosed with LLL PNA     1. Paroxsymal AFIB   - ECG in ED shows NSR normal ECG  -c/w Eliquis 5mg BID  -Check TSH  -CBC wnl   -ProBNP 456  -TTE 12/29 EF 70% , no WMA, grade 2 LVDD    2. PNA  -seen on chest xray  -treat left lower lobe PNA  -5-6 L NC - patient desats on RA  -c/w Duoneb treatments     3. HTN   -controlled  -c/w home BP medication    4. HLD  LDL 89   c/w Statin     5. Electrolyze imbalances   - on repeat still low  -treat and repeat STAT BMP    6. Prophylactic measures   - SCD's & AC        BIRGIT Vidales, DO [facility]  Cardiovascular Medicine  [address], [address]  Available through call or text on [employer] TEAM[employer]  Office: [phone]   DATE OF SERVICE: 01-14-24 @ 12:24    Patient is a 83y old  Female who presents with a chief complaint of Pneumonia (13 Jan 2024 16:47)      INTERVAL HISTORY:     REVIEW OF SYSTEMS:  CONSTITUTIONAL: No weakness  EYES/ENT: No visual changes;  No throat pain   NECK: No pain or stiffness  RESPIRATORY: No cough, wheezing; No shortness of breath  CARDIOVASCULAR: No chest pain or palpitations  GASTROINTESTINAL: No abdominal  pain. No nausea, vomiting, or hematemesis  GENITOURINARY: No dysuria, frequency or hematuria  NEUROLOGICAL: No stroke like symptoms  SKIN: No rashes    TELEMETRY Personally reviewed:  	  MEDICATIONS:  dronedarone 400 milliGRAM(s) Oral two times a day  metoprolol succinate  milliGRAM(s) Oral daily        PHYSICAL EXAM:  T(C): 36.4 (01-14-24 @ 09:38), Max: 38.9 (01-13-24 @ 13:04)  HR: 68 (01-14-24 @ 09:38) (64 - 82)  BP: 121/63 (01-14-24 @ 09:38) (90/62 - 164/74)  RR: 18 (01-14-24 @ 10:42) (18 - 26)  SpO2: 91% (01-14-24 @ 10:42) (90% - 98%)  Wt(kg): --  I&O's Summary    13 Jan 2024 07:01  -  14 Jan 2024 07:00  --------------------------------------------------------  IN: 100 mL / OUT: 201 mL / NET: -101 mL          Appearance: In no distress	  HEENT:    PERRL, EOMI	  Cardiovascular:  S1 S2, No JVD  Respiratory: Lungs clear to auscultation	  Gastrointestinal:  Soft, Non-tender, + BS	  Vascularature:  No edema of LE  Psychiatric: Appropriate affect   Neuro: no acute focal deficits                               13.8   6.32  )-----------( 176      ( 14 Jan 2024 07:53 )             41.6     01-14    134<L>  |  98  |  13  ----------------------------<  110<H>  3.2<L>   |  24  |  0.60    Ca    8.1<L>      14 Jan 2024 07:54  Phos  2.2     01-14  Mg     1.9     01-14    TPro  6.1  /  Alb  3.0<L>  /  TBili  0.4  /  DBili  x   /  AST  45<H>  /  ALT  22  /  AlkPhos  36<L>  01-14     TTE 12/29/23    1. Left ventricular systolic function is normal with an ejection fraction of 70 % by Otero's method of disks. There are no regional wall motion abnormalities seen.   2. There is moderate (grade 2) left ventricular diastolic dysfunction, with elevated filling pressure.   3. Normal right ventricular cavity size and systolic function. Tricuspid annular tissue Doppler S' is 10.0 cm/s (normal >10 cm/s).   4. No pericardial effusion seen.   5. The inferior vena cava is normal in size (normal <2.1cm) with normal inspiratory collapse (normal >50%) consistent with normal right atrial pressure (~3, range 0-5mmHg).      Labs personally reviewed      ASSESSMENT/PLAN: 	  83F hx AF on Eliquis, HTN, HLD, follicular lymphoma sent to ED from PCP's office with hypoxia and PNA. Patient was seen earlier this week at PCP's office and diagnosed with LLL PNA     1. Paroxsymal AFIB   - ECG in ED shows NSR normal ECG  -c/w Eliquis 5mg BID  -Check TSH  -CBC wnl   -ProBNP 456  -TTE 12/29 EF 70% , no WMA, grade 2 LVDD    2. PNA  -seen on chest xray  -treat left lower lobe PNA  -5-6 L NC - patient desats on RA  -c/w Duoneb treatments     3. HTN   -controlled  -c/w home BP medication    4. HLD  LDL 89   c/w Statin     5. Electrolyze imbalances   - on repeat still low  -treat and repeat STAT BMP    6. Prophylactic measures   - SCD's & AC        BIRGIT Vidales, DO [facility]  Cardiovascular Medicine  [address], [address]  Available through call or text on [employer] TEAM[employer]  Office: [phone]

## 2024-01-14 NOTE — CONSULT NOTE ADULT - SUBJECTIVE AND OBJECTIVE BOX
Pulmonology Consult Note     CHIEF COMPLAINT:Patient is a 83y old  Female who presents with a chief complaint of Pneumonia (2024 14:28)      HPI:  83F hx AF on Eliquis, HTN, HLD, follicular lymphoma presenting with hypoxia and PNA. Patient was seen earlier this week at PCP's office and diagnosed with LLL PNA and started on cephalexin. She returned to the office yesterday, still feeling poorly, and was started on doxycycline. She returned today and was noted to be hypoxic to the 80's and sent to the ED by EMS on supplemental oxygen for further treatment. Imaging here confirmed LLL PNA as well. Patient c/o fever/chills, weakness, and non-productive cough. Denies dyspnea       PAST MEDICAL & SURGICAL HISTORY:  HTN (hypertension)      Hyperlipidemia      Osteoporosis      Lymph node enlargement  left inguinal      Lymphoma      Atrial fibrillation      Afib      History of appendectomy      Cyst of skin  abdomen          FAMILY HISTORY:  Family history of tuberculosis (Mother)  mother  at 25 yr old    Family history of hypertension (Father)  father  at 86 yr old        SOCIAL HISTORY:  Smoking: [x] Never Smoked     Allergies    No Known Allergies    Intolerances        HOME MEDICATIONS:  Home Medications:  amLODIPine 2.5 mg oral tablet: 1 tab(s) orally (2024 18:04)  Eliquis 5 mg oral tablet: 1 tab(s) orally (2024 18:04)  hydroCHLOROthiazide 25 mg oral tablet: 1 tab(s) orally once a day (2024 18:04)  Metoprolol Succinate  mg oral tablet, extended release: 1 tab(s) orally once a day (2024 18:04)  simvastatin 10 mg oral tablet: 1 tab(s) orally once a day (at bedtime) (2024 18:04)  Vitamin D3 25 mcg (1000 intl units) oral tablet: 1 tab(s) orally once a day (2024 18:04)      REVIEW OF SYSTEMS:  Constitutional: [ ] negative [x] fevers [ ] chills [ ] weight loss [ ] weight gain  HEENT: [ ] negative [ ] dry eyes [ ] eye irritation [ ] postnasal drip [ ] nasal congestion  CV: [ ] negative  [ ] chest pain [ ] orthopnea [ ] palpitations [ ] murmur  Resp: [ ] negative [x] cough [x] shortness of breath [x] dyspnea [ ] wheezing [x] sputum [ ] hemoptysis  GI: [ ] negative [ ] nausea [ ] vomiting [ ] diarrhea [ ] constipation [ ] abd pain [ ] dysphagia   : [ ] negative [ ] dysuria [ ] nocturia [ ] hematuria [ ] increased urinary frequency  Musculoskeletal: [ ] negative [ ] back pain [ ] myalgias [ ] arthralgias [ ] fracture  Skin: [ ] negative [ ] rash [ ] itch  Neurological: [ ] negative [ ] headache [ ] dizziness [ ] syncope [ ] weakness [ ] numbness  Psychiatric: [ ] negative [ ] anxiety [ ] depression  Endocrine: [ ] negative [ ] diabetes [ ] thyroid problem  Hematologic/Lymphatic: [ ] negative [ ] anemia [ ] bleeding problem  Allergic/Immunologic: [ ] negative [ ] itchy eyes [ ] nasal discharge [ ] hives [ ] angioedema  [x] All other systems negative  [ ] Unable to assess ROS because ________    OBJECTIVE:  ICU Vital Signs Last 24 Hrs  T(C): 37.3 (2024 20:53), Max: 38.9 (2024 17:40)  T(F): 99.1 (2024 20:53), Max: 102.1 (2024 17:40)  HR: 65 (2024 20:53) (65 - 82)  BP: 106/63 (2024 20:53) (106/63 - 164/74)  BP(mean): --  ABP: --  ABP(mean): --  RR: 18 (2024 20:53) (18 - 20)  SpO2: 94% (2024 20:53) (90% - 95%)    O2 Parameters below as of 2024 20:53  Patient On (Oxygen Delivery Method): nasal cannula, high flow  O2 Flow (L/min): 40  O2 Concentration (%): 80           @ 07:  -   @ 07:00  --------------------------------------------------------  IN: 100 mL / OUT: 201 mL / NET: -101 mL     @ 07:  -   @ 22:49  --------------------------------------------------------  IN: 1310 mL / OUT: 200 mL / NET: 1110 mL      CAPILLARY BLOOD GLUCOSE          PHYSICAL EXAM:  GENERAL: NAD, well-groomed, well-developed  HEAD:  Atraumatic, Normocephalic  EYES: EOMI, conjunctiva and sclera clear  ENMT: Moist mucous membranes  CHEST/LUNG: Clear to auscultation bilaterally   HEART: Regular rate and rhythm; No murmurs, rubs, or gallops  ABDOMEN: Nondistended  VASCULAR: No  cyanosis, or edema  SKIN: No rashes or lesions  NERVOUS SYSTEM:  Alert & Oriented X3, Good concentration    HOSPITAL MEDICATIONS:  Standing Meds:  albuterol/ipratropium for Nebulization 3 milliLiter(s) Nebulizer every 6 hours  apixaban 5 milliGRAM(s) Oral every 12 hours  benzonatate 100 milliGRAM(s) Oral every 8 hours  cholecalciferol 1000 Unit(s) Oral daily  dronedarone 400 milliGRAM(s) Oral two times a day  guaiFENesin  milliGRAM(s) Oral every 12 hours  metoprolol succinate  milliGRAM(s) Oral daily  piperacillin/tazobactam IVPB.. 3.375 Gram(s) IV Intermittent every 8 hours  simvastatin 10 milliGRAM(s) Oral at bedtime      PRN Meds:  acetaminophen     Tablet .. 650 milliGRAM(s) Oral every 6 hours PRN  aluminum hydroxide/magnesium hydroxide/simethicone Suspension 30 milliLiter(s) Oral every 4 hours PRN  melatonin 3 milliGRAM(s) Oral at bedtime PRN      LABS:        133<L>  |  100  |  14  ----------------------------<  131<H>  3.7   |  23  |  0.70      134<L>  |  98  |  13  ----------------------------<  110<H>  3.2<L>   |  24  |  0.60      137  |  98  |  18  ----------------------------<  118<H>  2.8<LL>   |  28  |  0.76    Ca    8.0<L>      2024 21:47  Ca    8.1<L>      2024 07:54  Ca    7.9<L>      2024 11:06  Phos  2.9       Mg     2.0         TPro  6.1  /  Alb  3.0<L>  /  TBili  0.4  /  DBili  x   /  AST  45<H>  /  ALT  22  /  AlkPhos  36<L>    TPro  6.0  /  Alb  3.2<L>  /  TBili  0.5  /  DBili  x   /  AST  44<H>  /  ALT  21  /  AlkPhos  34<L>    TPro  6.8  /  Alb  3.4  /  TBili  0.6  /  DBili  x   /  AST  61<H>  /  ALT  21  /  AlkPhos  33<L>      Magnesium: 2.0 mg/dL (24 @ 21:47)  Magnesium: 1.9 mg/dL (24 @ 07:54)    Phosphorus: 2.9 mg/dL (24 @ 21:47)  Phosphorus: 2.2 mg/dL (24 @ 07:54)      PT/INR - ( 2024 09:50 )   PT: 23.1 sec;   INR: 2.15 ratio                       Urinalysis Basic - ( 2024 21:47 )    Color: x / Appearance: x / SG: x / pH: x  Gluc: 131 mg/dL / Ketone: x  / Bili: x / Urobili: x   Blood: x / Protein: x / Nitrite: x   Leuk Esterase: x / RBC: x / WBC x   Sq Epi: x / Non Sq Epi: x / Bacteria: x      ABG - ( 2024 10:28 )  pH, Arterial: 7.49  pH, Blood: x     /  pCO2: 34    /  pO2: 62    / HCO3: 26    / Base Excess: 2.8   /  SaO2: 95.2                                    13.8   6.32  )-----------( 176      ( 2024 07:53 )             41.6                         11.7   5.64  )-----------( 154      ( 2024 09:50 )             36.8                         13.7   5.97  )-----------( 226      ( 2024 15:35 )             40.8     CAPILLARY BLOOD GLUCOSE        Blood Gas Source Venous: Venous (24 @ 15:27)      MICROBIOLOGY:     Culture - Urine (collected 2024 16:45)  Source: Clean Catch Clean Catch (Midstream)  Final Report (2024 21:39):    <10,000 CFU/mL Normal Urogenital Nidia    Culture - Blood (collected 2024 16:27)  Source: .Blood Blood-Peripheral  Preliminary Report (2024 23:02):    No growth at 24 hours    Culture - Blood (collected 2024 15:20)  Source: .Blood Blood-Peripheral  Preliminary Report (2024 20:01):    No growth at 48 Hours        RADIOLOGY:     < from: CT Angio Chest PE Protocol w/ IV Cont (24 @ 15:05) >  LUNGS AND PLEURA: Small left pleural effusion. Extensive airspace   infiltrate/consolidation left upper and bilateral lower lobes, left   greater than right. Minimal airspace opacity right upper lobe.  MEDIASTINUM AND MICHAEL: Small right axillary lymph nodes present largest   measuring 1.2 x 0.8 cm. Scattered small mediastinal lymph nodes. No    < end of copied text >

## 2024-01-14 NOTE — CONSULT NOTE ADULT - ATTENDING COMMENTS
83F hx AF on Eliquis, HTN, HLD, follicular lymphoma presenting with hypoxia and PNA due to HMPV infection  Pt requiring HFNC but appears comfortable from respiratory standpoint however is becoming more delirious  On tx for poss secondary bacterial infection       - f/u cx  - C/w Zosyn  - C/w duonebs  - Wean HFNC as able, goal sat>90%

## 2024-01-14 NOTE — CONSULT NOTE ADULT - ASSESSMENT
83F hx AF on Eliquis, HTN, HLD, follicular lymphoma presenting with hypoxia and PNA.       # Pneumonia   - Suspect superinfection on top of the HMPV infection.  - Check sputum culture  - Check urine strep   - MRSA negative   - C/w Zosyn  - C/w duonebs  - Wean HFNC as able   - OOB as able

## 2024-01-15 LAB
ALBUMIN SERPL ELPH-MCNC: 2.8 G/DL — LOW (ref 3.3–5)
ALBUMIN SERPL ELPH-MCNC: 2.8 G/DL — LOW (ref 3.3–5)
ALP SERPL-CCNC: 32 U/L — LOW (ref 40–120)
ALP SERPL-CCNC: 32 U/L — LOW (ref 40–120)
ALT FLD-CCNC: 26 U/L — SIGNIFICANT CHANGE UP (ref 10–45)
ALT FLD-CCNC: 26 U/L — SIGNIFICANT CHANGE UP (ref 10–45)
ANION GAP SERPL CALC-SCNC: 9 MMOL/L — SIGNIFICANT CHANGE UP (ref 5–17)
ANION GAP SERPL CALC-SCNC: 9 MMOL/L — SIGNIFICANT CHANGE UP (ref 5–17)
APPEARANCE UR: CLEAR — SIGNIFICANT CHANGE UP
AST SERPL-CCNC: 41 U/L — HIGH (ref 10–40)
AST SERPL-CCNC: 41 U/L — HIGH (ref 10–40)
BACTERIA # UR AUTO: NEGATIVE /HPF — SIGNIFICANT CHANGE UP
BASE EXCESS BLDV CALC-SCNC: 3.8 MMOL/L — HIGH (ref -2–3)
BASE EXCESS BLDV CALC-SCNC: 3.8 MMOL/L — HIGH (ref -2–3)
BILIRUB SERPL-MCNC: 0.5 MG/DL — SIGNIFICANT CHANGE UP (ref 0.2–1.2)
BILIRUB SERPL-MCNC: 0.5 MG/DL — SIGNIFICANT CHANGE UP (ref 0.2–1.2)
BILIRUB UR-MCNC: NEGATIVE — SIGNIFICANT CHANGE UP
BUN SERPL-MCNC: 13 MG/DL — SIGNIFICANT CHANGE UP (ref 7–23)
BUN SERPL-MCNC: 13 MG/DL — SIGNIFICANT CHANGE UP (ref 7–23)
CALCIUM SERPL-MCNC: 8 MG/DL — LOW (ref 8.4–10.5)
CALCIUM SERPL-MCNC: 8 MG/DL — LOW (ref 8.4–10.5)
CAST: 0 /LPF — SIGNIFICANT CHANGE UP (ref 0–4)
CHLORIDE SERPL-SCNC: 100 MMOL/L — SIGNIFICANT CHANGE UP (ref 96–108)
CHLORIDE SERPL-SCNC: 100 MMOL/L — SIGNIFICANT CHANGE UP (ref 96–108)
CO2 BLDV-SCNC: 31 MMOL/L — HIGH (ref 22–26)
CO2 BLDV-SCNC: 31 MMOL/L — HIGH (ref 22–26)
CO2 SERPL-SCNC: 26 MMOL/L — SIGNIFICANT CHANGE UP (ref 22–31)
CO2 SERPL-SCNC: 26 MMOL/L — SIGNIFICANT CHANGE UP (ref 22–31)
COLOR SPEC: YELLOW — SIGNIFICANT CHANGE UP
CREAT SERPL-MCNC: 0.64 MG/DL — SIGNIFICANT CHANGE UP (ref 0.5–1.3)
CREAT SERPL-MCNC: 0.64 MG/DL — SIGNIFICANT CHANGE UP (ref 0.5–1.3)
DIFF PNL FLD: ABNORMAL
EGFR: 88 ML/MIN/1.73M2 — SIGNIFICANT CHANGE UP
EGFR: 88 ML/MIN/1.73M2 — SIGNIFICANT CHANGE UP
GLUCOSE SERPL-MCNC: 97 MG/DL — SIGNIFICANT CHANGE UP (ref 70–99)
GLUCOSE SERPL-MCNC: 97 MG/DL — SIGNIFICANT CHANGE UP (ref 70–99)
GLUCOSE UR QL: NEGATIVE MG/DL — SIGNIFICANT CHANGE UP
HCO3 BLDV-SCNC: 29 MMOL/L — SIGNIFICANT CHANGE UP (ref 22–29)
HCO3 BLDV-SCNC: 29 MMOL/L — SIGNIFICANT CHANGE UP (ref 22–29)
KETONES UR-MCNC: NEGATIVE MG/DL — SIGNIFICANT CHANGE UP
LACTATE SERPL-SCNC: 1.4 MMOL/L — SIGNIFICANT CHANGE UP (ref 0.5–2)
LACTATE SERPL-SCNC: 1.4 MMOL/L — SIGNIFICANT CHANGE UP (ref 0.5–2)
LEUKOCYTE ESTERASE UR-ACNC: NEGATIVE — SIGNIFICANT CHANGE UP
MAGNESIUM SERPL-MCNC: 1.9 MG/DL — SIGNIFICANT CHANGE UP (ref 1.6–2.6)
MAGNESIUM SERPL-MCNC: 1.9 MG/DL — SIGNIFICANT CHANGE UP (ref 1.6–2.6)
NITRITE UR-MCNC: NEGATIVE — SIGNIFICANT CHANGE UP
PCO2 BLDV: 46 MMHG — HIGH (ref 39–42)
PCO2 BLDV: 46 MMHG — HIGH (ref 39–42)
PH BLDV: 7.41 — SIGNIFICANT CHANGE UP (ref 7.32–7.43)
PH BLDV: 7.41 — SIGNIFICANT CHANGE UP (ref 7.32–7.43)
PH UR: 6 — SIGNIFICANT CHANGE UP (ref 5–8)
PHOSPHATE SERPL-MCNC: 2.4 MG/DL — LOW (ref 2.5–4.5)
PHOSPHATE SERPL-MCNC: 2.4 MG/DL — LOW (ref 2.5–4.5)
PO2 BLDV: 19 MMHG — LOW (ref 25–45)
PO2 BLDV: 19 MMHG — LOW (ref 25–45)
POTASSIUM SERPL-MCNC: 3.6 MMOL/L — SIGNIFICANT CHANGE UP (ref 3.5–5.3)
POTASSIUM SERPL-MCNC: 3.6 MMOL/L — SIGNIFICANT CHANGE UP (ref 3.5–5.3)
POTASSIUM SERPL-SCNC: 3.6 MMOL/L — SIGNIFICANT CHANGE UP (ref 3.5–5.3)
POTASSIUM SERPL-SCNC: 3.6 MMOL/L — SIGNIFICANT CHANGE UP (ref 3.5–5.3)
PROT SERPL-MCNC: 5.8 G/DL — LOW (ref 6–8.3)
PROT SERPL-MCNC: 5.8 G/DL — LOW (ref 6–8.3)
PROT UR-MCNC: 30 MG/DL
RBC CASTS # UR COMP ASSIST: 7 /HPF — HIGH (ref 0–4)
SAO2 % BLDV: 24.4 % — LOW (ref 67–88)
SAO2 % BLDV: 24.4 % — LOW (ref 67–88)
SODIUM SERPL-SCNC: 135 MMOL/L — SIGNIFICANT CHANGE UP (ref 135–145)
SODIUM SERPL-SCNC: 135 MMOL/L — SIGNIFICANT CHANGE UP (ref 135–145)
SP GR SPEC: 1.03 — SIGNIFICANT CHANGE UP (ref 1–1.03)
SQUAMOUS # UR AUTO: 2 /HPF — SIGNIFICANT CHANGE UP (ref 0–5)
T4 FREE+ TSH PNL SERPL: 2.85 UIU/ML — SIGNIFICANT CHANGE UP (ref 0.27–4.2)
T4 FREE+ TSH PNL SERPL: 2.85 UIU/ML — SIGNIFICANT CHANGE UP (ref 0.27–4.2)
UROBILINOGEN FLD QL: 0.2 MG/DL — SIGNIFICANT CHANGE UP (ref 0.2–1)
WBC UR QL: 1 /HPF — SIGNIFICANT CHANGE UP (ref 0–5)

## 2024-01-15 PROCEDURE — 99233 SBSQ HOSP IP/OBS HIGH 50: CPT

## 2024-01-15 RX ORDER — SODIUM CHLORIDE 9 MG/ML
1000 INJECTION, SOLUTION INTRAVENOUS
Refills: 0 | Status: DISCONTINUED | OUTPATIENT
Start: 2024-01-15 | End: 2024-01-15

## 2024-01-15 RX ORDER — HALOPERIDOL DECANOATE 100 MG/ML
0.5 INJECTION INTRAMUSCULAR ONCE
Refills: 0 | Status: COMPLETED | OUTPATIENT
Start: 2024-01-15 | End: 2024-01-15

## 2024-01-15 RX ADMIN — SODIUM CHLORIDE 50 MILLILITER(S): 9 INJECTION, SOLUTION INTRAVENOUS at 00:59

## 2024-01-15 RX ADMIN — Medication 3 MILLILITER(S): at 23:44

## 2024-01-15 RX ADMIN — Medication 63.75 MILLIMOLE(S): at 12:01

## 2024-01-15 RX ADMIN — Medication 100 MILLIGRAM(S): at 14:30

## 2024-01-15 RX ADMIN — PIPERACILLIN AND TAZOBACTAM 25 GRAM(S): 4; .5 INJECTION, POWDER, LYOPHILIZED, FOR SOLUTION INTRAVENOUS at 06:42

## 2024-01-15 RX ADMIN — Medication 650 MILLIGRAM(S): at 15:39

## 2024-01-15 RX ADMIN — HALOPERIDOL DECANOATE 0.5 MILLIGRAM(S): 100 INJECTION INTRAMUSCULAR at 02:48

## 2024-01-15 RX ADMIN — APIXABAN 5 MILLIGRAM(S): 2.5 TABLET, FILM COATED ORAL at 17:51

## 2024-01-15 RX ADMIN — APIXABAN 5 MILLIGRAM(S): 2.5 TABLET, FILM COATED ORAL at 06:41

## 2024-01-15 RX ADMIN — Medication 600 MILLIGRAM(S): at 17:51

## 2024-01-15 RX ADMIN — Medication 100 MILLIGRAM(S): at 06:41

## 2024-01-15 RX ADMIN — Medication 3 MILLILITER(S): at 17:51

## 2024-01-15 RX ADMIN — Medication 600 MILLIGRAM(S): at 06:41

## 2024-01-15 RX ADMIN — Medication 3 MILLIGRAM(S): at 03:01

## 2024-01-15 RX ADMIN — PIPERACILLIN AND TAZOBACTAM 25 GRAM(S): 4; .5 INJECTION, POWDER, LYOPHILIZED, FOR SOLUTION INTRAVENOUS at 21:43

## 2024-01-15 RX ADMIN — Medication 3 MILLILITER(S): at 12:04

## 2024-01-15 RX ADMIN — PIPERACILLIN AND TAZOBACTAM 25 GRAM(S): 4; .5 INJECTION, POWDER, LYOPHILIZED, FOR SOLUTION INTRAVENOUS at 14:30

## 2024-01-15 RX ADMIN — DRONEDARONE 400 MILLIGRAM(S): 400 TABLET, FILM COATED ORAL at 17:50

## 2024-01-15 RX ADMIN — Medication 3 MILLILITER(S): at 07:30

## 2024-01-15 RX ADMIN — Medication 650 MILLIGRAM(S): at 16:30

## 2024-01-15 RX ADMIN — Medication 3 MILLILITER(S): at 03:20

## 2024-01-15 RX ADMIN — Medication 100 MILLIGRAM(S): at 21:44

## 2024-01-15 RX ADMIN — DRONEDARONE 400 MILLIGRAM(S): 400 TABLET, FILM COATED ORAL at 06:41

## 2024-01-15 RX ADMIN — Medication 1000 UNIT(S): at 12:04

## 2024-01-15 RX ADMIN — SIMVASTATIN 10 MILLIGRAM(S): 20 TABLET, FILM COATED ORAL at 21:44

## 2024-01-15 NOTE — PROVIDER CONTACT NOTE (OTHER) - ASSESSMENT
Pt agitated, confused and restless. Pt took off high flow nasal cannula and won't put it back on. Pt desating to 78%. Pt wants to go to the bathroom but will not let staff assist her. Pt walking around room and will not listen to staff. Pt will not sit down and put high flow nasal cannula back on. Pt is yelling at staff and being aggressive. Pt is hitting and scratching staff.
Pt VS are as follows: /63 HR 65 Temp 99.1 SpO2 94%. Pt denies chest pain, palpitations, or headache.

## 2024-01-15 NOTE — PROGRESS NOTE ADULT - SUBJECTIVE AND OBJECTIVE BOX
DATE OF SERVICE: 01-15-24 @ 11:23    Patient is a 83y old  Female who presents with a chief complaint of Pneumonia (14 Jan 2024 22:10)      INTERVAL HISTORY:     REVIEW OF SYSTEMS:  CONSTITUTIONAL: No weakness  EYES/ENT: No visual changes;  No throat pain   NECK: No pain or stiffness  RESPIRATORY: No cough, wheezing; No shortness of breath  CARDIOVASCULAR: No chest pain or palpitations  GASTROINTESTINAL: No abdominal  pain. No nausea, vomiting, or hematemesis  GENITOURINARY: No dysuria, frequency or hematuria  NEUROLOGICAL: No stroke like symptoms  SKIN: No rashes    TELEMETRY Personally reviewed:  	  MEDICATIONS:  dronedarone 400 milliGRAM(s) Oral two times a day  metoprolol succinate  milliGRAM(s) Oral daily        PHYSICAL EXAM:  T(C): 36.9 (01-15-24 @ 04:43), Max: 38.9 (01-14-24 @ 17:40)  HR: 65 (01-15-24 @ 08:01) (64 - 75)  BP: 109/72 (01-15-24 @ 04:43) (106/63 - 160/69)  RR: 18 (01-15-24 @ 09:55) (18 - 18)  SpO2: 94% (01-15-24 @ 09:55) (90% - 98%)  Wt(kg): --  I&O's Summary    14 Jan 2024 07:01  -  15 Nato 2024 07:00  --------------------------------------------------------  IN: 1760 mL / OUT: 200 mL / NET: 1560 mL      Height (cm): 152.4 (01-14 @ 17:40)  Weight (kg): 62.5 (01-14 @ 17:40)  BMI (kg/m2): 26.9 (01-14 @ 17:40)  BSA (m2): 1.59 (01-14 @ 17:40)    Appearance: In no distress	  HEENT:    PERRL, EOMI	  Cardiovascular:  S1 S2, No JVD  Respiratory: Lungs clear to auscultation	  Gastrointestinal:  Soft, Non-tender, + BS	  Vascularature:  No edema of LE  Psychiatric: Appropriate affect   Neuro: no acute focal deficits                               13.8   6.32  )-----------( 176      ( 14 Jan 2024 07:53 )             41.6     01-15    135  |  100  |  13  ----------------------------<  97  3.6   |  26  |  0.64    Ca    8.0<L>      15 Jan 2024 08:32  Phos  2.4     01-15  Mg     1.9     01-15    TPro  5.8<L>  /  Alb  2.8<L>  /  TBili  0.5  /  DBili  x   /  AST  41<H>  /  ALT  26  /  AlkPhos  32<L>  01-15        Labs personally reviewed      ASSESSMENT/PLAN: 	  83F hx AF on Eliquis, HTN, HLD, follicular lymphoma sent to ED from PCP's office with hypoxia and PNA. Patient was seen earlier this week at PCP's office and diagnosed with LLL PNA     1. Paroxsymal AFIB   - ECG in ED shows NSR normal ECG  -c/w Eliquis 5mg BID  -Check TSH  -CBC wnl   -ProBNP 456  -TTE 12/29 EF 70% , no WMA, grade 2 LVDD    2. PNA  -seen on chest xray  -treat left lower lobe PNA  -5-6 L NC - patient desats on RA  -c/w Duoneb treatments     3. HTN   -controlled  -c/w home BP medication    4. HLD  LDL 89   c/w Statin     5. Electrolyze imbalances   - on repeat still low  -treat and repeat STAT BMP    6. Prophylactic measures   - SCD's & AC        BIRGIT Vidales DO [facility]  Cardiovascular Medicine  [address] [place] [address], [address]  Available through call or text on [employer] TE[employer]s  Office: [phone]   DATE OF SERVICE: 01-15-24 @ 11:23    Patient is a 83y old  Female who presents with a chief complaint of Pneumonia (14 Jan 2024 22:10)      INTERVAL HISTORY:     REVIEW OF SYSTEMS:  CONSTITUTIONAL: No weakness  EYES/ENT: No visual changes;  No throat pain   NECK: No pain or stiffness  RESPIRATORY: No cough, wheezing; No shortness of breath  CARDIOVASCULAR: No chest pain or palpitations  GASTROINTESTINAL: No abdominal  pain. No nausea, vomiting, or hematemesis  GENITOURINARY: No dysuria, frequency or hematuria  NEUROLOGICAL: No stroke like symptoms  SKIN: No rashes    TELEMETRY Personally reviewed:  	  MEDICATIONS:  dronedarone 400 milliGRAM(s) Oral two times a day  metoprolol succinate  milliGRAM(s) Oral daily        PHYSICAL EXAM:  T(C): 36.9 (01-15-24 @ 04:43), Max: 38.9 (01-14-24 @ 17:40)  HR: 65 (01-15-24 @ 08:01) (64 - 75)  BP: 109/72 (01-15-24 @ 04:43) (106/63 - 160/69)  RR: 18 (01-15-24 @ 09:55) (18 - 18)  SpO2: 94% (01-15-24 @ 09:55) (90% - 98%)  Wt(kg): --  I&O's Summary    14 Jan 2024 07:01  -  15 Nato 2024 07:00  --------------------------------------------------------  IN: 1760 mL / OUT: 200 mL / NET: 1560 mL      Height (cm): 152.4 (01-14 @ 17:40)  Weight (kg): 62.5 (01-14 @ 17:40)  BMI (kg/m2): 26.9 (01-14 @ 17:40)  BSA (m2): 1.59 (01-14 @ 17:40)    Appearance: In no distress	  HEENT:    PERRL, EOMI	  Cardiovascular:  S1 S2, No JVD  Respiratory: Lungs clear to auscultation	  Gastrointestinal:  Soft, Non-tender, + BS	  Vascularature:  No edema of LE  Psychiatric: Appropriate affect   Neuro: no acute focal deficits                               13.8   6.32  )-----------( 176      ( 14 Jan 2024 07:53 )             41.6     01-15    135  |  100  |  13  ----------------------------<  97  3.6   |  26  |  0.64    Ca    8.0<L>      15 Jan 2024 08:32  Phos  2.4     01-15  Mg     1.9     01-15    TPro  5.8<L>  /  Alb  2.8<L>  /  TBili  0.5  /  DBili  x   /  AST  41<H>  /  ALT  26  /  AlkPhos  32<L>  01-15        Labs personally reviewed      ASSESSMENT/PLAN: 	  83F hx AF on Eliquis, HTN, HLD, follicular lymphoma sent to ED from PCP's office with hypoxia and PNA. Patient was seen earlier this week at PCP's office and diagnosed with LLL PNA     1. Paroxsymal AFIB   - ECG in ED shows NSR normal ECG  -c/w Eliquis 5mg BID  -Check TSH  -CBC wnl   -ProBNP 456  -TTE 12/29 EF 70% , no WMA, grade 2 LVDD    2. PNA  -seen on chest xray  -treat left lower lobe PNA  -5-6 L NC - patient desats on RA  -c/w Duoneb treatments     3. HTN   -controlled  -c/w home BP medication    4. HLD  LDL 89   c/w Statin     5. Electrolyze imbalances   - on repeat still low  -treat and repeat STAT BMP    6. Prophylactic measures   - SCD's & AC        BIRGIT Vidales DO [facility]  Cardiovascular Medicine  [address] [place] [address], [address]  Available through call or text on [employer] TEAMs  Office: [phone]   DATE OF SERVICE: 01-15-24 @ 11:23    Patient is a 83y old  Female who presents with a chief complaint of Pneumonia (14 Jan 2024 22:10)      INTERVAL HISTORY:     REVIEW OF SYSTEMS:  CONSTITUTIONAL: No weakness  EYES/ENT: No visual changes;  No throat pain   NECK: No pain or stiffness  RESPIRATORY: No cough, wheezing; No shortness of breath  CARDIOVASCULAR: No chest pain or palpitations  GASTROINTESTINAL: No abdominal  pain. No nausea, vomiting, or hematemesis  GENITOURINARY: No dysuria, frequency or hematuria  NEUROLOGICAL: No stroke like symptoms  SKIN: No rashes    TELEMETRY Personally reviewed:  	  MEDICATIONS:  dronedarone 400 milliGRAM(s) Oral two times a day  metoprolol succinate  milliGRAM(s) Oral daily        PHYSICAL EXAM:  T(C): 36.9 (01-15-24 @ 04:43), Max: 38.9 (01-14-24 @ 17:40)  HR: 65 (01-15-24 @ 08:01) (64 - 75)  BP: 109/72 (01-15-24 @ 04:43) (106/63 - 160/69)  RR: 18 (01-15-24 @ 09:55) (18 - 18)  SpO2: 94% (01-15-24 @ 09:55) (90% - 98%)  Wt(kg): --  I&O's Summary    14 Jan 2024 07:01  -  15 Nato 2024 07:00  --------------------------------------------------------  IN: 1760 mL / OUT: 200 mL / NET: 1560 mL      Height (cm): 152.4 (01-14 @ 17:40)  Weight (kg): 62.5 (01-14 @ 17:40)  BMI (kg/m2): 26.9 (01-14 @ 17:40)  BSA (m2): 1.59 (01-14 @ 17:40)    Appearance: In no distress	  HEENT:    PERRL, EOMI	  Cardiovascular:  S1 S2, No JVD  Respiratory: Lungs clear to auscultation	  Gastrointestinal:  Soft, Non-tender, + BS	  Vascularature:  No edema of LE  Psychiatric: Appropriate affect   Neuro: no acute focal deficits                               13.8   6.32  )-----------( 176      ( 14 Jan 2024 07:53 )             41.6     01-15    135  |  100  |  13  ----------------------------<  97  3.6   |  26  |  0.64    Ca    8.0<L>      15 Jan 2024 08:32  Phos  2.4     01-15  Mg     1.9     01-15    TPro  5.8<L>  /  Alb  2.8<L>  /  TBili  0.5  /  DBili  x   /  AST  41<H>  /  ALT  26  /  AlkPhos  32<L>  01-15        Labs personally reviewed      ASSESSMENT/PLAN: 	  83F hx AF on Eliquis, HTN, HLD, follicular lymphoma sent to ED from PCP's office with hypoxia and PNA. Patient was seen earlier this week at PCP's office and diagnosed with LLL PNA     1. Paroxsymal AFIB   - ECG in ED shows NSR normal ECG  -c/w Eliquis 5mg BID  -Check TSH  -CBC wnl   -ProBNP 456  -TTE 12/29 EF 70% , no WMA, grade 2 LVDD    2. PNA  -seen on chest xray  -treat left lower lobe PNA  -5-6 L NC - patient desats on RA  -c/w Duoneb treatments     3. HTN   -controlled  -c/w home BP medication    4. HLD  LDL 89   c/w Statin     5. Electrolyze imbalances   - on repeat still low  -treat and repeat STAT BMP    6. Prophylactic measures   - SCD's & AC        BIRGIT Vidales DO [facility]  Cardiovascular Medicine  [address] [place] [address], [address]  Available through call or text on [employer] TEAM[employer]  Office: [phone]   DATE OF SERVICE: 01-15-24 @ 11:23    Patient is a 83y old  Female who presents with a chief complaint of Pneumonia (14 Jan 2024 22:10)      INTERVAL HISTORY:     REVIEW OF SYSTEMS:  CONSTITUTIONAL: No weakness  EYES/ENT: No visual changes;  No throat pain   NECK: No pain or stiffness  RESPIRATORY: No cough, wheezing; No shortness of breath  CARDIOVASCULAR: No chest pain or palpitations  GASTROINTESTINAL: No abdominal  pain. No nausea, vomiting, or hematemesis  GENITOURINARY: No dysuria, frequency or hematuria  NEUROLOGICAL: No stroke like symptoms  SKIN: No rashes    TELEMETRY Personally reviewed: AFIB  	  MEDICATIONS:  dronedarone 400 milliGRAM(s) Oral two times a day  metoprolol succinate  milliGRAM(s) Oral daily        PHYSICAL EXAM:  T(C): 36.9 (01-15-24 @ 04:43), Max: 38.9 (01-14-24 @ 17:40)  HR: 65 (01-15-24 @ 08:01) (64 - 75)  BP: 109/72 (01-15-24 @ 04:43) (106/63 - 160/69)  RR: 18 (01-15-24 @ 09:55) (18 - 18)  SpO2: 94% (01-15-24 @ 09:55) (90% - 98%)  Wt(kg): --  I&O's Summary    14 Jan 2024 07:01  -  15 Nato 2024 07:00  --------------------------------------------------------  IN: 1760 mL / OUT: 200 mL / NET: 1560 mL      Height (cm): 152.4 (01-14 @ 17:40)  Weight (kg): 62.5 (01-14 @ 17:40)  BMI (kg/m2): 26.9 (01-14 @ 17:40)  BSA (m2): 1.59 (01-14 @ 17:40)    Appearance: In no distress	  HEENT:    PERRL, EOMI	  Cardiovascular:  S1 S2, No JVD  Respiratory: Lungs clear to auscultation	  Gastrointestinal:  Soft, Non-tender, + BS	  Vascularature:  No edema of LE  Psychiatric: Appropriate affect   Neuro: no acute focal deficits                               13.8   6.32  )-----------( 176      ( 14 Jan 2024 07:53 )             41.6     01-15    135  |  100  |  13  ----------------------------<  97  3.6   |  26  |  0.64    Ca    8.0<L>      15 Jan 2024 08:32  Phos  2.4     01-15  Mg     1.9     01-15    TPro  5.8<L>  /  Alb  2.8<L>  /  TBili  0.5  /  DBili  x   /  AST  41<H>  /  ALT  26  /  AlkPhos  32<L>  01-15        Labs personally reviewed      ASSESSMENT/PLAN: 	  83F hx AF on Eliquis, HTN, HLD, follicular lymphoma sent to ED from PCP's office with hypoxia and PNA. Patient was seen earlier this week at PCP's office and diagnosed with LLL PNA     1. Paroxsymal AFIB   - ECG in ED shows NSR normal ECG  -c/w Eliquis 5mg BID  -Check TSH  -CBC wnl   -ProBNP 456  -TTE 12/29 EF 70% , no WMA, grade 2 LVDD    2. PNA  -seen on chest xray  -treat left lower lobe PNA  -5-6 L NC - patient desats on RA  -c/w Duoneb treatments     3. HTN   -controlled  -c/w home BP medication    4. HLD  LDL 89   c/w Statin     5. Electrolyze imbalances   - on repeat still low  -treat and repeat STAT BMP    6. Prophylactic measures   - SCD's & AC        BIRGIT Vidales DO [facility]  Cardiovascular Medicine  [address] [place] [address], [address]  Available through call or text on [employer] TE[employer]  Office: [phone]   DATE OF SERVICE: 01-15-24 @ 11:23    Patient is a 83y old  Female who presents with a chief complaint of Pneumonia (14 Jan 2024 22:10)      INTERVAL HISTORY:     REVIEW OF SYSTEMS:  CONSTITUTIONAL: No weakness  EYES/ENT: No visual changes;  No throat pain   NECK: No pain or stiffness  RESPIRATORY: No cough, wheezing; No shortness of breath  CARDIOVASCULAR: No chest pain or palpitations  GASTROINTESTINAL: No abdominal  pain. No nausea, vomiting, or hematemesis  GENITOURINARY: No dysuria, frequency or hematuria  NEUROLOGICAL: No stroke like symptoms  SKIN: No rashes    TELEMETRY Personally reviewed: AFIB  	  MEDICATIONS:  dronedarone 400 milliGRAM(s) Oral two times a day  metoprolol succinate  milliGRAM(s) Oral daily        PHYSICAL EXAM:  T(C): 36.9 (01-15-24 @ 04:43), Max: 38.9 (01-14-24 @ 17:40)  HR: 65 (01-15-24 @ 08:01) (64 - 75)  BP: 109/72 (01-15-24 @ 04:43) (106/63 - 160/69)  RR: 18 (01-15-24 @ 09:55) (18 - 18)  SpO2: 94% (01-15-24 @ 09:55) (90% - 98%)  Wt(kg): --  I&O's Summary    14 Jan 2024 07:01  -  15 Nato 2024 07:00  --------------------------------------------------------  IN: 1760 mL / OUT: 200 mL / NET: 1560 mL      Height (cm): 152.4 (01-14 @ 17:40)  Weight (kg): 62.5 (01-14 @ 17:40)  BMI (kg/m2): 26.9 (01-14 @ 17:40)  BSA (m2): 1.59 (01-14 @ 17:40)    Appearance: In no distress	  HEENT:    PERRL, EOMI	  Cardiovascular:  S1 S2, No JVD  Respiratory: Lungs clear to auscultation	  Gastrointestinal:  Soft, Non-tender, + BS	  Vascularature:  No edema of LE  Psychiatric: Appropriate affect   Neuro: no acute focal deficits                               13.8   6.32  )-----------( 176      ( 14 Jan 2024 07:53 )             41.6     01-15    135  |  100  |  13  ----------------------------<  97  3.6   |  26  |  0.64    Ca    8.0<L>      15 Jan 2024 08:32  Phos  2.4     01-15  Mg     1.9     01-15    TPro  5.8<L>  /  Alb  2.8<L>  /  TBili  0.5  /  DBili  x   /  AST  41<H>  /  ALT  26  /  AlkPhos  32<L>  01-15        Labs personally reviewed      ASSESSMENT/PLAN: 	  83F hx AF on Eliquis, HTN, HLD, follicular lymphoma sent to ED from PCP's office with hypoxia and PNA. Patient was seen earlier this week at PCP's office and diagnosed with LLL PNA     1. Paroxsymal AFIB   - ECG in ED shows NSR normal ECG  -c/w Eliquis 5mg BID  -Check TSH  -CBC wnl   -ProBNP 456  -TTE 12/29 EF 70% , no WMA, grade 2 LVDD    2. PNA  -seen on chest xray  -treat left lower lobe PNA  -5-6 L NC - patient desats on RA  -c/w Duoneb treatments     3. HTN   -controlled  -c/w home BP medication    4. HLD  LDL 89   c/w Statin     5. Electrolyze imbalances   - on repeat still low  -treat and repeat STAT BMP    6. Prophylactic measures   - SCD's & AC        BIRGIT Vidales DO [facility]  Cardiovascular Medicine  [address] [place] [address], [address]  Available through call or text on [employer] TE[employer]  Office: [phone]   DATE OF SERVICE: 01-15-24 @ 11:23    Patient is a 83y old  Female who presents with a chief complaint of Pneumonia (14 Jan 2024 22:10)      INTERVAL HISTORY:     REVIEW OF SYSTEMS:  CONSTITUTIONAL: No weakness  EYES/ENT: No visual changes;  No throat pain   NECK: No pain or stiffness  RESPIRATORY: No cough, wheezing; No shortness of breath  CARDIOVASCULAR: No chest pain or palpitations  GASTROINTESTINAL: No abdominal  pain. No nausea, vomiting, or hematemesis  GENITOURINARY: No dysuria, frequency or hematuria  NEUROLOGICAL: No stroke like symptoms  SKIN: No rashes    TELEMETRY Personally reviewed: AFIB  	  MEDICATIONS:  dronedarone 400 milliGRAM(s) Oral two times a day  metoprolol succinate  milliGRAM(s) Oral daily        PHYSICAL EXAM:  T(C): 36.9 (01-15-24 @ 04:43), Max: 38.9 (01-14-24 @ 17:40)  HR: 65 (01-15-24 @ 08:01) (64 - 75)  BP: 109/72 (01-15-24 @ 04:43) (106/63 - 160/69)  RR: 18 (01-15-24 @ 09:55) (18 - 18)  SpO2: 94% (01-15-24 @ 09:55) (90% - 98%)  Wt(kg): --  I&O's Summary    14 Jan 2024 07:01  -  15 Nato 2024 07:00  --------------------------------------------------------  IN: 1760 mL / OUT: 200 mL / NET: 1560 mL      Height (cm): 152.4 (01-14 @ 17:40)  Weight (kg): 62.5 (01-14 @ 17:40)  BMI (kg/m2): 26.9 (01-14 @ 17:40)  BSA (m2): 1.59 (01-14 @ 17:40)    Appearance: In no distress	  HEENT:    PERRL, EOMI	  Cardiovascular:  S1 S2, No JVD  Respiratory: Lungs clear to auscultation	  Gastrointestinal:  Soft, Non-tender, + BS	  Vascularature:  No edema of LE  Psychiatric: Appropriate affect   Neuro: no acute focal deficits                               13.8   6.32  )-----------( 176      ( 14 Jan 2024 07:53 )             41.6     01-15    135  |  100  |  13  ----------------------------<  97  3.6   |  26  |  0.64    Ca    8.0<L>      15 Jan 2024 08:32  Phos  2.4     01-15  Mg     1.9     01-15    TPro  5.8<L>  /  Alb  2.8<L>  /  TBili  0.5  /  DBili  x   /  AST  41<H>  /  ALT  26  /  AlkPhos  32<L>  01-15        Labs personally reviewed      ASSESSMENT/PLAN: 	  83F hx AF on Eliquis, HTN, HLD, follicular lymphoma sent to ED from PCP's office with hypoxia and PNA. Patient was seen earlier this week at PCP's office and diagnosed with LLL PNA     1. Paroxsymal AFIB   - ECG in ED shows NSR normal ECG  -c/w Eliquis 5mg BID  -Check TSH  -CBC wnl   -ProBNP 456  -TTE 12/29 EF 70% , no WMA, grade 2 LVDD    2. PNA  -seen on chest xray  -treat left lower lobe PNA  -5-6 L NC - patient desats on RA  -c/w Duoneb treatments     3. HTN   -controlled  -c/w home BP medication    4. HLD  LDL 89   c/w Statin     5. Electrolyze imbalances   - on repeat still low  -treat and repeat STAT BMP    6. Prophylactic measures   - SCD's & AC        BIRGIT Vidales DO [facility]  Cardiovascular Medicine  [address] [place] [address], [address]  Available through call or text on [employer] TE[employer]  Office: [phone]

## 2024-01-15 NOTE — PROVIDER CONTACT NOTE (OTHER) - BACKGROUND
PMH: Afib on Eliquis, HTN, HLD, follicular lymphoma (2014)  Pt dx with PNA. Pt on high flow nasal cannula
Pt dx with pneumonia. Pt on high flow nasal cannula

## 2024-01-15 NOTE — PROGRESS NOTE ADULT - SUBJECTIVE AND OBJECTIVE BOX
[facility] Division of Hospital Medicine  Erasmo Carson MD  Available via MS Teams  Pager: [phone]    SUBJECTIVE / OVERNIGHT EVENTS:  - overnight events noted - agitated, tried to pull out HFNC. This morning though, patient is arousable, AOX3 ( daughter providing translation ). states sob is improving, cough is improving, but patient remains fatigued. Denies any n/v/ abd pain/chest pain/ LE swelling.     MEDICATIONS  (STANDING):  albuterol/ipratropium for Nebulization 3 milliLiter(s) Nebulizer every 6 hours  apixaban 5 milliGRAM(s) Oral every 12 hours  benzonatate 100 milliGRAM(s) Oral every 8 hours  cholecalciferol 1000 Unit(s) Oral daily  dronedarone 400 milliGRAM(s) Oral two times a day  guaiFENesin  milliGRAM(s) Oral every 12 hours  metoprolol succinate  milliGRAM(s) Oral daily  piperacillin/tazobactam IVPB.. 3.375 Gram(s) IV Intermittent every 8 hours  simvastatin 10 milliGRAM(s) Oral at bedtime    MEDICATIONS  (PRN):  acetaminophen     Tablet .. 650 milliGRAM(s) Oral every 6 hours PRN Temp greater or equal to 38C (100.4F), Mild Pain (1 - 3)  aluminum hydroxide/magnesium hydroxide/simethicone Suspension 30 milliLiter(s) Oral every 4 hours PRN Dyspepsia  melatonin 3 milliGRAM(s) Oral at bedtime PRN Insomnia      I&O's Summary    14 Jan 2024 07:01  -  15 Nato 2024 07:00  --------------------------------------------------------  IN: 1760 mL / OUT: 200 mL / NET: 1560 mL    15 Nato 2024 07:01  -  15 Nato 2024 14:50  --------------------------------------------------------  IN: 480 mL / OUT: 0 mL / NET: 480 mL        PHYSICAL EXAM:  Vital Signs Last 24 Hrs  T(C): 36.4 (15 Nato 2024 14:06), Max: 38.9 (14 Jan 2024 17:40)  T(F): 97.6 (15 Nato 2024 14:06), Max: 102.1 (14 Jan 2024 17:40)  HR: 69 (15 Nato 2024 14:06) (64 - 75)  BP: 127/68 (15 Nato 2024 14:06) (106/63 - 160/69)  BP(mean): --  RR: 18 (15 Nato 2024 14:06) (18 - 18)  SpO2: 95% (15 Nato 2024 14:06) (90% - 98%)    Parameters below as of 15 Nato 2024 14:06  Patient On (Oxygen Delivery Method): nasal cannula, high flow  O2 Flow (L/min): 50  O2 Concentration (%): 60    CONSTITUTIONAL: NAD, well-developed, well-groomed  ENMT: Moist oral mucosa, no pharyngeal injection or exudates  RESPIRATORY: poor inspiratory effort, b/l rales at the bases, no crackles. on HFNC   CARDIOVASCULAR: Regular rate and rhythm, normal S1 and S2, no murmur/rub/gallop; Peripheral pulses are 2+ bilaterally  ABDOMEN: Nontender to palpation, normoactive bowel sounds, no rebound/guarding;   MUSCULOSKELETAL:  no clubbing or cyanosis of digits; no joint swelling or tenderness to palpation  PSYCH: A+O to person, place, and time; affect appropriate  NEUROLOGY: CN 2-12 are intact and symmetric; no gross sensory deficits   SKIN: No rashes; no palpable lesions    LABS:                        13.8   6.32  )-----------( 176      ( 14 Jan 2024 07:53 )             41.6     01-15    135  |  100  |  13  ----------------------------<  97  3.6   |  26  |  0.64    Ca    8.0<L>      15 Nato 2024 08:32  Phos  2.4     01-15  Mg     1.9     01-15    TPro  5.8<L>  /  Alb  2.8<L>  /  TBili  0.5  /  DBili  x   /  AST  41<H>  /  ALT  26  /  AlkPhos  32<L>  01-15          Urinalysis Basic - ( 15 Nato 2024 08:32 )    Color: x / Appearance: x / SG: x / pH: x  Gluc: 97 mg/dL / Ketone: x  / Bili: x / Urobili: x   Blood: x / Protein: x / Nitrite: x   Leuk Esterase: x / RBC: x / WBC x   Sq Epi: x / Non Sq Epi: x / Bacteria: x        Culture - Urine (collected 12 Jan 2024 16:45)  Source: Clean Catch Clean Catch (Midstream)  Final Report (13 Jan 2024 21:39):    <10,000 CFU/mL Normal Urogenital Nidia    Culture - Blood (collected 12 Jan 2024 16:27)  Source: .Blood Blood-Peripheral  Preliminary Report (14 Jan 2024 23:01):    No growth at 48 Hours    Culture - Blood (collected 12 Jan 2024 15:20)  Source: .Blood Blood-Peripheral  Preliminary Report (14 Jan 2024 20:01):    No growth at 48 Hours      SARS-CoV-2: NotDetec (14 Jan 2024 19:38)      RADIOLOGY & ADDITIONAL TESTS:  New Results Reviewed Today:   New Imaging Personally Reviewed Today:  New Electrocardiogram Personally Reviewed Today:  Prior or Outpatient Records Reviewed Today:    COMMUNICATION:  Care Discussed with Consultants/Other Providers and Details of Discussion:  Discussions with Patient/Family:  PCP Communication:     [facility] Division of Hospital Medicine  Erasmo Carson MD  Available via MS Teams  Pager: [phone]    SUBJECTIVE / OVERNIGHT EVENTS:  - overnight events noted - agitated, tried to pull out HFNC. This morning though, patient is arousable, AOX3 ( daughter providing translation ). states sob is improving, cough is improving, but patient remains fatigued. Denies any n/v/ abd pain/chest pain/ LE swelling.     MEDICATIONS  (STANDING):  albuterol/ipratropium for Nebulization 3 milliLiter(s) Nebulizer every 6 hours  apixaban 5 milliGRAM(s) Oral every 12 hours  benzonatate 100 milliGRAM(s) Oral every 8 hours  cholecalciferol 1000 Unit(s) Oral daily  dronedarone 400 milliGRAM(s) Oral two times a day  guaiFENesin  milliGRAM(s) Oral every 12 hours  metoprolol succinate  milliGRAM(s) Oral daily  piperacillin/tazobactam IVPB.. 3.375 Gram(s) IV Intermittent every 8 hours  simvastatin 10 milliGRAM(s) Oral at bedtime    MEDICATIONS  (PRN):  acetaminophen     Tablet .. 650 milliGRAM(s) Oral every 6 hours PRN Temp greater or equal to 38C (100.4F), Mild Pain (1 - 3)  aluminum hydroxide/magnesium hydroxide/simethicone Suspension 30 milliLiter(s) Oral every 4 hours PRN Dyspepsia  melatonin 3 milliGRAM(s) Oral at bedtime PRN Insomnia      I&O's Summary    14 Jan 2024 07:01  -  15 Nato 2024 07:00  --------------------------------------------------------  IN: 1760 mL / OUT: 200 mL / NET: 1560 mL    15 Nato 2024 07:01  -  15 Nato 2024 14:50  --------------------------------------------------------  IN: 480 mL / OUT: 0 mL / NET: 480 mL        PHYSICAL EXAM:  Vital Signs Last 24 Hrs  T(C): 36.4 (15 Nato 2024 14:06), Max: 38.9 (14 Jan 2024 17:40)  T(F): 97.6 (15 Nato 2024 14:06), Max: 102.1 (14 Jan 2024 17:40)  HR: 69 (15 Nato 2024 14:06) (64 - 75)  BP: 127/68 (15 Nato 2024 14:06) (106/63 - 160/69)  BP(mean): --  RR: 18 (15 Nato 2024 14:06) (18 - 18)  SpO2: 95% (15 Nato 2024 14:06) (90% - 98%)    Parameters below as of 15 Nato 2024 14:06  Patient On (Oxygen Delivery Method): nasal cannula, high flow  O2 Flow (L/min): 50  O2 Concentration (%): 60    CONSTITUTIONAL: NAD, well-developed, well-groomed  ENMT: Moist oral mucosa, no pharyngeal injection or exudates  RESPIRATORY: poor inspiratory effort, b/l rales at the bases, no crackles. on HFNC   CARDIOVASCULAR: Regular rate and rhythm, normal S1 and S2, no murmur/rub/gallop; Peripheral pulses are 2+ bilaterally  ABDOMEN: Nontender to palpation, normoactive bowel sounds, no rebound/guarding;   MUSCULOSKELETAL:  no clubbing or cyanosis of digits; no joint swelling or tenderness to palpation  PSYCH: A+O to person, place, and time; affect appropriate  NEUROLOGY: CN 2-12 are intact and symmetric; no gross sensory deficits   SKIN: No rashes; no palpable lesions    LABS:                        13.8   6.32  )-----------( 176      ( 14 Jan 2024 07:53 )             41.6     01-15    135  |  100  |  13  ----------------------------<  97  3.6   |  26  |  0.64    Ca    8.0<L>      15 Nato 2024 08:32  Phos  2.4     01-15  Mg     1.9     01-15    TPro  5.8<L>  /  Alb  2.8<L>  /  TBili  0.5  /  DBili  x   /  AST  41<H>  /  ALT  26  /  AlkPhos  32<L>  01-15          Urinalysis Basic - ( 15 Nato 2024 08:32 )    Color: x / Appearance: x / SG: x / pH: x  Gluc: 97 mg/dL / Ketone: x  / Bili: x / Urobili: x   Blood: x / Protein: x / Nitrite: x   Leuk Esterase: x / RBC: x / WBC x   Sq Epi: x / Non Sq Epi: x / Bacteria: x        Culture - Urine (collected 12 Jan 2024 16:45)  Source: Clean Catch Clean Catch (Midstream)  Final Report (13 Jan 2024 21:39):    <10,000 CFU/mL Normal Urogenital Nidia    Culture - Blood (collected 12 Jan 2024 16:27)  Source: .Blood Blood-Peripheral  Preliminary Report (14 Jan 2024 23:01):    No growth at 48 Hours    Culture - Blood (collected 12 Jan 2024 15:20)  Source: .Blood Blood-Peripheral  Preliminary Report (14 Jan 2024 20:01):    No growth at 48 Hours      SARS-CoV-2: NotDetec (14 Jan 2024 19:38)      RADIOLOGY & ADDITIONAL TESTS:  New Results Reviewed Today:   New Imaging Personally Reviewed Today:  New Electrocardiogram Personally Reviewed Today:  Prior or Outpatient Records Reviewed Today:    COMMUNICATION:  Care Discussed with Consultants/Other Providers and Details of Discussion:  Discussions with Patient/Family:  PCP Communication:     [facility] Division of Hospital Medicine  Erasmo Carson MD  Available via MS Teams  Pager: [phone]    SUBJECTIVE / OVERNIGHT EVENTS:  - overnight events noted - agitated, tried to pull out HFNC. This morning though, patient is arousable, AOX3 ( daughter providing translation ). states sob is improving, cough is improving, but patient remains fatigued. Denies any n/v/ abd pain/chest pain/ LE swelling.     MEDICATIONS  (STANDING):  albuterol/ipratropium for Nebulization 3 milliLiter(s) Nebulizer every 6 hours  apixaban 5 milliGRAM(s) Oral every 12 hours  benzonatate 100 milliGRAM(s) Oral every 8 hours  cholecalciferol 1000 Unit(s) Oral daily  dronedarone 400 milliGRAM(s) Oral two times a day  guaiFENesin  milliGRAM(s) Oral every 12 hours  metoprolol succinate  milliGRAM(s) Oral daily  piperacillin/tazobactam IVPB.. 3.375 Gram(s) IV Intermittent every 8 hours  simvastatin 10 milliGRAM(s) Oral at bedtime    MEDICATIONS  (PRN):  acetaminophen     Tablet .. 650 milliGRAM(s) Oral every 6 hours PRN Temp greater or equal to 38C (100.4F), Mild Pain (1 - 3)  aluminum hydroxide/magnesium hydroxide/simethicone Suspension 30 milliLiter(s) Oral every 4 hours PRN Dyspepsia  melatonin 3 milliGRAM(s) Oral at bedtime PRN Insomnia      I&O's Summary    14 Jan 2024 07:01  -  15 Nato 2024 07:00  --------------------------------------------------------  IN: 1760 mL / OUT: 200 mL / NET: 1560 mL    15 Nato 2024 07:01  -  15 Nato 2024 14:50  --------------------------------------------------------  IN: 480 mL / OUT: 0 mL / NET: 480 mL        PHYSICAL EXAM:  Vital Signs Last 24 Hrs  T(C): 36.4 (15 Nato 2024 14:06), Max: 38.9 (14 Jan 2024 17:40)  T(F): 97.6 (15 Nato 2024 14:06), Max: 102.1 (14 Jan 2024 17:40)  HR: 69 (15 Nato 2024 14:06) (64 - 75)  BP: 127/68 (15 Nato 2024 14:06) (106/63 - 160/69)  BP(mean): --  RR: 18 (15 Nato 2024 14:06) (18 - 18)  SpO2: 95% (15 Nato 2024 14:06) (90% - 98%)    Parameters below as of 15 Nato 2024 14:06  Patient On (Oxygen Delivery Method): nasal cannula, high flow  O2 Flow (L/min): 50  O2 Concentration (%): 60    CONSTITUTIONAL: NAD, well-developed, well-groomed  ENMT: Moist oral mucosa, no pharyngeal injection or exudates  RESPIRATORY: poor inspiratory effort, b/l rales at the bases, no crackles. on HFNC   CARDIOVASCULAR: Regular rate and rhythm, normal S1 and S2, no murmur/rub/gallop; Peripheral pulses are 2+ bilaterally  ABDOMEN: Nontender to palpation, normoactive bowel sounds, no rebound/guarding;   MUSCULOSKELETAL:  no clubbing or cyanosis of digits; no joint swelling or tenderness to palpation  PSYCH: A+O to person, place, and time; affect appropriate  NEUROLOGY: CN 2-12 are intact and symmetric; no gross sensory deficits   SKIN: No rashes; no palpable lesions    LABS:                        13.8   6.32  )-----------( 176      ( 14 Jan 2024 07:53 )             41.6     01-15    135  |  100  |  13  ----------------------------<  97  3.6   |  26  |  0.64    Ca    8.0<L>      15 Nato 2024 08:32  Phos  2.4     01-15  Mg     1.9     01-15    TPro  5.8<L>  /  Alb  2.8<L>  /  TBili  0.5  /  DBili  x   /  AST  41<H>  /  ALT  26  /  AlkPhos  32<L>  01-15          Urinalysis Basic - ( 15 Nato 2024 08:32 )    Color: x / Appearance: x / SG: x / pH: x  Gluc: 97 mg/dL / Ketone: x  / Bili: x / Urobili: x   Blood: x / Protein: x / Nitrite: x   Leuk Esterase: x / RBC: x / WBC x   Sq Epi: x / Non Sq Epi: x / Bacteria: x        Culture - Urine (collected 12 Jan 2024 16:45)  Source: Clean Catch Clean Catch (Midstream)  Final Report (13 Jan 2024 21:39):    <10,000 CFU/mL Normal Urogenital Nidia    Culture - Blood (collected 12 Jan 2024 16:27)  Source: .Blood Blood-Peripheral  Preliminary Report (14 Jan 2024 23:01):    No growth at 48 Hours    Culture - Blood (collected 12 Jan 2024 15:20)  Source: .Blood Blood-Peripheral  Preliminary Report (14 Jan 2024 20:01):    No growth at 48 Hours      SARS-CoV-2: NotDetec (14 Jan 2024 19:38)      RADIOLOGY & ADDITIONAL TESTS:  New Results Reviewed Today:   New Imaging Personally Reviewed Today:  New Electrocardiogram Personally Reviewed Today:  Prior or Outpatient Records Reviewed Today:    COMMUNICATION:  Care Discussed with Consultants/Other Providers and Details of Discussion:  Discussions with Patient/Family:  PCP Communication:

## 2024-01-15 NOTE — PROVIDER CONTACT NOTE (OTHER) - ACTION/TREATMENT ORDERED:
NP Emelin Reyes Mongero notified, no interventions at this time.
NP Emelin Reyes Mongero notified, ordered wrist restraints and haldol for patient. Pt was put on wrist restraints to keep her from taking off HFNC and desating. Pt was given haldol. Pt sleeping.

## 2024-01-15 NOTE — CHART NOTE - NSCHARTNOTEFT_GEN_A_CORE
HPI:  83F hx AF on Eliquis, HTN, HLD, follicular lymphoma sent to ED from PCP's office with hypoxia and PNA. Patient was seen earlier this week at PCP's office and diagnosed with LLL PNA and started on cephalexin. She returned to the office yesterday, still feeling poorly, and was started on doxycycline. She returned today and was noted to be hypoxic to the 80's and sent to the ED by EMS on supplemental oxygen for further treatment. Imaging here confirmed LLL PNA as well. Patient c/o fever/chills, weakness, and non-productive cough. Denies dyspnea     Agitation   Call to the bedside by RN, pt agitated, removed HF and attempting to hit RN and daughter.  Unable to reorient patient.  As per daughter pt has being more confuse after developing PNA. 2/2 risk for pt and staff harm, Haldol 0.5x1 dose ordered.  Pt placed back in HF.  Currently in no respiratory distress, O2 sat >92%.  Will order b/l wrist restraints to prevent pt from removing HF.    Daughter at bedside, agrees and understand plan of care.   Consider psych consult in the am.  Will continue with telemetry, pulse oxymetry and going reassessment for restraint use.      Emelin Reyes Monegro, NP   Medicine Department   [facility] [id number] HPI:  83F hx AF on Eliquis, HTN, HLD, follicular lymphoma sent to ED from PCP's office with hypoxia and PNA. Patient was seen earlier this week at PCP's office and diagnosed with LLL PNA and started on cephalexin. She returned to the office yesterday, still feeling poorly, and was started on doxycycline. She returned today and was noted to be hypoxic to the 80's and sent to the ED by EMS on supplemental oxygen for further treatment. Imaging here confirmed LLL PNA as well. Patient c/o fever/chills, weakness, and non-productive cough. Denies dyspnea     Agitation   Call to the bedside by RN, pt agitated, removed HF and attempting to hit RN and daughter.  Unable to reorient patient.  As per daughter pt has being more confuse after developing PNA. 2/2 risk for pt and staff harm, Haldol 0.5x1 dose ordered.  Pt placed back in HF.  Currently in no respiratory distress, O2 sat >92%.  Will order b/l wrist restraints to prevent pt from removing HF.    Daughter at bedside, agrees and understand plan of care.   Consider psych consult in the am.  Will continue with telemetry, pulse oxymetry and going reassessment for restraint use.      Emelin Reyes Monegro, NP   Medicine Department   [facility] [id number] HPI:  83F hx AF on Eliquis, HTN, HLD, follicular lymphoma sent to ED from PCP's office with hypoxia and PNA. Patient was seen earlier this week at PCP's office and diagnosed with LLL PNA and started on cephalexin. She returned to the office yesterday, still feeling poorly, and was started on doxycycline. She returned today and was noted to be hypoxic to the 80's and sent to the ED by EMS on supplemental oxygen for further treatment. Imaging here confirmed LLL PNA as well. Patient c/o fever/chills, weakness, and non-productive cough. Denies dyspnea     Agitation   Call to the bedside by RN, pt agitated, removed HF and attempting to hit RN and daughter.  Unable to reorient patient.  As per daughter pt has being more confuse after developing PNA. 2/2 risk for pt and staff harm, Haldol 0.5x1 dose ordered.  Pt placed back in HF.  Currently in no respiratory distress, O2 sat >92%.  Will order b/l wrist restraints to prevent pt from removing HF.    Daughter at bedside, agrees and understand plan of care.   Consider psych consult in the am.  Will continue with telemetry, pulse oxymetry and going reassessment for restraint use.      Emelin Reyes Monegro, NP   Medicine Department   [facility] [id number]

## 2024-01-16 ENCOUNTER — APPOINTMENT (OUTPATIENT)
Dept: CARDIOLOGY | Facility: CLINIC | Age: 84
End: 2024-01-16

## 2024-01-16 LAB
ANION GAP SERPL CALC-SCNC: 12 MMOL/L — SIGNIFICANT CHANGE UP (ref 5–17)
BUN SERPL-MCNC: 12 MG/DL — SIGNIFICANT CHANGE UP (ref 7–23)
CALCIUM SERPL-MCNC: 8 MG/DL — LOW (ref 8.4–10.5)
CHLORIDE SERPL-SCNC: 102 MMOL/L — SIGNIFICANT CHANGE UP (ref 96–108)
CO2 SERPL-SCNC: 24 MMOL/L — SIGNIFICANT CHANGE UP (ref 22–31)
CREAT SERPL-MCNC: 0.57 MG/DL — SIGNIFICANT CHANGE UP (ref 0.5–1.3)
CULTURE RESULTS: SIGNIFICANT CHANGE UP
EGFR: 90 ML/MIN/1.73M2 — SIGNIFICANT CHANGE UP
GLUCOSE SERPL-MCNC: 98 MG/DL — SIGNIFICANT CHANGE UP (ref 70–99)
HCT VFR BLD CALC: 35.2 % — SIGNIFICANT CHANGE UP (ref 34.5–45)
HGB BLD-MCNC: 11.8 G/DL — SIGNIFICANT CHANGE UP (ref 11.5–15.5)
MAGNESIUM SERPL-MCNC: 1.9 MG/DL — SIGNIFICANT CHANGE UP (ref 1.6–2.6)
MCHC RBC-ENTMCNC: 28.8 PG — SIGNIFICANT CHANGE UP (ref 27–34)
MCHC RBC-ENTMCNC: 33.5 GM/DL — SIGNIFICANT CHANGE UP (ref 32–36)
MCV RBC AUTO: 85.9 FL — SIGNIFICANT CHANGE UP (ref 80–100)
NRBC # BLD: 0 /100 WBCS — SIGNIFICANT CHANGE UP (ref 0–0)
PHOSPHATE SERPL-MCNC: 3.4 MG/DL — SIGNIFICANT CHANGE UP (ref 2.5–4.5)
PLATELET # BLD AUTO: 189 K/UL — SIGNIFICANT CHANGE UP (ref 150–400)
POTASSIUM SERPL-MCNC: 3.3 MMOL/L — LOW (ref 3.5–5.3)
POTASSIUM SERPL-SCNC: 3.3 MMOL/L — LOW (ref 3.5–5.3)
RBC # BLD: 4.1 M/UL — SIGNIFICANT CHANGE UP (ref 3.8–5.2)
RBC # FLD: 13.4 % — SIGNIFICANT CHANGE UP (ref 10.3–14.5)
SODIUM SERPL-SCNC: 138 MMOL/L — SIGNIFICANT CHANGE UP (ref 135–145)
SPECIMEN SOURCE: SIGNIFICANT CHANGE UP
WBC # BLD: 7.46 K/UL — SIGNIFICANT CHANGE UP (ref 3.8–10.5)
WBC # FLD AUTO: 7.46 K/UL — SIGNIFICANT CHANGE UP (ref 3.8–10.5)

## 2024-01-16 PROCEDURE — 99233 SBSQ HOSP IP/OBS HIGH 50: CPT | Mod: GC

## 2024-01-16 PROCEDURE — 99233 SBSQ HOSP IP/OBS HIGH 50: CPT

## 2024-01-16 RX ORDER — POTASSIUM CHLORIDE 20 MEQ
20 PACKET (EA) ORAL ONCE
Refills: 0 | Status: COMPLETED | OUTPATIENT
Start: 2024-01-16 | End: 2024-01-16

## 2024-01-16 RX ORDER — BENZOCAINE AND MENTHOL 5; 1 G/100ML; G/100ML
1 LIQUID ORAL ONCE
Refills: 0 | Status: COMPLETED | OUTPATIENT
Start: 2024-01-16 | End: 2024-01-16

## 2024-01-16 RX ADMIN — Medication 20 MILLIEQUIVALENT(S): at 13:07

## 2024-01-16 RX ADMIN — Medication 3 MILLILITER(S): at 13:07

## 2024-01-16 RX ADMIN — BENZOCAINE AND MENTHOL 1 LOZENGE: 5; 1 LIQUID ORAL at 06:24

## 2024-01-16 RX ADMIN — Medication 3 MILLIGRAM(S): at 21:24

## 2024-01-16 RX ADMIN — PIPERACILLIN AND TAZOBACTAM 25 GRAM(S): 4; .5 INJECTION, POWDER, LYOPHILIZED, FOR SOLUTION INTRAVENOUS at 06:25

## 2024-01-16 RX ADMIN — Medication 3 MILLILITER(S): at 06:25

## 2024-01-16 RX ADMIN — Medication 650 MILLIGRAM(S): at 21:23

## 2024-01-16 RX ADMIN — Medication 3 MILLILITER(S): at 23:52

## 2024-01-16 RX ADMIN — APIXABAN 5 MILLIGRAM(S): 2.5 TABLET, FILM COATED ORAL at 06:25

## 2024-01-16 RX ADMIN — APIXABAN 5 MILLIGRAM(S): 2.5 TABLET, FILM COATED ORAL at 17:38

## 2024-01-16 RX ADMIN — Medication 600 MILLIGRAM(S): at 17:39

## 2024-01-16 RX ADMIN — PIPERACILLIN AND TAZOBACTAM 25 GRAM(S): 4; .5 INJECTION, POWDER, LYOPHILIZED, FOR SOLUTION INTRAVENOUS at 21:23

## 2024-01-16 RX ADMIN — Medication 100 MILLIGRAM(S): at 06:25

## 2024-01-16 RX ADMIN — DRONEDARONE 400 MILLIGRAM(S): 400 TABLET, FILM COATED ORAL at 06:24

## 2024-01-16 RX ADMIN — Medication 100 MILLIGRAM(S): at 13:07

## 2024-01-16 RX ADMIN — Medication 3 MILLILITER(S): at 17:39

## 2024-01-16 RX ADMIN — Medication 650 MILLIGRAM(S): at 14:20

## 2024-01-16 RX ADMIN — Medication 650 MILLIGRAM(S): at 15:20

## 2024-01-16 RX ADMIN — Medication 1000 UNIT(S): at 13:07

## 2024-01-16 RX ADMIN — DRONEDARONE 400 MILLIGRAM(S): 400 TABLET, FILM COATED ORAL at 17:38

## 2024-01-16 RX ADMIN — SIMVASTATIN 10 MILLIGRAM(S): 20 TABLET, FILM COATED ORAL at 21:24

## 2024-01-16 RX ADMIN — Medication 600 MILLIGRAM(S): at 06:24

## 2024-01-16 RX ADMIN — Medication 650 MILLIGRAM(S): at 00:36

## 2024-01-16 RX ADMIN — Medication 650 MILLIGRAM(S): at 22:23

## 2024-01-16 RX ADMIN — Medication 100 MILLIGRAM(S): at 21:25

## 2024-01-16 RX ADMIN — PIPERACILLIN AND TAZOBACTAM 25 GRAM(S): 4; .5 INJECTION, POWDER, LYOPHILIZED, FOR SOLUTION INTRAVENOUS at 13:06

## 2024-01-16 NOTE — REVIEW OF SYSTEMS
[Fever] : fever [Cough] : cough [Dysuria] : dysuria [Frequency] : frequency [Night Sweats] : no night sweats [Discharge] : no discharge [Vision Problems] : no vision problems [Earache] : no earache [Nasal Discharge] : no nasal discharge [Chest Pain] : no chest pain [Orthopnea] : no orthopnea [Shortness Of Breath] : no shortness of breath [Abdominal Pain] : no abdominal pain [Vomiting] : no vomiting [Hematuria] : no hematuria [Joint Pain] : no joint pain [Muscle Pain] : no muscle pain [Itching] : no itching [Skin Rash] : no skin rash

## 2024-01-16 NOTE — PROGRESS NOTE ADULT - SUBJECTIVE AND OBJECTIVE BOX
Patient is a 83y old  Female who presents with a chief complaint of Pneumonia (16 Jan 2024 15:20)        SUBJECTIVE / OVERNIGHT EVENTS: Patient had no acute events overnight. Patient seen and examined at bedside this morning. Daughter at bedside providing Slovak translation. No change in SOB. Has dry nonproductive cough, unable to give sputum sample. Educated on use of incentive spirometry    ROS:     MEDICATIONS  (STANDING):  albuterol/ipratropium for Nebulization 3 milliLiter(s) Nebulizer every 6 hours  apixaban 5 milliGRAM(s) Oral every 12 hours  benzonatate 100 milliGRAM(s) Oral every 8 hours  cholecalciferol 1000 Unit(s) Oral daily  dronedarone 400 milliGRAM(s) Oral two times a day  guaiFENesin  milliGRAM(s) Oral every 12 hours  metoprolol succinate  milliGRAM(s) Oral daily  piperacillin/tazobactam IVPB.. 3.375 Gram(s) IV Intermittent every 8 hours  simvastatin 10 milliGRAM(s) Oral at bedtime    MEDICATIONS  (PRN):  acetaminophen     Tablet .. 650 milliGRAM(s) Oral every 6 hours PRN Temp greater or equal to 38C (100.4F), Mild Pain (1 - 3)  aluminum hydroxide/magnesium hydroxide/simethicone Suspension 30 milliLiter(s) Oral every 4 hours PRN Dyspepsia  melatonin 3 milliGRAM(s) Oral at bedtime PRN Insomnia      Vital Signs Last 24 Hrs  T(C): 36.4 (16 Jan 2024 09:42), Max: 37.1 (15 Nato 2024 22:00)  T(F): 97.6 (16 Jan 2024 09:42), Max: 98.8 (15 Nato 2024 22:00)  HR: 64 (16 Jan 2024 16:44) (58 - 65)  BP: 118/60 (16 Jan 2024 09:42) (114/64 - 125/61)  BP(mean): --  RR: 18 (16 Jan 2024 16:44) (18 - 20)  SpO2: 97% (16 Jan 2024 16:44) (93% - 98%)    Parameters below as of 16 Jan 2024 16:44  Patient On (Oxygen Delivery Method): nasal cannula, high flow  O2 Flow (L/min): 50  O2 Concentration (%): 50  CAPILLARY BLOOD GLUCOSE        I&O's Summary    15 Nato 2024 07:01  -  16 Jan 2024 07:00  --------------------------------------------------------  IN: 1196 mL / OUT: 100 mL / NET: 1096 mL    16 Jan 2024 07:01  -  16 Jan 2024 17:11  --------------------------------------------------------  IN: 480 mL / OUT: 0 mL / NET: 480 mL        PHYSICAL EXAM  GENERAL: NAD, sitting in chair on HFNC  HEENT:  Atraumatic, Normocephalic, EOMI, conjunctiva and sclera clear, no LAD  CHEST/LUNG: poor inspiratory effort, b/l rales at the bases, no crackles  HEART: RRR, S1 and S2 No murmurs, rubs, or gallops  ABDOMEN: Soft, Nontender, Nondistended; Bowel sounds present  EXTREMITIES:  2+ Peripheral Pulses, No clubbing, cyanosis, or edema  NEURO: awake and alert, interactive, non-focal  SKIN: No rashes or lesions    LABS:                        11.8   7.46  )-----------( 189      ( 16 Jan 2024 06:47 )             35.2     01-16    138  |  102  |  12  ----------------------------<  98  3.3<L>   |  24  |  0.57    Ca    8.0<L>      16 Jan 2024 06:46  Phos  3.4     01-16  Mg     1.9     01-16    TPro  5.8<L>  /  Alb  2.8<L>  /  TBili  0.5  /  DBili  x   /  AST  41<H>  /  ALT  26  /  AlkPhos  32<L>  01-15          Urinalysis Basic - ( 16 Jan 2024 06:46 )    Color: x / Appearance: x / SG: x / pH: x  Gluc: 98 mg/dL / Ketone: x  / Bili: x / Urobili: x   Blood: x / Protein: x / Nitrite: x   Leuk Esterase: x / RBC: x / WBC x   Sq Epi: x / Non Sq Epi: x / Bacteria: x          RADIOLOGY & ADDITIONAL TESTS:      Labs Personally Reviewed  Imaging Personally Reviewed  Consultant(s) Notes Reviewed

## 2024-01-16 NOTE — HISTORY OF PRESENT ILLNESS
[FreeTextEntry8] : 83 year old female presents with complains of fever, urinary frequency for 2 days. She also has a dry cough.  She denies any chest pain, palpitations or shortness of breath.

## 2024-01-16 NOTE — OCCUPATIONAL THERAPY INITIAL EVALUATION ADULT - LIVES WITH, PROFILE
Pt lives alone [address] and 1 flight indoors to bedroom. Pt reports independence with all aspects of self care and functional mobility with cane./alone

## 2024-01-16 NOTE — PROGRESS NOTE ADULT - ATTENDING COMMENTS
Patient is an 84 yo F w/ AFib (on Eliquis), HTN, HLD, follicular lymphoma (in remission) who is admitted for acute hypoxemic respiratory failure due to PNA and HMPV infection.     #Acute hypoxemic respiratory failure - Likely 2/2 HMPV and superimposed bacterial PNA. Differential also includes wide range of possible pulmonary toxicity due to Dronederone (of note was started several weeks ago as per daughters at bedside)  #Abnormal CT chest  #Multifocal PNA  #HMPV infection  - c/w HFNC, wean as tolerated  - c/w empiric Zosyn   - Encourage incentive spiromter and OOB to chair/ambulation as tolerated  - Reocmmend asking cardiology if alternative agents to Dronederone can be used  - c/w Cecilio Mora MD  Pulmonary & Critical Care Patient is an 82 yo F w/ AFib (on Eliquis), HTN, HLD, follicular lymphoma (in remission) who is admitted for acute hypoxemic respiratory failure due to PNA and HMPV infection.     #Acute hypoxemic respiratory failure - Likely 2/2 HMPV and superimposed bacterial PNA. Differential also includes wide range of possible pulmonary toxicity due to Dronederone (of note was started several weeks ago as per daughters at bedside)  #Abnormal CT chest  #Multifocal PNA  #HMPV infection  - c/w HFNC, wean as tolerated  - c/w empiric Zosyn   - Encourage incentive spiromter and OOB to chair/ambulation as tolerated  - Reocmmend asking cardiology if alternative agents to Dronederone can be used  - c/w Cecilio Mora MD  Pulmonary & Critical Care

## 2024-01-16 NOTE — OCCUPATIONAL THERAPY INITIAL EVALUATION ADULT - PERTINENT HX OF CURRENT PROBLEM, REHAB EVAL
83F hx AF on Eliquis, HTN, HLD, follicular lymphoma sent to ED from PCP's office with hypoxia and PNA. Patient was seen earlier this week at PCP's office and diagnosed with LLL PNA and started on cephalexin. She returned to the office yesterday, still feeling poorly, and was started on doxycycline. She returned today and was noted to be hypoxic to the 80's and sent to the ED by EMS on supplemental oxygen for further treatment. Imaging here confirmed LLL PNA as well. Patient c/o fever/chills, weakness, and non-productive cough. Denies dyspnea. CHEST XR: Interval increase in bilateral airspace opacities, left greater than right -due to progression of pulmonary edema or multifocal pneumonia. Small left pleural effusion. CTA CHEST: Small left pleural effusion. Extensive airspace infiltrate/consolidation left upper and bilateral lower lobes, left greater than right. Minimal airspace opacity right upper lobe. No evidence pulmonary embolism.

## 2024-01-16 NOTE — PROGRESS NOTE ADULT - SUBJECTIVE AND OBJECTIVE BOX
DATE OF SERVICE: 01-16-24 @ 15:20    Patient is a 83y old  Female who presents with a chief complaint of Pneumonia (16 Jan 2024 11:35)      INTERVAL HISTORY: feels okay    REVIEW OF SYSTEMS:  CONSTITUTIONAL: No weakness  EYES/ENT: No visual changes;  No throat pain   NECK: No pain or stiffness  RESPIRATORY: No cough, wheezing; No shortness of breath  CARDIOVASCULAR: No chest pain or palpitations  GASTROINTESTINAL: No abdominal  pain. No nausea, vomiting, or hematemesis  GENITOURINARY: No dysuria, frequency or hematuria  NEUROLOGICAL: No stroke like symptoms  SKIN: No rashes    TELEMETRY Personally reviewed: NSR HR 60-70s   	  MEDICATIONS:  dronedarone 400 milliGRAM(s) Oral two times a day  metoprolol succinate  milliGRAM(s) Oral daily        PHYSICAL EXAM:  T(C): 36.4 (01-16-24 @ 09:42), Max: 37.1 (01-15-24 @ 22:00)  HR: 62 (01-16-24 @ 09:42) (58 - 65)  BP: 118/60 (01-16-24 @ 09:42) (114/64 - 125/61)  RR: 18 (01-16-24 @ 09:42) (18 - 20)  SpO2: 95% (01-16-24 @ 12:07) (93% - 98%)  Wt(kg): --  I&O's Summary    15 Nato 2024 07:01  -  16 Jan 2024 07:00  --------------------------------------------------------  IN: 1196 mL / OUT: 100 mL / NET: 1096 mL    16 Jan 2024 07:01  -  16 Jan 2024 15:20  --------------------------------------------------------  IN: 480 mL / OUT: 0 mL / NET: 480 mL          Appearance: In no distress	  HEENT:    PERRL, EOMI	  Cardiovascular:  S1 S2, No JVD  Respiratory: Lungs clear to auscultation	  Gastrointestinal:  Soft, Non-tender, + BS	  Vascularature:  No edema of LE  Psychiatric: Appropriate affect   Neuro: no acute focal deficits                               11.8   7.46  )-----------( 189      ( 16 Jan 2024 06:47 )             35.2     01-16    138  |  102  |  12  ----------------------------<  98  3.3<L>   |  24  |  0.57    Ca    8.0<L>      16 Jan 2024 06:46  Phos  3.4     01-16  Mg     1.9     01-16    TPro  5.8<L>  /  Alb  2.8<L>  /  TBili  0.5  /  DBili  x   /  AST  41<H>  /  ALT  26  /  AlkPhos  32<L>  01-15        ASSESSMENT/PLAN: 	    83F hx AF on Eliquis, HTN, HLD, follicular lymphoma sent to ED from PCP's office with hypoxia and PNA. Patient was seen earlier this week at PCP's office and diagnosed with LLL PNA     1. Paroxsymal AFIB   - ECG in ED shows NSR normal ECG  -c/w Eliquis 5mg BID  - TSH wnl  -CBC wnl   -ProBNP 456  -TTE 12/29 EF 70% , no WMA, grade 2 LVDD    2. PNA  -seen on chest xray  -treat left lower lobe PNA  -High juliane O2 - patient desats on RA  -c/w Duoneb treatments     3. HTN   -controlled  -c/w home BP medication    4. HLD  LDL 89   c/w Statin     5. Electrolyze imbalances   - K 3.3  - treat and repeat BMP    6. Prophylactic measures   - SCD's & AC            LEANDRA Sheikh-NP   Channing Ramirez DO [facility]  Cardiovascular Medicine  [address] [place] [address], [address]  Available through call or text on [employer] TEAMs  Office: [phone]   DATE OF SERVICE: 01-16-24 @ 15:20    Patient is a 83y old  Female who presents with a chief complaint of Pneumonia (16 Jan 2024 11:35)      INTERVAL HISTORY: feels okay    REVIEW OF SYSTEMS:  CONSTITUTIONAL: No weakness  EYES/ENT: No visual changes;  No throat pain   NECK: No pain or stiffness  RESPIRATORY: No cough, wheezing; No shortness of breath  CARDIOVASCULAR: No chest pain or palpitations  GASTROINTESTINAL: No abdominal  pain. No nausea, vomiting, or hematemesis  GENITOURINARY: No dysuria, frequency or hematuria  NEUROLOGICAL: No stroke like symptoms  SKIN: No rashes    TELEMETRY Personally reviewed: NSR HR 60-70s   	  MEDICATIONS:  dronedarone 400 milliGRAM(s) Oral two times a day  metoprolol succinate  milliGRAM(s) Oral daily        PHYSICAL EXAM:  T(C): 36.4 (01-16-24 @ 09:42), Max: 37.1 (01-15-24 @ 22:00)  HR: 62 (01-16-24 @ 09:42) (58 - 65)  BP: 118/60 (01-16-24 @ 09:42) (114/64 - 125/61)  RR: 18 (01-16-24 @ 09:42) (18 - 20)  SpO2: 95% (01-16-24 @ 12:07) (93% - 98%)  Wt(kg): --  I&O's Summary    15 Nato 2024 07:01  -  16 Jan 2024 07:00  --------------------------------------------------------  IN: 1196 mL / OUT: 100 mL / NET: 1096 mL    16 Jan 2024 07:01  -  16 Jan 2024 15:20  --------------------------------------------------------  IN: 480 mL / OUT: 0 mL / NET: 480 mL          Appearance: In no distress	  HEENT:    PERRL, EOMI	  Cardiovascular:  S1 S2, No JVD  Respiratory: Lungs clear to auscultation	  Gastrointestinal:  Soft, Non-tender, + BS	  Vascularature:  No edema of LE  Psychiatric: Appropriate affect   Neuro: no acute focal deficits                               11.8   7.46  )-----------( 189      ( 16 Jan 2024 06:47 )             35.2     01-16    138  |  102  |  12  ----------------------------<  98  3.3<L>   |  24  |  0.57    Ca    8.0<L>      16 Jan 2024 06:46  Phos  3.4     01-16  Mg     1.9     01-16    TPro  5.8<L>  /  Alb  2.8<L>  /  TBili  0.5  /  DBili  x   /  AST  41<H>  /  ALT  26  /  AlkPhos  32<L>  01-15        ASSESSMENT/PLAN: 	    83F hx AF on Eliquis, HTN, HLD, follicular lymphoma sent to ED from PCP's office with hypoxia and PNA. Patient was seen earlier this week at PCP's office and diagnosed with LLL PNA     1. Paroxsymal AFIB   - ECG in ED shows NSR normal ECG  -c/w Eliquis 5mg BID  - TSH wnl  -CBC wnl   -ProBNP 456  -TTE 12/29 EF 70% , no WMA, grade 2 LVDD    2. PNA  -seen on chest xray  -treat left lower lobe PNA  -High juliane O2 - patient desats on RA  -c/w Duoneb treatments     3. HTN   -controlled  -c/w home BP medication    4. HLD  LDL 89   c/w Statin     5. Electrolyze imbalances   - K 3.3  - treat and repeat BMP    6. Prophylactic measures   - SCD's & AC            LEANDRA Sheikh-NP   Channing Ramirez DO [facility]  Cardiovascular Medicine  [address] [place] [address], [address]  Available through call or text on [employer] TEAMs  Office: [phone]   DATE OF SERVICE: 01-16-24 @ 15:20    Patient is a 83y old  Female who presents with a chief complaint of Pneumonia (16 Jan 2024 11:35)      INTERVAL HISTORY: feels okay    REVIEW OF SYSTEMS:  CONSTITUTIONAL: No weakness  EYES/ENT: No visual changes;  No throat pain   NECK: No pain or stiffness  RESPIRATORY: No cough, wheezing; No shortness of breath  CARDIOVASCULAR: No chest pain or palpitations  GASTROINTESTINAL: No abdominal  pain. No nausea, vomiting, or hematemesis  GENITOURINARY: No dysuria, frequency or hematuria  NEUROLOGICAL: No stroke like symptoms  SKIN: No rashes    TELEMETRY Personally reviewed: NSR HR 60-70s   	  MEDICATIONS:  dronedarone 400 milliGRAM(s) Oral two times a day  metoprolol succinate  milliGRAM(s) Oral daily        PHYSICAL EXAM:  T(C): 36.4 (01-16-24 @ 09:42), Max: 37.1 (01-15-24 @ 22:00)  HR: 62 (01-16-24 @ 09:42) (58 - 65)  BP: 118/60 (01-16-24 @ 09:42) (114/64 - 125/61)  RR: 18 (01-16-24 @ 09:42) (18 - 20)  SpO2: 95% (01-16-24 @ 12:07) (93% - 98%)  Wt(kg): --  I&O's Summary    15 Nato 2024 07:01  -  16 Jan 2024 07:00  --------------------------------------------------------  IN: 1196 mL / OUT: 100 mL / NET: 1096 mL    16 Jan 2024 07:01  -  16 Jan 2024 15:20  --------------------------------------------------------  IN: 480 mL / OUT: 0 mL / NET: 480 mL          Appearance: In no distress	  HEENT:    PERRL, EOMI	  Cardiovascular:  S1 S2, No JVD  Respiratory: Lungs clear to auscultation	  Gastrointestinal:  Soft, Non-tender, + BS	  Vascularature:  No edema of LE  Psychiatric: Appropriate affect   Neuro: no acute focal deficits                               11.8   7.46  )-----------( 189      ( 16 Jan 2024 06:47 )             35.2     01-16    138  |  102  |  12  ----------------------------<  98  3.3<L>   |  24  |  0.57    Ca    8.0<L>      16 Jan 2024 06:46  Phos  3.4     01-16  Mg     1.9     01-16    TPro  5.8<L>  /  Alb  2.8<L>  /  TBili  0.5  /  DBili  x   /  AST  41<H>  /  ALT  26  /  AlkPhos  32<L>  01-15        ASSESSMENT/PLAN: 	    83F hx AF on Eliquis, HTN, HLD, follicular lymphoma sent to ED from PCP's office with hypoxia and PNA. Patient was seen earlier this week at PCP's office and diagnosed with LLL PNA     1. Paroxsymal AFIB   - ECG in ED shows NSR normal ECG  -c/w Eliquis 5mg BID  - TSH wnl  -CBC wnl   -ProBNP 456  -TTE 12/29 EF 70% , no WMA, grade 2 LVDD    2. PNA  -seen on chest xray  -treat left lower lobe PNA  -High juliane O2 - patient desats on RA  -c/w Duoneb treatments     3. HTN   -controlled  -c/w home BP medication    4. HLD  LDL 89   c/w Statin     5. Electrolyze imbalances   - K 3.3  - treat and repeat BMP    6. Prophylactic measures   - SCD's & AC          LEANDRA Sheikh-NP   Channing Ramirez DO [facility]  Cardiovascular Medicine  [address] [place] [address], [address]  Available through call or text on [employer] TEAM[employer]  Office: [phone]   DATE OF SERVICE: 01-16-24 @ 15:20    Patient is a 83y old  Female who presents with a chief complaint of Pneumonia (16 Jan 2024 11:35)      INTERVAL HISTORY: feels okay    REVIEW OF SYSTEMS:  CONSTITUTIONAL: No weakness  EYES/ENT: No visual changes;  No throat pain   NECK: No pain or stiffness  RESPIRATORY: No cough, wheezing; No shortness of breath  CARDIOVASCULAR: No chest pain or palpitations  GASTROINTESTINAL: No abdominal  pain. No nausea, vomiting, or hematemesis  GENITOURINARY: No dysuria, frequency or hematuria  NEUROLOGICAL: No stroke like symptoms  SKIN: No rashes    TELEMETRY Personally reviewed: NSR HR 60-70s   	  MEDICATIONS:  dronedarone 400 milliGRAM(s) Oral two times a day  metoprolol succinate  milliGRAM(s) Oral daily        PHYSICAL EXAM:  T(C): 36.4 (01-16-24 @ 09:42), Max: 37.1 (01-15-24 @ 22:00)  HR: 62 (01-16-24 @ 09:42) (58 - 65)  BP: 118/60 (01-16-24 @ 09:42) (114/64 - 125/61)  RR: 18 (01-16-24 @ 09:42) (18 - 20)  SpO2: 95% (01-16-24 @ 12:07) (93% - 98%)  Wt(kg): --  I&O's Summary    15 Nato 2024 07:01  -  16 Jan 2024 07:00  --------------------------------------------------------  IN: 1196 mL / OUT: 100 mL / NET: 1096 mL    16 Jan 2024 07:01  -  16 Jan 2024 15:20  --------------------------------------------------------  IN: 480 mL / OUT: 0 mL / NET: 480 mL          Appearance: In no distress	  HEENT:    PERRL, EOMI	  Cardiovascular:  S1 S2, No JVD  Respiratory: Lungs clear to auscultation	  Gastrointestinal:  Soft, Non-tender, + BS	  Vascularature:  No edema of LE  Psychiatric: Appropriate affect   Neuro: no acute focal deficits                               11.8   7.46  )-----------( 189      ( 16 Jan 2024 06:47 )             35.2     01-16    138  |  102  |  12  ----------------------------<  98  3.3<L>   |  24  |  0.57    Ca    8.0<L>      16 Jan 2024 06:46  Phos  3.4     01-16  Mg     1.9     01-16    TPro  5.8<L>  /  Alb  2.8<L>  /  TBili  0.5  /  DBili  x   /  AST  41<H>  /  ALT  26  /  AlkPhos  32<L>  01-15        ASSESSMENT/PLAN: 	    83F hx AF on Eliquis, HTN, HLD, follicular lymphoma sent to ED from PCP's office with hypoxia and PNA. Patient was seen earlier this week at PCP's office and diagnosed with LLL PNA     1. Paroxsymal AFIB   - ECG in ED shows NSR normal ECG  -c/w Eliquis 5mg BID  - TSH wnl  -CBC wnl   -ProBNP 456  -TTE 12/29 EF 70% , no WMA, grade 2 LVDD    2. PNA  -seen on chest xray  -treat left lower lobe PNA  -High juliane O2 - patient desats on RA  -c/w Duoneb treatments     3. HTN   -controlled  -c/w home BP medication    4. HLD  LDL 89   c/w Statin     5. Electrolyze imbalances   - K 3.3  - treat and repeat BMP    6. Prophylactic measures   - SCD's & AC          LEANDRA Sheikh-NP   Channing Ramirez DO [facility]  Cardiovascular Medicine  [address] [place] [address], [address]  Available through call or text on [employer] TE[employer]  Office: [phone]   DATE OF SERVICE: 01-16-24 @ 15:20    Patient is a 83y old  Female who presents with a chief complaint of Pneumonia (16 Jan 2024 11:35)      INTERVAL HISTORY: feels okay    REVIEW OF SYSTEMS:  CONSTITUTIONAL: No weakness  EYES/ENT: No visual changes;  No throat pain   NECK: No pain or stiffness  RESPIRATORY: No cough, wheezing; No shortness of breath  CARDIOVASCULAR: No chest pain or palpitations  GASTROINTESTINAL: No abdominal  pain. No nausea, vomiting, or hematemesis  GENITOURINARY: No dysuria, frequency or hematuria  NEUROLOGICAL: No stroke like symptoms  SKIN: No rashes    TELEMETRY Personally reviewed: NSR HR 60-70s   	  MEDICATIONS:  dronedarone 400 milliGRAM(s) Oral two times a day  metoprolol succinate  milliGRAM(s) Oral daily        PHYSICAL EXAM:  T(C): 36.4 (01-16-24 @ 09:42), Max: 37.1 (01-15-24 @ 22:00)  HR: 62 (01-16-24 @ 09:42) (58 - 65)  BP: 118/60 (01-16-24 @ 09:42) (114/64 - 125/61)  RR: 18 (01-16-24 @ 09:42) (18 - 20)  SpO2: 95% (01-16-24 @ 12:07) (93% - 98%)  Wt(kg): --  I&O's Summary    15 Nato 2024 07:01  -  16 Jan 2024 07:00  --------------------------------------------------------  IN: 1196 mL / OUT: 100 mL / NET: 1096 mL    16 Jan 2024 07:01  -  16 Jan 2024 15:20  --------------------------------------------------------  IN: 480 mL / OUT: 0 mL / NET: 480 mL          Appearance: In no distress	  HEENT:    PERRL, EOMI	  Cardiovascular:  S1 S2, No JVD  Respiratory: Lungs clear to auscultation	  Gastrointestinal:  Soft, Non-tender, + BS	  Vascularature:  No edema of LE  Psychiatric: Appropriate affect   Neuro: no acute focal deficits                               11.8   7.46  )-----------( 189      ( 16 Jan 2024 06:47 )             35.2     01-16    138  |  102  |  12  ----------------------------<  98  3.3<L>   |  24  |  0.57    Ca    8.0<L>      16 Jan 2024 06:46  Phos  3.4     01-16  Mg     1.9     01-16    TPro  5.8<L>  /  Alb  2.8<L>  /  TBili  0.5  /  DBili  x   /  AST  41<H>  /  ALT  26  /  AlkPhos  32<L>  01-15        ASSESSMENT/PLAN:    83F hx AF on Eliquis, HTN, HLD, follicular lymphoma sent to ED from PCP's office with hypoxia and PNA. Patient was seen earlier this week at PCP's office and diagnosed with LLL PNA     1. Paroxsymal AFIB   - ECG in ED shows NSR normal ECG  -c/w Eliquis 5mg BID  - TSH wnl  -CBC wnl   -ProBNP 456  -TTE 12/29 EF 70% , no WMA, grade 2 LVDD    2. PNA  -seen on chest xray  -treat left lower lobe PNA  -High juliane O2 - patient desats on RA  -c/w Duoneb treatments     3. HTN   -controlled  -c/w home BP medication    4. HLD  LDL 89   c/w Statin     5. Electrolyze imbalances   - K 3.3  - treat and repeat BMP    6. Prophylactic measures   - SCD's & AC          LEANDRA Sheikh-NP   Channing Ramirez DO [facility]  Cardiovascular Medicine  [address] [place] [address], [address]  Available through call or text on [employer] TE[employer]  Office: [phone]   DATE OF SERVICE: 01-16-24 @ 15:20    Patient is a 83y old  Female who presents with a chief complaint of Pneumonia (16 Jan 2024 11:35)      INTERVAL HISTORY: feels okay    REVIEW OF SYSTEMS:  CONSTITUTIONAL: No weakness  EYES/ENT: No visual changes;  No throat pain   NECK: No pain or stiffness  RESPIRATORY: No cough, wheezing; No shortness of breath  CARDIOVASCULAR: No chest pain or palpitations  GASTROINTESTINAL: No abdominal  pain. No nausea, vomiting, or hematemesis  GENITOURINARY: No dysuria, frequency or hematuria  NEUROLOGICAL: No stroke like symptoms  SKIN: No rashes    TELEMETRY Personally reviewed: NSR HR 60-70s   	  MEDICATIONS:  dronedarone 400 milliGRAM(s) Oral two times a day  metoprolol succinate  milliGRAM(s) Oral daily        PHYSICAL EXAM:  T(C): 36.4 (01-16-24 @ 09:42), Max: 37.1 (01-15-24 @ 22:00)  HR: 62 (01-16-24 @ 09:42) (58 - 65)  BP: 118/60 (01-16-24 @ 09:42) (114/64 - 125/61)  RR: 18 (01-16-24 @ 09:42) (18 - 20)  SpO2: 95% (01-16-24 @ 12:07) (93% - 98%)  Wt(kg): --  I&O's Summary    15 Nato 2024 07:01  -  16 Jan 2024 07:00  --------------------------------------------------------  IN: 1196 mL / OUT: 100 mL / NET: 1096 mL    16 Jan 2024 07:01  -  16 Jan 2024 15:20  --------------------------------------------------------  IN: 480 mL / OUT: 0 mL / NET: 480 mL          Appearance: In no distress	  HEENT:    PERRL, EOMI	  Cardiovascular:  S1 S2, No JVD  Respiratory: Lungs clear to auscultation	  Gastrointestinal:  Soft, Non-tender, + BS	  Vascularature:  No edema of LE  Psychiatric: Appropriate affect   Neuro: no acute focal deficits                               11.8   7.46  )-----------( 189      ( 16 Jan 2024 06:47 )             35.2     01-16    138  |  102  |  12  ----------------------------<  98  3.3<L>   |  24  |  0.57    Ca    8.0<L>      16 Jan 2024 06:46  Phos  3.4     01-16  Mg     1.9     01-16    TPro  5.8<L>  /  Alb  2.8<L>  /  TBili  0.5  /  DBili  x   /  AST  41<H>  /  ALT  26  /  AlkPhos  32<L>  01-15        ASSESSMENT/PLAN:    83F hx AF on Eliquis, HTN, HLD, follicular lymphoma sent to ED from PCP's office with hypoxia and PNA. Patient was seen earlier this week at PCP's office and diagnosed with LLL PNA     1. Paroxsymal AFIB   - ECG in ED shows NSR normal ECG  -c/w Eliquis 5mg BID  - TSH wnl  -CBC wnl   -ProBNP 456  -TTE 12/29 EF 70% , no WMA, grade 2 LVDD    2. PNA  -seen on chest xray  -treat left lower lobe PNA  -High juliane O2 - patient desats on RA  -c/w Duoneb treatments     3. HTN   -controlled  -c/w home BP medication    4. HLD  LDL 89   c/w Statin     5. Electrolyze imbalances   - K 3.3  - treat and repeat BMP    6. Prophylactic measures   - SCD's & AC          LEANDRA Sheikh-NP   Channing Ramirez DO [facility]  Cardiovascular Medicine  [address] [place] [address], [address]  Available through call or text on [employer] TE[employer]  Office: [phone]

## 2024-01-16 NOTE — PLAN
[FreeTextEntry1] : Cough - likely viral, will check viral panel Urinary frequency/fever - will check UA/Urine Cx, start cefdinir Afib/HTN/HLD - c/w current meds

## 2024-01-16 NOTE — PROGRESS NOTE ADULT - ASSESSMENT
IS 83F hx AF on Eliquis, HTN, HLD, follicular lymphoma presenting with hypoxia and PNA.     # Pneumonia   - Suspect superinfection on top of the HMPV infection.  - check sputum culture and urine strep  - MRSA negative   - C/w Zosyn  - C/w duonebs  - Wean HFNC as able   - Titrate oxygen to 88-92% - out of bed to chair and early ambulation as able  - Incentive spirometry  - physical therapy  - Aspiration precautions  - DVT ppx

## 2024-01-17 LAB
ANION GAP SERPL CALC-SCNC: 11 MMOL/L — SIGNIFICANT CHANGE UP (ref 5–17)
BACTERIA BLD CULT: NORMAL
BACTERIA BLD CULT: NORMAL
BUN SERPL-MCNC: 11 MG/DL — SIGNIFICANT CHANGE UP (ref 7–23)
CALCIUM SERPL-MCNC: 8.1 MG/DL — LOW (ref 8.4–10.5)
CHLORIDE SERPL-SCNC: 103 MMOL/L — SIGNIFICANT CHANGE UP (ref 96–108)
CO2 SERPL-SCNC: 24 MMOL/L — SIGNIFICANT CHANGE UP (ref 22–31)
CREAT SERPL-MCNC: 0.52 MG/DL — SIGNIFICANT CHANGE UP (ref 0.5–1.3)
CULTURE RESULTS: SIGNIFICANT CHANGE UP
CULTURE RESULTS: SIGNIFICANT CHANGE UP
EGFR: 92 ML/MIN/1.73M2 — SIGNIFICANT CHANGE UP
GLUCOSE SERPL-MCNC: 96 MG/DL — SIGNIFICANT CHANGE UP (ref 70–99)
GRAM STN FLD: ABNORMAL
MAGNESIUM SERPL-MCNC: 1.9 MG/DL — SIGNIFICANT CHANGE UP (ref 1.6–2.6)
POTASSIUM SERPL-MCNC: 3.7 MMOL/L — SIGNIFICANT CHANGE UP (ref 3.5–5.3)
POTASSIUM SERPL-SCNC: 3.7 MMOL/L — SIGNIFICANT CHANGE UP (ref 3.5–5.3)
SODIUM SERPL-SCNC: 138 MMOL/L — SIGNIFICANT CHANGE UP (ref 135–145)
SPECIMEN SOURCE: SIGNIFICANT CHANGE UP

## 2024-01-17 PROCEDURE — 99233 SBSQ HOSP IP/OBS HIGH 50: CPT | Mod: GC

## 2024-01-17 PROCEDURE — 99233 SBSQ HOSP IP/OBS HIGH 50: CPT

## 2024-01-17 PROCEDURE — 71045 X-RAY EXAM CHEST 1 VIEW: CPT | Mod: 26

## 2024-01-17 RX ORDER — LIDOCAINE 4 G/100G
1 CREAM TOPICAL EVERY 24 HOURS
Refills: 0 | Status: DISCONTINUED | OUTPATIENT
Start: 2024-01-17 | End: 2024-01-21

## 2024-01-17 RX ORDER — CHLORHEXIDINE GLUCONATE 213 G/1000ML
1 SOLUTION TOPICAL DAILY
Refills: 0 | Status: DISCONTINUED | OUTPATIENT
Start: 2024-01-17 | End: 2024-01-21

## 2024-01-17 RX ADMIN — SIMVASTATIN 10 MILLIGRAM(S): 20 TABLET, FILM COATED ORAL at 21:05

## 2024-01-17 RX ADMIN — Medication 650 MILLIGRAM(S): at 16:58

## 2024-01-17 RX ADMIN — Medication 3 MILLILITER(S): at 05:44

## 2024-01-17 RX ADMIN — Medication 100 MILLIGRAM(S): at 14:07

## 2024-01-17 RX ADMIN — Medication 100 MILLIGRAM(S): at 05:44

## 2024-01-17 RX ADMIN — Medication 650 MILLIGRAM(S): at 09:45

## 2024-01-17 RX ADMIN — Medication 100 MILLIGRAM(S): at 21:05

## 2024-01-17 RX ADMIN — Medication 3 MILLILITER(S): at 23:35

## 2024-01-17 RX ADMIN — Medication 3 MILLILITER(S): at 17:03

## 2024-01-17 RX ADMIN — PIPERACILLIN AND TAZOBACTAM 25 GRAM(S): 4; .5 INJECTION, POWDER, LYOPHILIZED, FOR SOLUTION INTRAVENOUS at 05:41

## 2024-01-17 RX ADMIN — DRONEDARONE 400 MILLIGRAM(S): 400 TABLET, FILM COATED ORAL at 05:44

## 2024-01-17 RX ADMIN — PIPERACILLIN AND TAZOBACTAM 25 GRAM(S): 4; .5 INJECTION, POWDER, LYOPHILIZED, FOR SOLUTION INTRAVENOUS at 14:07

## 2024-01-17 RX ADMIN — Medication 1000 UNIT(S): at 14:06

## 2024-01-17 RX ADMIN — Medication 600 MILLIGRAM(S): at 17:03

## 2024-01-17 RX ADMIN — LIDOCAINE 1 PATCH: 4 CREAM TOPICAL at 14:07

## 2024-01-17 RX ADMIN — Medication 600 MILLIGRAM(S): at 05:44

## 2024-01-17 RX ADMIN — Medication 650 MILLIGRAM(S): at 08:45

## 2024-01-17 RX ADMIN — Medication 650 MILLIGRAM(S): at 17:58

## 2024-01-17 RX ADMIN — Medication 3 MILLILITER(S): at 11:52

## 2024-01-17 RX ADMIN — Medication 100 MILLIGRAM(S): at 05:43

## 2024-01-17 RX ADMIN — PIPERACILLIN AND TAZOBACTAM 25 GRAM(S): 4; .5 INJECTION, POWDER, LYOPHILIZED, FOR SOLUTION INTRAVENOUS at 21:06

## 2024-01-17 RX ADMIN — APIXABAN 5 MILLIGRAM(S): 2.5 TABLET, FILM COATED ORAL at 17:03

## 2024-01-17 RX ADMIN — APIXABAN 5 MILLIGRAM(S): 2.5 TABLET, FILM COATED ORAL at 05:41

## 2024-01-17 NOTE — PROGRESS NOTE ADULT - SUBJECTIVE AND OBJECTIVE BOX
DATE OF SERVICE: 01-17-24 @ 16:42    Patient is a 83y old  Female who presents with a chief complaint of Pneumonia (17 Jan 2024 13:35)      INTERVAL HISTORY: feels okay    REVIEW OF SYSTEMS:  CONSTITUTIONAL: No weakness  EYES/ENT: No visual changes;  No throat pain   NECK: No pain or stiffness  RESPIRATORY: No cough, wheezing; No shortness of breath  CARDIOVASCULAR: No chest pain or palpitations  GASTROINTESTINAL: No abdominal  pain. No nausea, vomiting, or hematemesis  GENITOURINARY: No dysuria, frequency or hematuria  NEUROLOGICAL: No stroke like symptoms  SKIN: No rashes    TELEMETRY Personally reviewed:  	  MEDICATIONS:  hydrochlorothiazide 25 milliGRAM(s) Oral daily  metoprolol succinate  milliGRAM(s) Oral daily        PHYSICAL EXAM:  T(C): 36.4 (01-17-24 @ 14:20), Max: 37.1 (01-16-24 @ 22:06)  HR: 77 (01-17-24 @ 14:20) (63 - 77)  BP: 118/59 (01-17-24 @ 14:20) (107/65 - 120/57)  RR: 20 (01-17-24 @ 14:20) (18 - 20)  SpO2: 96% (01-17-24 @ 14:20) (92% - 98%)  Wt(kg): --  I&O's Summary    16 Jan 2024 07:01  -  17 Jan 2024 07:00  --------------------------------------------------------  IN: 960 mL / OUT: 350 mL / NET: 610 mL    17 Jan 2024 07:01  -  17 Jan 2024 16:42  --------------------------------------------------------  IN: 360 mL / OUT: 0 mL / NET: 360 mL          Appearance: In no distress	  HEENT:    PERRL, EOMI	  Cardiovascular:  S1 S2, No JVD  Respiratory: Lungs clear to auscultation	  Gastrointestinal:  Soft, Non-tender, + BS	  Vascularature:  No edema of LE  Psychiatric: Appropriate affect   Neuro: no acute focal deficits                               11.8   7.46  )-----------( 189      ( 16 Jan 2024 06:47 )             35.2     01-17    138  |  103  |  11  ----------------------------<  96  3.7   |  24  |  0.52    Ca    8.1<L>      17 Jan 2024 07:24  Phos  3.4     01-16  Mg     1.9     01-17          Labs personally reviewed      ASSESSMENT/PLAN: 	    83F hx AF on Eliquis, HTN, HLD, follicular lymphoma sent to ED from PCP's office with hypoxia and PNA. Patient was seen earlier this week at PCP's office and diagnosed with LLL PNA     1. Paroxsymal AFIB   - ECG in ED shows NSR normal ECG  -c/w Eliquis 5mg BID  - TSH wnl  -CBC wnl   -ProBNP 456  -TTE 12/29 EF 70% , no WMA, grade 2 LVDD    2. PNA  -seen on chest xray  -treat left lower lobe PNA  -High juliane O2 - patient desats on RA  -c/w Duoneb treatments     3. HTN   -controlled  -c/w home BP medication    4. HLD  LDL 89   c/w Statin     5. Electrolyze imbalances   - K 3.7  - ctm BMP    6. Prophylactic measures   - SCD's & AC                  Hermelinda Ashton, AG-NP   Channing Ramirez DO [facility]  Cardiovascular Medicine  [address] [facility], [address]  Available through call or text on [employer] TE[employer]  Office: [phone]   DATE OF SERVICE: 01-17-24 @ 16:42    Patient is a 83y old  Female who presents with a chief complaint of Pneumonia (17 Jan 2024 13:35)      INTERVAL HISTORY: feels okay    REVIEW OF SYSTEMS:  CONSTITUTIONAL: No weakness  EYES/ENT: No visual changes;  No throat pain   NECK: No pain or stiffness  RESPIRATORY: No cough, wheezing; No shortness of breath  CARDIOVASCULAR: No chest pain or palpitations  GASTROINTESTINAL: No abdominal  pain. No nausea, vomiting, or hematemesis  GENITOURINARY: No dysuria, frequency or hematuria  NEUROLOGICAL: No stroke like symptoms  SKIN: No rashes    TELEMETRY Personally reviewed: NSR 60-80s  	  MEDICATIONS:  hydrochlorothiazide 25 milliGRAM(s) Oral daily  metoprolol succinate  milliGRAM(s) Oral daily        PHYSICAL EXAM:  T(C): 36.4 (01-17-24 @ 14:20), Max: 37.1 (01-16-24 @ 22:06)  HR: 77 (01-17-24 @ 14:20) (63 - 77)  BP: 118/59 (01-17-24 @ 14:20) (107/65 - 120/57)  RR: 20 (01-17-24 @ 14:20) (18 - 20)  SpO2: 96% (01-17-24 @ 14:20) (92% - 98%)  Wt(kg): --  I&O's Summary    16 Jan 2024 07:01  -  17 Jan 2024 07:00  --------------------------------------------------------  IN: 960 mL / OUT: 350 mL / NET: 610 mL    17 Jan 2024 07:01  -  17 Jan 2024 16:42  --------------------------------------------------------  IN: 360 mL / OUT: 0 mL / NET: 360 mL          Appearance: In no distress	  HEENT:    PERRL, EOMI	  Cardiovascular:  S1 S2, No JVD  Respiratory: Lungs clear to auscultation	  Gastrointestinal:  Soft, Non-tender, + BS	  Vascularature:  No edema of LE  Psychiatric: Appropriate affect   Neuro: no acute focal deficits                               11.8   7.46  )-----------( 189      ( 16 Jan 2024 06:47 )             35.2     01-17    138  |  103  |  11  ----------------------------<  96  3.7   |  24  |  0.52    Ca    8.1<L>      17 Jan 2024 07:24  Phos  3.4     01-16  Mg     1.9     01-17          Labs personally reviewed      ASSESSMENT/PLAN: 	    83F hx AF on Eliquis, HTN, HLD, follicular lymphoma sent to ED from PCP's office with hypoxia and PNA. Patient was seen earlier this week at PCP's office and diagnosed with LLL PNA     1. Paroxsymal AFIB   - ECG in ED shows NSR normal ECG  -c/w Eliquis 5mg BID  - TSH wnl  -CBC wnl   -ProBNP 456  -TTE 12/29 EF 70% , no WMA, grade 2 LVDD    2. PNA  -seen on chest xray  -treat left lower lobe PNA  -High juliane O2 - patient desats on RA  -c/w Duoneb treatments     3. HTN   -controlled  -c/w home BP medication    4. HLD  LDL 89   c/w Statin     5. Electrolyze imbalances   - K 3.7  - ctm BMP    6. Prophylactic measures   - SCD's & AC                  Hermelinda Ashton, AG-NP   Channing Ramirez DO [facility]  Cardiovascular Medicine  [address] [place] [address], [address]  Available through call or text on [employer] TE[employer]  Office: [phone]   DATE OF SERVICE: 01-17-24 @ 16:42    Patient is a 83y old  Female who presents with a chief complaint of Pneumonia (17 Jan 2024 13:35)      INTERVAL HISTORY: feels okay    REVIEW OF SYSTEMS:  CONSTITUTIONAL: No weakness  EYES/ENT: No visual changes;  No throat pain   NECK: No pain or stiffness  RESPIRATORY: No cough, wheezing; No shortness of breath  CARDIOVASCULAR: No chest pain or palpitations  GASTROINTESTINAL: No abdominal  pain. No nausea, vomiting, or hematemesis  GENITOURINARY: No dysuria, frequency or hematuria  NEUROLOGICAL: No stroke like symptoms  SKIN: No rashes    TELEMETRY Personally reviewed: NSR 60-80s  	  MEDICATIONS:  hydrochlorothiazide 25 milliGRAM(s) Oral daily  metoprolol succinate  milliGRAM(s) Oral daily        PHYSICAL EXAM:  T(C): 36.4 (01-17-24 @ 14:20), Max: 37.1 (01-16-24 @ 22:06)  HR: 77 (01-17-24 @ 14:20) (63 - 77)  BP: 118/59 (01-17-24 @ 14:20) (107/65 - 120/57)  RR: 20 (01-17-24 @ 14:20) (18 - 20)  SpO2: 96% (01-17-24 @ 14:20) (92% - 98%)  Wt(kg): --  I&O's Summary    16 Jan 2024 07:01  -  17 Jan 2024 07:00  --------------------------------------------------------  IN: 960 mL / OUT: 350 mL / NET: 610 mL    17 Jan 2024 07:01  -  17 Jan 2024 16:42  --------------------------------------------------------  IN: 360 mL / OUT: 0 mL / NET: 360 mL          Appearance: In no distress	  HEENT:    PERRL, EOMI	  Cardiovascular:  S1 S2, No JVD  Respiratory: Lungs clear to auscultation	  Gastrointestinal:  Soft, Non-tender, + BS	  Vascularature:  No edema of LE  Psychiatric: Appropriate affect   Neuro: no acute focal deficits                               11.8   7.46  )-----------( 189      ( 16 Jan 2024 06:47 )             35.2     01-17    138  |  103  |  11  ----------------------------<  96  3.7   |  24  |  0.52    Ca    8.1<L>      17 Jan 2024 07:24  Phos  3.4     01-16  Mg     1.9     01-17          Labs personally reviewed      ASSESSMENT/PLAN: 	    83F hx AF on Eliquis, HTN, HLD, follicular lymphoma sent to ED from PCP's office with hypoxia and PNA. Patient was seen earlier this week at PCP's office and diagnosed with LLL PNA     1. Paroxsymal AFIB   - ECG in ED shows NSR normal ECG  -c/w Eliquis 5mg BID  - TSH wnl  -ProBNP 456  -TTE 12/29 EF 70% , no WMA, grade 2 LVDD  - hold Multaq as ? contributing to lung findings    2. Hypoxia  -Repeat CXR  - Repeat proBNP in AM  - High juliane O2 - patient desats on RA  - agree with holding Multaq for now    3. HTN   -controlled  -c/w home BP medication    4. HLD  LDL 89   c/w Statin      5. Prophylactic measures   - SCD's & AC                  Hermelinda Ashton, AG-NP   Channing Ramirez DO [facility]  Cardiovascular Medicine  [address] [facility] [facility], [address]  Available through call or text on [employer] TE[employer]  Office: [phone]

## 2024-01-17 NOTE — PROGRESS NOTE ADULT - SUBJECTIVE AND OBJECTIVE BOX
Pulmonary Consult Follow up     Interval Events:    oxygenation improving. no acute events.    REVIEW OF SYSTEMS:  [x] All other systems negative except per HPI   [ ] Unable to assess ROS because ________    OBJECTIVE:  ICU Vital Signs Last 24 Hrs  T(C): 36.4 (17 Jan 2024 14:20), Max: 37.1 (16 Jan 2024 22:06)  T(F): 97.6 (17 Jan 2024 14:20), Max: 98.7 (16 Jan 2024 22:06)  HR: 77 (17 Jan 2024 14:20) (63 - 77)  BP: 118/59 (17 Jan 2024 14:20) (107/65 - 120/57)  BP(mean): --  ABP: --  ABP(mean): --  RR: 20 (17 Jan 2024 14:20) (20 - 20)  SpO2: 96% (17 Jan 2024 14:20) (92% - 98%)    O2 Parameters below as of 17 Jan 2024 14:20  Patient On (Oxygen Delivery Method): nasal cannula, high flow  O2 Flow (L/min): 50  O2 Concentration (%): 50          01-16 @ 07:01 - 01-17 @ 07:00  --------------------------------------------------------  IN: 960 mL / OUT: 350 mL / NET: 610 mL    01-17 @ 07:01 - 01-17 @ 16:53  --------------------------------------------------------  IN: 360 mL / OUT: 0 mL / NET: 360 mL        PHYSICAL EXAM:  GENERAL: NAD, well-groomed, well-developed  CHEST/LUNG: bibasilar rales   HEART: Regular rate and rhythm; No murmurs, rubs, or gallops  VASCULAR: No  cyanosis, or edema  SKIN: No rashes or lesions  NERVOUS SYSTEM:  Alert     HOSPITAL MEDICATIONS:  MEDICATIONS  (STANDING):  albuterol/ipratropium for Nebulization 3 milliLiter(s) Nebulizer every 6 hours  apixaban 5 milliGRAM(s) Oral every 12 hours  benzonatate 100 milliGRAM(s) Oral every 8 hours  chlorhexidine 2% Cloths 1 Application(s) Topical daily  cholecalciferol 1000 Unit(s) Oral daily  guaiFENesin  milliGRAM(s) Oral every 12 hours  hydrochlorothiazide 25 milliGRAM(s) Oral daily  lidocaine   4% Patch 1 Patch Transdermal every 24 hours  metoprolol succinate  milliGRAM(s) Oral daily  piperacillin/tazobactam IVPB.. 3.375 Gram(s) IV Intermittent every 8 hours  simvastatin 10 milliGRAM(s) Oral at bedtime    MEDICATIONS  (PRN):  acetaminophen     Tablet .. 650 milliGRAM(s) Oral every 6 hours PRN Temp greater or equal to 38C (100.4F), Mild Pain (1 - 3)  aluminum hydroxide/magnesium hydroxide/simethicone Suspension 30 milliLiter(s) Oral every 4 hours PRN Dyspepsia  melatonin 3 milliGRAM(s) Oral at bedtime PRN Insomnia      LABS:  01-17    138  |  103  |  11  ----------------------------<  96  3.7   |  24  |  0.52  01-16    138  |  102  |  12  ----------------------------<  98  3.3<L>   |  24  |  0.57  01-15    135  |  100  |  13  ----------------------------<  97  3.6   |  26  |  0.64    Ca    8.1<L>      17 Jan 2024 07:24  Ca    8.0<L>      16 Jan 2024 06:46  Ca    8.0<L>      15 Nato 2024 08:32  Phos  3.4     01-16  Mg     1.9     01-17    TPro  5.8<L>  /  Alb  2.8<L>  /  TBili  0.5  /  DBili  x   /  AST  41<H>  /  ALT  26  /  AlkPhos  32<L>  01-15    Magnesium: 1.9 mg/dL (01-17-24 @ 07:24)  Magnesium: 1.9 mg/dL (01-16-24 @ 06:46)  Magnesium: 1.9 mg/dL (01-15-24 @ 08:32)  Magnesium: 2.0 mg/dL (01-14-24 @ 21:47)    Phosphorus: 3.4 mg/dL (01-16-24 @ 06:46)  Phosphorus: 2.4 mg/dL (01-15-24 @ 08:32)  Phosphorus: 2.9 mg/dL (01-14-24 @ 21:47)                    Urinalysis Basic - ( 17 Jan 2024 07:24 )    Color: x / Appearance: x / SG: x / pH: x  Gluc: 96 mg/dL / Ketone: x  / Bili: x / Urobili: x   Blood: x / Protein: x / Nitrite: x   Leuk Esterase: x / RBC: x / WBC x   Sq Epi: x / Non Sq Epi: x / Bacteria: x                              11.8   7.46  )-----------( 189      ( 16 Jan 2024 06:47 )             35.2     CAPILLARY BLOOD GLUCOSE

## 2024-01-17 NOTE — PROGRESS NOTE ADULT - ASSESSMENT
83F hx AF on Eliquis, HTN, HLD, follicular lymphoma presenting with hypoxia and PNA.     # Pneumonia   - Suspect superinfection on top of the HMPV infection.  - check sputum culture and urine strep  - MRSA negative   - C/w Zosyn  - C/w duonebs  - Wean HFNC as able   - Titrate oxygen to 88-92% - out of bed to chair and early ambulation as able  - Incentive spirometry  - physical therapy  - Aspiration precautions  - DVT ppx  83F hx AF on Eliquis, HTN, HLD, follicular lymphoma presenting with hypoxia and PNA.     # Pneumonia   - Suspect superinfection on top of the HMPV infection.  - check sputum culture and urine strep  - MRSA negative   - C/w Zosyn  - C/w duonebs  - Wean HFNC as able   - Titrate oxygen to 88-92% - out of bed to chair and early ambulation as able  - Incentive spirometry  - physical therapy  - Aspiration precautions  - DVT ppx   - If pt does not improve, may need to discuss with cardiology if lung disease may be related to new medication dronedarone.

## 2024-01-17 NOTE — PROGRESS NOTE ADULT - SUBJECTIVE AND OBJECTIVE BOX
Patient is a 83y old  Female who presents with a chief complaint of Pneumonia (17 Jan 2024 12:54)        SUBJECTIVE / OVERNIGHT EVENTS: Patient had no acute events overnight. Patient seen and examined at bedside this morning. Continues to have dry cough. Daughter reports intermitent Left posterior thigh pain, feels like strain. Will give heat packs and lidocaine patch. Daughter at bedside providing [place] translation     ROS:     MEDICATIONS  (STANDING):  albuterol/ipratropium for Nebulization 3 milliLiter(s) Nebulizer every 6 hours  apixaban 5 milliGRAM(s) Oral every 12 hours  benzonatate 100 milliGRAM(s) Oral every 8 hours  cholecalciferol 1000 Unit(s) Oral daily  dronedarone 400 milliGRAM(s) Oral two times a day  guaiFENesin  milliGRAM(s) Oral every 12 hours  hydrochlorothiazide 25 milliGRAM(s) Oral daily  lidocaine   4% Patch 1 Patch Transdermal every 24 hours  metoprolol succinate  milliGRAM(s) Oral daily  piperacillin/tazobactam IVPB.. 3.375 Gram(s) IV Intermittent every 8 hours  simvastatin 10 milliGRAM(s) Oral at bedtime    MEDICATIONS  (PRN):  acetaminophen     Tablet .. 650 milliGRAM(s) Oral every 6 hours PRN Temp greater or equal to 38C (100.4F), Mild Pain (1 - 3)  aluminum hydroxide/magnesium hydroxide/simethicone Suspension 30 milliLiter(s) Oral every 4 hours PRN Dyspepsia  melatonin 3 milliGRAM(s) Oral at bedtime PRN Insomnia      Vital Signs Last 24 Hrs  T(C): 36.7 (17 Jan 2024 05:14), Max: 37.1 (16 Jan 2024 22:06)  T(F): 98 (17 Jan 2024 05:14), Max: 98.7 (16 Jan 2024 22:06)  HR: 67 (17 Jan 2024 05:14) (63 - 75)  BP: 120/57 (17 Jan 2024 05:14) (107/65 - 120/57)  BP(mean): --  RR: 20 (17 Jan 2024 05:14) (18 - 20)  SpO2: 96% (17 Jan 2024 05:14) (93% - 98%)    Parameters below as of 17 Jan 2024 05:14  Patient On (Oxygen Delivery Method): nasal cannula, high flow  O2 Flow (L/min): 50  O2 Concentration (%): 50  CAPILLARY BLOOD GLUCOSE        I&O's Summary    16 Jan 2024 07:01  -  17 Jan 2024 07:00  --------------------------------------------------------  IN: 960 mL / OUT: 350 mL / NET: 610 mL        PHYSICAL EXAM  GENERAL: NAD, in bed on HFNC 50/50  HEENT:  Atraumatic, Normocephalic, EOMI, conjunctiva and sclera clear, no LAD  CHEST/LUNG: poor inspiratory effort, b/l rales at the bases, no crackles  HEART: RRR, S1 and S2 No murmurs, rubs, or gallops  ABDOMEN: Soft, Nontender, Nondistended; Bowel sounds present  EXTREMITIES:  2+ Peripheral Pulses, LE edema+. Left thigh not swollen, nontender, no erythema. Straight leg raise neg  NEURO: awake and alert, interactive, non-focal  SKIN: No rashes or lesions      LABS:                        11.8   7.46  )-----------( 189      ( 16 Jan 2024 06:47 )             35.2     01-17    138  |  103  |  11  ----------------------------<  96  3.7   |  24  |  0.52    Ca    8.1<L>      17 Jan 2024 07:24  Phos  3.4     01-16  Mg     1.9     01-17            Urinalysis Basic - ( 17 Jan 2024 07:24 )    Color: x / Appearance: x / SG: x / pH: x  Gluc: 96 mg/dL / Ketone: x  / Bili: x / Urobili: x   Blood: x / Protein: x / Nitrite: x   Leuk Esterase: x / RBC: x / WBC x   Sq Epi: x / Non Sq Epi: x / Bacteria: x          RADIOLOGY & ADDITIONAL TESTS:      Labs Personally Reviewed  Imaging Personally Reviewed  Consultant(s) Notes Reviewed

## 2024-01-17 NOTE — PROGRESS NOTE ADULT - ASSESSMENT
83F hx AF on Eliquis, HTN, HLD, follicular lymphoma admitted with acute hypoxic respiratory failure due to LLL pneumonia on HFNC

## 2024-01-17 NOTE — PROGRESS NOTE ADULT - ATTENDING COMMENTS
Patient is an 82 yo F w/ AFib (on Eliquis), HTN, HLD, follicular lymphoma (in remission) who is admitted for acute hypoxemic respiratory failure due to PNA and HMPV infection.     #Acute hypoxemic respiratory failure - Likely 2/2 HMPV and superimposed bacterial PNA. Differential also includes wide range of possible pulmonary toxicity due to Dronederone (of note was started several weeks ago as per daughters at bedside)  #Abnormal CT chest  #Multifocal PNA  #HMPV infection  - c/w HFNC, wean as tolerated. Slightly improved from yesterday  - c/w empiric Zosyn   - Encourage incentive spirometry and OOB to chair/ambulation as tolerated  - Consider asking cardiology if alternative agents to Dronederone can be used, especially if patient does not improve with antibiotics  - c/w Cecilio Mora MD  Pulmonary & Critical Care

## 2024-01-18 LAB
ANION GAP SERPL CALC-SCNC: 8 MMOL/L — SIGNIFICANT CHANGE UP (ref 5–17)
BUN SERPL-MCNC: 9 MG/DL — SIGNIFICANT CHANGE UP (ref 7–23)
CALCIUM SERPL-MCNC: 8.5 MG/DL — SIGNIFICANT CHANGE UP (ref 8.4–10.5)
CHLORIDE SERPL-SCNC: 105 MMOL/L — SIGNIFICANT CHANGE UP (ref 96–108)
CO2 SERPL-SCNC: 26 MMOL/L — SIGNIFICANT CHANGE UP (ref 22–31)
CREAT SERPL-MCNC: 0.46 MG/DL — LOW (ref 0.5–1.3)
CULTURE RESULTS: ABNORMAL
EGFR: 95 ML/MIN/1.73M2 — SIGNIFICANT CHANGE UP
GLUCOSE SERPL-MCNC: 110 MG/DL — HIGH (ref 70–99)
HCT VFR BLD CALC: 34.9 % — SIGNIFICANT CHANGE UP (ref 34.5–45)
HGB BLD-MCNC: 11.5 G/DL — SIGNIFICANT CHANGE UP (ref 11.5–15.5)
MCHC RBC-ENTMCNC: 28 PG — SIGNIFICANT CHANGE UP (ref 27–34)
MCHC RBC-ENTMCNC: 33 GM/DL — SIGNIFICANT CHANGE UP (ref 32–36)
MCV RBC AUTO: 85.1 FL — SIGNIFICANT CHANGE UP (ref 80–100)
NRBC # BLD: 0 /100 WBCS — SIGNIFICANT CHANGE UP (ref 0–0)
NT-PROBNP SERPL-SCNC: 1853 PG/ML — HIGH (ref 0–300)
PLATELET # BLD AUTO: 320 K/UL — SIGNIFICANT CHANGE UP (ref 150–400)
POTASSIUM SERPL-MCNC: 3.6 MMOL/L — SIGNIFICANT CHANGE UP (ref 3.5–5.3)
POTASSIUM SERPL-SCNC: 3.6 MMOL/L — SIGNIFICANT CHANGE UP (ref 3.5–5.3)
RBC # BLD: 4.1 M/UL — SIGNIFICANT CHANGE UP (ref 3.8–5.2)
RBC # FLD: 13.3 % — SIGNIFICANT CHANGE UP (ref 10.3–14.5)
SODIUM SERPL-SCNC: 139 MMOL/L — SIGNIFICANT CHANGE UP (ref 135–145)
SPECIMEN SOURCE: SIGNIFICANT CHANGE UP
WBC # BLD: 10.09 K/UL — SIGNIFICANT CHANGE UP (ref 3.8–10.5)
WBC # FLD AUTO: 10.09 K/UL — SIGNIFICANT CHANGE UP (ref 3.8–10.5)

## 2024-01-18 PROCEDURE — 99233 SBSQ HOSP IP/OBS HIGH 50: CPT

## 2024-01-18 PROCEDURE — 99233 SBSQ HOSP IP/OBS HIGH 50: CPT | Mod: GC

## 2024-01-18 RX ORDER — FUROSEMIDE 40 MG
40 TABLET ORAL DAILY
Refills: 0 | Status: DISCONTINUED | OUTPATIENT
Start: 2024-01-18 | End: 2024-01-18

## 2024-01-18 RX ADMIN — Medication 650 MILLIGRAM(S): at 03:41

## 2024-01-18 RX ADMIN — Medication 3 MILLIGRAM(S): at 21:13

## 2024-01-18 RX ADMIN — Medication 3 MILLILITER(S): at 05:38

## 2024-01-18 RX ADMIN — Medication 100 MILLIGRAM(S): at 05:37

## 2024-01-18 RX ADMIN — PIPERACILLIN AND TAZOBACTAM 25 GRAM(S): 4; .5 INJECTION, POWDER, LYOPHILIZED, FOR SOLUTION INTRAVENOUS at 21:12

## 2024-01-18 RX ADMIN — Medication 3 MILLILITER(S): at 23:59

## 2024-01-18 RX ADMIN — APIXABAN 5 MILLIGRAM(S): 2.5 TABLET, FILM COATED ORAL at 05:37

## 2024-01-18 RX ADMIN — Medication 100 MILLIGRAM(S): at 21:12

## 2024-01-18 RX ADMIN — Medication 650 MILLIGRAM(S): at 02:41

## 2024-01-18 RX ADMIN — APIXABAN 5 MILLIGRAM(S): 2.5 TABLET, FILM COATED ORAL at 18:11

## 2024-01-18 RX ADMIN — Medication 3 MILLILITER(S): at 18:11

## 2024-01-18 RX ADMIN — Medication 3 MILLILITER(S): at 14:00

## 2024-01-18 RX ADMIN — SIMVASTATIN 10 MILLIGRAM(S): 20 TABLET, FILM COATED ORAL at 21:13

## 2024-01-18 RX ADMIN — PIPERACILLIN AND TAZOBACTAM 25 GRAM(S): 4; .5 INJECTION, POWDER, LYOPHILIZED, FOR SOLUTION INTRAVENOUS at 14:01

## 2024-01-18 RX ADMIN — PIPERACILLIN AND TAZOBACTAM 25 GRAM(S): 4; .5 INJECTION, POWDER, LYOPHILIZED, FOR SOLUTION INTRAVENOUS at 05:34

## 2024-01-18 RX ADMIN — Medication 650 MILLIGRAM(S): at 11:06

## 2024-01-18 RX ADMIN — Medication 100 MILLIGRAM(S): at 14:01

## 2024-01-18 RX ADMIN — Medication 600 MILLIGRAM(S): at 18:11

## 2024-01-18 RX ADMIN — Medication 650 MILLIGRAM(S): at 10:06

## 2024-01-18 RX ADMIN — Medication 600 MILLIGRAM(S): at 05:36

## 2024-01-18 RX ADMIN — LIDOCAINE 1 PATCH: 4 CREAM TOPICAL at 14:04

## 2024-01-18 RX ADMIN — Medication 1000 UNIT(S): at 14:01

## 2024-01-18 RX ADMIN — CHLORHEXIDINE GLUCONATE 1 APPLICATION(S): 213 SOLUTION TOPICAL at 09:52

## 2024-01-18 NOTE — PROGRESS NOTE ADULT - ATTENDING COMMENTS
Patient is an 82 yo F w/ AFib (on Eliquis), HTN, HLD, follicular lymphoma (in remission) who is admitted for acute hypoxemic respiratory failure due to PNA and HMPV infection.     #Acute hypoxemic respiratory failure - Likely 2/2 HMPV and superimposed bacterial PNA. Differential also includes wide range of possible pulmonary toxicity due to Dronederone (of note was started several weeks ago as per daughters at bedside)  #Abnormal CT chest  #Multifocal PNA  #HMPV infection  - c/w HFNC, wean as tolerated. Slightly improved from yesterday. If stable of HFNC 40%/40L, can trial on 6L nc.  - c/w empiric Zosyn, recommend 7 day course total  - Recommend diuresis, appears volume overloaded on exam. Most recent TTE with Grade II diastolic dysfunction  - Encourage incentive spirometry and OOB to chair/ambulation as tolerated  - c/w Cecilio Mora MD  Pulmonary & Critical Care

## 2024-01-18 NOTE — PROGRESS NOTE ADULT - SUBJECTIVE AND OBJECTIVE BOX
Patient is a 83y old  Female who presents with a chief complaint of Pneumonia (18 Jan 2024 16:14)        SUBJECTIVE / OVERNIGHT EVENTS: Patient had no acute events overnight. Patient seen and examined at bedside this morning. Tolerating 40/40 HFNC. Transitioned to NC 6L. Left thigh pain improved with lidocaine patch and heat packs. Daughter provided [place] translation.     ROS: [ - ] Fever [ - ] Chills [ - ] Nausea/Vomiting [ - ] Chest Pain [ - ] Shortness of breath     MEDICATIONS  (STANDING):  albuterol/ipratropium for Nebulization 3 milliLiter(s) Nebulizer every 6 hours  apixaban 5 milliGRAM(s) Oral every 12 hours  benzonatate 100 milliGRAM(s) Oral every 8 hours  chlorhexidine 2% Cloths 1 Application(s) Topical daily  cholecalciferol 1000 Unit(s) Oral daily  furosemide   Injectable 40 milliGRAM(s) IV Push daily  guaiFENesin  milliGRAM(s) Oral every 12 hours  lidocaine   4% Patch 1 Patch Transdermal every 24 hours  metoprolol succinate  milliGRAM(s) Oral daily  piperacillin/tazobactam IVPB.. 3.375 Gram(s) IV Intermittent every 8 hours  simvastatin 10 milliGRAM(s) Oral at bedtime    MEDICATIONS  (PRN):  acetaminophen     Tablet .. 650 milliGRAM(s) Oral every 6 hours PRN Temp greater or equal to 38C (100.4F), Mild Pain (1 - 3)  aluminum hydroxide/magnesium hydroxide/simethicone Suspension 30 milliLiter(s) Oral every 4 hours PRN Dyspepsia  melatonin 3 milliGRAM(s) Oral at bedtime PRN Insomnia      Vital Signs Last 24 Hrs  T(C): 36.7 (18 Jan 2024 13:49), Max: 37.1 (18 Jan 2024 05:13)  T(F): 98 (18 Jan 2024 13:49), Max: 98.7 (18 Jan 2024 05:13)  HR: 63 (18 Jan 2024 13:49) (63 - 85)  BP: 121/74 (18 Jan 2024 13:49) (121/74 - 152/78)  BP(mean): --  RR: 20 (18 Jan 2024 13:49) (18 - 22)  SpO2: 95% (18 Jan 2024 13:49) (92% - 99%)    Parameters below as of 18 Jan 2024 13:49  Patient On (Oxygen Delivery Method): nasal cannula  O2 Flow (L/min): 6    CAPILLARY BLOOD GLUCOSE        I&O's Summary    17 Jan 2024 07:01  -  18 Jan 2024 07:00  --------------------------------------------------------  IN: 700 mL / OUT: 200 mL / NET: 500 mL    18 Jan 2024 07:01  -  18 Jan 2024 16:36  --------------------------------------------------------  IN: 600 mL / OUT: 0 mL / NET: 600 mL        PHYSICAL EXAM  GENERAL: NAD, in chair transitioned HFNC 40/40 to 6L NC, saturating wel  HEENT:  Atraumatic, Normocephalic, EOMI, conjunctiva and sclera clear, no LAD  CHEST/LUNG: poor inspiratory effort, b/l rales at the bases, no crackles  HEART: RRR, S1 and S2 No murmurs, rubs, or gallops  ABDOMEN: Soft, Nontender, Nondistended; Bowel sounds present  EXTREMITIES:  2+ Peripheral Pulses, LE edema+. Left thigh not swollen, nontender, no erythema. Straight leg raise neg.   NEURO: awake and alert, interactive, non-focal  SKIN: No rashes or lesions    LABS:                        11.5   10.09 )-----------( 320      ( 18 Jan 2024 07:15 )             34.9     01-18    139  |  105  |  9   ----------------------------<  110<H>  3.6   |  26  |  0.46<L>    Ca    8.5      18 Jan 2024 07:13  Mg     1.9     01-17            Urinalysis Basic - ( 18 Jan 2024 07:13 )    Color: x / Appearance: x / SG: x / pH: x  Gluc: 110 mg/dL / Ketone: x  / Bili: x / Urobili: x   Blood: x / Protein: x / Nitrite: x   Leuk Esterase: x / RBC: x / WBC x   Sq Epi: x / Non Sq Epi: x / Bacteria: x          RADIOLOGY & ADDITIONAL TESTS:      Labs Personally Reviewed  Imaging Personally Reviewed  Consultant(s) Notes Reviewed

## 2024-01-18 NOTE — PROGRESS NOTE ADULT - SUBJECTIVE AND OBJECTIVE BOX
Pulmonary Consult Follow up     Interval Events:    Doing well. Oxygen requirements improving. now HFNC 40%/40L.      REVIEW OF SYSTEMS:  [x] All other systems negative except per HPI   [ ] Unable to assess ROS because ________    OBJECTIVE:  ICU Vital Signs Last 24 Hrs  T(C): 37.1 (18 Jan 2024 05:13), Max: 37.1 (18 Jan 2024 05:13)  T(F): 98.7 (18 Jan 2024 05:13), Max: 98.7 (18 Jan 2024 05:13)  HR: 73 (18 Jan 2024 05:13) (69 - 85)  BP: 129/66 (18 Jan 2024 05:13) (118/59 - 152/78)  BP(mean): --  ABP: --  ABP(mean): --  RR: 20 (18 Jan 2024 05:13) (18 - 20)  SpO2: 92% (18 Jan 2024 05:13) (92% - 99%)    O2 Parameters below as of 18 Jan 2024 05:13  Patient On (Oxygen Delivery Method): nasal cannula, high flow  O2 Flow (L/min): 40  O2 Concentration (%): 40          01-17 @ 07:01  -  01-18 @ 07:00  --------------------------------------------------------  IN: 700 mL / OUT: 200 mL / NET: 500 mL        PHYSICAL EXAM:  GENERAL: NAD, well-groomed, well-developed  CHEST/LUNG: Bibasilar crackles.  HEART: Regular rate and rhythm; No murmurs, rubs, or gallops  SKIN: No rashes or lesions  NERVOUS SYSTEM:  At baseline    HOSPITAL MEDICATIONS:  MEDICATIONS  (STANDING):  albuterol/ipratropium for Nebulization 3 milliLiter(s) Nebulizer every 6 hours  apixaban 5 milliGRAM(s) Oral every 12 hours  benzonatate 100 milliGRAM(s) Oral every 8 hours  chlorhexidine 2% Cloths 1 Application(s) Topical daily  cholecalciferol 1000 Unit(s) Oral daily  guaiFENesin  milliGRAM(s) Oral every 12 hours  hydrochlorothiazide 25 milliGRAM(s) Oral daily  lidocaine   4% Patch 1 Patch Transdermal every 24 hours  metoprolol succinate  milliGRAM(s) Oral daily  piperacillin/tazobactam IVPB.. 3.375 Gram(s) IV Intermittent every 8 hours  simvastatin 10 milliGRAM(s) Oral at bedtime    MEDICATIONS  (PRN):  acetaminophen     Tablet .. 650 milliGRAM(s) Oral every 6 hours PRN Temp greater or equal to 38C (100.4F), Mild Pain (1 - 3)  aluminum hydroxide/magnesium hydroxide/simethicone Suspension 30 milliLiter(s) Oral every 4 hours PRN Dyspepsia  melatonin 3 milliGRAM(s) Oral at bedtime PRN Insomnia      LABS:  01-18    139  |  105  |  9   ----------------------------<  110<H>  3.6   |  26  |  0.46<L>  01-17    138  |  103  |  11  ----------------------------<  96  3.7   |  24  |  0.52  01-16    138  |  102  |  12  ----------------------------<  98  3.3<L>   |  24  |  0.57    Ca    8.5      18 Jan 2024 07:13  Ca    8.1<L>      17 Jan 2024 07:24  Ca    8.0<L>      16 Jan 2024 06:46  Mg     1.9     01-17      Magnesium: 1.9 mg/dL (01-17-24 @ 07:24)  Magnesium: 1.9 mg/dL (01-16-24 @ 06:46)    Phosphorus: 3.4 mg/dL (01-16-24 @ 06:46)                    Urinalysis Basic - ( 18 Jan 2024 07:13 )    Color: x / Appearance: x / SG: x / pH: x  Gluc: 110 mg/dL / Ketone: x  / Bili: x / Urobili: x   Blood: x / Protein: x / Nitrite: x   Leuk Esterase: x / RBC: x / WBC x   Sq Epi: x / Non Sq Epi: x / Bacteria: x                              11.5   10.09 )-----------( 320      ( 18 Jan 2024 07:15 )             34.9                         11.8   7.46  )-----------( 189      ( 16 Jan 2024 06:47 )             35.2     CAPILLARY BLOOD GLUCOSE

## 2024-01-18 NOTE — PROGRESS NOTE ADULT - ASSESSMENT
83F hx AF on Eliquis, HTN, HLD, follicular lymphoma admitted with acute hypoxic respiratory failure due to LLL pneumonia on HFNC-> NC

## 2024-01-18 NOTE — PROGRESS NOTE ADULT - SUBJECTIVE AND OBJECTIVE BOX
DATE OF SERVICE: 01-18-24 @ 16:15    Patient is a 83y old  Female who presents with a chief complaint of Pneumonia (18 Jan 2024 09:22)      INTERVAL HISTORY: feels okay    REVIEW OF SYSTEMS:  CONSTITUTIONAL: No weakness  EYES/ENT: No visual changes;  No throat pain   NECK: No pain or stiffness  RESPIRATORY: No cough, wheezing; No shortness of breath  CARDIOVASCULAR: No chest pain or palpitations  GASTROINTESTINAL: No abdominal  pain. No nausea, vomiting, or hematemesis  GENITOURINARY: No dysuria, frequency or hematuria  NEUROLOGICAL: No stroke like symptoms  SKIN: No rashes    TELEMETRY Personally reviewed: NSR 70's  	  MEDICATIONS:  hydrochlorothiazide 25 milliGRAM(s) Oral daily  metoprolol succinate  milliGRAM(s) Oral daily        PHYSICAL EXAM:  T(C): 36.7 (01-18-24 @ 13:49), Max: 37.1 (01-18-24 @ 05:13)  HR: 63 (01-18-24 @ 13:49) (63 - 85)  BP: 121/74 (01-18-24 @ 13:49) (121/74 - 152/78)  RR: 20 (01-18-24 @ 13:49) (18 - 22)  SpO2: 95% (01-18-24 @ 13:49) (92% - 99%)  Wt(kg): --  I&O's Summary    17 Jan 2024 07:01  -  18 Jan 2024 07:00  --------------------------------------------------------  IN: 700 mL / OUT: 200 mL / NET: 500 mL    18 Jan 2024 07:01  -  18 Jan 2024 16:15  --------------------------------------------------------  IN: 600 mL / OUT: 0 mL / NET: 600 mL          Appearance: In no distress	  HEENT:    PERRL, EOMI	  Cardiovascular:  S1 S2, No JVD  Respiratory: Lungs clear to auscultation	  Gastrointestinal:  Soft, Non-tender, + BS	  Vascularature:  No edema of LE  Psychiatric: Appropriate affect   Neuro: no acute focal deficits                               11.5   10.09 )-----------( 320      ( 18 Jan 2024 07:15 )             34.9     01-18    139  |  105  |  9   ----------------------------<  110<H>  3.6   |  26  |  0.46<L>    Ca    8.5      18 Jan 2024 07:13  Mg     1.9     01-17          Labs personally reviewed      ASSESSMENT/PLAN: 	    83F hx AF on Eliquis, HTN, HLD, follicular lymphoma sent to ED from PCP's office with hypoxia and PNA. Patient was seen earlier this week at PCP's office and diagnosed with LLL PNA     1. Paroxsymal AFIB   - ECG in ED shows NSR normal ECG  -c/w Eliquis 5mg BID  - TSH wnl  -ProBNP 1853  -TTE 12/29 EF 70% , no WMA, grade 2 LVDD  - hold Multaq as ? contributing to lung findings    2. Hypoxia  - await CXR results  - BNP 1853  - High juliane O2 -> now on nasal cannula 6L  - agree with holding Multaq for now    3. HTN   -controlled  -c/w home BP medication    4. HLD  LDL 89   c/w Statin      5. Prophylactic measures   - SCD's & AC                    Iolani Behrbom, AG-BIRGIT Ramirez DO [facility]  Cardiovascular Medicine  [address] [place] [address], [address]  Available through call or text on [employer] TE[employer]  Office: [phone]   DATE OF SERVICE: 01-18-24 @ 16:15    Patient is a 83y old  Female who presents with a chief complaint of Pneumonia (18 Jan 2024 09:22)      INTERVAL HISTORY: feels okay    REVIEW OF SYSTEMS:  CONSTITUTIONAL: No weakness  EYES/ENT: No visual changes;  No throat pain   NECK: No pain or stiffness  RESPIRATORY: No cough, wheezing; No shortness of breath  CARDIOVASCULAR: No chest pain or palpitations  GASTROINTESTINAL: No abdominal  pain. No nausea, vomiting, or hematemesis  GENITOURINARY: No dysuria, frequency or hematuria  NEUROLOGICAL: No stroke like symptoms  SKIN: No rashes    TELEMETRY Personally reviewed: NSR 70's  	  MEDICATIONS:  hydrochlorothiazide 25 milliGRAM(s) Oral daily  metoprolol succinate  milliGRAM(s) Oral daily        PHYSICAL EXAM:  T(C): 36.7 (01-18-24 @ 13:49), Max: 37.1 (01-18-24 @ 05:13)  HR: 63 (01-18-24 @ 13:49) (63 - 85)  BP: 121/74 (01-18-24 @ 13:49) (121/74 - 152/78)  RR: 20 (01-18-24 @ 13:49) (18 - 22)  SpO2: 95% (01-18-24 @ 13:49) (92% - 99%)  Wt(kg): --  I&O's Summary    17 Jan 2024 07:01  -  18 Jan 2024 07:00  --------------------------------------------------------  IN: 700 mL / OUT: 200 mL / NET: 500 mL    18 Jan 2024 07:01  -  18 Jan 2024 16:15  --------------------------------------------------------  IN: 600 mL / OUT: 0 mL / NET: 600 mL          Appearance: In no distress	  HEENT:    PERRL, EOMI	  Cardiovascular:  S1 S2, No JVD  Respiratory: Lungs clear to auscultation	  Gastrointestinal:  Soft, Non-tender, + BS	  Vascularature:  No edema of LE  Psychiatric: Appropriate affect   Neuro: no acute focal deficits                               11.5   10.09 )-----------( 320      ( 18 Jan 2024 07:15 )             34.9     01-18    139  |  105  |  9   ----------------------------<  110<H>  3.6   |  26  |  0.46<L>    Ca    8.5      18 Jan 2024 07:13  Mg     1.9     01-17          Labs personally reviewed      ASSESSMENT/PLAN: 	    83F hx AF on Eliquis, HTN, HLD, follicular lymphoma sent to ED from PCP's office with hypoxia and PNA. Patient was seen earlier this week at PCP's office and diagnosed with LLL PNA     1. Paroxsymal AFIB   - ECG in ED shows NSR normal ECG  -c/w Eliquis 5mg BID  - TSH wnl  -ProBNP 1853  -TTE 12/29 EF 70% , no WMA, grade 2 LVDD  - hold Multaq as ? contributing to lung findings    2. Hypoxia  - BNP 1853  - will consider Lasix 40mg IV pending CXR  - High juliane O2 -> now on nasal cannula 6L  - agree with holding Multaq for now    3. HTN   -controlled  -c/w home BP medication    4. HLD  LDL 89   c/w Statin      5. Prophylactic measures   - SCD's & AC                    Iolani Behrbom, AG-NP   Channing Ramirez DO [facility]  Cardiovascular Medicine  [address], [address]  Available through call or text on [employer] TEAMs  Office: [phone]   DATE OF SERVICE: 01-18-24 @ 16:15    Patient is a 83y old  Female who presents with a chief complaint of Pneumonia (18 Jan 2024 09:22)      INTERVAL HISTORY: feels okay    REVIEW OF SYSTEMS:  CONSTITUTIONAL: No weakness  EYES/ENT: No visual changes;  No throat pain   NECK: No pain or stiffness  RESPIRATORY: No cough, wheezing; No shortness of breath  CARDIOVASCULAR: No chest pain or palpitations  GASTROINTESTINAL: No abdominal  pain. No nausea, vomiting, or hematemesis  GENITOURINARY: No dysuria, frequency or hematuria  NEUROLOGICAL: No stroke like symptoms  SKIN: No rashes    TELEMETRY Personally reviewed: NSR 70's  	  MEDICATIONS:  hydrochlorothiazide 25 milliGRAM(s) Oral daily  metoprolol succinate  milliGRAM(s) Oral daily        PHYSICAL EXAM:  T(C): 36.7 (01-18-24 @ 13:49), Max: 37.1 (01-18-24 @ 05:13)  HR: 63 (01-18-24 @ 13:49) (63 - 85)  BP: 121/74 (01-18-24 @ 13:49) (121/74 - 152/78)  RR: 20 (01-18-24 @ 13:49) (18 - 22)  SpO2: 95% (01-18-24 @ 13:49) (92% - 99%)  Wt(kg): --  I&O's Summary    17 Jan 2024 07:01  -  18 Jan 2024 07:00  --------------------------------------------------------  IN: 700 mL / OUT: 200 mL / NET: 500 mL    18 Jan 2024 07:01  -  18 Jan 2024 16:15  --------------------------------------------------------  IN: 600 mL / OUT: 0 mL / NET: 600 mL          Appearance: In no distress	  HEENT:    PERRL, EOMI	  Cardiovascular:  S1 S2, No JVD  Respiratory: Lungs clear to auscultation	  Gastrointestinal:  Soft, Non-tender, + BS	  Vascularature:  No edema of LE  Psychiatric: Appropriate affect   Neuro: no acute focal deficits                               11.5   10.09 )-----------( 320      ( 18 Jan 2024 07:15 )             34.9     01-18    139  |  105  |  9   ----------------------------<  110<H>  3.6   |  26  |  0.46<L>    Ca    8.5      18 Jan 2024 07:13  Mg     1.9     01-17          Labs personally reviewed      ASSESSMENT/PLAN: 	    83F hx AF on Eliquis, HTN, HLD, follicular lymphoma sent to ED from PCP's office with hypoxia and PNA. Patient was seen earlier this week at PCP's office and diagnosed with LLL PNA     1. Paroxsymal AFIB   - ECG in ED shows NSR normal ECG  -c/w Eliquis 5mg BID  - TSH wnl  -ProBNP 1853  -TTE 12/29 EF 70% , no WMA, grade 2 LVDD  - hold Multaq as ? contributing to lung findings    2. Hypoxia  - BNP 1853 from 456  - Agree with Lasix 40mg IV daily, pending CXR  - High juliane O2 -> now on nasal cannula 6L  - agree with holding Multaq for now    3. HTN   -controlled  -c/w home BP medication    4. HLD  LDL 89   c/w Statin      5. Prophylactic measures   - SCD's & AC                    Iolani Behrbom, LEANDRA-NP   Channing Ramirez DO [facility]  Cardiovascular Medicine  [address] [place] [address], [address]  Available through call or text on [employer] TEAMs  Office: [phone]

## 2024-01-18 NOTE — PROGRESS NOTE ADULT - ASSESSMENT
83F hx AF on Eliquis, HTN, HLD, follicular lymphoma presenting with hypoxia and PNA.     # Pneumonia   - Suspect superinfection on top of the HMPV infection.  - sputum culture with normal pankaj   - check urine strep  - MRSA negative   - C/w Zosyn  - C/w duonebs  - Wean HFNC as able   - Titrate oxygen to 88-92% - out of bed to chair and early ambulation as able  - Incentive spirometry  - physical therapy  - Aspiration precautions  - DVT ppx   - If pt does not improve, may need to discuss with cardiology if lung disease may be related to new medication dronedarone.  83F hx AF on Eliquis, HTN, HLD, follicular lymphoma presenting with hypoxia and PNA.     # Pneumonia   - Suspect superinfection on top of the HMPV infection.  - sputum culture with normal pankaj   - check urine strep  - MRSA negative   - C/w Zosyn  - C/w duonebs  - Wean HFNC as able - now on 40%/40L   - Would trial 6L NC to see if pt able to tolerate.  - Titrate oxygen to 88-92% - out of bed to chair and early ambulation as able  - Incentive spirometry  - physical therapy  - Aspiration precautions  - DVT ppx   - If pt does not improve, may need to discuss with cardiology if lung disease may be related to new medication dronedarone.

## 2024-01-19 LAB
ANION GAP SERPL CALC-SCNC: 10 MMOL/L — SIGNIFICANT CHANGE UP (ref 5–17)
BUN SERPL-MCNC: 8 MG/DL — SIGNIFICANT CHANGE UP (ref 7–23)
CALCIUM SERPL-MCNC: 8.6 MG/DL — SIGNIFICANT CHANGE UP (ref 8.4–10.5)
CHLORIDE SERPL-SCNC: 101 MMOL/L — SIGNIFICANT CHANGE UP (ref 96–108)
CO2 SERPL-SCNC: 26 MMOL/L — SIGNIFICANT CHANGE UP (ref 22–31)
CREAT SERPL-MCNC: 0.48 MG/DL — LOW (ref 0.5–1.3)
EGFR: 94 ML/MIN/1.73M2 — SIGNIFICANT CHANGE UP
GLUCOSE SERPL-MCNC: 115 MG/DL — HIGH (ref 70–99)
MAGNESIUM SERPL-MCNC: 2 MG/DL — SIGNIFICANT CHANGE UP (ref 1.6–2.6)
POTASSIUM SERPL-MCNC: 3.6 MMOL/L — SIGNIFICANT CHANGE UP (ref 3.5–5.3)
POTASSIUM SERPL-SCNC: 3.6 MMOL/L — SIGNIFICANT CHANGE UP (ref 3.5–5.3)
SODIUM SERPL-SCNC: 137 MMOL/L — SIGNIFICANT CHANGE UP (ref 135–145)

## 2024-01-19 PROCEDURE — 99233 SBSQ HOSP IP/OBS HIGH 50: CPT | Mod: GC

## 2024-01-19 PROCEDURE — 99233 SBSQ HOSP IP/OBS HIGH 50: CPT

## 2024-01-19 RX ADMIN — SIMVASTATIN 10 MILLIGRAM(S): 20 TABLET, FILM COATED ORAL at 21:22

## 2024-01-19 RX ADMIN — Medication 100 MILLIGRAM(S): at 05:33

## 2024-01-19 RX ADMIN — LIDOCAINE 1 PATCH: 4 CREAM TOPICAL at 18:28

## 2024-01-19 RX ADMIN — LIDOCAINE 1 PATCH: 4 CREAM TOPICAL at 01:59

## 2024-01-19 RX ADMIN — Medication 600 MILLIGRAM(S): at 05:33

## 2024-01-19 RX ADMIN — Medication 3 MILLILITER(S): at 11:31

## 2024-01-19 RX ADMIN — Medication 3 MILLILITER(S): at 17:14

## 2024-01-19 RX ADMIN — Medication 3 MILLILITER(S): at 23:22

## 2024-01-19 RX ADMIN — APIXABAN 5 MILLIGRAM(S): 2.5 TABLET, FILM COATED ORAL at 05:34

## 2024-01-19 RX ADMIN — Medication 200 MILLIGRAM(S): at 11:24

## 2024-01-19 RX ADMIN — Medication 200 MILLIGRAM(S): at 23:18

## 2024-01-19 RX ADMIN — PIPERACILLIN AND TAZOBACTAM 25 GRAM(S): 4; .5 INJECTION, POWDER, LYOPHILIZED, FOR SOLUTION INTRAVENOUS at 21:22

## 2024-01-19 RX ADMIN — Medication 100 MILLIGRAM(S): at 05:34

## 2024-01-19 RX ADMIN — Medication 200 MILLIGRAM(S): at 17:14

## 2024-01-19 RX ADMIN — APIXABAN 5 MILLIGRAM(S): 2.5 TABLET, FILM COATED ORAL at 17:14

## 2024-01-19 RX ADMIN — LIDOCAINE 1 PATCH: 4 CREAM TOPICAL at 13:03

## 2024-01-19 RX ADMIN — Medication 1000 UNIT(S): at 11:24

## 2024-01-19 RX ADMIN — Medication 3 MILLILITER(S): at 05:33

## 2024-01-19 RX ADMIN — CHLORHEXIDINE GLUCONATE 1 APPLICATION(S): 213 SOLUTION TOPICAL at 11:31

## 2024-01-19 RX ADMIN — PIPERACILLIN AND TAZOBACTAM 25 GRAM(S): 4; .5 INJECTION, POWDER, LYOPHILIZED, FOR SOLUTION INTRAVENOUS at 13:06

## 2024-01-19 RX ADMIN — PIPERACILLIN AND TAZOBACTAM 25 GRAM(S): 4; .5 INJECTION, POWDER, LYOPHILIZED, FOR SOLUTION INTRAVENOUS at 05:36

## 2024-01-19 NOTE — PROGRESS NOTE ADULT - ASSESSMENT
83F hx AF on Eliquis, HTN, HLD, follicular lymphoma admitted with acute hypoxic respiratory failure due to LLL pneumonia s/p HFNC now on 4L NC

## 2024-01-19 NOTE — PROGRESS NOTE ADULT - SUBJECTIVE AND OBJECTIVE BOX
DATE OF SERVICE: 01-19-24 @ 17:01    Patient is a 83y old  Female who presents with a chief complaint of Pneumonia (19 Jan 2024 14:47)      INTERVAL HISTORY: feels tired but overall okay    REVIEW OF SYSTEMS:  CONSTITUTIONAL: No weakness  EYES/ENT: No visual changes;  No throat pain   NECK: No pain or stiffness  RESPIRATORY: No cough, wheezing; No shortness of breath  CARDIOVASCULAR: No chest pain or palpitations  GASTROINTESTINAL: No abdominal  pain. No nausea, vomiting, or hematemesis  GENITOURINARY: No dysuria, frequency or hematuria  NEUROLOGICAL: No stroke like symptoms  SKIN: No rashes    TELEMETRY Personally reviewed: NSR 60-70s  	  MEDICATIONS:  hydrochlorothiazide 25 milliGRAM(s) Oral daily  metoprolol succinate  milliGRAM(s) Oral daily        PHYSICAL EXAM:  T(C): 36.5 (01-19-24 @ 13:47), Max: 36.8 (01-18-24 @ 21:22)  HR: 84 (01-19-24 @ 13:47) (62 - 84)  BP: 116/61 (01-19-24 @ 13:47) (116/61 - 153/69)  RR: 18 (01-19-24 @ 13:47) (18 - 20)  SpO2: 98% (01-19-24 @ 13:47) (93% - 98%)  Wt(kg): --  I&O's Summary    18 Jan 2024 07:01  -  19 Jan 2024 07:00  --------------------------------------------------------  IN: 1080 mL / OUT: 0 mL / NET: 1080 mL    19 Jan 2024 07:01  -  19 Jan 2024 17:01  --------------------------------------------------------  IN: 500 mL / OUT: 0 mL / NET: 500 mL          Appearance: In no distress	  HEENT:    PERRL, EOMI	  Cardiovascular:  S1 S2, No JVD  Respiratory: Lungs clear to auscultation	  Gastrointestinal:  Soft, Non-tender, + BS	  Vascularature:  No edema of LE  Psychiatric: Appropriate affect   Neuro: no acute focal deficits                               11.5   10.09 )-----------( 320      ( 18 Jan 2024 07:15 )             34.9     01-19    137  |  101  |  8   ----------------------------<  115<H>  3.6   |  26  |  0.48<L>    Ca    8.6      19 Jan 2024 07:52  Mg     2.0     01-19          Labs personally reviewed      ASSESSMENT/PLAN: 	    83F hx AF on Eliquis, HTN, HLD, follicular lymphoma sent to ED from PCP's office with hypoxia and PNA. Patient was seen earlier this week at PCP's office and diagnosed with LLL PNA     1. Paroxsymal AFIB   - ECG in ED shows NSR normal ECG  -c/w Eliquis 5mg BID  - TSH wnl  -ProBNP 1853  -TTE 12/29 EF 70% , no WMA, grade 2 LVDD  - hold Multaq as ? contributing to lung findings    2. Hypoxia  - BNP 1853 from 456  - CXR Left effusion is similar or smaller. consider IVP Lasix 40mg  - High juliane O2 -> now on nasal cannula 4L  - agree with holding Multaq for now    3. HTN   -controlled  -c/w home BP medication    4. HLD  LDL 89   c/w Statin      5. Prophylactic measures   - SCD's & AC            Iolani Behrbom, AG-BIRGIT Ramirez DO [facility]  Cardiovascular Medicine  [address] [place] [address], [address]  Available through call or text on [employer] TEAM[employer]  Office: [phone]

## 2024-01-19 NOTE — PROGRESS NOTE ADULT - ASSESSMENT
83F hx AF on Eliquis, HTN, HLD, follicular lymphoma presenting with hypoxia and PNA.     # Pneumonia   - Suspect superinfection on top of the HMPV infection.  - sputum culture with normal pankaj   - check urine strep  - MRSA negative   - C/w Zosyn  - C/w duonebs  - Wean 5L NC  - Titrate oxygen to 88-92% - out of bed to chair and early ambulation as able  - Incentive spirometry  - physical therapy  - Aspiration precautions  - DVT ppx   - multaq stopped by cardiology

## 2024-01-19 NOTE — PROGRESS NOTE ADULT - SUBJECTIVE AND OBJECTIVE BOX
Patient is a 83y old  Female who presents with a chief complaint of Pneumonia (19 Jan 2024 12:26)        SUBJECTIVE / OVERNIGHT EVENTS: Patient had no acute events overnight. Patient seen and examined at bedside this morning. Dry cough. Thigh pain improving. Daughter wants to hold off on lasix given prior hx of fatigue and weakness from new meds. oCoper gaspar Maori translation    ROS: [ - ] Fever [ - ] Chills [ - ] Nausea/Vomiting [ - ] Chest Pain [ - ] Shortness of breath     MEDICATIONS  (STANDING):  albuterol/ipratropium for Nebulization 3 milliLiter(s) Nebulizer every 6 hours  apixaban 5 milliGRAM(s) Oral every 12 hours  chlorhexidine 2% Cloths 1 Application(s) Topical daily  cholecalciferol 1000 Unit(s) Oral daily  guaiFENesin Oral Liquid (Sugar-Free) 200 milliGRAM(s) Oral every 6 hours  hydrochlorothiazide 25 milliGRAM(s) Oral daily  lidocaine   4% Patch 1 Patch Transdermal every 24 hours  metoprolol succinate  milliGRAM(s) Oral daily  piperacillin/tazobactam IVPB.. 3.375 Gram(s) IV Intermittent every 8 hours  simvastatin 10 milliGRAM(s) Oral at bedtime    MEDICATIONS  (PRN):  acetaminophen     Tablet .. 650 milliGRAM(s) Oral every 6 hours PRN Temp greater or equal to 38C (100.4F), Mild Pain (1 - 3)  aluminum hydroxide/magnesium hydroxide/simethicone Suspension 30 milliLiter(s) Oral every 4 hours PRN Dyspepsia  melatonin 3 milliGRAM(s) Oral at bedtime PRN Insomnia      Vital Signs Last 24 Hrs  T(C): 36.5 (19 Jan 2024 13:47), Max: 36.8 (18 Jan 2024 21:22)  T(F): 97.7 (19 Jan 2024 13:47), Max: 98.2 (18 Jan 2024 21:22)  HR: 84 (19 Jan 2024 13:47) (62 - 84)  BP: 116/61 (19 Jan 2024 13:47) (116/61 - 153/69)  BP(mean): --  RR: 18 (19 Jan 2024 13:47) (18 - 20)  SpO2: 98% (19 Jan 2024 13:47) (93% - 98%)    Parameters below as of 19 Jan 2024 13:47  Patient On (Oxygen Delivery Method): nasal cannula  O2 Flow (L/min): 4    CAPILLARY BLOOD GLUCOSE        I&O's Summary    18 Jan 2024 07:01  -  19 Jan 2024 07:00  --------------------------------------------------------  IN: 1080 mL / OUT: 0 mL / NET: 1080 mL    19 Jan 2024 07:01  -  19 Jan 2024 14:48  --------------------------------------------------------  IN: 500 mL / OUT: 0 mL / NET: 500 mL        PHYSICAL EXAM  GGENERAL: NAD, in chair on 6L NC, saturating well  HEENT:  Atraumatic, Normocephalic, EOMI, conjunctiva and sclera clear, no LAD  CHEST/LUNG: poor inspiratory effort, b/l rales at the bases, no crackles  HEART: RRR, S1 and S2 No murmurs, rubs, or gallops  ABDOMEN: Soft, Nontender, Nondistended; Bowel sounds present  EXTREMITIES:  2+ Peripheral Pulses, LE edema+. Left thigh not swollen, nontender, no erythema.   NEURO: awake and alert, interactive, non-focal  SKIN: No rashes or lesions      LABS:                        11.5   10.09 )-----------( 320      ( 18 Jan 2024 07:15 )             34.9     01-19    137  |  101  |  8   ----------------------------<  115<H>  3.6   |  26  |  0.48<L>    Ca    8.6      19 Jan 2024 07:52  Mg     2.0     01-19            Urinalysis Basic - ( 19 Jan 2024 07:52 )    Color: x / Appearance: x / SG: x / pH: x  Gluc: 115 mg/dL / Ketone: x  / Bili: x / Urobili: x   Blood: x / Protein: x / Nitrite: x   Leuk Esterase: x / RBC: x / WBC x   Sq Epi: x / Non Sq Epi: x / Bacteria: x          RADIOLOGY & ADDITIONAL TESTS:      Labs Personally Reviewed  Imaging Personally Reviewed  Consultant(s) Notes Reviewed

## 2024-01-19 NOTE — PROGRESS NOTE ADULT - ATTENDING COMMENTS
Patient is an 84 yo F w/ AFib (on Eliquis), HTN, HLD, follicular lymphoma (in remission) who is admitted for acute hypoxemic respiratory failure due to PNA and HMPV infection.     #Acute hypoxemic respiratory failure - Likely 2/2 HMPV and superimposed bacterial PNA. Differential also includes wide range of possible pulmonary toxicity due to Dronederone (of note was started several weeks ago as per daughters at bedside)  #Abnormal CT chest  #Multifocal PNA  #HMPV infection  - c/w nasal cannula, wean as tolerated. Improving.  - c/w empiric Zosyn, recommend 7 day course total  - Recommend gentle diuresis, appears volume overloaded on exam. Most recent TTE with Grade II diastolic dysfunction  - Encourage incentive spirometry and OOB to chair/ambulation as tolerated  - c/w Duonebs. can add Pulmicort nebulizers BID for cough  - Recommend PT    Leon Mora MD  Pulmonary & Critical Care

## 2024-01-19 NOTE — PROGRESS NOTE ADULT - SUBJECTIVE AND OBJECTIVE BOX
Pulmonary Consult Follow up     Interval Events:    Doing well. Lowered from 6L NC to 5L NC.    REVIEW OF SYSTEMS:  [x] All other systems negative except per HPI   [ ] Unable to assess ROS because ________    OBJECTIVE:  ICU Vital Signs Last 24 Hrs  T(C): 36.9 (19 Jan 2024 17:08), Max: 36.9 (19 Jan 2024 17:08)  T(F): 98.5 (19 Jan 2024 17:08), Max: 98.5 (19 Jan 2024 17:08)  HR: 72 (19 Jan 2024 17:08) (62 - 84)  BP: 128/70 (19 Jan 2024 17:08) (116/61 - 153/69)  BP(mean): --  ABP: --  ABP(mean): --  RR: 18 (19 Jan 2024 17:08) (18 - 20)  SpO2: 95% (19 Jan 2024 17:08) (94% - 98%)    O2 Parameters below as of 19 Jan 2024 17:08  Patient On (Oxygen Delivery Method): nasal cannula  O2 Flow (L/min): 4            01-18 @ 07:01 - 01-19 @ 07:00  --------------------------------------------------------  IN: 1080 mL / OUT: 0 mL / NET: 1080 mL    01-19 @ 07:01 - 01-19 @ 18:12  --------------------------------------------------------  IN: 740 mL / OUT: 0 mL / NET: 740 mL        PHYSICAL EXAM:  GENERAL: NAD, well-groomed, well-developed  CHEST/LUNG: Coarse breath sounds at the bases.  HEART: Regular rate and rhythm; No murmurs, rubs, or gallops  ABDOMEN: Nondistended  VASCULAR: No  cyanosis  SKIN: No rashes or lesions  NERVOUS SYSTEM:  Alert    HOSPITAL MEDICATIONS:  MEDICATIONS  (STANDING):  albuterol/ipratropium for Nebulization 3 milliLiter(s) Nebulizer every 6 hours  apixaban 5 milliGRAM(s) Oral every 12 hours  chlorhexidine 2% Cloths 1 Application(s) Topical daily  cholecalciferol 1000 Unit(s) Oral daily  guaiFENesin Oral Liquid (Sugar-Free) 200 milliGRAM(s) Oral every 6 hours  hydrochlorothiazide 25 milliGRAM(s) Oral daily  lidocaine   4% Patch 1 Patch Transdermal every 24 hours  metoprolol succinate  milliGRAM(s) Oral daily  piperacillin/tazobactam IVPB.. 3.375 Gram(s) IV Intermittent every 8 hours  simvastatin 10 milliGRAM(s) Oral at bedtime    MEDICATIONS  (PRN):  acetaminophen     Tablet .. 650 milliGRAM(s) Oral every 6 hours PRN Temp greater or equal to 38C (100.4F), Mild Pain (1 - 3)  aluminum hydroxide/magnesium hydroxide/simethicone Suspension 30 milliLiter(s) Oral every 4 hours PRN Dyspepsia  melatonin 3 milliGRAM(s) Oral at bedtime PRN Insomnia      LABS:  01-19    137  |  101  |  8   ----------------------------<  115<H>  3.6   |  26  |  0.48<L>  01-18    139  |  105  |  9   ----------------------------<  110<H>  3.6   |  26  |  0.46<L>  01-17    138  |  103  |  11  ----------------------------<  96  3.7   |  24  |  0.52    Ca    8.6      19 Jan 2024 07:52  Ca    8.5      18 Jan 2024 07:13  Ca    8.1<L>      17 Jan 2024 07:24  Mg     2.0     01-19      Magnesium: 2.0 mg/dL (01-19-24 @ 07:52)  Magnesium: 1.9 mg/dL (01-17-24 @ 07:24)                  Urinalysis Basic - ( 19 Jan 2024 07:52 )    Color: x / Appearance: x / SG: x / pH: x  Gluc: 115 mg/dL / Ketone: x  / Bili: x / Urobili: x   Blood: x / Protein: x / Nitrite: x   Leuk Esterase: x / RBC: x / WBC x   Sq Epi: x / Non Sq Epi: x / Bacteria: x                              11.5   10.09 )-----------( 320      ( 18 Jan 2024 07:15 )             34.9     CAPILLARY BLOOD GLUCOSE

## 2024-01-19 NOTE — PROGRESS NOTE ADULT - TIME BILLING
Medical management as above, reviewing chart and coordinating care with primary team/staff, as well as reviewing vitals, radiology, medication list, recent labs, and prior records.    Does not include teaching time.
- Ordering, reviewing, and interpreting labs, testing, and imaging.  - Independently obtaining a review of systems and performing a physical exam  - Reviewing consultant documentation/recommendations in addition to discussing plan of care with consultants.  - Counselling and educating patient and family regarding interpretation of aforementioned items and plan of care.
- Ordering, reviewing, and interpreting labs, testing, and imaging.  - Independently obtaining a review of systems and performing a physical exam  - Reviewing consultant documentation/recommendations in addition to discussing plan of care with consultants.  - Counselling and educating patient and family regarding interpretation of aforementioned items and plan of care.
Medical management as above, reviewing chart and coordinating care with primary team/staff, as well as reviewing vitals, radiology, medication list, recent labs, and prior records.    Does not include teaching time.
- Ordering, reviewing, and interpreting labs, testing, and imaging.  - Independently obtaining a review of systems and performing a physical exam  - Reviewing consultant documentation/recommendations in addition to discussing plan of care with consultants.  - Counselling and educating patient and family regarding interpretation of aforementioned items and plan of care.

## 2024-01-20 PROCEDURE — 99233 SBSQ HOSP IP/OBS HIGH 50: CPT

## 2024-01-20 RX ORDER — IPRATROPIUM/ALBUTEROL SULFATE 18-103MCG
3 AEROSOL WITH ADAPTER (GRAM) INHALATION EVERY 6 HOURS
Refills: 0 | Status: DISCONTINUED | OUTPATIENT
Start: 2024-01-20 | End: 2024-01-21

## 2024-01-20 RX ADMIN — Medication 100 MILLIGRAM(S): at 11:04

## 2024-01-20 RX ADMIN — Medication 1000 UNIT(S): at 11:04

## 2024-01-20 RX ADMIN — LIDOCAINE 1 PATCH: 4 CREAM TOPICAL at 13:22

## 2024-01-20 RX ADMIN — SIMVASTATIN 10 MILLIGRAM(S): 20 TABLET, FILM COATED ORAL at 23:09

## 2024-01-20 RX ADMIN — Medication 3 MILLILITER(S): at 06:16

## 2024-01-20 RX ADMIN — Medication 3 MILLILITER(S): at 17:14

## 2024-01-20 RX ADMIN — Medication 200 MILLIGRAM(S): at 17:13

## 2024-01-20 RX ADMIN — Medication 200 MILLIGRAM(S): at 23:09

## 2024-01-20 RX ADMIN — Medication 200 MILLIGRAM(S): at 11:04

## 2024-01-20 RX ADMIN — Medication 200 MILLIGRAM(S): at 06:15

## 2024-01-20 RX ADMIN — LIDOCAINE 1 PATCH: 4 CREAM TOPICAL at 18:27

## 2024-01-20 RX ADMIN — Medication 100 MILLIGRAM(S): at 02:28

## 2024-01-20 RX ADMIN — Medication 3 MILLILITER(S): at 11:04

## 2024-01-20 RX ADMIN — LIDOCAINE 1 PATCH: 4 CREAM TOPICAL at 01:10

## 2024-01-20 RX ADMIN — APIXABAN 5 MILLIGRAM(S): 2.5 TABLET, FILM COATED ORAL at 17:13

## 2024-01-20 RX ADMIN — APIXABAN 5 MILLIGRAM(S): 2.5 TABLET, FILM COATED ORAL at 06:15

## 2024-01-20 RX ADMIN — CHLORHEXIDINE GLUCONATE 1 APPLICATION(S): 213 SOLUTION TOPICAL at 11:09

## 2024-01-20 RX ADMIN — Medication 100 MILLIGRAM(S): at 06:16

## 2024-01-20 NOTE — PROGRESS NOTE ADULT - PROBLEM SELECTOR PROBLEM 1
LLL pneumonia

## 2024-01-20 NOTE — PROGRESS NOTE ADULT - SUBJECTIVE AND OBJECTIVE BOX
DATE OF SERVICE: 01-20-24 @ 13:10    Patient is a 83y old  Female who presents with a chief complaint of Pneumonia (19 Jan 2024 17:00)      INTERVAL HISTORY:     REVIEW OF SYSTEMS:  CONSTITUTIONAL: No weakness  EYES/ENT: No visual changes;  No throat pain   NECK: No pain or stiffness  RESPIRATORY: No cough, wheezing; No shortness of breath  CARDIOVASCULAR: No chest pain or palpitations  GASTROINTESTINAL: No abdominal  pain. No nausea, vomiting, or hematemesis  GENITOURINARY: No dysuria, frequency or hematuria  NEUROLOGICAL: No stroke like symptoms  SKIN: No rashes    TELEMETRY Personally reviewed:  	  MEDICATIONS:  hydrochlorothiazide 25 milliGRAM(s) Oral two times a day  metoprolol succinate  milliGRAM(s) Oral daily        PHYSICAL EXAM:  T(C): 36.3 (01-20-24 @ 12:35), Max: 36.9 (01-19-24 @ 17:08)  HR: 81 (01-20-24 @ 12:35) (70 - 84)  BP: 108/67 (01-20-24 @ 12:35) (108/67 - 135/71)  RR: 18 (01-20-24 @ 12:35) (18 - 20)  SpO2: 93% (01-20-24 @ 12:35) (93% - 98%)  Wt(kg): --  I&O's Summary    19 Jan 2024 07:01  -  20 Jan 2024 07:00  --------------------------------------------------------  IN: 1100 mL / OUT: 0 mL / NET: 1100 mL    20 Jan 2024 07:01  -  20 Jan 2024 13:10  --------------------------------------------------------  IN: 200 mL / OUT: 0 mL / NET: 200 mL          Appearance: In no distress	  HEENT:    PERRL, EOMI	  Cardiovascular:  S1 S2, No JVD  Respiratory: Lungs clear to auscultation	  Gastrointestinal:  Soft, Non-tender, + BS	  Vascularature:  No edema of LE  Psychiatric: Appropriate affect   Neuro: no acute focal deficits           01-19    137  |  101  |  8   ----------------------------<  115<H>  3.6   |  26  |  0.48<L>    Ca    8.6      19 Jan 2024 07:52  Mg     2.0     01-19          Labs personally reviewed      ASSESSMENT/PLAN: 	  83F hx AF on Eliquis, HTN, HLD, follicular lymphoma sent to ED from PCP's office with hypoxia and PNA. Patient was seen earlier this week at PCP's office and diagnosed with LLL PNA     1. Paroxsymal AFIB   - ECG in ED shows NSR normal ECG  -c/w Eliquis 5mg BID  - TSH wnl  -ProBNP 1853  -TTE 12/29 EF 70% , no WMA, grade 2 LVDD  - hold Multaq as ? contributing to lung findings    2. Hypoxia  - BNP 1853 from 456  - CXR Left effusion is similar or smaller. consider IVP Lasix 40mg  - High juliane O2 -> now on nasal cannula 4L  - agree with holding Multaq for now    3. HTN   -controlled  -c/w home BP medication    4. HLD  LDL 89   c/w Statin      5. Prophylactic measures   - SCD's & AC          BIRGIT Vidales DO [facility]  Cardiovascular Medicine  [address], [address]  Available through call or text on [employer] TEAM[employer]  Office: [phone]   DATE OF SERVICE: 01-20-24 @ 13:10    Patient is a 83y old  Female who presents with a chief complaint of Pneumonia (19 Jan 2024 17:00)      INTERVAL HISTORY:     REVIEW OF SYSTEMS:  CONSTITUTIONAL: No weakness  EYES/ENT: No visual changes;  No throat pain   NECK: No pain or stiffness  RESPIRATORY: No cough, wheezing; No shortness of breath  CARDIOVASCULAR: No chest pain or palpitations  GASTROINTESTINAL: No abdominal  pain. No nausea, vomiting, or hematemesis  GENITOURINARY: No dysuria, frequency or hematuria  NEUROLOGICAL: No stroke like symptoms  SKIN: No rashes    TELEMETRY Personally reviewed:  	  MEDICATIONS:  hydrochlorothiazide 25 milliGRAM(s) Oral two times a day  metoprolol succinate  milliGRAM(s) Oral daily        PHYSICAL EXAM:  T(C): 36.3 (01-20-24 @ 12:35), Max: 36.9 (01-19-24 @ 17:08)  HR: 81 (01-20-24 @ 12:35) (70 - 84)  BP: 108/67 (01-20-24 @ 12:35) (108/67 - 135/71)  RR: 18 (01-20-24 @ 12:35) (18 - 20)  SpO2: 93% (01-20-24 @ 12:35) (93% - 98%)  Wt(kg): --  I&O's Summary    19 Jan 2024 07:01  -  20 Jan 2024 07:00  --------------------------------------------------------  IN: 1100 mL / OUT: 0 mL / NET: 1100 mL    20 Jan 2024 07:01  -  20 Jan 2024 13:10  --------------------------------------------------------  IN: 200 mL / OUT: 0 mL / NET: 200 mL          Appearance: In no distress	  HEENT:    PERRL, EOMI	  Cardiovascular:  S1 S2, No JVD  Respiratory: Lungs clear to auscultation	  Gastrointestinal:  Soft, Non-tender, + BS	  Vascularature:  No edema of LE  Psychiatric: Appropriate affect   Neuro: no acute focal deficits           01-19    137  |  101  |  8   ----------------------------<  115<H>  3.6   |  26  |  0.48<L>    Ca    8.6      19 Jan 2024 07:52  Mg     2.0     01-19          Labs personally reviewed      ASSESSMENT/PLAN: 	  83F hx AF on Eliquis, HTN, HLD, follicular lymphoma sent to ED from PCP's office with hypoxia and PNA. Patient was seen earlier this week at PCP's office and diagnosed with LLL PNA     1. Paroxsymal AFIB   - ECG in ED shows NSR normal ECG  -c/w Eliquis 5mg BID  - TSH wnl  -ProBNP 1853  -TTE 12/29 EF 70% , no WMA, grade 2 LVDD  - hold Multaq as ? contributing to lung findings    2. Hypoxia  - BNP 1853 from 456  - CXR Left effusion is similar or smaller, pulm edema resolving.  consider IVP Lasix 40mg but daughter declining, HCTZ dose increased   - High juliane O2 -> now on nasal cannula 4L  - Consider resume Multaq as hypoxia more likely 2/2 pulm edema and PNA    3. HTN   -controlled  -c/w home BP medication    4. HLD  LDL 89   c/w Statin      5. Prophylactic measures   - SCD's & AC          BIRGIT Vidales DO [facility]  Cardiovascular Medicine  [address], [address]  Available through call or text on [employer] TEAM[employer]  Office: [phone]

## 2024-01-20 NOTE — PROGRESS NOTE ADULT - NSPROGADDITIONALINFOA_GEN_ALL_CORE
d/w acp  Updated daughter    The necessity of the time spent during the encounter on this date of service was due to:   - Ordering, reviewing, and interpreting labs, testing, and imaging.  - Independently obtaining a review of systems and performing a physical exam  - Reviewing prior hospitalization and where necessary, outpatient records.  - Counselling and educating patient and family regarding interpretation of aforementioned items and plan of care.    Time Spent 35 minutes    Suzanna Oshea  Division of Hospital Medicine  Available on [employer] Teams
Updated daughter    The necessity of the time spent during the encounter on this date of service was due to:   - Ordering, reviewing, and interpreting labs, testing, and imaging.  - Independently obtaining a review of systems and performing a physical exam  - Reviewing prior hospitalization and where necessary, outpatient records.  - Counselling and educating patient and family regarding interpretation of aforementioned items and plan of care.    Time Spent 35 minutes    Suzanna Oshea  Division of Hospital Medicine  Available on [employer] Teams
Updated daughter at bedside    The necessity of the time spent during the encounter on this date of service was due to:   - Ordering, reviewing, and interpreting labs, testing, and imaging.  - Independently obtaining a review of systems and performing a physical exam  - Reviewing prior hospitalization and where necessary, outpatient records.  - Counselling and educating patient and family regarding interpretation of aforementioned items and plan of care.    Time Spent 35 minutes    Suzanna Oshea  Division of Hospital Medicine  Available on [employer] Teams
d/w acp  updated dtr at bedside 1/14
Updated daughter      The necessity of the time spent during the encounter on this date of service was due to:   - Ordering, reviewing, and interpreting labs, testing, and imaging.  - Independently obtaining a review of systems and performing a physical exam  - Reviewing prior hospitalization and where necessary, outpatient records.  - Counselling and educating patient and family regarding interpretation of aforementioned items and plan of care.    Time Spent 35 minutes    Suzanna Oshea  Division of Hospital Medicine  Available on [employer] Teams
d/w acp  updated dtr at bedside 1/13
Updated daughter at bedside  d/w ACP and cardiology      The necessity of the time spent during the encounter on this date of service was due to:   - Ordering, reviewing, and interpreting labs, testing, and imaging.  - Independently obtaining a review of systems and performing a physical exam  - Reviewing prior hospitalization and where necessary, outpatient records.  - Counselling and educating patient and family regarding interpretation of aforementioned items and plan of care.    Time Spent 45 minutes    Suzanna Oshea  Division of Hospital Medicine  Available on [employer] Teams
d/w daughter at bedside

## 2024-01-20 NOTE — PROGRESS NOTE ADULT - ASSESSMENT
83F hx AF on Eliquis, HTN, HLD, follicular lymphoma admitted with acute hypoxic respiratory failure due to LLL pneumonia s/p HFNC now on nasal cannula

## 2024-01-20 NOTE — PROGRESS NOTE ADULT - PROBLEM SELECTOR PROBLEM 4
HTN (hypertension)
HLD (hyperlipidemia)
HTN (hypertension)
HLD (hyperlipidemia)
HTN (hypertension)

## 2024-01-20 NOTE — PROGRESS NOTE ADULT - SUBJECTIVE AND OBJECTIVE BOX
Patient is a 83y old  Female who presents with a chief complaint of Pneumonia (20 Jan 2024 13:10)        SUBJECTIVE / OVERNIGHT EVENTS: Patient had no acute events overnight. Patient seen and examined at bedside this morning. Daughter provided itlian translation. Patient was up during the night coughing and recieved tessalon ayon.     ROS: [ - ] Fever [ - ] Chills [ - ] Nausea/Vomiting [ - ] Chest Pain [ - ] Shortness of breath     MEDICATIONS  (STANDING):  albuterol/ipratropium for Nebulization 3 milliLiter(s) Nebulizer every 6 hours  apixaban 5 milliGRAM(s) Oral every 12 hours  chlorhexidine 2% Cloths 1 Application(s) Topical daily  cholecalciferol 1000 Unit(s) Oral daily  guaiFENesin Oral Liquid (Sugar-Free) 200 milliGRAM(s) Oral every 6 hours  hydrochlorothiazide 25 milliGRAM(s) Oral two times a day  lidocaine   4% Patch 1 Patch Transdermal every 24 hours  metoprolol succinate  milliGRAM(s) Oral daily  simvastatin 10 milliGRAM(s) Oral at bedtime    MEDICATIONS  (PRN):  acetaminophen     Tablet .. 650 milliGRAM(s) Oral every 6 hours PRN Temp greater or equal to 38C (100.4F), Mild Pain (1 - 3)  aluminum hydroxide/magnesium hydroxide/simethicone Suspension 30 milliLiter(s) Oral every 4 hours PRN Dyspepsia  benzonatate 100 milliGRAM(s) Oral every 8 hours PRN Cough  melatonin 3 milliGRAM(s) Oral at bedtime PRN Insomnia      Vital Signs Last 24 Hrs  T(C): 36.3 (20 Jan 2024 12:35), Max: 36.9 (19 Jan 2024 17:08)  T(F): 97.4 (20 Jan 2024 12:35), Max: 98.5 (19 Jan 2024 17:08)  HR: 81 (20 Jan 2024 12:35) (70 - 84)  BP: 108/67 (20 Jan 2024 12:35) (108/67 - 135/71)  BP(mean): --  RR: 18 (20 Jan 2024 12:35) (18 - 20)  SpO2: 93% (20 Jan 2024 12:35) (93% - 96%)    Parameters below as of 20 Jan 2024 12:35  Patient On (Oxygen Delivery Method): room air      CAPILLARY BLOOD GLUCOSE        I&O's Summary    19 Jan 2024 07:01  -  20 Jan 2024 07:00  --------------------------------------------------------  IN: 1100 mL / OUT: 0 mL / NET: 1100 mL    20 Jan 2024 07:01  -  20 Jan 2024 14:14  --------------------------------------------------------  IN: 440 mL / OUT: 0 mL / NET: 440 mL      PHYSICAL EXAM:  GENERAL: NAD, in chair on 3L NC, saturating well  HEENT:  Atraumatic, Normocephalic, EOMI, conjunctiva and sclera clear, no LAD  CHEST/LUNG: poor inspiratory effort, b/l rales at the bases, no crackles  HEART: RRR, S1 and S2 No murmurs, rubs, or gallops  ABDOMEN: Soft, Nontender, Nondistended; Bowel sounds present  EXTREMITIES:  2+ Peripheral Pulses, LE edema+. Left thigh not swollen, nontender, no erythema.   NEURO: awake and alert, interactive, non-focal  SKIN: No rashes or lesions    LABS:    01-19    137  |  101  |  8   ----------------------------<  115<H>  3.6   |  26  |  0.48<L>    Ca    8.6      19 Jan 2024 07:52  Mg     2.0     01-19            Urinalysis Basic - ( 19 Jan 2024 07:52 )    Color: x / Appearance: x / SG: x / pH: x  Gluc: 115 mg/dL / Ketone: x  / Bili: x / Urobili: x   Blood: x / Protein: x / Nitrite: x   Leuk Esterase: x / RBC: x / WBC x   Sq Epi: x / Non Sq Epi: x / Bacteria: x          RADIOLOGY & ADDITIONAL TESTS:      Labs Personally Reviewed  Imaging Personally Reviewed  Consultant(s) Notes Reviewed

## 2024-01-20 NOTE — PROGRESS NOTE ADULT - PROBLEM SELECTOR PLAN 4
Borderline BP in setting of infection  Hold norvasc. Restarted 25mg hctz daily
Borderline BP in setting of infection  Hold norvasc. 25mg hctz daily
Borderline BP in setting of infection  Hold norvasc. 25mg hctz held while duresis lasix 40mg IV daily
Continue simvastatin
Borderline BP in setting of infection  Hold norvasc. Restart 25mg hctz daily
Continue simvastatin
Borderline BP in setting of infection  Hold norvasc. 25mg hctz BID (lower back to daily after diuresis)
Borderline BP in setting of infection  Hold Norvasc, HCTZ

## 2024-01-20 NOTE — PROGRESS NOTE ADULT - PROBLEM SELECTOR PLAN 6
Diagnosed and treated with radiation in 2014- patient/daughter report currently in remission  Outpatient follow up every 6 months with Dr. Shaun Camacho
Pending clinical course  PT eval pending
Pending clinical course  PT eval pending
Diagnosed and treated with radiation in 2014- patient/daughter report currently in remission  Outpatient follow up every 6 months with Dr. Shaun Camacho

## 2024-01-20 NOTE — PROGRESS NOTE ADULT - PROBLEM SELECTOR PROBLEM 3
Chronic atrial fibrillation
HTN (hypertension)
Chronic atrial fibrillation
HTN (hypertension)

## 2024-01-20 NOTE — PROGRESS NOTE ADULT - PROBLEM SELECTOR PLAN 2
Worsening Hypoxia requiring High FLow Nasal Canula  - repeat CXR showing MultiFocal PNA and some pleural effusion  - recent echo without any heart failure in dec 2023. Will hold off repeat TTE  - d/w cardiology Dr. Ramirez, will hold dronedarone
Worsening Hypoxia requiring High FLow Nasal Canula  - repeat CXR showing MultiFocal PNA and some pleural effusion  - recent echo without any heart failure in dec 2023. Will hold off repeat TTE  - d/w cardiology Dr. Ramirez, will hold dronedarone  - daughters do not want to start lasix for diuresis given prior hx of side effects. Will increased hctz to 25mg bid for diuresis
Worsening Hypoxia requiring High FLow Nasal Canula  - repeat CXR showing MultiFocal PNA and some pleural effusion  - recent echo without any heart failure in dec 2023. Given patient continues to require HFNC, will obtain repeat TTE
Worsening Hypoxia requiring High FLow Nasal Canula  - repeat CXR showing MultiFocal PNA and some pleural effusion  - recent echo without any heart failure in dec 2023. Will hold off repeat TTE  - d/w cardiology Dr. Ramirez, will hold dronedarone  - Lasix 40mg IV daily
Worsening Hypoxia requiring High FLow Nasal Canula  - repeat CXR showing MultiFocal PNA and some pleural effusion  - recent echo without any heart failure in dec 2023. Will hold off repeat TTE  - d/w cardiology Dr. Ramirez, will hold dronedarone  - daughters do not want to start lasix for diuresis given prior hx of side effects. We agreed upon to continue 25mg hctz and if needed, can increase hctz 25mg bid.
Worsening Hypoxia requiring High FLow Nasal Canula  - repeat CXR showing MultiFocal PNA and some pleural effusion  - recent echo without any heart failure, do not see utility in repeating TTE at this time  - if unable to wean oxygen, would get repeat TTE
Currently in SR, EKG 1/12 showing NSR  Continue Multaq, Metoprolol, Eliquis
Currently in SR, EKG 1/12 showing NSR  Continue Multaq, Metoprolol, Eliquis

## 2024-01-20 NOTE — PROGRESS NOTE ADULT - PROBLEM SELECTOR PLAN 1
With acute hypoxic respiratory failure, + human metapneumovirus   - Zosyn 1/13-1/19 for 7day course  - MRSA/MSSA PCR, urine legionella negative  - c/w  duoneb   -CTA negative for pE but shows Small left pleural effusion. Extensive airspace infiltrate/consolidation left upper and bilateral lower lobes, left greater than right. Minimal  airspace opacity right upper lobe.  - prior hospitalist discussed code status- FULL CODE for now  - pulmonary following  - repeat CXR showing worsening opacities  - s/p HFNC to NC  - will hold dronedarone
With acute hypoxic respiratory failure, + human metapneumovirus   - Zosyn 1/13-1/19 for 7day course  - MRSA/MSSA PCR, urine legionella negative  - c/w  duoneb   -CTA negative for pE but shows Small left pleural effusion. Extensive airspace infiltrate/consolidation left upper and bilateral lower lobes, left greater than right. Minimal  airspace opacity right upper lobe.  - prior hospitalist discussed code status- FULL CODE for now  - pulmonary following  - repeat CXR showing worsening opacities  - s/p HFNC to NC  - will hold dronedarone
With acute hypoxic respiratory failure, + human metapneumovirus   - Zosyn 1/13-1/19 for 7day course  - MRSA/MSSA PCR, urine legionella negative  - c/w  duoneb   -CTA negative for pE but shows Small left pleural effusion. Extensive airspace infiltrate/consolidation left upper and bilateral lower lobes, left greater than right. Minimal  airspace opacity right upper lobe.  - prior hospitalist discussed code status- FULL CODE for now  - pulmonary following  - repeat CXR showing worsening opacities  - s/p HFNC to 6L NC  - will hold dronedarone
With acute hypoxic respiratory failure, + human metapneumovirus   -Started on Zosyn 1/13-1/19  - MRSA/MSSA PCR, urine legionella negative  - c/w  duoneb   -CTA negative for pE but shows Small left pleural effusion. Extensive airspace infiltrate/consolidation left upper and bilateral lower lobes, left greater than right. Minimal  airspace opacity right upper lobe.  - prior hospitalist discussed code status- FULL CODE for now  - pulmonary following  - repeat CXR showing worsening opacities  - wean HFNC  - will hold dronedarone
With acute hypoxic respiratory failure, + human metapneumovirus   -Started on Zosyn in ED-  will continue given failure of outpatient cephalexin and lack of interaction with antiarrhythmic and persistent high grade fevers   - MRSA/MSSA PCR, urine legionella negative  - c/w  duoneb   -CTA negative for pE but shows Small left pleural effusion. Extensive airspace infiltrate/consolidation left upper and bilateral lower lobes, left greater than right. Minimal  airspace opacity right upper lobe.  - discussed code status - full code  for now, but daughters will discuss amongst themselves  - pulmonary following  - repeat CXR showing worsening opacities  - wean HFNC
With acute hypoxic respiratory failure  RVP negative  Started on Zosyn in ED-  will continue given failure of outpatient cephalexin and lack of interaction with antiarrhythmic   - MRSA/MSSA PCR, urine legionella negative  standing duoneb given worsening hypoxia  Supplemental O2  Mucolytics  given worsening hypoxia and oxygen requirements and hx of follicular lymphoma, CTA ordered (1/13), negative for pE but shows Small left pleural effusion. Extensive airspace infiltrate/consolidation left upper and bilateral lower lobes, left greater than right. Minimal  airspace opacity right upper lobe.  - worsening hypoxia, requiring high flow nasal canula, ABG with PO2 62. given findings, consult pulmonary  - discussed code status - full code  for now, but daughters will discuss amongst themselves
With acute hypoxic respiratory failure  RVP negative  Started on Zosyn in ED-  will continue given failure of outpatient cephalexin and lack of interaction with antiarrhythmic   Check MRSA/MSSA PCR, urine legionella ( in lab)  standing duoneb given worsening hypoxia  Supplemental O2  Mucolytics  given worsening hypoxia and oxygen requirements and hx of follicular lymphoma, CTA ordered (1/13), negative for pE but shows Small left pleural effusion. Extensive airspace infiltrate/consolidation left upper and bilateral lower lobes, left greater than right. Minimal  airspace opacity right upper lobe.
With acute hypoxic respiratory failure, + human metapneumovirus   -Started on Zosyn in ED-  will continue given failure of outpatient cephalexin and lack of interaction with antiarrhythmic and persistent high grade fevers   - MRSA/MSSA PCR, urine legionella negative  - c/w  duoneb   - given worsening hypoxia and oxygen requirements and hx of follicular lymphoma, CTA ordered (1/13), negative for pE but shows Small left pleural effusion. Extensive airspace infiltrate/consolidation left upper and bilateral lower lobes, left greater than right. Minimal  airspace opacity right upper lobe.  - discussed code status - full code  for now, but daughters will discuss amongst themselves  - pulmonary following  - repeat CXR showing worsening opacities  - wean HFNC ( on 60/60 today)

## 2024-01-20 NOTE — PROGRESS NOTE ADULT - PROBLEM SELECTOR PROBLEM 2
Acute respiratory failure with hypoxia
Chronic atrial fibrillation
Acute respiratory failure with hypoxia
Chronic atrial fibrillation

## 2024-01-20 NOTE — PROGRESS NOTE ADULT - PROBLEM SELECTOR PROBLEM 5
HLD (hyperlipidemia)
Follicular lymphoma
Follicular lymphoma
HLD (hyperlipidemia)

## 2024-01-20 NOTE — PROGRESS NOTE ADULT - PROBLEM SELECTOR PLAN 5
Continue simvastatin
Diagnosed and treated with radiation in 2014- patient/daughter report currently in remission  Outpatient follow up every 6 months with Dr. Shaun Camacho
Diagnosed and treated with radiation in 2014- patient/daughter report currently in remission  Outpatient follow up every 6 months with Dr. Shaun Camacho
dr wren

## 2024-01-20 NOTE — PROGRESS NOTE ADULT - PROBLEM SELECTOR PLAN 3
Borderline BP in setting of infection  Hold Norvasc, HCTZ
Currently in SR, EKG 1/12 showing NSR  metoprolol ER 100mg daily  Will hold dronderone
Currently in SR, EKG 1/12 showing NSR  Continue Multaq, Metoprolol, Eliquis
Currently in SR, EKG 1/12 showing NSR  metoprolol ER 100mg daily  Will hold dronderone
Currently in SR, EKG 1/12 showing NSR  Continue Multaq, Metoprolol, Eliquis
Currently in SR, EKG 1/12 showing NSR  metoprolol ER 100mg daily  Will hold dronderone
Currently in SR, EKG 1/12 showing NSR  metoprolol ER 100mg daily  Will hold dronderone
Borderline BP in setting of infection  Hold Norvasc, HCTZ

## 2024-01-20 NOTE — PROGRESS NOTE ADULT - PROBLEM SELECTOR PROBLEM 6
Follicular lymphoma
Discharge planning issues
Follicular lymphoma
Discharge planning issues
Follicular lymphoma
Follicular lymphoma

## 2024-01-21 ENCOUNTER — TRANSCRIPTION ENCOUNTER (OUTPATIENT)
Age: 84
End: 2024-01-21

## 2024-01-21 VITALS
RESPIRATION RATE: 20 BRPM | TEMPERATURE: 99 F | DIASTOLIC BLOOD PRESSURE: 70 MMHG | HEART RATE: 78 BPM | OXYGEN SATURATION: 94 % | SYSTOLIC BLOOD PRESSURE: 130 MMHG

## 2024-01-21 LAB
ANION GAP SERPL CALC-SCNC: 12 MMOL/L — SIGNIFICANT CHANGE UP (ref 5–17)
BUN SERPL-MCNC: 9 MG/DL — SIGNIFICANT CHANGE UP (ref 7–23)
CALCIUM SERPL-MCNC: 9.2 MG/DL — SIGNIFICANT CHANGE UP (ref 8.4–10.5)
CHLORIDE SERPL-SCNC: 99 MMOL/L — SIGNIFICANT CHANGE UP (ref 96–108)
CO2 SERPL-SCNC: 26 MMOL/L — SIGNIFICANT CHANGE UP (ref 22–31)
CREAT SERPL-MCNC: 0.53 MG/DL — SIGNIFICANT CHANGE UP (ref 0.5–1.3)
EGFR: 92 ML/MIN/1.73M2 — SIGNIFICANT CHANGE UP
GLUCOSE SERPL-MCNC: 97 MG/DL — SIGNIFICANT CHANGE UP (ref 70–99)
HCT VFR BLD CALC: 38.5 % — SIGNIFICANT CHANGE UP (ref 34.5–45)
HGB BLD-MCNC: 12.1 G/DL — SIGNIFICANT CHANGE UP (ref 11.5–15.5)
MAGNESIUM SERPL-MCNC: 1.9 MG/DL — SIGNIFICANT CHANGE UP (ref 1.6–2.6)
MCHC RBC-ENTMCNC: 27.5 PG — SIGNIFICANT CHANGE UP (ref 27–34)
MCHC RBC-ENTMCNC: 31.4 GM/DL — LOW (ref 32–36)
MCV RBC AUTO: 87.5 FL — SIGNIFICANT CHANGE UP (ref 80–100)
NRBC # BLD: 0 /100 WBCS — SIGNIFICANT CHANGE UP (ref 0–0)
PLATELET # BLD AUTO: 501 K/UL — HIGH (ref 150–400)
POTASSIUM SERPL-MCNC: 3.9 MMOL/L — SIGNIFICANT CHANGE UP (ref 3.5–5.3)
POTASSIUM SERPL-SCNC: 3.9 MMOL/L — SIGNIFICANT CHANGE UP (ref 3.5–5.3)
RBC # BLD: 4.4 M/UL — SIGNIFICANT CHANGE UP (ref 3.8–5.2)
RBC # FLD: 13.4 % — SIGNIFICANT CHANGE UP (ref 10.3–14.5)
SODIUM SERPL-SCNC: 137 MMOL/L — SIGNIFICANT CHANGE UP (ref 135–145)
WBC # BLD: 7.66 K/UL — SIGNIFICANT CHANGE UP (ref 3.8–10.5)
WBC # FLD AUTO: 7.66 K/UL — SIGNIFICANT CHANGE UP (ref 3.8–10.5)

## 2024-01-21 PROCEDURE — 84132 ASSAY OF SERUM POTASSIUM: CPT

## 2024-01-21 PROCEDURE — 83880 ASSAY OF NATRIURETIC PEPTIDE: CPT

## 2024-01-21 PROCEDURE — 85730 THROMBOPLASTIN TIME PARTIAL: CPT

## 2024-01-21 PROCEDURE — 84100 ASSAY OF PHOSPHORUS: CPT

## 2024-01-21 PROCEDURE — 87449 NOS EACH ORGANISM AG IA: CPT

## 2024-01-21 PROCEDURE — 83605 ASSAY OF LACTIC ACID: CPT

## 2024-01-21 PROCEDURE — 87641 MR-STAPH DNA AMP PROBE: CPT

## 2024-01-21 PROCEDURE — 97110 THERAPEUTIC EXERCISES: CPT

## 2024-01-21 PROCEDURE — 82803 BLOOD GASES ANY COMBINATION: CPT

## 2024-01-21 PROCEDURE — 97116 GAIT TRAINING THERAPY: CPT

## 2024-01-21 PROCEDURE — 36415 COLL VENOUS BLD VENIPUNCTURE: CPT

## 2024-01-21 PROCEDURE — 84443 ASSAY THYROID STIM HORMONE: CPT

## 2024-01-21 PROCEDURE — 84295 ASSAY OF SERUM SODIUM: CPT

## 2024-01-21 PROCEDURE — 87640 STAPH A DNA AMP PROBE: CPT

## 2024-01-21 PROCEDURE — 97161 PT EVAL LOW COMPLEX 20 MIN: CPT

## 2024-01-21 PROCEDURE — 82435 ASSAY OF BLOOD CHLORIDE: CPT

## 2024-01-21 PROCEDURE — 81001 URINALYSIS AUTO W/SCOPE: CPT

## 2024-01-21 PROCEDURE — 85610 PROTHROMBIN TIME: CPT

## 2024-01-21 PROCEDURE — 80053 COMPREHEN METABOLIC PANEL: CPT

## 2024-01-21 PROCEDURE — 71046 X-RAY EXAM CHEST 2 VIEWS: CPT

## 2024-01-21 PROCEDURE — 84145 PROCALCITONIN (PCT): CPT

## 2024-01-21 PROCEDURE — 87070 CULTURE OTHR SPECIMN AEROBIC: CPT

## 2024-01-21 PROCEDURE — 85027 COMPLETE CBC AUTOMATED: CPT

## 2024-01-21 PROCEDURE — 83735 ASSAY OF MAGNESIUM: CPT

## 2024-01-21 PROCEDURE — 85014 HEMATOCRIT: CPT

## 2024-01-21 PROCEDURE — 80048 BASIC METABOLIC PNL TOTAL CA: CPT

## 2024-01-21 PROCEDURE — 0225U NFCT DS DNA&RNA 21 SARSCOV2: CPT

## 2024-01-21 PROCEDURE — 82330 ASSAY OF CALCIUM: CPT

## 2024-01-21 PROCEDURE — 97166 OT EVAL MOD COMPLEX 45 MIN: CPT

## 2024-01-21 PROCEDURE — 85018 HEMOGLOBIN: CPT

## 2024-01-21 PROCEDURE — 99285 EMERGENCY DEPT VISIT HI MDM: CPT

## 2024-01-21 PROCEDURE — 87086 URINE CULTURE/COLONY COUNT: CPT

## 2024-01-21 PROCEDURE — 71275 CT ANGIOGRAPHY CHEST: CPT

## 2024-01-21 PROCEDURE — 94640 AIRWAY INHALATION TREATMENT: CPT

## 2024-01-21 PROCEDURE — 82565 ASSAY OF CREATININE: CPT

## 2024-01-21 PROCEDURE — 87040 BLOOD CULTURE FOR BACTERIA: CPT

## 2024-01-21 PROCEDURE — 85025 COMPLETE CBC W/AUTO DIFF WBC: CPT

## 2024-01-21 PROCEDURE — 99239 HOSP IP/OBS DSCHRG MGMT >30: CPT

## 2024-01-21 PROCEDURE — 71045 X-RAY EXAM CHEST 1 VIEW: CPT

## 2024-01-21 PROCEDURE — 87637 SARSCOV2&INF A&B&RSV AMP PRB: CPT

## 2024-01-21 PROCEDURE — 82947 ASSAY GLUCOSE BLOOD QUANT: CPT

## 2024-01-21 RX ORDER — AMLODIPINE BESYLATE 2.5 MG/1
1 TABLET ORAL
Refills: 0 | DISCHARGE

## 2024-01-21 RX ADMIN — Medication 1000 UNIT(S): at 11:59

## 2024-01-21 RX ADMIN — Medication 200 MILLIGRAM(S): at 11:58

## 2024-01-21 RX ADMIN — LIDOCAINE 1 PATCH: 4 CREAM TOPICAL at 11:59

## 2024-01-21 RX ADMIN — APIXABAN 5 MILLIGRAM(S): 2.5 TABLET, FILM COATED ORAL at 05:35

## 2024-01-21 RX ADMIN — Medication 200 MILLIGRAM(S): at 05:35

## 2024-01-21 RX ADMIN — Medication 100 MILLIGRAM(S): at 05:35

## 2024-01-21 RX ADMIN — CHLORHEXIDINE GLUCONATE 1 APPLICATION(S): 213 SOLUTION TOPICAL at 12:04

## 2024-01-21 NOTE — DISCHARGE NOTE PROVIDER - CARE PROVIDER_API CALL
Marcin Esparza  Internal Medicine  [address], [address]  [place] [address]  Phone: [phone]  Fax: [phone]  Follow Up Time: 1 week

## 2024-01-21 NOTE — DISCHARGE NOTE PROVIDER - NSDCFUSCHEDAPPT_GEN_ALL_CORE_FT
Konrad Boategn  [facility]  VASCULAR [address]  Scheduled Appointment: 01/26/2024    [facility]  VASCULAR [address]  Scheduled Appointment: 02/02/2024    Phi Dozier  [facility]  CARDIOLOGY [address]   Scheduled Appointment: 02/13/2024    [facility]  VASCULAR [address]  Scheduled Appointment: 02/23/2024    Shaun Camacho  [facility]  [facility]  Scheduled Appointment: 04/02/2024    Shaun Camacho  [facility]  [facility]  Scheduled Appointment: 04/02/2024

## 2024-01-21 NOTE — DISCHARGE NOTE NURSING/CASE MANAGEMENT/SOCIAL WORK - NSDCPEELIQUISREACT_GEN_ALL_CORE
2. The status of comorbities. (See ED/admit documents) Apixaban/Eliquis increases your risk for bleeding. Notify your doctor if you experience any of the following side effects: bleeding, coughing or vomiting blood, red or black stool, unexpected pain or swelling, itching or hives, chest pain, chest tightness, trouble breathing, changes in how much or how often you urinate, red or pink urine, numbness or tingling in your feet, or unusual muscle weakness. When Apixaban/Eliquis is taken with other medicines, they can affect how it works. Taking other medications such as aspirin, blood thinners, nonsteroidal anti-inflammatories, and medications that treat depression can increase your risk of bleeding. It is very important to tell your health care provider about all of the other medicines, including over-the-counter medications, herbs, and vitamins you are taking. DO NOT start, stop, or change the dosage of any medicine, including over-the-counter medicines, vitamins, and herbal products without your doctor’s approval. Any products containing aspirin or are nonsteroidal anti-inflammatories lessen the blood’s ability to form clots and add to the effect of Apixaban/Eliquis. Never take aspirin or medicines that contain aspirin without speaking to your doctor.

## 2024-01-21 NOTE — DISCHARGE NOTE PROVIDER - NSDCMRMEDTOKEN_GEN_ALL_CORE_FT
Eliquis 5 mg oral tablet: 1 tab(s) orally  guaiFENesin 100 mg/5 mL oral liquid: 10 milliliter(s) orally every 6 hours  hydroCHLOROthiazide 25 mg oral tablet: 1 tab(s) orally once a day  Metoprolol Succinate  mg oral tablet, extended release: 1 tab(s) orally once a day  simvastatin 10 mg oral tablet: 1 tab(s) orally once a day (at bedtime)  Vitamin D3 25 mcg (1000 intl units) oral tablet: 1 tab(s) orally once a day   benzonatate 100 mg oral capsule: 1 cap(s) orally every 8 hours as needed for Cough  Eliquis 5 mg oral tablet: 1 tab(s) orally  guaiFENesin 100 mg/5 mL oral liquid: 10 milliliter(s) orally every 6 hours as needed for  cough  hydroCHLOROthiazide 25 mg oral tablet: 1 tab(s) orally once a day  Metoprolol Succinate  mg oral tablet, extended release: 1 tab(s) orally once a day  simvastatin 10 mg oral tablet: 1 tab(s) orally once a day (at bedtime)  Vitamin D3 25 mcg (1000 intl units) oral tablet: 1 tab(s) orally once a day

## 2024-01-21 NOTE — DISCHARGE NOTE PROVIDER - HOSPITAL COURSE
83F hx AF on Eliquis, HTN, HLD, follicular lymphoma admitted with acute hypoxic respiratory failure due to LLL pneumonia s/p HFNC now on nasal cannula     Problem/Plan - 1:  ·  Problem: LLL pneumonia.   ·  Plan: With acute hypoxic respiratory failure, + human metapneumovirus   - Zosyn 1/13-1/19 for 7day course  - MRSA/MSSA PCR, urine legionella negative  - c/w  duoneb   -CTA negative for pE but shows Small left pleural effusion. Extensive airspace infiltrate/consolidation left upper and bilateral lower lobes, left greater than right. Minimal  airspace opacity right upper lobe.  - prior hospitalist discussed code status- FULL CODE for now  - pulmonary following  - repeat CXR showing worsening opacities  - s/p HFNC to NC  - will hold dronedarone.     Problem/Plan - 2:  ·  Problem: Acute respiratory failure with hypoxia.   ·  Plan: Worsening Hypoxia requiring High FLow Nasal Canula  - repeat CXR showing MultiFocal PNA and some pleural effusion  - recent echo without any heart failure in dec 2023. Will hold off repeat TTE  - d/w cardiology Dr. Ramirez, will hold dronedarone  - daughters do not want to start lasix for diuresis given prior hx of side effects. Will increased hctz to 25mg bid for diuresis.     Problem/Plan - 3:  ·  Problem: Chronic atrial fibrillation.   ·  Plan: Currently in SR, EKG 1/12 showing NSR  metoprolol ER 100mg daily  Will hold dronderone.     Problem/Plan - 4:  ·  Problem: HTN (hypertension).   ·  Plan: Borderline BP in setting of infection  Hold norvasc. 25mg hctz BID (lower back to daily after diuresis).     Problem/Plan - 5:  ·  Problem: HLD (hyperlipidemia).   ·  Plan: Continue simvastatin.     Problem/Plan - 6:  ·  Problem: Follicular lymphoma.   ·  Plan: Diagnosed and treated with radiation in 2014- patient/daughter report currently in remission  Outpatient follow up every 6 months with Dr. Shaun Camacho.

## 2024-01-21 NOTE — PROGRESS NOTE ADULT - PROVIDER SPECIALTY LIST ADULT
Cardiology
Cardiology
Pulmonology
Cardiology
Cardiology
Pulmonology
Cardiology
Internal Medicine
Pulmonology
Hospitalist
Pulmonology
Internal Medicine
Hospitalist
Hospitalist

## 2024-01-21 NOTE — DISCHARGE NOTE PROVIDER - ATTENDING DISCHARGE PHYSICAL EXAMINATION:
GENERAL: NAD, in chair on room air, saturating well  HEENT:  Atraumatic, Normocephalic, EOMI, conjunctiva and sclera clear, no LAD  CHEST/LUNG: poor inspiratory effort, b/l rales at the bases, no crackles  HEART: RRR, S1 and S2 No murmurs, rubs, or gallops  ABDOMEN: Soft, Nontender, Nondistended; Bowel sounds present  EXTREMITIES:  2+ Peripheral Pulses, LE edema+. Left thigh not swollen, nontender, no erythema.   NEURO: awake and alert, interactive, non-focal  SKIN: No rashes or lesions    Updated daughter

## 2024-01-21 NOTE — DISCHARGE NOTE PROVIDER - NSDCCPCAREPLAN_GEN_ALL_CORE_FT
PRINCIPAL DISCHARGE DIAGNOSIS  Diagnosis: Left lower lobe pneumonia  Assessment and Plan of Treatment: You have been treated for Pneumonia. Follow up with your Primary MD Dr Esparza within 1 week. Call his office tomorrow for an appointment this week. Follow up with your other physicians as scheduled and outlined in this discharge document.      SECONDARY DISCHARGE DIAGNOSES  Diagnosis: Shortness of breath  Assessment and Plan of Treatment: You were able to walk here off supplemental oxygen with your blood oxygen levels within acceptable ranges. If you are feeling short of breath or your cough returns or worsens or you develop fever call your Primary MD for reevaluation or return to the hospital emergency room.     PRINCIPAL DISCHARGE DIAGNOSIS  Diagnosis: Left lower lobe pneumonia  Assessment and Plan of Treatment: You have been treated for Pneumonia with 7 days of zosyn (IV antibiotics). You tested positive for human metapneumovirus. Your oxygenation gradually improved. Follow up with your Primary MD Dr Esparza within 1 week. Call his office tomorrow for an appointment this week. Follow up with your other physicians as scheduled and outlined in this discharge document. You can take robutissan and tessalone pearles for your cough.      SECONDARY DISCHARGE DIAGNOSES  Diagnosis: Shortness of breath  Assessment and Plan of Treatment: You were able to walk here off supplemental oxygen with your blood oxygen levels within acceptable ranges. If you are feeling short of breath or your cough returns or worsens or you develop fever call your Primary MD for reevaluation or return to the hospital emergency room.

## 2024-01-21 NOTE — PROGRESS NOTE ADULT - SUBJECTIVE AND OBJECTIVE BOX
DATE OF SERVICE: 01-21-24 @ 10:48    Patient is a 83y old  Female who presents with a chief complaint of Pneumonia (20 Jan 2024 14:14)      INTERVAL HISTORY:     REVIEW OF SYSTEMS:  CONSTITUTIONAL: No weakness  EYES/ENT: No visual changes;  No throat pain   NECK: No pain or stiffness  RESPIRATORY: No cough, wheezing; No shortness of breath  CARDIOVASCULAR: No chest pain or palpitations  GASTROINTESTINAL: No abdominal  pain. No nausea, vomiting, or hematemesis  GENITOURINARY: No dysuria, frequency or hematuria  NEUROLOGICAL: No stroke like symptoms  SKIN: No rashes    TELEMETRY Personally reviewed:  	  MEDICATIONS:  hydrochlorothiazide 25 milliGRAM(s) Oral two times a day  metoprolol succinate  milliGRAM(s) Oral daily        PHYSICAL EXAM:  T(C): 36.9 (01-21-24 @ 10:04), Max: 37.2 (01-20-24 @ 17:14)  HR: 77 (01-21-24 @ 10:04) (75 - 88)  BP: 124/66 (01-21-24 @ 10:04) (108/67 - 138/71)  RR: 18 (01-21-24 @ 10:04) (18 - 20)  SpO2: 94% (01-21-24 @ 10:04) (93% - 96%)  Wt(kg): --  I&O's Summary    20 Jan 2024 07:01  -  21 Jan 2024 07:00  --------------------------------------------------------  IN: 920 mL / OUT: 0 mL / NET: 920 mL    21 Jan 2024 07:01  -  21 Jan 2024 10:48  --------------------------------------------------------  IN: 240 mL / OUT: 0 mL / NET: 240 mL          Appearance: In no distress	  HEENT:    PERRL, EOMI	  Cardiovascular:  S1 S2, No JVD  Respiratory: Lungs clear to auscultation	  Gastrointestinal:  Soft, Non-tender, + BS	  Vascularature:  No edema of LE  Psychiatric: Appropriate affect   Neuro: no acute focal deficits                               12.1   7.66  )-----------( 501      ( 21 Jan 2024 07:55 )             38.5     01-21    137  |  99  |  9   ----------------------------<  97  3.9   |  26  |  0.53    Ca    9.2      21 Jan 2024 07:54  Mg     1.9     01-21          Labs personally reviewed      ASSESSMENT/PLAN: 	  83F hx AF on Eliquis, HTN, HLD, follicular lymphoma sent to ED from PCP's office with hypoxia and PNA. Patient was seen earlier this week at PCP's office and diagnosed with LLL PNA     1. Paroxsymal AFIB   - ECG in ED shows NSR normal ECG  -c/w Eliquis 5mg BID  - TSH wnl  -ProBNP 1853  -TTE 12/29 EF 70% , no WMA, grade 2 LVDD  - hold Multaq as ? contributing to lung findings    2. Hypoxia  - BNP 1853 from 456  - CXR Left effusion is similar or smaller, pulm edema resolving.  consider IVP Lasix 40mg but daughter declining, HCTZ dose increased   - High juliane O2 -> now on nasal cannula 4L  - Consider resume Multaq as hypoxia more likely 2/2 pulm edema and PNA    3. HTN   -controlled  -c/w home BP medication    4. HLD  LDL 89   c/w Statin      5. Prophylactic measures   - SCD's & AC          Raheem Leblanc, BIRGIT Ramirez DO [facility]  Cardiovascular Medicine  [address], [address]  Available through call or text on [employer]  Office: [phone]

## 2024-01-21 NOTE — DISCHARGE NOTE NURSING/CASE MANAGEMENT/SOCIAL WORK - PATIENT PORTAL LINK FT
You can access the FollowMyHealth Patient Portal offered by [facility] by registering at the following website: [email or web address]. By joining [employer]’s FollowMyHealth portal, you will also be able to view your health information using other applications (apps) compatible with our system.

## 2024-01-22 ENCOUNTER — TRANSCRIPTION ENCOUNTER (OUTPATIENT)
Age: 84
End: 2024-01-22

## 2024-01-22 ENCOUNTER — NON-APPOINTMENT (OUTPATIENT)
Age: 84
End: 2024-01-22

## 2024-01-22 PROBLEM — I48.91 UNSPECIFIED ATRIAL FIBRILLATION: Chronic | Status: ACTIVE | Noted: 2023-12-29

## 2024-01-23 ENCOUNTER — APPOINTMENT (OUTPATIENT)
Dept: INTERNAL MEDICINE | Facility: CLINIC | Age: 84
End: 2024-01-23
Payer: MEDICARE

## 2024-01-23 ENCOUNTER — APPOINTMENT (OUTPATIENT)
Dept: PULMONOLOGY | Facility: CLINIC | Age: 84
End: 2024-01-23

## 2024-01-23 ENCOUNTER — NON-APPOINTMENT (OUTPATIENT)
Age: 84
End: 2024-01-23

## 2024-01-23 ENCOUNTER — APPOINTMENT (OUTPATIENT)
Dept: INTERNAL MEDICINE | Facility: CLINIC | Age: 84
End: 2024-01-23

## 2024-01-23 VITALS
HEART RATE: 80 BPM | HEIGHT: 58 IN | BODY MASS INDEX: 27.71 KG/M2 | WEIGHT: 132 LBS | SYSTOLIC BLOOD PRESSURE: 130 MMHG | OXYGEN SATURATION: 96 % | DIASTOLIC BLOOD PRESSURE: 60 MMHG | TEMPERATURE: 98.7 F

## 2024-01-23 DIAGNOSIS — R05.1 ACUTE COUGH: ICD-10-CM

## 2024-01-23 DIAGNOSIS — R05.9 COUGH, UNSPECIFIED: ICD-10-CM

## 2024-01-23 PROCEDURE — 99496 TRANSJ CARE MGMT HIGH F2F 7D: CPT

## 2024-01-23 PROCEDURE — 99214 OFFICE O/P EST MOD 30 MIN: CPT

## 2024-01-23 PROCEDURE — 93000 ELECTROCARDIOGRAM COMPLETE: CPT

## 2024-01-23 NOTE — HISTORY OF PRESENT ILLNESS
[FreeTextEntry2] : discharged on sunday   Rash in between groin, occasional dysuria.  No fever/chills/cough

## 2024-01-24 ENCOUNTER — TRANSCRIPTION ENCOUNTER (OUTPATIENT)
Age: 84
End: 2024-01-24

## 2024-01-24 LAB
APPEARANCE: CLEAR
BACTERIA: NEGATIVE /HPF
BILIRUBIN URINE: NEGATIVE
BLOOD URINE: NEGATIVE
CAST: 1 /LPF
COLOR: YELLOW
EPITHELIAL CELLS: 13 /HPF
GLUCOSE QUALITATIVE U: NEGATIVE MG/DL
KETONES URINE: NEGATIVE MG/DL
LEUKOCYTE ESTERASE URINE: ABNORMAL
MICROSCOPIC-UA: NORMAL
NITRITE URINE: NEGATIVE
PH URINE: 5.5
PROTEIN URINE: NEGATIVE MG/DL
RED BLOOD CELLS URINE: 4 /HPF
SPECIFIC GRAVITY URINE: 1.02
UROBILINOGEN URINE: 0.2 MG/DL
WHITE BLOOD CELLS URINE: 7 /HPF

## 2024-01-26 ENCOUNTER — APPOINTMENT (OUTPATIENT)
Dept: VASCULAR SURGERY | Facility: CLINIC | Age: 84
End: 2024-01-26

## 2024-01-26 ENCOUNTER — APPOINTMENT (OUTPATIENT)
Dept: PULMONOLOGY | Facility: CLINIC | Age: 84
End: 2024-01-26
Payer: MEDICARE

## 2024-01-26 VITALS — OXYGEN SATURATION: 98 % | DIASTOLIC BLOOD PRESSURE: 72 MMHG | SYSTOLIC BLOOD PRESSURE: 148 MMHG | HEART RATE: 70 BPM

## 2024-01-26 DIAGNOSIS — J18.9 PNEUMONIA, UNSPECIFIED ORGANISM: ICD-10-CM

## 2024-01-26 LAB — BACTERIA UR CULT: NORMAL

## 2024-01-26 PROCEDURE — 99214 OFFICE O/P EST MOD 30 MIN: CPT

## 2024-01-26 PROCEDURE — 71046 X-RAY EXAM CHEST 2 VIEWS: CPT

## 2024-01-26 RX ORDER — DOXYCYCLINE 100 MG/1
100 TABLET, FILM COATED ORAL
Qty: 14 | Refills: 0 | Status: DISCONTINUED | COMMUNITY
Start: 2024-01-11 | End: 2024-01-26

## 2024-01-26 RX ORDER — POTASSIUM CHLORIDE 750 MG/1
10 CAPSULE, EXTENDED RELEASE ORAL DAILY
Qty: 1 | Refills: 0 | Status: DISCONTINUED | COMMUNITY
Start: 2024-01-12 | End: 2024-01-26

## 2024-01-26 RX ORDER — BENZONATATE 200 MG/1
200 CAPSULE ORAL 3 TIMES DAILY
Qty: 42 | Refills: 0 | Status: DISCONTINUED | COMMUNITY
Start: 2024-01-11 | End: 2024-01-26

## 2024-01-26 RX ORDER — CEFDINIR 300 MG/1
300 CAPSULE ORAL
Qty: 14 | Refills: 0 | Status: DISCONTINUED | COMMUNITY
Start: 2024-01-09 | End: 2024-01-26

## 2024-01-26 RX ORDER — METHYLPREDNISOLONE 4 MG/1
4 TABLET ORAL
Qty: 1 | Refills: 0 | Status: DISCONTINUED | COMMUNITY
Start: 2024-01-11 | End: 2024-01-26

## 2024-01-26 NOTE — DISCUSSION/SUMMARY
[FreeTextEntry1] : s/p hospitalization significant pneumonia likely secondary infection to human metapneumovirus. ? Comp CHF. atrial fibrillation. On Eliquis Hypertension on therapy. Probable component of white coat.  Hyperlipidemia on treatment protocol including medications, diet, and exercise program as tolerated. Continue present therapy never a smoker Lymphoma followed by oncology. Osteopenia Status post COVID virus infection without evidence of sequela.

## 2024-01-26 NOTE — HISTORY OF PRESENT ILLNESS
[TextBox_4] : here for f/u after hospitalization Jan 12 until the 21st,  had a respiratory infection and o2 level decreased and was admittied  for 02 and had HMV was told had pneumonia and got 7 days of antibiotics still with cough, no mucus, no wheeze, no sob was hospitalized in Dec for afib ,was on Mutiaq ,told to stop. Not discharged on antibiotics. Was on oral prior to hosp.

## 2024-01-26 NOTE — PHYSICAL EXAM
[General Appearance - Well Developed] : well developed [General Appearance - Well Nourished] : well nourished [Normal Oropharynx] : normal oropharynx [Neck Cervical Mass (___cm)] : no neck mass was observed [Jugular Venous Distention Increased] : there was no jugular-venous distention [Heart Sounds] : normal S1 and S2 [Thyroid Diffuse Enlargement] : the thyroid was not enlarged [Lungs Percussion] : the lungs were normal to percussion [Abdomen Soft] : soft [Abdomen Tenderness] : non-tender [Abdomen Mass (___ Cm)] : no abdominal mass palpated [Nail Clubbing] : no clubbing of the fingernails [Cyanosis, Localized] : no localized cyanosis [1+ Pitting] : 1+  pitting [Skin Color & Pigmentation] : normal skin color and pigmentation [] : no rash [No Venous Stasis] : no venous stasis [Skin Lesions] : no skin lesions [No Xanthoma] : no  xanthoma was observed [No Skin Ulcers] : no skin ulcer [Deep Tendon Reflexes (DTR)] : deep tendon reflexes were 2+ and symmetric [No Focal Deficits] : no focal deficits [Sensation] : the sensory exam was normal to light touch and pinprick [Oriented To Time, Place, And Person] : oriented to person, place, and time [Impaired Insight] : insight and judgment were intact [Affect] : the affect was normal [FreeTextEntry1] : Few crackles on left. [FreeTextEntry2] :  Varicose veins.

## 2024-01-26 NOTE — ASSESSMENT
[FreeTextEntry1] : Pneumovacc RTO. Observation. Cardiology follow-up. Follow-up in 1 month or sooner on a as needed basis.   35 minutes spent in evaluation management and review of studies.

## 2024-01-26 NOTE — PROCEDURE
[FreeTextEntry1] : Labs notable for abscence of leukocytosis Elevated BNP  1 / 1 Ori Simpson  Report date:1/19/2024 View Order (Report matches exam selected in Patient History pan)     ACC: [id number] EXAM: XR CHEST AP OR PA 1V ORDERED BY: RENETTA MILLS  PROCEDURE DATE: 01/17/2024    INTERPRETATION: Chest one view  HISTORY: Hypoxia  COMPARISON STUDY: 1/14/2024  Frontal expiratory view of the chest shows the heart to be similar in size. The lungs show less pulmonary congestion with clearing of the left lung. Left effusion is similar or smaller and there is no evidence of pneumothorax nor right pleural effusion.  IMPRESSION: Clearing of the lungs as noted.    Thank you for the courtesy of this referral.  --- End of Report ---      ORI SIMPSON MD; Attending Interventional Radiologist This document has been electronically signed. Jan 19 2024 1:18PM  1 / 1 Karyn Kyle  Report date:1/13/2024 View Order (Report matches exam selected in Patient History [facility])     ACC: [id number] EXAM: CT ANGIO CHEST PULM ART WAWI ORDERED BY: JER FULLER  PROCEDURE DATE: 01/13/2024    INTERPRETATION: CLINICAL INFORMATION: Evaluate for pulmonary embolism.  COMPARISON: Noncontrast CT chest"a 2023.  CONTRAST/COMPLICATIONS: IV Contrast: Omnipaque 350 70 cc administered 30 cc discarded Oral Contrast: NONE Complications: None reported at time of study completion  PROCEDURE: CT Angiography of the Chest. Sagittal and coronal reformats were performed as well as 3D (MIP) reconstructions.  FINDINGS:  AIRWAYS: Patent central airways. LUNGS AND PLEURA: Small left pleural effusion. Extensive airspace infiltrate/consolidation left upper and bilateral lower lobes, left greater than right. Minimal airspace opacity right upper lobe. MEDIASTINUM AND MICHAEL: Small right axillary lymph nodes present largest measuring 1.2 x 0.8 cm. Scattered small mediastinal lymph nodes. No VESSELS: Atherosclerotic vascular calcification thoracic aorta. No thoracic aneurysm or dissection. No acute aortic syndrome. No evidence of pulmonary embolism. HEART: Mild cardiomegaly. No pericardial effusion. Mitral annular and coronary artery calcification present. CHEST WALL AND LOWER NECK: Within normal limits. VISUALIZED UPPER ABDOMEN: Within normal limits. BONES: Degenerative changes. No lytic or blastic process.  IMPRESSION: Small left pleural effusion. Extensive airspace infiltrate/consolidation left upper and bilateral lower lobes, left greater than right. Minimal airspace opacity right upper lobe.  No evidence pulmonary embolism.  Please refer to detailed findings otherwise described above.  --- End of Report ---      KARYN KYLE MD; Attending Radiologist This document has been electronically signed. Jan 13 2024 4:34PM

## 2024-01-30 ENCOUNTER — APPOINTMENT (OUTPATIENT)
Dept: INTERNAL MEDICINE | Facility: CLINIC | Age: 84
End: 2024-01-30
Payer: MEDICARE

## 2024-01-30 VITALS
TEMPERATURE: 98.6 F | OXYGEN SATURATION: 96 % | WEIGHT: 133 LBS | SYSTOLIC BLOOD PRESSURE: 150 MMHG | DIASTOLIC BLOOD PRESSURE: 60 MMHG | BODY MASS INDEX: 27.92 KG/M2 | HEART RATE: 65 BPM | HEIGHT: 58 IN

## 2024-01-30 DIAGNOSIS — I83.893 VARICOSE VEINS OF BILATERAL LOWER EXTREMITIES WITH OTHER COMPLICATIONS: ICD-10-CM

## 2024-01-30 DIAGNOSIS — J12.3 HUMAN METAPNEUMOVIRUS PNEUMONIA: ICD-10-CM

## 2024-01-30 DIAGNOSIS — R52 PAIN, UNSPECIFIED: ICD-10-CM

## 2024-01-30 PROCEDURE — 99214 OFFICE O/P EST MOD 30 MIN: CPT

## 2024-01-30 PROCEDURE — G2211 COMPLEX E/M VISIT ADD ON: CPT

## 2024-01-30 RX ORDER — DRONEDARONE 400 MG/1
400 TABLET, FILM COATED ORAL
Qty: 180 | Refills: 3 | Status: DISCONTINUED | COMMUNITY
Start: 2024-01-03 | End: 2024-01-30

## 2024-01-30 NOTE — PHYSICAL EXAM
[No Acute Distress] : no acute distress [Well Nourished] : well nourished [Well Developed] : well developed [Well-Appearing] : well-appearing [Normal Sclera/Conjunctiva] : normal sclera/conjunctiva [Normal Outer Ear/Nose] : the outer ears and nose were normal in appearance [Normal Oropharynx] : the oropharynx was normal [No JVD] : no jugular venous distention [No Lymphadenopathy] : no lymphadenopathy [Supple] : supple [No Respiratory Distress] : no respiratory distress  [No Accessory Muscle Use] : no accessory muscle use [Clear to Auscultation] : lungs were clear to auscultation bilaterally [Normal Rate] : normal rate  [Regular Rhythm] : with a regular rhythm [Normal S1, S2] : normal S1 and S2 [No Murmur] : no murmur heard [No Carotid Bruits] : no carotid bruits [No Varicosities] : no varicosities [Pedal Pulses Present] : the pedal pulses are present [No Extremity Clubbing/Cyanosis] : no extremity clubbing/cyanosis [Soft] : abdomen soft [Non Tender] : non-tender [Non-distended] : non-distended [Normal Bowel Sounds] : normal bowel sounds [Normal Anterior Cervical Nodes] : no anterior cervical lymphadenopathy [No CVA Tenderness] : no CVA  tenderness [No Spinal Tenderness] : no spinal tenderness [No Joint Swelling] : no joint swelling [Grossly Normal Strength/Tone] : grossly normal strength/tone [Coordination Grossly Intact] : coordination grossly intact [No Focal Deficits] : no focal deficits [Normal Gait] : normal gait [Alert and Oriented x3] : oriented to person, place, and time [de-identified] : trace ankle edema bilaterally, improved from prior   [de-identified] : bilateral fungal rash on groin

## 2024-01-30 NOTE — HISTORY OF PRESENT ILLNESS
[Family Member] : family member [FreeTextEntry1] : f/u multiple issues [de-identified] : This is an 84 y/o female with a pmhx of pAF, pre-DM, HLD, HTN who returns today for f/u on multiple issues She saw Dr. Drake on 1/23 and had fungal rash on groin. Was prescribed nystatin cream BID and has since improved but not resolved, Pt daughter itchiness has completely resolved. Pt also had questionable dysuria during that visit but Urine cx and Sx now resolved. Denies f/c Denies chest pain, SOB, MARTIN, dizziness, diaphoresis, palpitations, LE swelling, orthopnea, syncope, n/v, headache.

## 2024-01-30 NOTE — HEALTH RISK ASSESSMENT
[0] : 2) Feeling down, depressed, or hopeless: Not at all (0) [PHQ-2 Negative - No further assessment needed] : PHQ-2 Negative - No further assessment needed [Never] : Never [[id number]] : 0

## 2024-01-31 LAB
ANION GAP SERPL CALC-SCNC: 15 MMOL/L
BASOPHILS # BLD AUTO: 0.07 K/UL
BASOPHILS NFR BLD AUTO: 1.2 %
BUN SERPL-MCNC: 17 MG/DL
CALCIUM SERPL-MCNC: 9.7 MG/DL
CHLORIDE SERPL-SCNC: 97 MMOL/L
CO2 SERPL-SCNC: 28 MMOL/L
CREAT SERPL-MCNC: 0.53 MG/DL
EGFR: 92 ML/MIN/1.73M2
EOSINOPHIL # BLD AUTO: 0.23 K/UL
EOSINOPHIL NFR BLD AUTO: 3.9 %
GLUCOSE SERPL-MCNC: 76 MG/DL
HCT VFR BLD CALC: 45.3 %
HGB BLD-MCNC: 14.2 G/DL
IMM GRANULOCYTES NFR BLD AUTO: 0.3 %
LYMPHOCYTES # BLD AUTO: 1.25 K/UL
LYMPHOCYTES NFR BLD AUTO: 21 %
MAN DIFF?: NORMAL
MCHC RBC-ENTMCNC: 28.4 PG
MCHC RBC-ENTMCNC: 31.3 GM/DL
MCV RBC AUTO: 90.6 FL
MONOCYTES # BLD AUTO: 0.78 K/UL
MONOCYTES NFR BLD AUTO: 13.1 %
NEUTROPHILS # BLD AUTO: 3.59 K/UL
NEUTROPHILS NFR BLD AUTO: 60.5 %
PLATELET # BLD AUTO: 361 K/UL
POTASSIUM SERPL-SCNC: 3.6 MMOL/L
RBC # BLD: 5 M/UL
RBC # FLD: 13.9 %
SODIUM SERPL-SCNC: 140 MMOL/L
WBC # FLD AUTO: 5.94 K/UL

## 2024-02-02 ENCOUNTER — APPOINTMENT (OUTPATIENT)
Dept: VASCULAR SURGERY | Facility: CLINIC | Age: 84
End: 2024-02-02

## 2024-02-07 ENCOUNTER — TRANSCRIPTION ENCOUNTER (OUTPATIENT)
Age: 84
End: 2024-02-07

## 2024-02-08 NOTE — CHART NOTE - NSCHARTNOTESELECT_GEN_ALL_CORE
[facility] Note    Name: Augusta Lynch  YOB: 1958  Gender: female  MRN: [id number]      Follow up visit:   1. 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 female who is following up for left thumb pain. She had only asked for injections in the right thumb and bilateral middle fingers at her last visit, but realized she also wanted one in the left thumb after she left. .      ROS/Meds/PSH/PMH/FH/SH: I personally reviewed the patients standard intake form.  Pertinents are discussed in the HPI    Physical Examination:    Musculoskeletal Examination:  Examination on the left upper extremity demonstrates cap refill < 5 seconds in all fingers, there is tenderness to pain and pain with motion at the thumb MCP, motion is 0-30.    Imaging / Electrodiagnostic Tests:     none    Assessment:     ICD-10-CM    1. 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 We discussed observation, therapy, antiinflammatory medications and other pertinent treatment modalities.    After discussing in detail the patient elects to proceed with cortisone injection.    Procedure Note    The risk, benefits and alternatives of injection and no injection therapy were discussed. Risks including skin blanching, subcutaneous fat atrophy, and elevations in blood glucose levels were discussed.  The patient consented for an injection. Time out performed. The patient has been identified by name and birthdate. The injection site was identified, marked and prepped with a alcohol swab. The left thumb MCP joint was injected with 0.5ml of 6mg/ml Celestone and 0.5ml of Lidocaine plain 1%. The injection site was then dressed with a bandaid. The patient 
Discharge/Event Note
D/C Appt/Event Note

## 2024-02-13 ENCOUNTER — APPOINTMENT (OUTPATIENT)
Dept: CARDIOLOGY | Facility: CLINIC | Age: 84
End: 2024-02-13

## 2024-02-14 ENCOUNTER — APPOINTMENT (OUTPATIENT)
Dept: INTERNAL MEDICINE | Facility: CLINIC | Age: 84
End: 2024-02-14
Payer: MEDICARE

## 2024-02-14 VITALS
BODY MASS INDEX: 30.32 KG/M2 | SYSTOLIC BLOOD PRESSURE: 120 MMHG | DIASTOLIC BLOOD PRESSURE: 70 MMHG | HEIGHT: 55 IN | HEART RATE: 72 BPM | WEIGHT: 131 LBS | OXYGEN SATURATION: 97 % | TEMPERATURE: 98.4 F

## 2024-02-14 DIAGNOSIS — R30.0 DYSURIA: ICD-10-CM

## 2024-02-14 DIAGNOSIS — R60.0 LOCALIZED EDEMA: ICD-10-CM

## 2024-02-14 DIAGNOSIS — R21 RASH AND OTHER NONSPECIFIC SKIN ERUPTION: ICD-10-CM

## 2024-02-14 DIAGNOSIS — R68.83 CHILLS (WITHOUT FEVER): ICD-10-CM

## 2024-02-14 PROCEDURE — G2211 COMPLEX E/M VISIT ADD ON: CPT

## 2024-02-14 PROCEDURE — 99214 OFFICE O/P EST MOD 30 MIN: CPT

## 2024-02-15 LAB
APPEARANCE: ABNORMAL
BACTERIA: ABNORMAL /HPF
BILIRUBIN URINE: NEGATIVE
BLOOD URINE: ABNORMAL
CAST: 0 /LPF
COLOR: YELLOW
EPITHELIAL CELLS: 1 /HPF
GLUCOSE QUALITATIVE U: NEGATIVE MG/DL
KETONES URINE: NEGATIVE MG/DL
LEUKOCYTE ESTERASE URINE: ABNORMAL
MICROSCOPIC-UA: NORMAL
NITRITE URINE: NEGATIVE
PH URINE: 6
PROTEIN URINE: 30 MG/DL
RED BLOOD CELLS URINE: 47 /HPF
SPECIFIC GRAVITY URINE: 1.01
UROBILINOGEN URINE: 0.2 MG/DL
WHITE BLOOD CELLS URINE: 109 /HPF

## 2024-02-16 ENCOUNTER — TRANSCRIPTION ENCOUNTER (OUTPATIENT)
Age: 84
End: 2024-02-16

## 2024-02-16 DIAGNOSIS — E87.6 HYPOKALEMIA: ICD-10-CM

## 2024-02-16 LAB
ANION GAP SERPL CALC-SCNC: 13 MMOL/L
BASOPHILS # BLD AUTO: 0.03 K/UL
BASOPHILS NFR BLD AUTO: 0.4 %
BUN SERPL-MCNC: 15 MG/DL
CALCIUM SERPL-MCNC: 9.8 MG/DL
CHLORIDE SERPL-SCNC: 96 MMOL/L
CO2 SERPL-SCNC: 30 MMOL/L
CREAT SERPL-MCNC: 0.52 MG/DL
EGFR: 92 ML/MIN/1.73M2
EOSINOPHIL # BLD AUTO: 0.21 K/UL
EOSINOPHIL NFR BLD AUTO: 2.8 %
GLUCOSE SERPL-MCNC: 118 MG/DL
HCT VFR BLD CALC: 45 %
HGB BLD-MCNC: 13.8 G/DL
IMM GRANULOCYTES NFR BLD AUTO: 0.1 %
LYMPHOCYTES # BLD AUTO: 1.21 K/UL
LYMPHOCYTES NFR BLD AUTO: 16.1 %
MAN DIFF?: NORMAL
MCHC RBC-ENTMCNC: 27.5 PG
MCHC RBC-ENTMCNC: 30.7 GM/DL
MCV RBC AUTO: 89.6 FL
MONOCYTES # BLD AUTO: 0.72 K/UL
MONOCYTES NFR BLD AUTO: 9.6 %
NEUTROPHILS # BLD AUTO: 5.33 K/UL
NEUTROPHILS NFR BLD AUTO: 71 %
PLATELET # BLD AUTO: 287 K/UL
POTASSIUM SERPL-SCNC: 3.1 MMOL/L
RBC # BLD: 5.02 M/UL
RBC # FLD: 14.5 %
SODIUM SERPL-SCNC: 140 MMOL/L
WBC # FLD AUTO: 7.51 K/UL

## 2024-02-16 RX ORDER — POTASSIUM CHLORIDE 750 MG/1
10 CAPSULE, EXTENDED RELEASE ORAL
Qty: 20 | Refills: 0 | Status: DISCONTINUED | COMMUNITY
Start: 2024-02-16 | End: 2024-02-16

## 2024-02-17 LAB — BACTERIA UR CULT: ABNORMAL

## 2024-02-19 NOTE — PHYSICAL EXAM
[No Acute Distress] : no acute distress [Well Nourished] : well nourished [Well Developed] : well developed [Well-Appearing] : well-appearing [Normal Sclera/Conjunctiva] : normal sclera/conjunctiva [Normal Outer Ear/Nose] : the outer ears and nose were normal in appearance [Normal Oropharynx] : the oropharynx was normal [No JVD] : no jugular venous distention [No Lymphadenopathy] : no lymphadenopathy [Supple] : supple [No Respiratory Distress] : no respiratory distress  [No Accessory Muscle Use] : no accessory muscle use [Clear to Auscultation] : lungs were clear to auscultation bilaterally [Normal Rate] : normal rate  [Regular Rhythm] : with a regular rhythm [Normal S1, S2] : normal S1 and S2 [No Murmur] : no murmur heard [No Carotid Bruits] : no carotid bruits [Pedal Pulses Present] : the pedal pulses are present [No Extremity Clubbing/Cyanosis] : no extremity clubbing/cyanosis [Soft] : abdomen soft [Non Tender] : non-tender [Non-distended] : non-distended [Normal Bowel Sounds] : normal bowel sounds [Normal Anterior Cervical Nodes] : no anterior cervical lymphadenopathy [No CVA Tenderness] : no CVA  tenderness [No Spinal Tenderness] : no spinal tenderness [No Joint Swelling] : no joint swelling [Grossly Normal Strength/Tone] : grossly normal strength/tone [Coordination Grossly Intact] : coordination grossly intact [No Focal Deficits] : no focal deficits [Normal Gait] : normal gait [Alert and Oriented x3] : oriented to person, place, and time [de-identified] : trace ankle edema bilaterally, varicose veins  [de-identified] : bilateral inner thigh with much improved fungal rash, slight remnant

## 2024-02-19 NOTE — HISTORY OF PRESENT ILLNESS
[Family Member] : family member [FreeTextEntry8] : This is an 84 y/o female with a pmhx of pAF, pre-DM, HLD, HTN who returns today for c/o dysuria and chills since Monday evening. Denies back pain, urinary frequency, urgency, hematuria. Denies chest pain, SOB, MARTIN, dizziness, diaphoresis, palpitations, LE swelling, orthopnea, syncope, n/v, headache. 22-Aug-2021 16:29

## 2024-02-19 NOTE — HEALTH RISK ASSESSMENT
[0] : 2) Feeling down, depressed, or hopeless: Not at all (0) [PHQ-2 Negative - No further assessment needed] : PHQ-2 Negative - No further assessment needed [Never] : Never [[id number]] : 0

## 2024-02-21 LAB
ANION GAP SERPL CALC-SCNC: 17 MMOL/L
BACTERIA BLD CULT: NORMAL
BUN SERPL-MCNC: 13 MG/DL
CALCIUM SERPL-MCNC: 9.5 MG/DL
CHLORIDE SERPL-SCNC: 100 MMOL/L
CO2 SERPL-SCNC: 24 MMOL/L
CREAT SERPL-MCNC: 0.56 MG/DL
EGFR: 90 ML/MIN/1.73M2
GLUCOSE SERPL-MCNC: 78 MG/DL
POTASSIUM SERPL-SCNC: 4.3 MMOL/L
SODIUM SERPL-SCNC: 141 MMOL/L

## 2024-02-23 ENCOUNTER — APPOINTMENT (OUTPATIENT)
Dept: VASCULAR SURGERY | Facility: CLINIC | Age: 84
End: 2024-02-23
Payer: MEDICARE

## 2024-02-23 PROCEDURE — 99441: CPT | Mod: 93

## 2024-02-23 NOTE — HISTORY OF PRESENT ILLNESS
[FreeTextEntry1] :  ARON SCHAEFER (: Aug 16 1940) is a 83 year old female with history of venous insufficiency.  She was evaluated by Dr. Boateng in Dec 2023 for RLE edema and cellulitis (treated with abx). VLE demonstrated R GSV reflux and patient was scheduled for R GSV RFA on 2024. Procedure was canceled by patient because she was recuperating from PNA (which required hospitalization).   I spoke with the patient today. She is doing better but does not feel well enough to reschedule the vein procedure. She will call the office when she is ready to schedule.

## 2024-02-26 ENCOUNTER — APPOINTMENT (OUTPATIENT)
Dept: PULMONOLOGY | Facility: CLINIC | Age: 84
End: 2024-02-26
Payer: MEDICARE

## 2024-02-26 VITALS — OXYGEN SATURATION: 99 % | SYSTOLIC BLOOD PRESSURE: 125 MMHG | HEART RATE: 66 BPM | DIASTOLIC BLOOD PRESSURE: 68 MMHG

## 2024-02-26 DIAGNOSIS — Z23 ENCOUNTER FOR IMMUNIZATION: ICD-10-CM

## 2024-02-26 DIAGNOSIS — R93.89 ABNORMAL FINDINGS ON DIAGNOSTIC IMAGING OF OTHER SPECIFIED BODY STRUCTURES: ICD-10-CM

## 2024-02-26 PROCEDURE — 99213 OFFICE O/P EST LOW 20 MIN: CPT

## 2024-02-26 PROCEDURE — 90732 PPSV23 VACC 2 YRS+ SUBQ/IM: CPT

## 2024-02-26 PROCEDURE — 71046 X-RAY EXAM CHEST 2 VIEWS: CPT

## 2024-02-26 PROCEDURE — G0009: CPT

## 2024-02-26 RX ORDER — CEFPODOXIME PROXETIL 200 MG/1
200 TABLET, FILM COATED ORAL
Qty: 14 | Refills: 0 | Status: DISCONTINUED | COMMUNITY
Start: 2024-02-14 | End: 2024-02-26

## 2024-02-29 NOTE — PHYSICAL EXAM
[General Appearance - Well Developed] : well developed [General Appearance - Well Nourished] : well nourished [Normal Oropharynx] : normal oropharynx [Jugular Venous Distention Increased] : there was no jugular-venous distention [Neck Cervical Mass (___cm)] : no neck mass was observed [Thyroid Diffuse Enlargement] : the thyroid was not enlarged [Heart Sounds] : normal S1 and S2 [Lungs Percussion] : the lungs were normal to percussion [Abdomen Soft] : soft [Abdomen Tenderness] : non-tender [Abdomen Mass (___ Cm)] : no abdominal mass palpated [Nail Clubbing] : no clubbing of the fingernails [Cyanosis, Localized] : no localized cyanosis [1+ Pitting] : 1+  pitting [Skin Color & Pigmentation] : normal skin color and pigmentation [] : no rash [No Venous Stasis] : no venous stasis [Skin Lesions] : no skin lesions [No Skin Ulcers] : no skin ulcer [No Xanthoma] : no  xanthoma was observed [Deep Tendon Reflexes (DTR)] : deep tendon reflexes were 2+ and symmetric [Sensation] : the sensory exam was normal to light touch and pinprick [No Focal Deficits] : no focal deficits [Oriented To Time, Place, And Person] : oriented to person, place, and time [Impaired Insight] : insight and judgment were intact [Affect] : the affect was normal [FreeTextEntry1] : 1-2/6 syst. m [FreeTextEntry2] :  Varicose veins.

## 2024-02-29 NOTE — ASSESSMENT
[FreeTextEntry1] :  Cardiology follow-up. Follow-up in 6 month or sooner on a as needed basis.   25 minutes spent in evaluation management and review of studies.

## 2024-02-29 NOTE — DISCUSSION/SUMMARY
[FreeTextEntry1] : s/p hospitalization significant pneumonia likely secondary infection to human metapneumovirus. ? Comp CHF.  Clinically and radiographically improved. atrial fibrillation. On Eliquis Hypertension on therapy. Probable component of white coat.  Hyperlipidemia on treatment protocol including medications, diet, and exercise program as tolerated. Continue present therapy never a smoker Lymphoma followed by oncology. Osteopenia Status post COVID virus infection without evidence of sequela.

## 2024-02-29 NOTE — HISTORY OF PRESENT ILLNESS
[TextBox_4] : Here for f/u feeling better on water pills and K and had k done last week normal  no cough or mucus or sob seeing cardio in March Jan 12 until the 21st, had a respiratory infection and o2 level decreased and was admitted  for 02 and had HMV was told had pneumonia and got 7 days of antibiotics.  was hospitalized in Dec for afib ,was on Mutiaq ,no longer

## 2024-02-29 NOTE — PROCEDURE
[FreeTextEntry1] : Labs notable for abscence of leukocytosis Elevated BNP  1 / 1 Ori Simpson  Report date:1/19/2024 View Order (Report matches exam selected in Patient History pan)     ACC: [id number] EXAM: XR CHEST AP OR PA 1V ORDERED BY: RENETTA MILLS  PROCEDURE DATE: 01/17/2024    INTERPRETATION: Chest one view  HISTORY: Hypoxia  COMPARISON STUDY: 1/14/2024  Frontal expiratory view of the chest shows the heart to be similar in size. The lungs show less pulmonary congestion with clearing of the left lung. Left effusion is similar or smaller and there is no evidence of pneumothorax nor right pleural effusion.  IMPRESSION: Clearing of the lungs as noted.    Thank you for the courtesy of this referral.  --- End of Report ---      ORI SIMPSON MD; Attending Interventional Radiologist This document has been electronically signed. Jan 19 2024 1:18PM  1 / 1 Karyn Kyle  Report date:1/13/2024 View Order (Report matches exam selected in Patient History [facility])     ACC: [id number] EXAM: CT ANGIO CHEST PULM ART WAWI ORDERED BY: JER FULLER  PROCEDURE DATE: 01/13/2024    INTERPRETATION: CLINICAL INFORMATION: Evaluate for pulmonary embolism.  COMPARISON: Noncontrast CT chest"a 2023.  CONTRAST/COMPLICATIONS: IV Contrast: Omnipaque 350 70 cc administered 30 cc discarded Oral Contrast: NONE Complications: None reported at time of study completion  PROCEDURE: CT Angiography of the Chest. Sagittal and coronal reformats were performed as well as 3D (MIP) reconstructions.  FINDINGS:  AIRWAYS: Patent central airways. LUNGS AND PLEURA: Small left pleural effusion. Extensive airspace infiltrate/consolidation left upper and bilateral lower lobes, left greater than right. Minimal airspace opacity right upper lobe. MEDIASTINUM AND MICHAEL: Small right axillary lymph nodes present largest measuring 1.2 x 0.8 cm. Scattered small mediastinal lymph nodes. No VESSELS: Atherosclerotic vascular calcification thoracic aorta. No thoracic aneurysm or dissection. No acute aortic syndrome. No evidence of pulmonary embolism. HEART: Mild cardiomegaly. No pericardial effusion. Mitral annular and coronary artery calcification present. CHEST WALL AND LOWER NECK: Within normal limits. VISUALIZED UPPER ABDOMEN: Within normal limits. BONES: Degenerative changes. No lytic or blastic process.  IMPRESSION: Small left pleural effusion. Extensive airspace infiltrate/consolidation left upper and bilateral lower lobes, left greater than right. Minimal airspace opacity right upper lobe.  No evidence pulmonary embolism.  Please refer to detailed findings otherwise described above.  --- End of Report ---      KARYN KYLE MD; Attending Radiologist This document has been electronically signed. Jan 13 2024 4:34PM

## 2024-03-06 ENCOUNTER — RX RENEWAL (OUTPATIENT)
Age: 84
End: 2024-03-06

## 2024-03-06 RX ORDER — METOPROLOL SUCCINATE 100 MG/1
100 TABLET, EXTENDED RELEASE ORAL DAILY
Qty: 90 | Refills: 2 | Status: ACTIVE | COMMUNITY
Start: 2023-03-29 | End: 1900-01-01

## 2024-03-20 ENCOUNTER — OUTPATIENT (OUTPATIENT)
Dept: OUTPATIENT SERVICES | Facility: HOSPITAL | Age: 84
LOS: 1 days | Discharge: HOME | End: 2024-03-20

## 2024-03-20 DIAGNOSIS — C85.88 OTHER SPECIFIED TYPES OF NON-HODGKIN LYMPHOMA, LYMPH NODES OF MULTIPLE SITES: ICD-10-CM

## 2024-03-20 DIAGNOSIS — Z98.89 OTHER SPECIFIED POSTPROCEDURAL STATES: Chronic | ICD-10-CM

## 2024-03-20 DIAGNOSIS — L72.9 FOLLICULAR CYST OF THE SKIN AND SUBCUTANEOUS TISSUE, UNSPECIFIED: Chronic | ICD-10-CM

## 2024-03-21 ENCOUNTER — APPOINTMENT (OUTPATIENT)
Dept: CARDIOLOGY | Facility: CLINIC | Age: 84
End: 2024-03-21
Payer: MEDICARE

## 2024-03-21 VITALS
WEIGHT: 135 LBS | BODY MASS INDEX: 31.24 KG/M2 | DIASTOLIC BLOOD PRESSURE: 60 MMHG | TEMPERATURE: 98.1 F | SYSTOLIC BLOOD PRESSURE: 150 MMHG | OXYGEN SATURATION: 97 % | HEART RATE: 55 BPM | HEIGHT: 55 IN

## 2024-03-21 DIAGNOSIS — I72.2 ANEURYSM OF RENAL ARTERY: ICD-10-CM

## 2024-03-21 DIAGNOSIS — I51.7 CARDIOMEGALY: ICD-10-CM

## 2024-03-21 DIAGNOSIS — I35.9 NONRHEUMATIC AORTIC VALVE DISORDER, UNSPECIFIED: ICD-10-CM

## 2024-03-21 DIAGNOSIS — E78.5 HYPERLIPIDEMIA, UNSPECIFIED: ICD-10-CM

## 2024-03-21 DIAGNOSIS — I65.29 OCCLUSION AND STENOSIS OF UNSPECIFIED CAROTID ARTERY: ICD-10-CM

## 2024-03-21 DIAGNOSIS — I48.0 PAROXYSMAL ATRIAL FIBRILLATION: ICD-10-CM

## 2024-03-21 DIAGNOSIS — Z13.228 ENCOUNTER FOR SCREENING FOR OTHER METABOLIC DISORDERS: ICD-10-CM

## 2024-03-21 DIAGNOSIS — I77.810 THORACIC AORTIC ECTASIA: ICD-10-CM

## 2024-03-21 PROCEDURE — G2211 COMPLEX E/M VISIT ADD ON: CPT | Mod: NC

## 2024-03-21 PROCEDURE — 99214 OFFICE O/P EST MOD 30 MIN: CPT | Mod: 25

## 2024-03-21 PROCEDURE — 93000 ELECTROCARDIOGRAM COMPLETE: CPT

## 2024-03-21 NOTE — PHYSICAL EXAM
[Well Developed] : well developed [Well Nourished] : well nourished [No Acute Distress] : no acute distress [Normal Conjunctiva] : normal conjunctiva [Normal Venous Pressure] : normal venous pressure [No Carotid Bruit] : no carotid bruit [Normal S1, S2] : normal S1, S2 [No Murmur] : no murmur [No Gallop] : no gallop [No Rub] : no rub [Good Air Entry] : good air entry [Clear Lung Fields] : clear lung fields [No Respiratory Distress] : no respiratory distress  [Non Tender] : non-tender [Soft] : abdomen soft [Normal Bowel Sounds] : normal bowel sounds [No Masses/organomegaly] : no masses/organomegaly [Normal Gait] : normal gait [No Cyanosis] : no cyanosis [No Edema] : no edema [No Clubbing] : no clubbing [No Varicosities] : no varicosities [No Rash] : no rash [No Skin Lesions] : no skin lesions [Moves all extremities] : moves all extremities [No Focal Deficits] : no focal deficits [Normal Speech] : normal speech [Alert and Oriented] : alert and oriented [Normal memory] : normal memory

## 2024-03-21 NOTE — HISTORY OF PRESENT ILLNESS
[FreeTextEntry1] : This is an 84 y/o female with a pmhx of pAF, pre-DM, HLD, HTN here today for a routine follow up.pt reports feeloing better since PNA in jan  daughter here in office to translate Patient denies chest pain, dyspnea, palpitations, dizziness, syncope, changes in bowel/bladder habits or appetite.

## 2024-03-21 NOTE — REVIEW OF SYSTEMS
Advised patient of echo results. Patient voiced understanding.
LM on VM of echo results. LV function and size are normal, Ejection Fraction low normal 50-55%.   Mild concentric left ventricular hypertrophy.   No regional wall motion abnormalities were detected.   Normal diastolic filling pattern for age.  Zamarripa Safer dilated left atrium.   Mild mitral and tricuspid regurgitation is present.   Pacemaker/AICD lead(s) was(were) visualized within the RA/RV cavity.  RVSP= 33 mmHg.   No evidence of pericardial effusion.
[Negative] : Heme/Lymph

## 2024-03-26 LAB
ALBUMIN SERPL ELPH-MCNC: 4.1 G/DL
ALP BLD-CCNC: 67 U/L
ALT SERPL-CCNC: 12 U/L
ANION GAP SERPL CALC-SCNC: 9 MMOL/L
AST SERPL-CCNC: 21 U/L
BASOPHILS # BLD AUTO: 0.05 K/UL
BASOPHILS NFR BLD AUTO: 0.7 %
BILIRUB DIRECT SERPL-MCNC: 0.1 MG/DL
BILIRUB INDIRECT SERPL-MCNC: 0.2 MG/DL
BILIRUB SERPL-MCNC: 0.3 MG/DL
BUN SERPL-MCNC: 17 MG/DL
CALCIUM SERPL-MCNC: 9.8 MG/DL
CHLORIDE SERPL-SCNC: 101 MMOL/L
CHOLEST SERPL-MCNC: 167 MG/DL
CK SERPL-CCNC: 47 U/L
CO2 SERPL-SCNC: 30 MMOL/L
CREAT SERPL-MCNC: 0.52 MG/DL
EGFR: 92 ML/MIN/1.73M2
EOSINOPHIL # BLD AUTO: 0.29 K/UL
EOSINOPHIL NFR BLD AUTO: 3.9 %
ESTIMATED AVERAGE GLUCOSE: 126 MG/DL
GLUCOSE SERPL-MCNC: 98 MG/DL
HBA1C MFR BLD HPLC: 6 %
HCT VFR BLD CALC: 43.8 %
HDLC SERPL-MCNC: 57 MG/DL
HGB BLD-MCNC: 14.3 G/DL
IMM GRANULOCYTES NFR BLD AUTO: 0.3 %
LDLC SERPL CALC-MCNC: 91 MG/DL
LYMPHOCYTES # BLD AUTO: 1.47 K/UL
LYMPHOCYTES NFR BLD AUTO: 19.7 %
MAGNESIUM SERPL-MCNC: 2.1 MG/DL
MAN DIFF?: NORMAL
MCHC RBC-ENTMCNC: 28 PG
MCHC RBC-ENTMCNC: 32.6 GM/DL
MCV RBC AUTO: 85.7 FL
MONOCYTES # BLD AUTO: 0.89 K/UL
MONOCYTES NFR BLD AUTO: 11.9 %
NEUTROPHILS # BLD AUTO: 4.73 K/UL
NEUTROPHILS NFR BLD AUTO: 63.5 %
NONHDLC SERPL-MCNC: 110 MG/DL
PLATELET # BLD AUTO: 261 K/UL
POTASSIUM SERPL-SCNC: 4.3 MMOL/L
PROT SERPL-MCNC: 7.1 G/DL
RBC # BLD: 5.11 M/UL
RBC # FLD: 14.3 %
SODIUM SERPL-SCNC: 141 MMOL/L
T4 FREE SERPL-MCNC: 1 NG/DL
TRIGL SERPL-MCNC: 105 MG/DL
TSH SERPL-ACNC: 2.27 UIU/ML
WBC # FLD AUTO: 7.45 K/UL

## 2024-04-02 ENCOUNTER — RESULT REVIEW (OUTPATIENT)
Age: 84
End: 2024-04-02

## 2024-04-02 ENCOUNTER — APPOINTMENT (OUTPATIENT)
Dept: HEMATOLOGY ONCOLOGY | Facility: CLINIC | Age: 84
End: 2024-04-02
Payer: MEDICARE

## 2024-04-02 VITALS
DIASTOLIC BLOOD PRESSURE: 66 MMHG | TEMPERATURE: 96.3 F | SYSTOLIC BLOOD PRESSURE: 151 MMHG | WEIGHT: 135.14 LBS | RESPIRATION RATE: 16 BRPM | BODY MASS INDEX: 39.57 KG/M2 | HEART RATE: 58 BPM | OXYGEN SATURATION: 99 %

## 2024-04-02 DIAGNOSIS — I10 ESSENTIAL (PRIMARY) HYPERTENSION: ICD-10-CM

## 2024-04-02 DIAGNOSIS — I48.0 PAROXYSMAL ATRIAL FIBRILLATION: ICD-10-CM

## 2024-04-02 DIAGNOSIS — Z92.3 PERSONAL HISTORY OF IRRADIATION: ICD-10-CM

## 2024-04-02 DIAGNOSIS — C82.00 FOLLICULAR LYMPHOMA GRADE I, UNSPECIFIED SITE: ICD-10-CM

## 2024-04-02 LAB
ALBUMIN SERPL ELPH-MCNC: 4 G/DL
ALP BLD-CCNC: 62 U/L
ALT SERPL-CCNC: 11 U/L
ANION GAP SERPL CALC-SCNC: 10 MMOL/L
AST SERPL-CCNC: 13 U/L
BASOPHILS # BLD AUTO: 0.04 K/UL — SIGNIFICANT CHANGE UP (ref 0–0.2)
BASOPHILS NFR BLD AUTO: 0.5 % — SIGNIFICANT CHANGE UP (ref 0–2)
BILIRUB SERPL-MCNC: 0.4 MG/DL
BUN SERPL-MCNC: 15 MG/DL
CALCIUM SERPL-MCNC: 9.4 MG/DL
CHLORIDE SERPL-SCNC: 100 MMOL/L
CO2 SERPL-SCNC: 31 MMOL/L
CREAT SERPL-MCNC: 0.56 MG/DL
DEPRECATED KAPPA LC FREE/LAMBDA SER: 1.27 RATIO
EGFR: 90 ML/MIN/1.73M2
EOSINOPHIL # BLD AUTO: 0.25 K/UL — SIGNIFICANT CHANGE UP (ref 0–0.5)
EOSINOPHIL NFR BLD AUTO: 3.4 % — SIGNIFICANT CHANGE UP (ref 0–6)
GLUCOSE SERPL-MCNC: 104 MG/DL
HCT VFR BLD CALC: 42.9 % — SIGNIFICANT CHANGE UP (ref 34.5–45)
HGB BLD-MCNC: 13.9 G/DL — SIGNIFICANT CHANGE UP (ref 11.5–15.5)
IGA SER QL IEP: 176 MG/DL
IGG SER QL IEP: 855 MG/DL
IGM SER QL IEP: 222 MG/DL
IMM GRANULOCYTES NFR BLD AUTO: 0.3 % — SIGNIFICANT CHANGE UP (ref 0–0.9)
KAPPA LC CSF-MCNC: 1.51 MG/DL
KAPPA LC SERPL-MCNC: 1.92 MG/DL
LDH SERPL-CCNC: 110 U/L
LYMPHOCYTES # BLD AUTO: 1.14 K/UL — SIGNIFICANT CHANGE UP (ref 1–3.3)
LYMPHOCYTES # BLD AUTO: 15.4 % — SIGNIFICANT CHANGE UP (ref 13–44)
MCHC RBC-ENTMCNC: 27.9 PG — SIGNIFICANT CHANGE UP (ref 27–34)
MCHC RBC-ENTMCNC: 32.4 G/DL — SIGNIFICANT CHANGE UP (ref 32–36)
MCV RBC AUTO: 86 FL — SIGNIFICANT CHANGE UP (ref 80–100)
MONOCYTES # BLD AUTO: 0.83 K/UL — SIGNIFICANT CHANGE UP (ref 0–0.9)
MONOCYTES NFR BLD AUTO: 11.2 % — SIGNIFICANT CHANGE UP (ref 2–14)
NEUTROPHILS # BLD AUTO: 5.11 K/UL — SIGNIFICANT CHANGE UP (ref 1.8–7.4)
NEUTROPHILS NFR BLD AUTO: 69.2 % — SIGNIFICANT CHANGE UP (ref 43–77)
NRBC # BLD: 0 /100 WBCS — SIGNIFICANT CHANGE UP (ref 0–0)
PLATELET # BLD AUTO: 241 K/UL — SIGNIFICANT CHANGE UP (ref 150–400)
POTASSIUM SERPL-SCNC: 4.3 MMOL/L
PROT SERPL-MCNC: 6.4 G/DL
RBC # BLD: 4.99 M/UL — SIGNIFICANT CHANGE UP (ref 3.8–5.2)
RBC # FLD: 13.9 % — SIGNIFICANT CHANGE UP (ref 10.3–14.5)
SODIUM SERPL-SCNC: 141 MMOL/L
WBC # BLD: 7.39 K/UL — SIGNIFICANT CHANGE UP (ref 3.8–10.5)
WBC # FLD AUTO: 7.39 K/UL — SIGNIFICANT CHANGE UP (ref 3.8–10.5)

## 2024-04-02 PROCEDURE — 99213 OFFICE O/P EST LOW 20 MIN: CPT

## 2024-04-02 RX ORDER — AMLODIPINE BESYLATE 2.5 MG/1
2.5 TABLET ORAL DAILY
Qty: 90 | Refills: 1 | Status: DISCONTINUED | COMMUNITY
Start: 2023-08-09 | End: 2024-04-02

## 2024-04-02 RX ORDER — POLYETHYLENE GLYCOL 3350 17 G/17G
17 POWDER, FOR SOLUTION ORAL DAILY
Qty: 90 | Refills: 0 | Status: DISCONTINUED | COMMUNITY
Start: 2023-12-07 | End: 2024-04-02

## 2024-04-02 RX ORDER — NYSTATIN 100000 [USP'U]/G
100000 CREAM TOPICAL TWICE DAILY
Qty: 1 | Refills: 0 | Status: DISCONTINUED | COMMUNITY
Start: 2024-01-23 | End: 2024-04-02

## 2024-04-17 NOTE — HISTORY OF PRESENT ILLNESS
[de-identified] : Mrs. Upton is a 74 year old woman with follicular lymphoma, grade 1-2, stage IA, who received radiotherapy to a > 5 cm left inguinal node in late 2014. In October, 2014 she noted a left inguinal lump associated with some discomfort. CT scan showed a 4.8 x 3,2 cm left inguinal node. 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 PET/CT showed that the inguinal node had grown over a few weeks to 5.2 cm. SUV was 10.1. The adrenal nodule was FDG(-). Marrow was (-). Iron was present. Excisional biopsy of the inguinal node showed findings most c/w FL, with dim CD10(+), CD20, BCL-6 and BCL-2 (partial). FISH for BCL-IgH was (-). \par  She was treated with RT which was well tolerated.\par  \par   \par  \par  Disease: follicular lymphoma, gr 1-2 \par  Stage:. IA. \par  Pathology: FL, gr 1-2.  [de-identified] : Remains w/o constitutional c/o  Radiotherapy to left inguinal node - 30 Gy in 15 fractions -completed 1/9/15. Baseline CT scans showed small renal (1.3 cm);,splenic artery aneurysms.  CT A/P 4/2/2022: vs 2014 no change in 1.7 cm right renal a. aneurysm or in 0.8 cm splenic a. aneurysm. No LAD or H/S eneida 3/8/23 for pneumonia. She was noted to be in new onselt atrial fibrillation at the time with RVR. She subsequently converted on her own. Her Echo showed normal LV function and mild MR/MS. She was seen by Dr Dozier and started on Eliquis. She developed a rash on it, and it was stopped and switched to Xarelto. She however resumed Eliquis and has not had recurrent rash since. She continues to do well without recurrent atrial fibrillation or palpitations.sahil.   Had  uncomplicated Covid 7/2022; paxlovid taken x 5d   BP better controlled with incr in toprol dose and HCTZ

## 2024-04-17 NOTE — ASSESSMENT
[Palliative Care Plan] : not applicable at this time [Palliative] : Goals of care discussed with patient: Palliative [FreeTextEntry1] : Clinically DUC. Currently with no complaints. 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 Renal, splenic a. are small aneurysms were re imaged 2/2018 - no change in prior two yrs. Reimaged 2/2022 with no change vs. 2014. She also has  had a 1 cm FDG(-) indeterminate probable adrenal adenoma. CBC reviewed and d/w pt:  No indication for imaging directed at lymphoma but will continue to f/u stable renal and splenic aa aneurysms will see if can be visualized on U/S  Cardiology, Medicine f/u of BP, arrhythmia   check CMP, LDH, B2M, immunoglobulins  RV 6 months .

## 2024-04-24 LAB
25(OH)D3 SERPL-MCNC: 47.6 NG/ML
25(OH)D3 SERPL-MCNC: 48.2 NG/ML
ALBUMIN SERPL ELPH-MCNC: 4.3 G/DL
ALBUMIN SERPL ELPH-MCNC: 4.6 G/DL
ALP BLD-CCNC: 62 U/L
ALP BLD-CCNC: 64 U/L
ALT SERPL-CCNC: 24 U/L
ALT SERPL-CCNC: 27 U/L
ANION GAP SERPL CALC-SCNC: 10 MMOL/L
ANION GAP SERPL CALC-SCNC: 11 MMOL/L
ANION GAP SERPL CALC-SCNC: 12 MMOL/L
ANION GAP SERPL CALC-SCNC: 13 MMOL/L
ANION GAP SERPL CALC-SCNC: 13 MMOL/L
APPEARANCE: CLEAR
AST SERPL-CCNC: 22 U/L
AST SERPL-CCNC: 24 U/L
BACTERIA UR CULT: NORMAL
BACTERIA: NEGATIVE
BACTERIA: NEGATIVE
BACTERIA: NEGATIVE /HPF
BACTERIA: NEGATIVE /HPF
BASOPHILS # BLD AUTO: 0.04 K/UL
BASOPHILS # BLD AUTO: 0.05 K/UL
BASOPHILS NFR BLD AUTO: 0.5 %
BASOPHILS NFR BLD AUTO: 0.5 %
BASOPHILS NFR BLD AUTO: 0.6 %
BASOPHILS NFR BLD AUTO: 0.6 %
BILIRUB DIRECT SERPL-MCNC: 0.1 MG/DL
BILIRUB DIRECT SERPL-MCNC: 0.1 MG/DL
BILIRUB INDIRECT SERPL-MCNC: 0.2 MG/DL
BILIRUB INDIRECT SERPL-MCNC: 0.2 MG/DL
BILIRUB SERPL-MCNC: 0.3 MG/DL
BILIRUB SERPL-MCNC: 0.3 MG/DL
BILIRUBIN URINE: NEGATIVE
BLOOD URINE: NEGATIVE
BUN SERPL-MCNC: 12 MG/DL
BUN SERPL-MCNC: 14 MG/DL
BUN SERPL-MCNC: 14 MG/DL
BUN SERPL-MCNC: 15 MG/DL
BUN SERPL-MCNC: 16 MG/DL
CALCIUM SERPL-MCNC: 9.2 MG/DL
CALCIUM SERPL-MCNC: 9.6 MG/DL
CALCIUM SERPL-MCNC: 9.7 MG/DL
CALCIUM SERPL-MCNC: 9.7 MG/DL
CALCIUM SERPL-MCNC: 9.8 MG/DL
CALCIUM SERPL-MCNC: 9.9 MG/DL
CALCIUM SERPL-MCNC: 9.9 MG/DL
CAST: 0 /LPF
CAST: 1 /LPF
CHLORIDE SERPL-SCNC: 100 MMOL/L
CHLORIDE SERPL-SCNC: 100 MMOL/L
CHLORIDE SERPL-SCNC: 101 MMOL/L
CHLORIDE SERPL-SCNC: 102 MMOL/L
CHLORIDE SERPL-SCNC: 103 MMOL/L
CHLORIDE SERPL-SCNC: 98 MMOL/L
CHLORIDE SERPL-SCNC: 99 MMOL/L
CHOLEST SERPL-MCNC: 159 MG/DL
CHOLEST SERPL-MCNC: 165 MG/DL
CK SERPL-CCNC: 64 U/L
CK SERPL-CCNC: 71 U/L
CO2 SERPL-SCNC: 28 MMOL/L
CO2 SERPL-SCNC: 29 MMOL/L
CO2 SERPL-SCNC: 30 MMOL/L
CO2 SERPL-SCNC: 30 MMOL/L
CO2 SERPL-SCNC: 31 MMOL/L
COLOR: NORMAL
COLOR: NORMAL
COLOR: YELLOW
COLOR: YELLOW
CREAT SERPL-MCNC: 0.54 MG/DL
CREAT SERPL-MCNC: 0.56 MG/DL
CREAT SERPL-MCNC: 0.57 MG/DL
CREAT SERPL-MCNC: 0.59 MG/DL
CREAT SERPL-MCNC: 0.61 MG/DL
EGFR: 89 ML/MIN/1.73M2
EGFR: 89 ML/MIN/1.73M2
EGFR: 90 ML/MIN/1.73M2
EGFR: 90 ML/MIN/1.73M2
EGFR: 91 ML/MIN/1.73M2
EGFR: 91 ML/MIN/1.73M2
EGFR: 92 ML/MIN/1.73M2
EOSINOPHIL # BLD AUTO: 0.19 K/UL
EOSINOPHIL # BLD AUTO: 0.23 K/UL
EOSINOPHIL # BLD AUTO: 0.26 K/UL
EOSINOPHIL # BLD AUTO: 0.31 K/UL
EOSINOPHIL NFR BLD AUTO: 2.3 %
EOSINOPHIL NFR BLD AUTO: 2.9 %
EOSINOPHIL NFR BLD AUTO: 3.4 %
EOSINOPHIL NFR BLD AUTO: 4.4 %
EPITHELIAL CELLS: 2 /HPF
EPITHELIAL CELLS: 5 /HPF
ESTIMATED AVERAGE GLUCOSE: 134 MG/DL
FRUCTOSAMINE SERPL-MCNC: 234 UMOL/L
GLUCOSE QUALITATIVE U: NEGATIVE
GLUCOSE QUALITATIVE U: NEGATIVE
GLUCOSE QUALITATIVE U: NEGATIVE MG/DL
GLUCOSE QUALITATIVE U: NEGATIVE MG/DL
GLUCOSE SERPL-MCNC: 100 MG/DL
GLUCOSE SERPL-MCNC: 107 MG/DL
GLUCOSE SERPL-MCNC: 77 MG/DL
GLUCOSE SERPL-MCNC: 81 MG/DL
GLUCOSE SERPL-MCNC: 94 MG/DL
GLUCOSE SERPL-MCNC: 97 MG/DL
GLUCOSE SERPL-MCNC: 99 MG/DL
HBA1C MFR BLD HPLC: 6.3 %
HCT VFR BLD CALC: 44 %
HCT VFR BLD CALC: 44.4 %
HCT VFR BLD CALC: 44.4 %
HCT VFR BLD CALC: 46.7 %
HDLC SERPL-MCNC: 53 MG/DL
HDLC SERPL-MCNC: 56 MG/DL
HGB BLD-MCNC: 14.2 G/DL
HGB BLD-MCNC: 14.3 G/DL
HGB BLD-MCNC: 14.7 G/DL
HGB BLD-MCNC: 15 G/DL
HYALINE CASTS: 0 /LPF
HYALINE CASTS: 2 /LPF
IMM GRANULOCYTES NFR BLD AUTO: 0.1 %
IMM GRANULOCYTES NFR BLD AUTO: 0.1 %
IMM GRANULOCYTES NFR BLD AUTO: 0.2 %
IMM GRANULOCYTES NFR BLD AUTO: 0.3 %
KETONES URINE: NEGATIVE
KETONES URINE: NEGATIVE
KETONES URINE: NEGATIVE MG/DL
KETONES URINE: NEGATIVE MG/DL
LDLC SERPL CALC-MCNC: 86 MG/DL
LDLC SERPL CALC-MCNC: 88 MG/DL
LEUKOCYTE ESTERASE URINE: ABNORMAL
LYMPHOCYTES # BLD AUTO: 1.3 K/UL
LYMPHOCYTES # BLD AUTO: 1.57 K/UL
LYMPHOCYTES # BLD AUTO: 1.59 K/UL
LYMPHOCYTES # BLD AUTO: 1.7 K/UL
LYMPHOCYTES NFR BLD AUTO: 15.5 %
LYMPHOCYTES NFR BLD AUTO: 20.1 %
LYMPHOCYTES NFR BLD AUTO: 20.5 %
LYMPHOCYTES NFR BLD AUTO: 24.1 %
MAGNESIUM SERPL-MCNC: 2 MG/DL
MAGNESIUM SERPL-MCNC: 2.1 MG/DL
MAGNESIUM SERPL-MCNC: 2.1 MG/DL
MAGNESIUM SERPL-MCNC: 2.2 MG/DL
MAN DIFF?: NORMAL
MCHC RBC-ENTMCNC: 28.1 PG
MCHC RBC-ENTMCNC: 28.7 PG
MCHC RBC-ENTMCNC: 28.7 PG
MCHC RBC-ENTMCNC: 29.2 PG
MCHC RBC-ENTMCNC: 32.1 GM/DL
MCHC RBC-ENTMCNC: 32.2 GM/DL
MCHC RBC-ENTMCNC: 32.3 GM/DL
MCHC RBC-ENTMCNC: 33.1 GM/DL
MCV RBC AUTO: 87.5 FL
MCV RBC AUTO: 88.1 FL
MCV RBC AUTO: 89.1 FL
MCV RBC AUTO: 89.2 FL
MICROSCOPIC-UA: NORMAL
MONOCYTES # BLD AUTO: 0.78 K/UL
MONOCYTES # BLD AUTO: 0.81 K/UL
MONOCYTES # BLD AUTO: 0.84 K/UL
MONOCYTES # BLD AUTO: 1.03 K/UL
MONOCYTES NFR BLD AUTO: 11 %
MONOCYTES NFR BLD AUTO: 11.5 %
MONOCYTES NFR BLD AUTO: 12.3 %
MONOCYTES NFR BLD AUTO: 9.9 %
NEUTROPHILS # BLD AUTO: 4.18 K/UL
NEUTROPHILS # BLD AUTO: 4.93 K/UL
NEUTROPHILS # BLD AUTO: 5.24 K/UL
NEUTROPHILS # BLD AUTO: 5.79 K/UL
NEUTROPHILS NFR BLD AUTO: 59.3 %
NEUTROPHILS NFR BLD AUTO: 64.3 %
NEUTROPHILS NFR BLD AUTO: 66.4 %
NEUTROPHILS NFR BLD AUTO: 69.2 %
NITRITE URINE: NEGATIVE
NONHDLC SERPL-MCNC: 106 MG/DL
NONHDLC SERPL-MCNC: 109 MG/DL
NT-PROBNP SERPL-MCNC: 371 PG/ML
NT-PROBNP SERPL-MCNC: 578 PG/ML
PH URINE: 6
PH URINE: 6.5
PLATELET # BLD AUTO: 247 K/UL
PLATELET # BLD AUTO: 250 K/UL
PLATELET # BLD AUTO: 259 K/UL
PLATELET # BLD AUTO: 266 K/UL
POTASSIUM SERPL-SCNC: 3.5 MMOL/L
POTASSIUM SERPL-SCNC: 3.9 MMOL/L
POTASSIUM SERPL-SCNC: 3.9 MMOL/L
POTASSIUM SERPL-SCNC: 4 MMOL/L
POTASSIUM SERPL-SCNC: 4.2 MMOL/L
POTASSIUM SERPL-SCNC: 4.2 MMOL/L
POTASSIUM SERPL-SCNC: 4.3 MMOL/L
PROT SERPL-MCNC: 6.8 G/DL
PROT SERPL-MCNC: 6.9 G/DL
PROTEIN URINE: NEGATIVE
PROTEIN URINE: NEGATIVE
PROTEIN URINE: NEGATIVE MG/DL
PROTEIN URINE: NEGATIVE MG/DL
RBC # BLD: 4.94 M/UL
RBC # BLD: 4.98 M/UL
RBC # BLD: 5.04 M/UL
RBC # BLD: 5.34 M/UL
RBC # FLD: 13.2 %
RBC # FLD: 13.4 %
RBC # FLD: 13.8 %
RBC # FLD: 14.1 %
RED BLOOD CELLS URINE: 0 /HPF
RED BLOOD CELLS URINE: 1 /HPF
RED BLOOD CELLS URINE: 1 /HPF
RED BLOOD CELLS URINE: 2 /HPF
REVIEW: NORMAL
SODIUM SERPL-SCNC: 139 MMOL/L
SODIUM SERPL-SCNC: 140 MMOL/L
SODIUM SERPL-SCNC: 141 MMOL/L
SODIUM SERPL-SCNC: 141 MMOL/L
SODIUM SERPL-SCNC: 142 MMOL/L
SODIUM SERPL-SCNC: 142 MMOL/L
SODIUM SERPL-SCNC: 143 MMOL/L
SPECIFIC GRAVITY URINE: 1.01
SPECIFIC GRAVITY URINE: 1.01
SPECIFIC GRAVITY URINE: 1.02
SPECIFIC GRAVITY URINE: 1.02
SQUAMOUS EPITHELIAL CELLS: 2 /HPF
SQUAMOUS EPITHELIAL CELLS: 2 /HPF
T4 FREE SERPL-MCNC: 1.1 NG/DL
T4 FREE SERPL-MCNC: 1.1 NG/DL
T4 FREE SERPL-MCNC: 1.2 NG/DL
TRIGL SERPL-MCNC: 111 MG/DL
TRIGL SERPL-MCNC: 91 MG/DL
TSH SERPL-ACNC: 1.74 UIU/ML
TSH SERPL-ACNC: 2.17 UIU/ML
TSH SERPL-ACNC: 2.45 UIU/ML
UROBILINOGEN URINE: 0.2 MG/DL
UROBILINOGEN URINE: 0.2 MG/DL
UROBILINOGEN URINE: NORMAL
UROBILINOGEN URINE: NORMAL
WBC # FLD AUTO: 7.05 K/UL
WBC # FLD AUTO: 7.66 K/UL
WBC # FLD AUTO: 7.9 K/UL
WBC # FLD AUTO: 8.37 K/UL
WHITE BLOOD CELLS URINE: 0 /HPF
WHITE BLOOD CELLS URINE: 2 /HPF
WHITE BLOOD CELLS URINE: 2 /HPF
WHITE BLOOD CELLS URINE: 4 /HPF

## 2024-05-20 RX ORDER — POTASSIUM CHLORIDE 1.5 G/1.58G
20 POWDER, FOR SOLUTION ORAL DAILY
Qty: 90 | Refills: 0 | Status: ACTIVE | COMMUNITY
Start: 2024-02-16 | End: 1900-01-01

## 2024-06-24 NOTE — ED PROVIDER NOTE - WET READ LAUNCH FT
Physical Therapy Visit    Visit Type: Daily Treatment Note  Visit: 15  Referring Provider: Ryan Yi MD  Medical Diagnosis (from order): M25.669 - Stiffness of knee joint     SUBJECTIVE                                                                                                               Patient had an x-ray on Friday after a fall, which was negative for fracture. Patient denies falling over the weekend. Patient reports pain at superior knee.     Pain / Symptoms  - Pain rating (out of 10): Current: 5       OBJECTIVE                                                                                                                     Range of Motion (ROM)   (degrees unless noted; active unless noted; norms in ( ); negative=lacking to 0, positive=beyond 0)  Knee:   - Flexion (150):       Right:  90  Pain  Passive: 94   - Extension (0-10):       Right:  -6   Passive: -3         Palpation  Knee: [place]/Tendon/Bone  - Medial Joint Line: - Right: no tenderness  - Lateral Joint Line: - Right: tenderness  - Quad Tendon: - Right: tenderness  - Patellar Tendon: - Right: no tenderness  Swelling noted on left lateral joint line         Ambulation / Gait  - Assistive device: none  - Assist Level: independent  - Description: antalgic and decreased stance time RLE           Treatment     Therapeutic Exercise    Patient was instructed in parameters and technique with specific demonstration of the following activities to develop strength, endurance, range of motion, and muscle/joint flexibility to improve daily function, progress towards therapy goals, and maximize independence with home exercise program    · Exercises:  -dispensed tubi  size E  -NuStep level 2-1 for 7 minutes//0/10 right knee pain  -squat 3 x 10 with bilateral upper extremity support//verbal cues for posterior weight shift and to decrease knee valgus  -heel raise with bilateral upper extremity support 2 x 15  -standing hip abduction bilateral  upper extremity support red theraband 2 x 10   -step up onto a 6 inch step with bilateral upper extremity support 2 x 15  -right knee flexions stretch on 6 inch step x 1 minute  -step down from a 6 inch step with bilateral upper extremity support x 15  //3/10 pain, right knee flexion A/PROM: 94/98 degrees    - Assistance needed: supervision  - Cues:exercise technique  - Corrected: posture  - Adapted/modified:none  - Equipment used: see above  - Quality of movement/task:fair     Manual Therapy   Patient was seen for tissue and joint mobilizations to progress in mobility, flexibility and pain:     Soft tissue mobilization to:   Right quadriceps tendon and lateral joint line   //4/10 right knee pain    - Post-Treatment: improvement in soft tissue extensibility, decreased pain and general mobility with observed improvements in function end of session after treatment for progress towards goals.       Skilled input: verbal instruction/cues, posture correction, tactile instruction/cues and demonstration    Writer verbally educated and received verbal consent for hand placement, positioning of patient, and techniques to be performed today from patient for hand placement and palpation for techniques and therapist position for techniques as described above and how they are pertinent to the patient's plan of care.  Home Exercise Program  See med[employer] program      ASSESSMENT                                                                                                            Patient presented today with 6/10 right knee pain at rest with pain with palpation to right quadriceps tendon and lateral joint line with swelling noted. Patient's x-ray on Friday was negative for fracture. Patient's range of motion improved since Friday measuring -6/-3 to 90/94 degrees at start of session. Patient responded well to soft tissue mobilization reporting improved pain to 4/10 and resolved pain after nustep. Patient tolerated therapeutic  exercises well demonstrating 94/98 degrees of active/passive right knee flexion at end of session. Patient may continue to benefit from physical therapy.   Pain/symptoms after session (out of 10): 3  Education:   - Results of above outlined education: Verbalizes understanding and Demonstrates understanding Educated patient on use of tubi . Discussed use of cane for safety as needed by patient.     PLAN                                                                                                                           Suggestions for next session as indicated: Progress to tolerance        Therapy procedure time and total treatment time can be found documented on the Time Entry flowsheet     There are no Wet Read(s) to document.

## 2024-07-05 ENCOUNTER — APPOINTMENT (OUTPATIENT)
Dept: CARDIOLOGY | Facility: CLINIC | Age: 84
End: 2024-07-05
Payer: MEDICARE

## 2024-07-05 VITALS
DIASTOLIC BLOOD PRESSURE: 70 MMHG | SYSTOLIC BLOOD PRESSURE: 140 MMHG | BODY MASS INDEX: 31.24 KG/M2 | WEIGHT: 135 LBS | OXYGEN SATURATION: 97 % | HEART RATE: 56 BPM | HEIGHT: 55 IN | TEMPERATURE: 97.4 F

## 2024-07-05 DIAGNOSIS — R73.09 OTHER ABNORMAL GLUCOSE: ICD-10-CM

## 2024-07-05 DIAGNOSIS — I51.7 CARDIOMEGALY: ICD-10-CM

## 2024-07-05 DIAGNOSIS — I65.29 OCCLUSION AND STENOSIS OF UNSPECIFIED CAROTID ARTERY: ICD-10-CM

## 2024-07-05 DIAGNOSIS — I72.2 ANEURYSM OF RENAL ARTERY: ICD-10-CM

## 2024-07-05 DIAGNOSIS — I35.9 NONRHEUMATIC AORTIC VALVE DISORDER, UNSPECIFIED: ICD-10-CM

## 2024-07-05 DIAGNOSIS — E78.5 HYPERLIPIDEMIA, UNSPECIFIED: ICD-10-CM

## 2024-07-05 DIAGNOSIS — I51.89 OTHER ILL-DEFINED HEART DISEASES: ICD-10-CM

## 2024-07-05 DIAGNOSIS — I10 ESSENTIAL (PRIMARY) HYPERTENSION: ICD-10-CM

## 2024-07-05 DIAGNOSIS — R73.03 PREDIABETES.: ICD-10-CM

## 2024-07-05 DIAGNOSIS — I77.810 THORACIC AORTIC ECTASIA: ICD-10-CM

## 2024-07-05 DIAGNOSIS — I48.0 PAROXYSMAL ATRIAL FIBRILLATION: ICD-10-CM

## 2024-07-05 DIAGNOSIS — R60.0 LOCALIZED EDEMA: ICD-10-CM

## 2024-07-05 DIAGNOSIS — Z13.228 ENCOUNTER FOR SCREENING FOR OTHER METABOLIC DISORDERS: ICD-10-CM

## 2024-07-05 PROCEDURE — 93000 ELECTROCARDIOGRAM COMPLETE: CPT

## 2024-07-05 PROCEDURE — G2211 COMPLEX E/M VISIT ADD ON: CPT

## 2024-07-05 PROCEDURE — 99214 OFFICE O/P EST MOD 30 MIN: CPT

## 2024-07-05 PROCEDURE — 93880 EXTRACRANIAL BILAT STUDY: CPT

## 2024-07-10 PROBLEM — R73.09 ELEVATED HEMOGLOBIN A1C: Status: ACTIVE | Noted: 2024-07-10

## 2024-07-10 LAB
25(OH)D3 SERPL-MCNC: 34.4 NG/ML
ALBUMIN SERPL ELPH-MCNC: 4.5 G/DL
ALP BLD-CCNC: 64 U/L
ALT SERPL-CCNC: 16 U/L
ANION GAP SERPL CALC-SCNC: 14 MMOL/L
APPEARANCE: CLEAR
AST SERPL-CCNC: 20 U/L
BACTERIA: NEGATIVE /HPF
BASOPHILS # BLD AUTO: 0.04 K/UL
BASOPHILS NFR BLD AUTO: 0.5 %
BILIRUB DIRECT SERPL-MCNC: 0.1 MG/DL
BILIRUB INDIRECT SERPL-MCNC: 0.3 MG/DL
BILIRUB SERPL-MCNC: 0.5 MG/DL
BILIRUBIN URINE: NEGATIVE
BLOOD URINE: NEGATIVE
BUN SERPL-MCNC: 14 MG/DL
CALCIUM SERPL-MCNC: 9.7 MG/DL
CAST: 0 /LPF
CHLORIDE SERPL-SCNC: 100 MMOL/L
CHOLEST SERPL-MCNC: 164 MG/DL
CK SERPL-CCNC: 70 U/L
CO2 SERPL-SCNC: 27 MMOL/L
COLOR: YELLOW
CREAT SERPL-MCNC: 0.61 MG/DL
EGFR: 89 ML/MIN/1.73M2
EOSINOPHIL # BLD AUTO: 0.33 K/UL
EOSINOPHIL NFR BLD AUTO: 4.5 %
EPITHELIAL CELLS: 1 /HPF
ESTIMATED AVERAGE GLUCOSE: 140 MG/DL
GLUCOSE QUALITATIVE U: NEGATIVE MG/DL
GLUCOSE SERPL-MCNC: 104 MG/DL
HBA1C MFR BLD HPLC: 6.5 %
HCT VFR BLD CALC: 47.3 %
HDLC SERPL-MCNC: 56 MG/DL
HGB BLD-MCNC: 15 G/DL
IMM GRANULOCYTES NFR BLD AUTO: 0.3 %
KETONES URINE: NEGATIVE MG/DL
LDLC SERPL CALC-MCNC: 91 MG/DL
LEUKOCYTE ESTERASE URINE: ABNORMAL
LYMPHOCYTES # BLD AUTO: 1.31 K/UL
LYMPHOCYTES NFR BLD AUTO: 17.7 %
MAGNESIUM SERPL-MCNC: 2 MG/DL
MAN DIFF?: NORMAL
MCHC RBC-ENTMCNC: 27.7 PG
MCHC RBC-ENTMCNC: 31.7 GM/DL
MCV RBC AUTO: 87.4 FL
MICROSCOPIC-UA: NORMAL
MONOCYTES # BLD AUTO: 0.8 K/UL
MONOCYTES NFR BLD AUTO: 10.8 %
NEUTROPHILS # BLD AUTO: 4.9 K/UL
NEUTROPHILS NFR BLD AUTO: 66.2 %
NITRITE URINE: NEGATIVE
NONHDLC SERPL-MCNC: 108 MG/DL
PH URINE: 6.5
PLATELET # BLD AUTO: 241 K/UL
POTASSIUM SERPL-SCNC: 4 MMOL/L
PROT SERPL-MCNC: 6.9 G/DL
PROTEIN URINE: NEGATIVE MG/DL
RBC # BLD: 5.41 M/UL
RBC # FLD: 14.5 %
RED BLOOD CELLS URINE: 0 /HPF
SODIUM SERPL-SCNC: 141 MMOL/L
SPECIFIC GRAVITY URINE: 1.01
T4 FREE SERPL-MCNC: 1.1 NG/DL
TRIGL SERPL-MCNC: 89 MG/DL
TSH SERPL-ACNC: 2.63 UIU/ML
UROBILINOGEN URINE: 0.2 MG/DL
WBC # FLD AUTO: 7.4 K/UL
WHITE BLOOD CELLS URINE: 0 /HPF

## 2024-08-02 ENCOUNTER — APPOINTMENT (OUTPATIENT)
Dept: INTERNAL MEDICINE | Facility: CLINIC | Age: 84
End: 2024-08-02
Payer: MEDICARE

## 2024-08-02 ENCOUNTER — NON-APPOINTMENT (OUTPATIENT)
Age: 84
End: 2024-08-02

## 2024-08-02 VITALS
BODY MASS INDEX: 32.01 KG/M2 | HEIGHT: 55 IN | TEMPERATURE: 98.1 F | WEIGHT: 138.31 LBS | SYSTOLIC BLOOD PRESSURE: 162 MMHG | OXYGEN SATURATION: 96 % | DIASTOLIC BLOOD PRESSURE: 58 MMHG | HEART RATE: 60 BPM

## 2024-08-02 VITALS — SYSTOLIC BLOOD PRESSURE: 144 MMHG | HEART RATE: 58 BPM | OXYGEN SATURATION: 97 % | DIASTOLIC BLOOD PRESSURE: 66 MMHG

## 2024-08-02 DIAGNOSIS — I51.7 CARDIOMEGALY: ICD-10-CM

## 2024-08-02 DIAGNOSIS — R60.0 LOCALIZED EDEMA: ICD-10-CM

## 2024-08-02 DIAGNOSIS — I48.0 PAROXYSMAL ATRIAL FIBRILLATION: ICD-10-CM

## 2024-08-02 DIAGNOSIS — R73.09 OTHER ABNORMAL GLUCOSE: ICD-10-CM

## 2024-08-02 DIAGNOSIS — I10 ESSENTIAL (PRIMARY) HYPERTENSION: ICD-10-CM

## 2024-08-02 PROCEDURE — 93000 ELECTROCARDIOGRAM COMPLETE: CPT

## 2024-08-02 PROCEDURE — G2211 COMPLEX E/M VISIT ADD ON: CPT

## 2024-08-02 PROCEDURE — 99214 OFFICE O/P EST MOD 30 MIN: CPT

## 2024-08-04 NOTE — HEALTH RISK ASSESSMENT
[0] : 2) Feeling down, depressed, or hopeless: Not at all (0) [PHQ-2 Negative - No further assessment needed] : PHQ-2 Negative - No further assessment needed [Never] : Never [[id number]] : 0

## 2024-08-04 NOTE — ADDENDUM
[FreeTextEntry1] : This note was written by Amarilys Grant on 08/02/2024 acting as medical scribe for Dr. Marcin Esparza. I, Dr. Marcin Esparza, have read and attest that all the information, medical decision making and discharge instructions within are true and accurate.

## 2024-08-04 NOTE — PHYSICAL EXAM
[No Acute Distress] : no acute distress [Well Nourished] : well nourished [Well Developed] : well developed [Well-Appearing] : well-appearing [Normal Sclera/Conjunctiva] : normal sclera/conjunctiva [PERRL] : pupils equal round and reactive to light [EOMI] : extraocular movements intact [Normal Outer Ear/Nose] : the outer ears and nose were normal in appearance [Normal Oropharynx] : the oropharynx was normal [No JVD] : no jugular venous distention [No Lymphadenopathy] : no lymphadenopathy [Supple] : supple [Thyroid Normal, No Nodules] : the thyroid was normal and there were no nodules present [No Respiratory Distress] : no respiratory distress  [No Accessory Muscle Use] : no accessory muscle use [Clear to Auscultation] : lungs were clear to auscultation bilaterally [Normal Rate] : normal rate  [Regular Rhythm] : with a regular rhythm [Normal S1, S2] : normal S1 and S2 [No Murmur] : no murmur heard [No Carotid Bruits] : no carotid bruits [No Abdominal Bruit] : a ~M bruit was not heard ~T in the abdomen [Pedal Pulses Present] : the pedal pulses are present [No Palpable Aorta] : no palpable aorta [No Extremity Clubbing/Cyanosis] : no extremity clubbing/cyanosis [Soft] : abdomen soft [Non Tender] : non-tender [Non-distended] : non-distended [No Masses] : no abdominal mass palpated [No HSM] : no HSM [Normal Bowel Sounds] : normal bowel sounds [Normal Posterior Cervical Nodes] : no posterior cervical lymphadenopathy [Normal Anterior Cervical Nodes] : no anterior cervical lymphadenopathy [No CVA Tenderness] : no CVA  tenderness [No Spinal Tenderness] : no spinal tenderness [No Joint Swelling] : no joint swelling [Grossly Normal Strength/Tone] : grossly normal strength/tone [No Rash] : no rash [Coordination Grossly Intact] : coordination grossly intact [No Focal Deficits] : no focal deficits [Normal Gait] : normal gait [Normal Affect] : the affect was normal [Normal Insight/Judgement] : insight and judgment were intact [Alert and Oriented x3] : oriented to person, place, and time [de-identified] : varicose veins b/l, trace ankle edema b/l

## 2024-08-04 NOTE — HISTORY OF PRESENT ILLNESS
[FreeTextEntry1] : bp and leg swelling check [de-identified] : 84 y/o female with a pmhx of pAF, pre-DM, HLD, HTN here today for a follow up. Patient saw Dr. Dozier about 1 month ago and HCTZ was decreased to once daily and K to qod due to fatigue which was improved.  Denies chest pain, sob, tucker, dizziness, diaphoresis, palpitations, LE swelling, orthopnea, syncope, n/v, headache. DISPOSITION/PAST MEDICAL/SURGICAL/SOCIAL HISTORY/HISTORY OF PRESENT ILLNESS/REVIEW OF SYSTEMS/VITAL SIGNS( Pullset)/RESULTS/PROGRESS NOTE

## 2024-08-04 NOTE — PHYSICAL EXAM
[No Acute Distress] : no acute distress [Well Nourished] : well nourished [Well Developed] : well developed [Well-Appearing] : well-appearing [Normal Sclera/Conjunctiva] : normal sclera/conjunctiva [PERRL] : pupils equal round and reactive to light [EOMI] : extraocular movements intact [Normal Outer Ear/Nose] : the outer ears and nose were normal in appearance [Normal Oropharynx] : the oropharynx was normal [No JVD] : no jugular venous distention [No Lymphadenopathy] : no lymphadenopathy [Supple] : supple [Thyroid Normal, No Nodules] : the thyroid was normal and there were no nodules present [No Respiratory Distress] : no respiratory distress  [No Accessory Muscle Use] : no accessory muscle use [Clear to Auscultation] : lungs were clear to auscultation bilaterally [Normal Rate] : normal rate  [Regular Rhythm] : with a regular rhythm [Normal S1, S2] : normal S1 and S2 [No Murmur] : no murmur heard [No Carotid Bruits] : no carotid bruits [No Abdominal Bruit] : a ~M bruit was not heard ~T in the abdomen [Pedal Pulses Present] : the pedal pulses are present [No Palpable Aorta] : no palpable aorta [No Extremity Clubbing/Cyanosis] : no extremity clubbing/cyanosis [Soft] : abdomen soft [Non Tender] : non-tender [Non-distended] : non-distended [No Masses] : no abdominal mass palpated [No HSM] : no HSM [Normal Bowel Sounds] : normal bowel sounds [Normal Posterior Cervical Nodes] : no posterior cervical lymphadenopathy [Normal Anterior Cervical Nodes] : no anterior cervical lymphadenopathy [No CVA Tenderness] : no CVA  tenderness [No Spinal Tenderness] : no spinal tenderness [No Joint Swelling] : no joint swelling [Grossly Normal Strength/Tone] : grossly normal strength/tone [No Rash] : no rash [Coordination Grossly Intact] : coordination grossly intact [No Focal Deficits] : no focal deficits [Normal Gait] : normal gait [Normal Affect] : the affect was normal [Normal Insight/Judgement] : insight and judgment were intact [Alert and Oriented x3] : oriented to person, place, and time [de-identified] : varicose veins b/l, trace ankle edema b/l

## 2024-08-04 NOTE — HISTORY OF PRESENT ILLNESS
[FreeTextEntry1] : bp and leg swelling check [de-identified] : 84 y/o female with a pmhx of pAF, pre-DM, HLD, HTN here today for a follow up. Patient saw Dr. Dozier about 1 month ago and HCTZ was decreased to once daily and K to qod due to fatigue which was improved.  Denies chest pain, sob, tucker, dizziness, diaphoresis, palpitations, LE swelling, orthopnea, syncope, n/v, headache.

## 2024-08-04 NOTE — HEALTH RISK ASSESSMENT
[0] : 2) Feeling down, depressed, or hopeless: Not at all (0) [PHQ-2 Negative - No further assessment needed] : PHQ-2 Negative - No further assessment needed [Never] : Never [[id number]] : 0

## 2024-08-05 LAB
ANION GAP SERPL CALC-SCNC: 13 MMOL/L
BUN SERPL-MCNC: 14 MG/DL
CALCIUM SERPL-MCNC: 9.7 MG/DL
CHLORIDE SERPL-SCNC: 101 MMOL/L
CO2 SERPL-SCNC: 28 MMOL/L
CREAT SERPL-MCNC: 0.56 MG/DL
CREAT SPEC-SCNC: 15 MG/DL
EGFR: 90 ML/MIN/1.73M2
GLUCOSE SERPL-MCNC: 80 MG/DL
MICROALBUMIN 24H UR DL<=1MG/L-MCNC: <1.2 MG/DL
MICROALBUMIN/CREAT 24H UR-RTO: NORMAL MG/G
POTASSIUM SERPL-SCNC: 4.1 MMOL/L
SODIUM SERPL-SCNC: 142 MMOL/L

## 2024-08-11 ENCOUNTER — OUTPATIENT (OUTPATIENT)
Dept: OUTPATIENT SERVICES | Facility: HOSPITAL | Age: 84
LOS: 1 days | End: 2024-08-11
Payer: MEDICARE

## 2024-08-11 DIAGNOSIS — Z98.89 OTHER SPECIFIED POSTPROCEDURAL STATES: Chronic | ICD-10-CM

## 2024-08-11 DIAGNOSIS — L72.9 FOLLICULAR CYST OF THE SKIN AND SUBCUTANEOUS TISSUE, UNSPECIFIED: Chronic | ICD-10-CM

## 2024-08-11 DIAGNOSIS — I77.810 THORACIC AORTIC ECTASIA: ICD-10-CM

## 2024-08-11 PROCEDURE — 71250 CT THORAX DX C-: CPT

## 2024-08-15 ENCOUNTER — APPOINTMENT (OUTPATIENT)
Dept: CARDIOLOGY | Facility: CLINIC | Age: 84
End: 2024-08-15
Payer: MEDICARE

## 2024-08-15 VITALS
DIASTOLIC BLOOD PRESSURE: 70 MMHG | SYSTOLIC BLOOD PRESSURE: 140 MMHG | BODY MASS INDEX: 31.94 KG/M2 | HEIGHT: 55 IN | TEMPERATURE: 98.1 F | OXYGEN SATURATION: 96 % | WEIGHT: 138 LBS | HEART RATE: 57 BPM

## 2024-08-15 DIAGNOSIS — I48.0 PAROXYSMAL ATRIAL FIBRILLATION: ICD-10-CM

## 2024-08-15 DIAGNOSIS — R59.9 ENLARGED LYMPH NODES, UNSPECIFIED: ICD-10-CM

## 2024-08-15 PROCEDURE — 93000 ELECTROCARDIOGRAM COMPLETE: CPT

## 2024-08-15 PROCEDURE — 99214 OFFICE O/P EST MOD 30 MIN: CPT

## 2024-08-15 PROCEDURE — G2211 COMPLEX E/M VISIT ADD ON: CPT

## 2024-08-15 NOTE — HISTORY OF PRESENT ILLNESS
[FreeTextEntry1] : ARON is a 83 year F w/MHx of Carotid artery disease, Renal artery aneurysm, pAfib, Dilated Aorta, HLD, HTN, PreDM, Obesity who presents for follow up. Last OV 7/5/2024. 8/11/2024 CT chest w/ Multiple enlarged right axillary lymph nodes are increased from prior exam. The largest one measures 3.6 x 3.3 cm, previously 3.1 x 1.9 cm. Denies chest pain, palpitations, diaphoresis, vision changes, HA, dizziness, syncope, cough, wheezing, SOB/MARTIN, edema, fatigue, fever, chills, infection/UTI SXS.

## 2024-08-15 NOTE — PHYSICAL EXAM

## 2024-08-19 ENCOUNTER — NON-APPOINTMENT (OUTPATIENT)
Age: 84
End: 2024-08-19

## 2024-08-22 LAB
ALBUMIN SERPL ELPH-MCNC: 4.4 G/DL
ALP BLD-CCNC: 63 U/L
ALT SERPL-CCNC: 15 U/L
ANION GAP SERPL CALC-SCNC: 13 MMOL/L
AST SERPL-CCNC: 18 U/L
BASOPHILS # BLD AUTO: 0.04 K/UL
BASOPHILS NFR BLD AUTO: 0.6 %
BILIRUB SERPL-MCNC: 0.4 MG/DL
BUN SERPL-MCNC: 18 MG/DL
CALCIUM SERPL-MCNC: 9.6 MG/DL
CHLORIDE SERPL-SCNC: 101 MMOL/L
CO2 SERPL-SCNC: 28 MMOL/L
CREAT SERPL-MCNC: 0.59 MG/DL
EGFR: 89 ML/MIN/1.73M2
EOSINOPHIL # BLD AUTO: 0.27 K/UL
EOSINOPHIL NFR BLD AUTO: 3.8 %
GLUCOSE SERPL-MCNC: 101 MG/DL
HCT VFR BLD CALC: 46.1 %
HGB BLD-MCNC: 14.4 G/DL
IMM GRANULOCYTES NFR BLD AUTO: 0.1 %
LYMPHOCYTES # BLD AUTO: 1.27 K/UL
LYMPHOCYTES NFR BLD AUTO: 17.9 %
MAN DIFF?: NORMAL
MCHC RBC-ENTMCNC: 28.1 PG
MCHC RBC-ENTMCNC: 31.2 GM/DL
MCV RBC AUTO: 90 FL
MONOCYTES # BLD AUTO: 0.84 K/UL
MONOCYTES NFR BLD AUTO: 11.8 %
NEUTROPHILS # BLD AUTO: 4.66 K/UL
NEUTROPHILS NFR BLD AUTO: 65.8 %
PLATELET # BLD AUTO: 236 K/UL
POTASSIUM SERPL-SCNC: 4.3 MMOL/L
PROT SERPL-MCNC: 6.9 G/DL
RBC # BLD: 5.12 M/UL
RBC # FLD: 14.3 %
SODIUM SERPL-SCNC: 143 MMOL/L
T4 FREE SERPL-MCNC: 1.1 NG/DL
TSH SERPL-ACNC: 2.16 UIU/ML
WBC # FLD AUTO: 7.09 K/UL

## 2024-08-24 LAB
5OH-INDOLEACETATE UR-SCNC: 4.4 MG/G CREAT
CREAT ?TM UR-MCNC: 54 MG/DL

## 2024-08-26 ENCOUNTER — NON-APPOINTMENT (OUTPATIENT)
Age: 84
End: 2024-08-26

## 2024-08-26 ENCOUNTER — APPOINTMENT (OUTPATIENT)
Dept: PULMONOLOGY | Facility: CLINIC | Age: 84
End: 2024-08-26
Payer: MEDICARE

## 2024-08-26 VITALS — SYSTOLIC BLOOD PRESSURE: 152 MMHG | DIASTOLIC BLOOD PRESSURE: 62 MMHG

## 2024-08-26 VITALS — DIASTOLIC BLOOD PRESSURE: 66 MMHG | OXYGEN SATURATION: 95 % | HEART RATE: 63 BPM | SYSTOLIC BLOOD PRESSURE: 152 MMHG

## 2024-08-26 DIAGNOSIS — R91.8 OTHER NONSPECIFIC ABNORMAL FINDING OF LUNG FIELD: ICD-10-CM

## 2024-08-26 PROCEDURE — 99213 OFFICE O/P EST LOW 20 MIN: CPT

## 2024-08-27 PROBLEM — R91.8 ABNORMAL CT SCAN OF LUNG: Status: ACTIVE | Noted: 2024-08-27

## 2024-08-27 NOTE — PHYSICAL EXAM
[General Appearance - Well Developed] : well developed [General Appearance - Well Nourished] : well nourished [Normal Oropharynx] : normal oropharynx [Neck Cervical Mass (___cm)] : no neck mass was observed [Jugular Venous Distention Increased] : there was no jugular-venous distention [Thyroid Diffuse Enlargement] : the thyroid was not enlarged [Heart Sounds] : normal S1 and S2 [Lungs Percussion] : the lungs were normal to percussion [Abdomen Soft] : soft [Abdomen Tenderness] : non-tender [Abdomen Mass (___ Cm)] : no abdominal mass palpated [Nail Clubbing] : no clubbing of the fingernails [Cyanosis, Localized] : no localized cyanosis [1+ Pitting] : 1+  pitting [Skin Color & Pigmentation] : normal skin color and pigmentation [] : no rash [No Venous Stasis] : no venous stasis [Skin Lesions] : no skin lesions [No Skin Ulcers] : no skin ulcer [No Xanthoma] : no  xanthoma was observed [Deep Tendon Reflexes (DTR)] : deep tendon reflexes were 2+ and symmetric [Sensation] : the sensory exam was normal to light touch and pinprick [No Focal Deficits] : no focal deficits [Oriented To Time, Place, And Person] : oriented to person, place, and time [Impaired Insight] : insight and judgment were intact [Affect] : the affect was normal [FreeTextEntry1] : Few crackles on left. [FreeTextEntry2] :  Varicose veins.

## 2024-08-27 NOTE — HISTORY OF PRESENT ILLNESS
[TextBox_4] : Here for f/u seeing dr Dozier  had recent CT chest multiple enlarged right axillary lymph nodes  going for Mammo and Sono seeing hematologist in Sept Denies cough wheezing chest pain or shortness of breath. No recurrence of respiratory infections or hypoxemia.

## 2024-08-27 NOTE — PROCEDURE
[FreeTextEntry1] : CT chest 8/11/24 reviewed  1 / 1 Ori Simpson  Report date:1/19/2024 View Order (Report matches exam selected in Patient History pan)     ACC: [id number] EXAM: XR CHEST AP OR PA 1V ORDERED BY: RENETTA MILLS  PROCEDURE DATE: 01/17/2024    INTERPRETATION: Chest one view  HISTORY: Hypoxia  COMPARISON STUDY: 1/14/2024  Frontal expiratory view of the chest shows the heart to be similar in size. The lungs show less pulmonary congestion with clearing of the left lung. Left effusion is similar or smaller and there is no evidence of pneumothorax nor right pleural effusion.  IMPRESSION: Clearing of the lungs as noted.    Thank you for the courtesy of this referral.  --- End of Report ---      ORI SIMPSON MD; Attending Interventional Radiologist This document has been electronically signed. Jan 19 2024 1:18PM  1 / 1 Karyn Kyle  Report date:1/13/2024 View Order (Report matches exam selected in Patient History [facility])     ACC: [id number] EXAM: CT ANGIO CHEST PULM ART WAWI ORDERED BY: JER FULLER  PROCEDURE DATE: 01/13/2024    INTERPRETATION: CLINICAL INFORMATION: Evaluate for pulmonary embolism.  COMPARISON: Noncontrast CT chest"a 2023.  CONTRAST/COMPLICATIONS: IV Contrast: Omnipaque 350 70 cc administered 30 cc discarded Oral Contrast: NONE Complications: None reported at time of study completion  PROCEDURE: CT Angiography of the Chest. Sagittal and coronal reformats were performed as well as 3D (MIP) reconstructions.  FINDINGS:  AIRWAYS: Patent central airways. LUNGS AND PLEURA: Small left pleural effusion. Extensive airspace infiltrate/consolidation left upper and bilateral lower lobes, left greater than right. Minimal airspace opacity right upper lobe. MEDIASTINUM AND MICHAEL: Small right axillary lymph nodes present largest measuring 1.2 x 0.8 cm. Scattered small mediastinal lymph nodes. No VESSELS: Atherosclerotic vascular calcification thoracic aorta. No thoracic aneurysm or dissection. No acute aortic syndrome. No evidence of pulmonary embolism. HEART: Mild cardiomegaly. No pericardial effusion. Mitral annular and coronary artery calcification present. CHEST WALL AND LOWER NECK: Within normal limits. VISUALIZED UPPER ABDOMEN: Within normal limits. BONES: Degenerative changes. No lytic or blastic process.  IMPRESSION: Small left pleural effusion. Extensive airspace infiltrate/consolidation left upper and bilateral lower lobes, left greater than right. Minimal airspace opacity right upper lobe.  No evidence pulmonary embolism.  Please refer to detailed findings otherwise described above.  --- End of Report ---      KARYN KYLE MD; Attending Radiologist This document has been electronically signed. Jan 13 2024 4:34PM

## 2024-08-27 NOTE — DISCUSSION/SUMMARY
[FreeTextEntry1] :  Enlarged axillary lymph nodes.  Receiving workup.  History of lymphoma. Hospitalization 2023 significant pneumonia likely secondary infection to human metapneumovirus. ? Comp CHF.  Clinically and radiographically improved. atrial fibrillation. On Eliquis Hypertension on therapy. Probable component of white coat.  Hyperlipidemia on treatment protocol including medications, diet, and exercise program as tolerated. Continue present therapy never a smoker Lymphoma followed by oncology. Osteopenia Status post COVID virus infection without evidence of sequela.

## 2024-08-27 NOTE — DISCUSSION/SUMMARY
[FreeTextEntry1] :  Enlarged axillary lymph nodes.  Receiving workup.  History of lymphoma. Hospitalization 2023 significant pneumonia likely secondary infection to human metapneumovirus. ? Comp CHF.  Clinically and radiographically improved. atrial fibrillation. On Eliquis Hypertension on therapy. Probable component of white coat.  Hyperlipidemia on treatment protocol including medications, diet, and exercise program as tolerated. Continue present therapy never a smoker Lymphoma followed by oncology. Osteopenia Status post COVID virus infection without evidence of sequela. Strong peripheral pulses

## 2024-08-27 NOTE — PROCEDURE
[FreeTextEntry1] : CT chest 8/11/24 reviewed  1 / 1 Ori Simpson  Report date:1/19/2024 View Order (Report matches exam selected in Patient History pan)     ACC: [id number] EXAM: XR CHEST AP OR PA 1V ORDERED BY: RENETTA MILLS  PROCEDURE DATE: 01/17/2024    INTERPRETATION: Chest one view  HISTORY: Hypoxia  COMPARISON STUDY: 1/14/2024  Frontal expiratory view of the chest shows the heart to be similar in size. The lungs show less pulmonary congestion with clearing of the left lung. Left effusion is similar or smaller and there is no evidence of pneumothorax nor right pleural effusion.  IMPRESSION: Clearing of the lungs as noted.    Thank you for the courtesy of this referral.  --- End of Report ---      ORI SIMPSON MD; Attending Interventional Radiologist This document has been electronically signed. Jan 19 2024 1:18PM  1 / 1 Karyn Kyle  Report date:1/13/2024 View Order (Report matches exam selected in Patient History [facility])     ACC: [id number] EXAM: CT ANGIO CHEST PULM ART WAWI ORDERED BY: JER FULLER  PROCEDURE DATE: 01/13/2024    INTERPRETATION: CLINICAL INFORMATION: Evaluate for pulmonary embolism.  COMPARISON: Noncontrast CT chest"a 2023.  CONTRAST/COMPLICATIONS: IV Contrast: Omnipaque 350 70 cc administered 30 cc discarded Oral Contrast: NONE Complications: None reported at time of study completion  PROCEDURE: CT Angiography of the Chest. Sagittal and coronal reformats were performed as well as 3D (MIP) reconstructions.  FINDINGS:  AIRWAYS: Patent central airways. LUNGS AND PLEURA: Small left pleural effusion. Extensive airspace infiltrate/consolidation left upper and bilateral lower lobes, left greater than right. Minimal airspace opacity right upper lobe. MEDIASTINUM AND MICHAEL: Small right axillary lymph nodes present largest measuring 1.2 x 0.8 cm. Scattered small mediastinal lymph nodes. No VESSELS: Atherosclerotic vascular calcification thoracic aorta. No thoracic aneurysm or dissection. No acute aortic syndrome. No evidence of pulmonary embolism. HEART: Mild cardiomegaly. No pericardial effusion. Mitral annular and coronary artery calcification present. CHEST WALL AND LOWER NECK: Within normal limits. VISUALIZED UPPER ABDOMEN: Within normal limits. BONES: Degenerative changes. No lytic or blastic process.  IMPRESSION: Small left pleural effusion. Extensive airspace infiltrate/consolidation left upper and bilateral lower lobes, left greater than right. Minimal airspace opacity right upper lobe.  No evidence pulmonary embolism.  Please refer to detailed findings otherwise described above.  --- End of Report ---      KARYN KYLE MD; Attending Radiologist This document has been electronically signed. Jan 13 2024 4:34PM

## 2024-08-27 NOTE — ASSESSMENT
[FreeTextEntry1] : Cardiology follow-up. Receiving workup for lymphadenopathy. Sees hematology oncology. Follow-up in office on appearing basis.   25 minutes spent in evaluation management and review of studies.

## 2024-08-28 ENCOUNTER — RESULT REVIEW (OUTPATIENT)
Age: 84
End: 2024-08-28

## 2024-08-28 ENCOUNTER — APPOINTMENT (OUTPATIENT)
Dept: MAMMOGRAPHY | Facility: CLINIC | Age: 84
End: 2024-08-28
Payer: MEDICARE

## 2024-08-28 ENCOUNTER — APPOINTMENT (OUTPATIENT)
Dept: ULTRASOUND IMAGING | Facility: CLINIC | Age: 84
End: 2024-08-28
Payer: MEDICARE

## 2024-08-28 ENCOUNTER — OUTPATIENT (OUTPATIENT)
Dept: OUTPATIENT SERVICES | Facility: HOSPITAL | Age: 84
LOS: 1 days | End: 2024-08-28
Payer: MEDICARE

## 2024-08-28 DIAGNOSIS — R93.89 ABNORMAL FINDINGS ON DIAGNOSTIC IMAGING OF OTHER SPECIFIED BODY STRUCTURES: ICD-10-CM

## 2024-08-28 DIAGNOSIS — R59.9 ENLARGED LYMPH NODES, UNSPECIFIED: ICD-10-CM

## 2024-08-28 PROCEDURE — G0279: CPT | Mod: 26

## 2024-08-28 PROCEDURE — 76641 ULTRASOUND BREAST COMPLETE: CPT

## 2024-08-28 PROCEDURE — 77066 DX MAMMO INCL CAD BI: CPT | Mod: 26

## 2024-08-28 PROCEDURE — 77066 DX MAMMO INCL CAD BI: CPT

## 2024-08-28 PROCEDURE — 76641 ULTRASOUND BREAST COMPLETE: CPT | Mod: 26,50,3G

## 2024-08-28 PROCEDURE — G0279: CPT

## 2024-08-30 ENCOUNTER — APPOINTMENT (OUTPATIENT)
Dept: SURGICAL ONCOLOGY | Facility: CLINIC | Age: 84
End: 2024-08-30
Payer: MEDICARE

## 2024-08-30 VITALS
BODY MASS INDEX: 27.09 KG/M2 | OXYGEN SATURATION: 96 % | HEART RATE: 67 BPM | WEIGHT: 138 LBS | DIASTOLIC BLOOD PRESSURE: 67 MMHG | SYSTOLIC BLOOD PRESSURE: 177 MMHG | HEIGHT: 60 IN

## 2024-08-30 DIAGNOSIS — C82.00 FOLLICULAR LYMPHOMA GRADE I, UNSPECIFIED SITE: ICD-10-CM

## 2024-08-30 DIAGNOSIS — R59.9 ENLARGED LYMPH NODES, UNSPECIFIED: ICD-10-CM

## 2024-08-30 PROCEDURE — 99215 OFFICE O/P EST HI 40 MIN: CPT

## 2024-08-30 NOTE — ASSESSMENT
[FreeTextEntry1] : Hx of lymphoma 12/2023 Enlarged right groin lymph nodes possibly reactive, Case discussed with Dr. Camacho  now w/ enlarged B/L axilla LN 7 suspicious Left breast mass -- biopsies are scheduled for 9/3 @ [facility].

## 2024-08-30 NOTE — PHYSICAL EXAM
[Normal] : supple, no neck mass and thyroid not enlarged [Normal Neck Lymph Nodes] : normal neck lymph nodes  [Normal Supraclavicular Lymph Nodes] : normal supraclavicular lymph nodes [Normal Axillary Lymph Nodes] : normal axillary lymph nodes [Normal] : oriented to person, place and time, with appropriate affect [de-identified] : us shows normal appearing right inguinal lymph nodes

## 2024-08-30 NOTE — HISTORY OF PRESENT ILLNESS
[de-identified] : 85yo F here for a follow-up for an enlarged right groin lymph node now w/ enlarged B/L axilla LN -- biopsies are scheduled for 9/3 @ [facility]. Pt has hx of lymphoma in the left 2014, tx as per Dr. Camacho.   B/L mammo/sono 8/28/2024 - B/L abnormal appearing LN including a dominant RIGHT axillary LN measuring 5.0 cm & a rounded abnormal appearing LEFT axillary LN measuring 1.3 cm, corresponding to an enlarged node compared w/ prior breast imaging *U/S biopsy of both LN is recommended  - NEW sub centimeter irregular hypoechoic mass to L 12:00 N3 -> f/u L [facility] BI-RADS 4A  CT chest (NONcon) 8/11/2024 - multiple enlarged RIGHT axillary LN measuring up to 3.6 x 3.3 cm  - ascending aorta measures 3.7 cm   Pt was in the ER a few weeks ago for cellulitis and tx with abx. She was referred by Dr. Dozier. She presented with swelling in the RT extremity now which prompted her to get imaging.   CT A/P (12/13/23): Mildly enlarged 1.8 x 1.4 cm right groin lymph node with hypervascular cortex   US duplex venous LE (12/5/23): Right: no evidence of right lower extremity deep venous thrombosis. Enlarged lymph nodes noted in the right groin.

## 2024-09-03 ENCOUNTER — RESULT REVIEW (OUTPATIENT)
Age: 84
End: 2024-09-03

## 2024-09-03 ENCOUNTER — APPOINTMENT (OUTPATIENT)
Dept: ULTRASOUND IMAGING | Facility: CLINIC | Age: 84
End: 2024-09-03

## 2024-09-03 ENCOUNTER — OUTPATIENT (OUTPATIENT)
Dept: OUTPATIENT SERVICES | Facility: HOSPITAL | Age: 84
LOS: 1 days | End: 2024-09-03
Payer: MEDICARE

## 2024-09-03 DIAGNOSIS — L72.9 FOLLICULAR CYST OF THE SKIN AND SUBCUTANEOUS TISSUE, UNSPECIFIED: Chronic | ICD-10-CM

## 2024-09-03 DIAGNOSIS — R59.9 ENLARGED LYMPH NODES, UNSPECIFIED: ICD-10-CM

## 2024-09-03 DIAGNOSIS — Z98.89 OTHER SPECIFIED POSTPROCEDURAL STATES: Chronic | ICD-10-CM

## 2024-09-03 PROCEDURE — 38505 NEEDLE BIOPSY LYMPH NODES: CPT | Mod: LT

## 2024-09-03 PROCEDURE — 88189 FLOWCYTOMETRY/READ 16 & >: CPT | Mod: 59

## 2024-09-03 PROCEDURE — 77066 DX MAMMO INCL CAD BI: CPT

## 2024-09-03 PROCEDURE — 19083 BX BREAST 1ST LESION US IMAG: CPT

## 2024-09-03 PROCEDURE — 77066 DX MAMMO INCL CAD BI: CPT | Mod: 26

## 2024-09-03 PROCEDURE — A4648: CPT

## 2024-09-03 PROCEDURE — 19083 BX BREAST 1ST LESION US IMAG: CPT | Mod: LT

## 2024-09-03 PROCEDURE — 88184 FLOWCYTOMETRY/ TC 1 MARKER: CPT

## 2024-09-03 PROCEDURE — 88185 FLOWCYTOMETRY/TC ADD-ON: CPT

## 2024-09-03 PROCEDURE — 76942 ECHO GUIDE FOR BIOPSY: CPT | Mod: 26,59

## 2024-09-03 PROCEDURE — 38505 NEEDLE BIOPSY LYMPH NODES: CPT

## 2024-09-03 PROCEDURE — 76942 ECHO GUIDE FOR BIOPSY: CPT | Mod: 26,59,76

## 2024-09-03 PROCEDURE — 87205 SMEAR GRAM STAIN: CPT

## 2024-09-03 PROCEDURE — 76942 ECHO GUIDE FOR BIOPSY: CPT

## 2024-09-10 ENCOUNTER — OUTPATIENT (OUTPATIENT)
Dept: OUTPATIENT SERVICES | Facility: HOSPITAL | Age: 84
LOS: 1 days | Discharge: HOME | End: 2024-09-10
Payer: MEDICARE

## 2024-09-10 DIAGNOSIS — C85.88 OTHER SPECIFIED TYPES OF NON-HODGKIN LYMPHOMA, LYMPH NODES OF MULTIPLE SITES: ICD-10-CM

## 2024-09-10 DIAGNOSIS — Z98.89 OTHER SPECIFIED POSTPROCEDURAL STATES: Chronic | ICD-10-CM

## 2024-09-10 NOTE — PHYSICAL EXAM
[No Acute Distress] : no acute distress [Well Nourished] : well nourished [Well Developed] : well developed [Well-Appearing] : well-appearing [Normal Sclera/Conjunctiva] : normal sclera/conjunctiva [PERRL] : pupils equal round and reactive to light [EOMI] : extraocular movements intact [Normal Outer Ear/Nose] : the outer ears and nose were normal in appearance [Normal Oropharynx] : the oropharynx was normal [No JVD] : no jugular venous distention [No Lymphadenopathy] : no lymphadenopathy [Supple] : supple [Thyroid Normal, No Nodules] : the thyroid was normal and there were no nodules present [No Respiratory Distress] : no respiratory distress  [No Accessory Muscle Use] : no accessory muscle use [Clear to Auscultation] : lungs were clear to auscultation bilaterally [Normal Rate] : normal rate  [Regular Rhythm] : with a regular rhythm [Normal S1, S2] : normal S1 and S2 [No Murmur] : no murmur heard [No Carotid Bruits] : no carotid bruits [No Varicosities] : no varicosities [Pedal Pulses Present] : the pedal pulses are present [No Edema] : there was no peripheral edema [No Extremity Clubbing/Cyanosis] : no extremity clubbing/cyanosis [Soft] : abdomen soft [Non Tender] : non-tender [Non-distended] : non-distended [No Masses] : no abdominal mass palpated [No HSM] : no HSM [Normal Bowel Sounds] : normal bowel sounds [Normal Posterior Cervical Nodes] : no posterior cervical lymphadenopathy [Normal Anterior Cervical Nodes] : no anterior cervical lymphadenopathy [No CVA Tenderness] : no CVA  tenderness [No Spinal Tenderness] : no spinal tenderness [No Joint Swelling] : no joint swelling [Grossly Normal Strength/Tone] : grossly normal strength/tone [No Rash] : no rash [Coordination Grossly Intact] : coordination grossly intact [No Focal Deficits] : no focal deficits [Normal Gait] : normal gait [Normal Affect] : the affect was normal [Alert and Oriented x3] : oriented to person, place, and time

## 2024-09-11 ENCOUNTER — APPOINTMENT (OUTPATIENT)
Dept: ULTRASOUND IMAGING | Facility: IMAGING CENTER | Age: 84
End: 2024-09-11

## 2024-09-11 ENCOUNTER — APPOINTMENT (OUTPATIENT)
Dept: INTERNAL MEDICINE | Facility: CLINIC | Age: 84
End: 2024-09-11
Payer: MEDICARE

## 2024-09-11 VITALS
BODY MASS INDEX: 26.7 KG/M2 | TEMPERATURE: 98.2 F | SYSTOLIC BLOOD PRESSURE: 120 MMHG | OXYGEN SATURATION: 98 % | WEIGHT: 136 LBS | DIASTOLIC BLOOD PRESSURE: 60 MMHG | HEIGHT: 60 IN | HEART RATE: 59 BPM

## 2024-09-11 DIAGNOSIS — R59.9 ENLARGED LYMPH NODES, UNSPECIFIED: ICD-10-CM

## 2024-09-11 DIAGNOSIS — R09.81 NASAL CONGESTION: ICD-10-CM

## 2024-09-11 DIAGNOSIS — I48.0 PAROXYSMAL ATRIAL FIBRILLATION: ICD-10-CM

## 2024-09-11 DIAGNOSIS — I35.9 NONRHEUMATIC AORTIC VALVE DISORDER, UNSPECIFIED: ICD-10-CM

## 2024-09-11 LAB
INFLUENZA A RESULT: NOT DETECTED
INFLUENZA B RESULT: NOT DETECTED
RESP SYN VIRUS RESULT: NOT DETECTED
SARS-COV-2 RESULT: NOT DETECTED

## 2024-09-11 PROCEDURE — 99213 OFFICE O/P EST LOW 20 MIN: CPT

## 2024-09-11 PROCEDURE — G2211 COMPLEX E/M VISIT ADD ON: CPT

## 2024-09-12 LAB
ALBUMIN SERPL ELPH-MCNC: 4.2 G/DL
ALP BLD-CCNC: 67 U/L
ALT SERPL-CCNC: 22 U/L
ANION GAP SERPL CALC-SCNC: 13 MMOL/L
AST SERPL-CCNC: 23 U/L
BASOPHILS # BLD AUTO: 0.04 K/UL
BASOPHILS NFR BLD AUTO: 0.6 %
BILIRUB SERPL-MCNC: 0.4 MG/DL
BUN SERPL-MCNC: 20 MG/DL
CALCIUM SERPL-MCNC: 10.1 MG/DL
CHLORIDE SERPL-SCNC: 99 MMOL/L
CK SERPL-CCNC: 63 U/L
CO2 SERPL-SCNC: 29 MMOL/L
CREAT SERPL-MCNC: 0.58 MG/DL
EGFR: 89 ML/MIN/1.73M2
EOSINOPHIL # BLD AUTO: 0.25 K/UL
EOSINOPHIL NFR BLD AUTO: 3.9 %
GLUCOSE SERPL-MCNC: 95 MG/DL
HCT VFR BLD CALC: 45.4 %
HGB BLD-MCNC: 14.4 G/DL
IMM GRANULOCYTES NFR BLD AUTO: 0.2 %
LYMPHOCYTES # BLD AUTO: 1.23 K/UL
LYMPHOCYTES NFR BLD AUTO: 19.2 %
MAN DIFF?: NORMAL
MCHC RBC-ENTMCNC: 28.2 PG
MCHC RBC-ENTMCNC: 31.7 GM/DL
MCV RBC AUTO: 89 FL
MONOCYTES # BLD AUTO: 0.75 K/UL
MONOCYTES NFR BLD AUTO: 11.7 %
NEUTROPHILS # BLD AUTO: 4.11 K/UL
NEUTROPHILS NFR BLD AUTO: 64.4 %
NT-PROBNP SERPL-MCNC: 617 PG/ML
PLATELET # BLD AUTO: 257 K/UL
POTASSIUM SERPL-SCNC: 4.7 MMOL/L
PROT SERPL-MCNC: 6.9 G/DL
RBC # BLD: 5.1 M/UL
RBC # FLD: 14.5 %
SODIUM SERPL-SCNC: 140 MMOL/L
T4 FREE SERPL-MCNC: 1.1 NG/DL
TSH SERPL-ACNC: 2.19 UIU/ML
WBC # FLD AUTO: 6.39 K/UL

## 2024-09-13 ENCOUNTER — NON-APPOINTMENT (OUTPATIENT)
Age: 84
End: 2024-09-13

## 2024-09-15 NOTE — HISTORY OF PRESENT ILLNESS
[FreeTextEntry1] : leg swelling [de-identified] : 83 y/o female with a pmhx of pAF, pre-DM, HLD, HTN here today for b/l legf swelling x 3 dauys taking hctz 25mg bid from 2 days, leg getteing better. was previosuly on 1 day Denies chest pain, SOB, MARTIN, dizziness, diaphoresis, palpitations,, orthopnea, syncope, n/v, headache.

## 2024-09-15 NOTE — ADDENDUM
[FreeTextEntry1] : This note was written by Shweta Timmons on 09/11/2024 acting as medical scribe for Dr. Marcin Esparza. I, Dr. Marcin Esparza, have read and attest that all the information, medical decision making and discharge instructions within are true and accurate.

## 2024-09-15 NOTE — HEALTH RISK ASSESSMENT
[0] : 2) Feeling down, depressed, or hopeless: Not at all (0) [PHQ-2 Negative - No further assessment needed] : PHQ-2 Negative - No further assessment needed [Never] : Never [[id number]] : 0

## 2024-09-15 NOTE — HEALTH RISK ASSESSMENT
[0] : 2) Feeling down, depressed, or hopeless: Not at all (0) [PHQ-2 Negative - No further assessment needed] : PHQ-2 Negative - No further assessment needed [Never] : Never [[id number]] : 0

## 2024-09-15 NOTE — HISTORY OF PRESENT ILLNESS
[FreeTextEntry1] : leg swelling [de-identified] : 83 y/o female with a pmhx of pAF, pre-DM, HLD, HTN here today for b/l legf swelling x 3 dauys taking hctz 25mg bid from 2 days, leg getteing better. was previosuly on 1 day Denies chest pain, SOB, MARTIN, dizziness, diaphoresis, palpitations,, orthopnea, syncope, n/v, headache.

## 2024-09-17 ENCOUNTER — RESULT REVIEW (OUTPATIENT)
Age: 84
End: 2024-09-17

## 2024-09-17 ENCOUNTER — APPOINTMENT (OUTPATIENT)
Dept: HEMATOLOGY ONCOLOGY | Facility: CLINIC | Age: 84
End: 2024-09-17

## 2024-09-17 VITALS
TEMPERATURE: 97.7 F | BODY MASS INDEX: 26.56 KG/M2 | OXYGEN SATURATION: 98 % | RESPIRATION RATE: 16 BRPM | DIASTOLIC BLOOD PRESSURE: 67 MMHG | WEIGHT: 136 LBS | SYSTOLIC BLOOD PRESSURE: 171 MMHG | HEART RATE: 56 BPM

## 2024-09-17 VITALS — SYSTOLIC BLOOD PRESSURE: 140 MMHG | DIASTOLIC BLOOD PRESSURE: 70 MMHG

## 2024-09-17 DIAGNOSIS — C82.00 FOLLICULAR LYMPHOMA GRADE I, UNSPECIFIED SITE: ICD-10-CM

## 2024-09-17 LAB
BASOPHILS # BLD AUTO: 0.04 K/UL — SIGNIFICANT CHANGE UP (ref 0–0.2)
BASOPHILS NFR BLD AUTO: 0.5 % — SIGNIFICANT CHANGE UP (ref 0–2)
EOSINOPHIL # BLD AUTO: 0.26 K/UL — SIGNIFICANT CHANGE UP (ref 0–0.5)
EOSINOPHIL NFR BLD AUTO: 3.2 % — SIGNIFICANT CHANGE UP (ref 0–6)
HCT VFR BLD CALC: 42.3 % — SIGNIFICANT CHANGE UP (ref 34.5–45)
HGB BLD-MCNC: 14 G/DL — SIGNIFICANT CHANGE UP (ref 11.5–15.5)
IMM GRANULOCYTES NFR BLD AUTO: 0.2 % — SIGNIFICANT CHANGE UP (ref 0–0.9)
LYMPHOCYTES # BLD AUTO: 0.92 K/UL — LOW (ref 1–3.3)
LYMPHOCYTES # BLD AUTO: 11.4 % — LOW (ref 13–44)
MCHC RBC-ENTMCNC: 28.8 PG — SIGNIFICANT CHANGE UP (ref 27–34)
MCHC RBC-ENTMCNC: 33.1 G/DL — SIGNIFICANT CHANGE UP (ref 32–36)
MCV RBC AUTO: 87 FL — SIGNIFICANT CHANGE UP (ref 80–100)
MONOCYTES # BLD AUTO: 0.86 K/UL — SIGNIFICANT CHANGE UP (ref 0–0.9)
MONOCYTES NFR BLD AUTO: 10.6 % — SIGNIFICANT CHANGE UP (ref 2–14)
NEUTROPHILS # BLD AUTO: 5.98 K/UL — SIGNIFICANT CHANGE UP (ref 1.8–7.4)
NEUTROPHILS NFR BLD AUTO: 74.1 % — SIGNIFICANT CHANGE UP (ref 43–77)
NRBC # BLD: 0 /100 WBCS — SIGNIFICANT CHANGE UP (ref 0–0)
PLATELET # BLD AUTO: 235 K/UL — SIGNIFICANT CHANGE UP (ref 150–400)
RBC # BLD: 4.86 M/UL — SIGNIFICANT CHANGE UP (ref 3.8–5.2)
RBC # FLD: 13.7 % — SIGNIFICANT CHANGE UP (ref 10.3–14.5)
WBC # BLD: 8.08 K/UL — SIGNIFICANT CHANGE UP (ref 3.8–10.5)
WBC # FLD AUTO: 8.08 K/UL — SIGNIFICANT CHANGE UP (ref 3.8–10.5)

## 2024-09-17 PROCEDURE — 99214 OFFICE O/P EST MOD 30 MIN: CPT

## 2024-09-17 NOTE — HISTORY OF PRESENT ILLNESS
[de-identified] : Mrs. Upton is a 74 year old woman with follicular lymphoma, grade 1-2, stage IA, who received radiotherapy to a > 5 cm left inguinal node in late 2014. In October, 2014 she noted a left inguinal lump associated with some discomfort. CT scan showed a 4.8 x 3,2 cm left inguinal node. 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 PET/CT showed that the inguinal node had grown over a few weeks to 5.2 cm. SUV was 10.1. The adrenal nodule was FDG(-). Marrow was (-). Iron was present. Excisional biopsy of the inguinal node showed findings most c/w FL, with dim CD10(+), CD20, BCL-6 and BCL-2 (partial). FISH for BCL-IgH was (-). \par  She was treated with RT which was well tolerated.\par  \par   \par  \par  Disease: follicular lymphoma, gr 1-2 \par  Stage:. IA. \par  Pathology: FL, gr 1-2.  [de-identified] : Remains w/o constitutional c/o  Radiotherapy to left inguinal node - 30 Gy in 15 fractions -completed 1/9/15. Baseline CT scans showed small renal (1.3 cm);,splenic artery aneurysms.  CT A/P 4/2/2022: vs 2014 no change in 1.7 cm right renal a. aneurysm or in 0.8 cm splenic a. aneurysm. No LAD or H/S eneida 3/8/23 for pneumonia. She was noted to be in new onselt atrial fibrillation at the time with RVR. She subsequently converted on her own. Her Echo showed normal LV function and mild MR/MS. She was seen by Dr Dozier and started on Eliquis. She developed a rash on it, and it was stopped and switched to Xarelto. She however resumed Eliquis and has not had recurrent rash since. She continues to do well without recurrent atrial fibrillation or palpitations.. .   Had  uncomplicated Covid 7/2022; paxlovid taken x 5d   BP better controlled with incr in toprol dose and HCTZ

## 2024-09-17 NOTE — REASON FOR VISIT
[Follow-Up Visit] : a follow-up visit for [Lymphoma] : lymphoma [Family Member] : family member [FreeTextEntry2] : FL

## 2024-09-18 ENCOUNTER — APPOINTMENT (OUTPATIENT)
Dept: INTERNAL MEDICINE | Facility: CLINIC | Age: 84
End: 2024-09-18
Payer: MEDICARE

## 2024-09-18 ENCOUNTER — APPOINTMENT (OUTPATIENT)
Dept: INTERNAL MEDICINE | Facility: CLINIC | Age: 84
End: 2024-09-18

## 2024-09-18 VITALS
HEIGHT: 71 IN | OXYGEN SATURATION: 99 % | WEIGHT: 137.31 LBS | HEART RATE: 67 BPM | DIASTOLIC BLOOD PRESSURE: 60 MMHG | SYSTOLIC BLOOD PRESSURE: 138 MMHG | TEMPERATURE: 97.7 F | BODY MASS INDEX: 19.22 KG/M2

## 2024-09-18 DIAGNOSIS — I65.23 OCCLUSION AND STENOSIS OF BILATERAL CAROTID ARTERIES: ICD-10-CM

## 2024-09-18 DIAGNOSIS — R60.0 LOCALIZED EDEMA: ICD-10-CM

## 2024-09-18 DIAGNOSIS — E03.9 HYPOTHYROIDISM, UNSPECIFIED: ICD-10-CM

## 2024-09-18 PROCEDURE — 99214 OFFICE O/P EST MOD 30 MIN: CPT

## 2024-09-18 NOTE — PLAN
[FreeTextEntry1] : Leg swelling - much improved w/ HCTZ, will c/w same HTN - controlled, c/w same Follicular lymphoma - following with hemeonc PAF - c/w eliquis/metoprolol, f/u with cardiology

## 2024-09-18 NOTE — REVIEW OF SYSTEMS
[Fever] : no fever [Night Sweats] : no night sweats [Discharge] : no discharge [Vision Problems] : no vision problems [Earache] : no earache [Nasal Discharge] : no nasal discharge [Chest Pain] : no chest pain [Orthopnea] : no orthopnea [Shortness Of Breath] : no shortness of breath [Cough] : no cough [Abdominal Pain] : no abdominal pain [Vomiting] : no vomiting [Dysuria] : no dysuria [Hematuria] : no hematuria [Joint Pain] : no joint pain [Muscle Pain] : no muscle pain [Itching] : no itching [Skin Rash] : no skin rash [Headache] : no headache [Memory Loss] : no memory loss [Suicidal] : not suicidal [Easy Bleeding] : no easy bleeding

## 2024-09-24 NOTE — REASON FOR VISIT
I reviewed the H&P, I examined the patient, and there are no changes in the patient's condition.    Elizabeth Hutson PA-C  General Surgery    
[FreeTextEntry1] : asses lymphadenopathy

## 2024-09-27 ENCOUNTER — APPOINTMENT (OUTPATIENT)
Dept: CARDIOLOGY | Facility: CLINIC | Age: 84
End: 2024-09-27
Payer: MEDICARE

## 2024-09-27 VITALS
DIASTOLIC BLOOD PRESSURE: 60 MMHG | RESPIRATION RATE: 15 BRPM | TEMPERATURE: 97.8 F | SYSTOLIC BLOOD PRESSURE: 164 MMHG | WEIGHT: 138 LBS | HEART RATE: 58 BPM | BODY MASS INDEX: 27.09 KG/M2 | HEIGHT: 60 IN | OXYGEN SATURATION: 97 %

## 2024-09-27 VITALS — SYSTOLIC BLOOD PRESSURE: 148 MMHG | DIASTOLIC BLOOD PRESSURE: 50 MMHG

## 2024-09-27 DIAGNOSIS — I10 ESSENTIAL (PRIMARY) HYPERTENSION: ICD-10-CM

## 2024-09-27 DIAGNOSIS — I35.1 NONRHEUMATIC AORTIC (VALVE) INSUFFICIENCY: ICD-10-CM

## 2024-09-27 DIAGNOSIS — E11.9 TYPE 2 DIABETES MELLITUS W/OUT COMPLICATIONS: ICD-10-CM

## 2024-09-27 DIAGNOSIS — E78.5 HYPERLIPIDEMIA, UNSPECIFIED: ICD-10-CM

## 2024-09-27 DIAGNOSIS — I72.2 ANEURYSM OF RENAL ARTERY: ICD-10-CM

## 2024-09-27 DIAGNOSIS — C85.90 NON-HODGKIN LYMPHOMA, UNSPECIFIED, UNSPECIFIED SITE: ICD-10-CM

## 2024-09-27 DIAGNOSIS — I48.0 PAROXYSMAL ATRIAL FIBRILLATION: ICD-10-CM

## 2024-09-27 PROCEDURE — G2211 COMPLEX E/M VISIT ADD ON: CPT

## 2024-09-27 PROCEDURE — 99214 OFFICE O/P EST MOD 30 MIN: CPT

## 2024-09-27 PROCEDURE — 93000 ELECTROCARDIOGRAM COMPLETE: CPT

## 2024-09-27 NOTE — HISTORY OF PRESENT ILLNESS
[FreeTextEntry1] : ARON is a 84 year F w/MHx of Lymphoma, Carotid artery disease, Renal artery aneurysm, pAfib, Dilated Aorta, HLD, HTN, PreDM who presents for follow up. Last OV 8/15/2024. 9/3/2024 s/p R/L Axilla L breast Bx, Classic follicular lymphoma to the R/L Axilla, L breast with fibroadenoma w/ calcifications. Pending wholebody PET/CT 9/28/2024. Sees hematology Dr. Camacho. Reports improvement with leg swelling. Denies chest pain, palpitations, diaphoresis, vision changes, HA, dizziness, syncope, cough, wheezing, SOB/MARTIN, fatigue, fever, chills, infection/UTI SXS.

## 2024-09-28 ENCOUNTER — APPOINTMENT (OUTPATIENT)
Dept: NUCLEAR MEDICINE | Facility: IMAGING CENTER | Age: 84
End: 2024-09-28

## 2024-09-28 ENCOUNTER — OUTPATIENT (OUTPATIENT)
Dept: OUTPATIENT SERVICES | Facility: HOSPITAL | Age: 84
LOS: 1 days | End: 2024-09-28
Payer: MEDICARE

## 2024-09-28 DIAGNOSIS — L72.9 FOLLICULAR CYST OF THE SKIN AND SUBCUTANEOUS TISSUE, UNSPECIFIED: Chronic | ICD-10-CM

## 2024-09-28 DIAGNOSIS — Z98.89 OTHER SPECIFIED POSTPROCEDURAL STATES: Chronic | ICD-10-CM

## 2024-09-28 DIAGNOSIS — C82.00 FOLLICULAR LYMPHOMA GRADE I, UNSPECIFIED SITE: ICD-10-CM

## 2024-09-28 PROCEDURE — A9552: CPT

## 2024-09-28 PROCEDURE — 78815 PET IMAGE W/CT SKULL-THIGH: CPT | Mod: 26,PI,MH

## 2024-09-28 PROCEDURE — 78815 PET IMAGE W/CT SKULL-THIGH: CPT

## 2024-10-01 ENCOUNTER — APPOINTMENT (OUTPATIENT)
Dept: HEMATOLOGY ONCOLOGY | Facility: CLINIC | Age: 84
End: 2024-10-01

## 2024-10-01 ENCOUNTER — NON-APPOINTMENT (OUTPATIENT)
Age: 84
End: 2024-10-01

## 2024-10-01 VITALS
HEART RATE: 54 BPM | SYSTOLIC BLOOD PRESSURE: 164 MMHG | BODY MASS INDEX: 26.56 KG/M2 | DIASTOLIC BLOOD PRESSURE: 52 MMHG | RESPIRATION RATE: 16 BRPM | OXYGEN SATURATION: 98 % | TEMPERATURE: 98.1 F | WEIGHT: 136 LBS

## 2024-10-01 DIAGNOSIS — E03.9 HYPOTHYROIDISM, UNSPECIFIED: ICD-10-CM

## 2024-10-01 PROCEDURE — 99214 OFFICE O/P EST MOD 30 MIN: CPT

## 2024-10-01 NOTE — HISTORY OF PRESENT ILLNESS
[de-identified] : Mrs. Upton is a 74 year old woman with follicular lymphoma, grade 1-2, stage IA, who received radiotherapy to a > 5 cm left inguinal node in late 2014. In October, 2014 she noted a left inguinal lump associated with some discomfort. CT scan showed a 4.8 x 3,2 cm left inguinal node. 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 PET/CT showed that the inguinal node had grown over a few weeks to 5.2 cm. SUV was 10.1. The adrenal nodule was FDG(-). Marrow was (-). Iron was present. Excisional biopsy of the inguinal node showed findings most c/w FL, with dim CD10(+), CD20, BCL-6 and BCL-2 (partial). FISH for BCL-IgH was (-). \par  She was treated with RT which was well tolerated.\par  \par   \par  \par  Disease: follicular lymphoma, gr 1-2 \par  Stage:. IA. \par  Pathology: FL, gr 1-2.

## 2024-10-01 NOTE — HISTORY OF PRESENT ILLNESS
[de-identified] : Mrs. Upton is a 74 year old woman with follicular lymphoma, grade 1-2, stage IA, who received radiotherapy to a > 5 cm left inguinal node in late 2014. In October, 2014 she noted a left inguinal lump associated with some discomfort. CT scan showed a 4.8 x 3,2 cm left inguinal node. 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 PET/CT showed that the inguinal node had grown over a few weeks to 5.2 cm. SUV was 10.1. The adrenal nodule was FDG(-). Marrow was (-). Iron was present. Excisional biopsy of the inguinal node showed findings most c/w FL, with dim CD10(+), CD20, BCL-6 and BCL-2 (partial). FISH for BCL-IgH was (-). \par  She was treated with RT which was well tolerated.\par  \par   \par  \par  Disease: follicular lymphoma, gr 1-2 \par  Stage:. IA. \par  Pathology: FL, gr 1-2.

## 2024-10-02 ENCOUNTER — APPOINTMENT (OUTPATIENT)
Dept: ELECTROPHYSIOLOGY | Facility: CLINIC | Age: 84
End: 2024-10-02
Payer: MEDICARE

## 2024-10-02 ENCOUNTER — NON-APPOINTMENT (OUTPATIENT)
Age: 84
End: 2024-10-02

## 2024-10-02 VITALS
OXYGEN SATURATION: 100 % | SYSTOLIC BLOOD PRESSURE: 143 MMHG | BODY MASS INDEX: 27.29 KG/M2 | HEART RATE: 84 BPM | HEIGHT: 60 IN | DIASTOLIC BLOOD PRESSURE: 55 MMHG | WEIGHT: 139 LBS

## 2024-10-02 VITALS — SYSTOLIC BLOOD PRESSURE: 146 MMHG | DIASTOLIC BLOOD PRESSURE: 64 MMHG

## 2024-10-02 DIAGNOSIS — I48.0 PAROXYSMAL ATRIAL FIBRILLATION: ICD-10-CM

## 2024-10-02 PROCEDURE — 99214 OFFICE O/P EST MOD 30 MIN: CPT

## 2024-10-02 PROCEDURE — 93000 ELECTROCARDIOGRAM COMPLETE: CPT

## 2024-10-02 RX ORDER — POTASSIUM CHLORIDE 1.5 G/1
20 POWDER, FOR SOLUTION ORAL DAILY
Refills: 0 | Status: ACTIVE | COMMUNITY
Start: 2024-10-02

## 2024-10-02 NOTE — DISCUSSION/SUMMARY
[EKG obtained to assist in diagnosis and management of assessed problem(s)] : EKG obtained to assist in diagnosis and management of assessed problem(s) [FreeTextEntry1] : In summary, this is an 84-year-old woman with a cardiovascular history significant for HTN and pAF. Her CHADSVASC score is 4, and she would benefit from lifelong anticoagulation. She seems to be tolerating Eliquis well now. She is 64.41 kg and will continue on 5mg BID until she is either less than 60 kg or if her Cr is >1.5.  She will continue the metoprolol and Eliquis. She will follow up in 1 year.  Mrs. Upton appeared to understand the whole discussion and verbalized that all of her questions were answered to her satisfaction.   Thank you for allowing me to be involved in the care of this pleasant woman. Please feel free to contact me with any questions.

## 2024-10-02 NOTE — CARDIOLOGY SUMMARY
[de-identified] : 10/2/24 Sinus bradycardia at 55 bpm 3/22/23: Sinus rhythm at 68 bpm, LAE 10/4/2023: Sinus bradycardia at 55 bpm 1/11/2024: Sinus rhythm at 75 bpm, left atrial enlargement, poor R wave progression [de-identified] : 6/22/22 LVEF 83% 3/8/23 LVEF 74% 8/29/23: 1. Normal left ventricular cavity size. Left ventricular wall thickness is mildly increased. Left ventricular systolic function is normal with an ejection fraction of 59 % by Otero's method of disks. 2. Mild septal left ventricular hypertrophy. 3. There is mild (grade 1) left ventricular diastolic dysfunction. 4. The left atrium is moderately dilated in size. 5. The right atrium is dilated in size. 6. Mild mitral regurgitation. 7. Trace tricuspid regurgitation. 8. Mild left ventricular hypertrophy. 9. Mild mitral valve leaflet calcification. 10. Mild pulmonic regurgitation. 11. Mild-to-moderate aortic regurgitation. 12. Pericardial fat pad is seen anterior to the right ventricle cannot rule out trivial effusion. 13. The ascending aorta diameter is dilated, measuring 4.00 cm (indexed 2.53 cm/m). The aortic arch diameter is normal. 14. There is increased LV mass and concentric hypertrophy. 15. There is mild mitral valve stenosis. 16. There is moderate calcification of the mitral valve annulus. 17. Thickened mitral valve leaflets. 18. Trileaflet aortic valve with normal systolic excursion. focal calcification of the aortic valve leaflets. minimal thickening of the aortic valve leaflets. fibrocalcific aortic valve sclerosis without stenosis.

## 2024-10-02 NOTE — HISTORY OF PRESENT ILLNESS
[FreeTextEntry1] : Referring Physician: Phi Dozier MD  Dear Dr. Dozier   Mrs. Upton was seen in the [facility] today. For our records, please allow me to summarize the history and my findings.   This pleasant 84 year old woman has a cardiovascular history significant for HTN and pAF.   She was hospitalized on 3/8/23 for pneumonia. She was noted to be in new onset atrial fibrillation at the time with RVR. She subsequently converted on her own. Her Echo showed normal LV function and mild MR/MS. She was seen by Dr Dozier and started on Eliquis. She developed a rash on it, and it was stopped and switched to Xarelto. She however resumed Eliquis and has not had recurrent rash since.  She saw 5 Dr. Dozier and was noted to be in A-fib with RVR was sent to the hospital at that time was noted to have a pneumonia.  She self-converted to sinus rhythm and then was started on Multaq.  She was discharged home but stopped Multaq earlier this week due to leg pain.  However the body aches were quickly followed by fever of 101.9.  This was on January 9.  She followed up  in the office 1/11/24 and had a fever of 101 with cough and body aches and fatigue and followed up with her PCP for treatment. 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 She had lymphoma 10 years ago and was treated with radiation at that time. She is no longer taking the Mutaq but is on metoprolol and Eliquis and is maintaining sinus rhythm.   Mrs. Upton denies any recent history of chest pain, shortness of breath, palpitations, dizziness, or syncope.

## 2024-10-03 ENCOUNTER — APPOINTMENT (OUTPATIENT)
Dept: CARDIOLOGY | Facility: CLINIC | Age: 84
End: 2024-10-03
Payer: MEDICARE

## 2024-10-03 ENCOUNTER — APPOINTMENT (OUTPATIENT)
Dept: CARDIOLOGY | Facility: CLINIC | Age: 84
End: 2024-10-03

## 2024-10-03 PROCEDURE — 90662 IIV NO PRSV INCREASED AG IM: CPT

## 2024-10-03 PROCEDURE — 93306 TTE W/DOPPLER COMPLETE: CPT

## 2024-10-03 PROCEDURE — G0008: CPT

## 2024-10-07 ENCOUNTER — RESULT REVIEW (OUTPATIENT)
Age: 84
End: 2024-10-07

## 2024-10-07 ENCOUNTER — NON-APPOINTMENT (OUTPATIENT)
Age: 84
End: 2024-10-07

## 2024-10-07 ENCOUNTER — RESULT CHARGE (OUTPATIENT)
Age: 84
End: 2024-10-07

## 2024-10-07 ENCOUNTER — APPOINTMENT (OUTPATIENT)
Dept: INFUSION THERAPY | Facility: HOSPITAL | Age: 84
End: 2024-10-07

## 2024-10-07 ENCOUNTER — APPOINTMENT (OUTPATIENT)
Dept: HEMATOLOGY ONCOLOGY | Facility: CLINIC | Age: 84
End: 2024-10-07

## 2024-10-07 DIAGNOSIS — C82.00 FOLLICULAR LYMPHOMA GRADE I, UNSPECIFIED SITE: ICD-10-CM

## 2024-10-07 LAB
ALBUMIN SERPL ELPH-MCNC: 4.2 G/DL — SIGNIFICANT CHANGE UP (ref 3.3–5)
ALP SERPL-CCNC: 63 U/L — SIGNIFICANT CHANGE UP (ref 40–120)
ALT FLD-CCNC: 15 U/L — SIGNIFICANT CHANGE UP (ref 10–45)
ANION GAP SERPL CALC-SCNC: 12 MMOL/L — SIGNIFICANT CHANGE UP (ref 5–17)
AST SERPL-CCNC: 27 U/L — SIGNIFICANT CHANGE UP (ref 10–40)
BASOPHILS # BLD AUTO: 0.02 K/UL — SIGNIFICANT CHANGE UP (ref 0–0.2)
BASOPHILS NFR BLD AUTO: 0.3 % — SIGNIFICANT CHANGE UP (ref 0–2)
BILIRUB SERPL-MCNC: 0.4 MG/DL — SIGNIFICANT CHANGE UP (ref 0.2–1.2)
BUN SERPL-MCNC: 17 MG/DL — SIGNIFICANT CHANGE UP (ref 7–23)
CALCIUM SERPL-MCNC: 9.9 MG/DL — SIGNIFICANT CHANGE UP (ref 8.4–10.5)
CHLORIDE SERPL-SCNC: 101 MMOL/L — SIGNIFICANT CHANGE UP (ref 96–108)
CO2 SERPL-SCNC: 28 MMOL/L — SIGNIFICANT CHANGE UP (ref 22–31)
CREAT SERPL-MCNC: 0.59 MG/DL — SIGNIFICANT CHANGE UP (ref 0.5–1.3)
EGFR: 89 ML/MIN/1.73M2 — SIGNIFICANT CHANGE UP
EOSINOPHIL # BLD AUTO: 0.27 K/UL — SIGNIFICANT CHANGE UP (ref 0–0.5)
EOSINOPHIL NFR BLD AUTO: 4.5 % — SIGNIFICANT CHANGE UP (ref 0–6)
GLUCOSE SERPL-MCNC: 132 MG/DL — HIGH (ref 70–99)
HBV DNA # SERPL NAA+PROBE: SIGNIFICANT CHANGE UP
HBV DNA SERPL NAA+PROBE-LOG#: SIGNIFICANT CHANGE UP LOGIU/ML
HCT VFR BLD CALC: 42.5 % — SIGNIFICANT CHANGE UP (ref 34.5–45)
HGB BLD-MCNC: 14.3 G/DL — SIGNIFICANT CHANGE UP (ref 11.5–15.5)
IMM GRANULOCYTES NFR BLD AUTO: 0.5 % — SIGNIFICANT CHANGE UP (ref 0–0.9)
LDH SERPL L TO P-CCNC: 206 U/L — SIGNIFICANT CHANGE UP (ref 50–242)
LYMPHOCYTES # BLD AUTO: 0.95 K/UL — LOW (ref 1–3.3)
LYMPHOCYTES # BLD AUTO: 15.9 % — SIGNIFICANT CHANGE UP (ref 13–44)
MCHC RBC-ENTMCNC: 28.8 PG — SIGNIFICANT CHANGE UP (ref 27–34)
MCHC RBC-ENTMCNC: 33.6 G/DL — SIGNIFICANT CHANGE UP (ref 32–36)
MCV RBC AUTO: 85.7 FL — SIGNIFICANT CHANGE UP (ref 80–100)
MONOCYTES # BLD AUTO: 0.91 K/UL — HIGH (ref 0–0.9)
MONOCYTES NFR BLD AUTO: 15.2 % — HIGH (ref 2–14)
NEUTROPHILS # BLD AUTO: 3.79 K/UL — SIGNIFICANT CHANGE UP (ref 1.8–7.4)
NEUTROPHILS NFR BLD AUTO: 63.6 % — SIGNIFICANT CHANGE UP (ref 43–77)
NRBC # BLD: 0 /100 WBCS — SIGNIFICANT CHANGE UP (ref 0–0)
PHOSPHATE SERPL-MCNC: 3.6 MG/DL — SIGNIFICANT CHANGE UP (ref 2.5–4.5)
PLATELET # BLD AUTO: 209 K/UL — SIGNIFICANT CHANGE UP (ref 150–400)
POTASSIUM SERPL-MCNC: 3.8 MMOL/L — SIGNIFICANT CHANGE UP (ref 3.5–5.3)
POTASSIUM SERPL-SCNC: 3.8 MMOL/L — SIGNIFICANT CHANGE UP (ref 3.5–5.3)
PROT SERPL-MCNC: 7.2 G/DL — SIGNIFICANT CHANGE UP (ref 6–8.3)
RBC # BLD: 4.96 M/UL — SIGNIFICANT CHANGE UP (ref 3.8–5.2)
RBC # FLD: 13.8 % — SIGNIFICANT CHANGE UP (ref 10.3–14.5)
SODIUM SERPL-SCNC: 141 MMOL/L — SIGNIFICANT CHANGE UP (ref 135–145)
URATE SERPL-MCNC: 5.3 MG/DL — SIGNIFICANT CHANGE UP (ref 2.5–7)
WBC # BLD: 5.97 K/UL — SIGNIFICANT CHANGE UP (ref 3.8–10.5)
WBC # FLD AUTO: 5.97 K/UL — SIGNIFICANT CHANGE UP (ref 3.8–10.5)

## 2024-10-07 PROCEDURE — 93010 ELECTROCARDIOGRAM REPORT: CPT

## 2024-10-07 RX ORDER — ONDANSETRON 8 MG/1
8 TABLET ORAL
Qty: 15 | Refills: 0 | Status: ACTIVE | COMMUNITY
Start: 2024-10-07 | End: 1900-01-01

## 2024-10-07 RX ORDER — ALLOPURINOL 300 MG/1
300 TABLET ORAL
Qty: 10 | Refills: 1 | Status: ACTIVE | COMMUNITY
Start: 2024-10-07 | End: 1900-01-01

## 2024-10-08 DIAGNOSIS — R11.2 NAUSEA WITH VOMITING, UNSPECIFIED: ICD-10-CM

## 2024-10-08 DIAGNOSIS — Z51.11 ENCOUNTER FOR ANTINEOPLASTIC CHEMOTHERAPY: ICD-10-CM

## 2024-10-08 LAB
HAV IGM SER-ACNC: SIGNIFICANT CHANGE UP
HBV CORE AB SER-ACNC: SIGNIFICANT CHANGE UP
HBV CORE IGM SER-ACNC: SIGNIFICANT CHANGE UP
HBV SURFACE AB SER-ACNC: <3 MIU/ML — LOW
HBV SURFACE AG SER-ACNC: SIGNIFICANT CHANGE UP
HCV AB S/CO SERPL IA: 0.08 S/CO — SIGNIFICANT CHANGE UP (ref 0–0.99)
HCV AB SERPL-IMP: SIGNIFICANT CHANGE UP

## 2024-10-09 ENCOUNTER — APPOINTMENT (OUTPATIENT)
Dept: ENDOCRINOLOGY | Facility: CLINIC | Age: 84
End: 2024-10-09
Payer: MEDICARE

## 2024-10-09 VITALS
TEMPERATURE: 97.3 F | HEART RATE: 68 BPM | OXYGEN SATURATION: 98 % | RESPIRATION RATE: 18 BRPM | HEIGHT: 60 IN | DIASTOLIC BLOOD PRESSURE: 80 MMHG | SYSTOLIC BLOOD PRESSURE: 140 MMHG | WEIGHT: 139.4 LBS | BODY MASS INDEX: 27.37 KG/M2

## 2024-10-09 DIAGNOSIS — E11.9 TYPE 2 DIABETES MELLITUS W/OUT COMPLICATIONS: ICD-10-CM

## 2024-10-09 DIAGNOSIS — D35.01 BENIGN NEOPLASM OF RIGHT ADRENAL GLAND: ICD-10-CM

## 2024-10-09 LAB
GLUCOSE BLDC GLUCOMTR-MCNC: 84
HBA1C MFR BLD HPLC: 6.1

## 2024-10-09 PROCEDURE — 83036 HEMOGLOBIN GLYCOSYLATED A1C: CPT | Mod: QW

## 2024-10-09 PROCEDURE — 99204 OFFICE O/P NEW MOD 45 MIN: CPT

## 2024-10-09 PROCEDURE — 82962 GLUCOSE BLOOD TEST: CPT

## 2024-10-09 RX ORDER — GLUCOSAM/CHON-MSM1/C/MANG/BOSW 500-416.6
TABLET ORAL
Qty: 1 | Refills: 0 | Status: ACTIVE | COMMUNITY
Start: 2024-10-09 | End: 1900-01-01

## 2024-10-09 RX ORDER — BLOOD-GLUCOSE METER
W/DEVICE KIT MISCELLANEOUS
Qty: 1 | Refills: 0 | Status: ACTIVE | COMMUNITY
Start: 2024-10-09 | End: 1900-01-01

## 2024-10-09 RX ORDER — LANCETS 33 GAUGE
EACH MISCELLANEOUS
Qty: 90 | Refills: 2 | Status: ACTIVE | COMMUNITY
Start: 2024-10-09 | End: 1900-01-01

## 2024-10-09 RX ORDER — BLOOD-GLUCOSE METER
70 EACH MISCELLANEOUS
Qty: 3 | Refills: 3 | Status: ACTIVE | COMMUNITY
Start: 2024-10-09 | End: 1900-01-01

## 2024-10-09 RX ORDER — BLOOD SUGAR DIAGNOSTIC
STRIP MISCELLANEOUS DAILY
Qty: 90 | Refills: 2 | Status: ACTIVE | COMMUNITY
Start: 2024-10-09 | End: 1900-01-01

## 2024-10-14 ENCOUNTER — RESULT REVIEW (OUTPATIENT)
Age: 84
End: 2024-10-14

## 2024-10-14 ENCOUNTER — APPOINTMENT (OUTPATIENT)
Dept: HEMATOLOGY ONCOLOGY | Facility: CLINIC | Age: 84
End: 2024-10-14

## 2024-10-14 ENCOUNTER — APPOINTMENT (OUTPATIENT)
Dept: INFUSION THERAPY | Facility: HOSPITAL | Age: 84
End: 2024-10-14

## 2024-10-14 LAB
ALBUMIN SERPL ELPH-MCNC: 3.9 G/DL — SIGNIFICANT CHANGE UP (ref 3.3–5)
ALP SERPL-CCNC: 58 U/L — SIGNIFICANT CHANGE UP (ref 40–120)
ALT FLD-CCNC: 14 U/L — SIGNIFICANT CHANGE UP (ref 10–45)
ANION GAP SERPL CALC-SCNC: 12 MMOL/L — SIGNIFICANT CHANGE UP (ref 5–17)
AST SERPL-CCNC: 23 U/L — SIGNIFICANT CHANGE UP (ref 10–40)
BASOPHILS # BLD AUTO: 0.05 K/UL — SIGNIFICANT CHANGE UP (ref 0–0.2)
BASOPHILS NFR BLD AUTO: 0.8 % — SIGNIFICANT CHANGE UP (ref 0–2)
BILIRUB SERPL-MCNC: 0.3 MG/DL — SIGNIFICANT CHANGE UP (ref 0.2–1.2)
BUN SERPL-MCNC: 20 MG/DL — SIGNIFICANT CHANGE UP (ref 7–23)
CALCIUM SERPL-MCNC: 9.6 MG/DL — SIGNIFICANT CHANGE UP (ref 8.4–10.5)
CHLORIDE SERPL-SCNC: 102 MMOL/L — SIGNIFICANT CHANGE UP (ref 96–108)
CO2 SERPL-SCNC: 27 MMOL/L — SIGNIFICANT CHANGE UP (ref 22–31)
CREAT SERPL-MCNC: 0.53 MG/DL — SIGNIFICANT CHANGE UP (ref 0.5–1.3)
EGFR: 91 ML/MIN/1.73M2 — SIGNIFICANT CHANGE UP
EOSINOPHIL # BLD AUTO: 0.24 K/UL — SIGNIFICANT CHANGE UP (ref 0–0.5)
EOSINOPHIL NFR BLD AUTO: 3.9 % — SIGNIFICANT CHANGE UP (ref 0–6)
GLUCOSE SERPL-MCNC: 96 MG/DL — SIGNIFICANT CHANGE UP (ref 70–99)
HCT VFR BLD CALC: 40 % — SIGNIFICANT CHANGE UP (ref 34.5–45)
HGB BLD-MCNC: 13.4 G/DL — SIGNIFICANT CHANGE UP (ref 11.5–15.5)
IMM GRANULOCYTES NFR BLD AUTO: 0.5 % — SIGNIFICANT CHANGE UP (ref 0–0.9)
LDH SERPL L TO P-CCNC: 146 U/L — SIGNIFICANT CHANGE UP (ref 50–242)
LYMPHOCYTES # BLD AUTO: 1.01 K/UL — SIGNIFICANT CHANGE UP (ref 1–3.3)
LYMPHOCYTES # BLD AUTO: 16.3 % — SIGNIFICANT CHANGE UP (ref 13–44)
MCHC RBC-ENTMCNC: 28.4 PG — SIGNIFICANT CHANGE UP (ref 27–34)
MCHC RBC-ENTMCNC: 33.5 G/DL — SIGNIFICANT CHANGE UP (ref 32–36)
MCV RBC AUTO: 84.7 FL — SIGNIFICANT CHANGE UP (ref 80–100)
MONOCYTES # BLD AUTO: 0.8 K/UL — SIGNIFICANT CHANGE UP (ref 0–0.9)
MONOCYTES NFR BLD AUTO: 12.9 % — SIGNIFICANT CHANGE UP (ref 2–14)
NEUTROPHILS # BLD AUTO: 4.05 K/UL — SIGNIFICANT CHANGE UP (ref 1.8–7.4)
NEUTROPHILS NFR BLD AUTO: 65.6 % — SIGNIFICANT CHANGE UP (ref 43–77)
NRBC # BLD: 0 /100 WBCS — SIGNIFICANT CHANGE UP (ref 0–0)
PHOSPHATE SERPL-MCNC: 3.8 MG/DL — SIGNIFICANT CHANGE UP (ref 2.5–4.5)
PLATELET # BLD AUTO: 217 K/UL — SIGNIFICANT CHANGE UP (ref 150–400)
POTASSIUM SERPL-MCNC: 3.9 MMOL/L — SIGNIFICANT CHANGE UP (ref 3.5–5.3)
POTASSIUM SERPL-SCNC: 3.9 MMOL/L — SIGNIFICANT CHANGE UP (ref 3.5–5.3)
PROT SERPL-MCNC: 6.6 G/DL — SIGNIFICANT CHANGE UP (ref 6–8.3)
RBC # BLD: 4.72 M/UL — SIGNIFICANT CHANGE UP (ref 3.8–5.2)
RBC # FLD: 13.7 % — SIGNIFICANT CHANGE UP (ref 10.3–14.5)
SODIUM SERPL-SCNC: 140 MMOL/L — SIGNIFICANT CHANGE UP (ref 135–145)
URATE SERPL-MCNC: 3 MG/DL — SIGNIFICANT CHANGE UP (ref 2.5–7)
WBC # BLD: 6.18 K/UL — SIGNIFICANT CHANGE UP (ref 3.8–10.5)
WBC # FLD AUTO: 6.18 K/UL — SIGNIFICANT CHANGE UP (ref 3.8–10.5)

## 2024-10-21 ENCOUNTER — RESULT REVIEW (OUTPATIENT)
Age: 84
End: 2024-10-21

## 2024-10-21 ENCOUNTER — APPOINTMENT (OUTPATIENT)
Dept: HEMATOLOGY ONCOLOGY | Facility: CLINIC | Age: 84
End: 2024-10-21

## 2024-10-21 ENCOUNTER — APPOINTMENT (OUTPATIENT)
Dept: INFUSION THERAPY | Facility: HOSPITAL | Age: 84
End: 2024-10-21

## 2024-10-21 DIAGNOSIS — C82.00 FOLLICULAR LYMPHOMA GRADE I, UNSPECIFIED SITE: ICD-10-CM

## 2024-10-21 LAB
ALBUMIN SERPL ELPH-MCNC: 4.2 G/DL — SIGNIFICANT CHANGE UP (ref 3.3–5)
ALP SERPL-CCNC: 68 U/L — SIGNIFICANT CHANGE UP (ref 40–120)
ALT FLD-CCNC: 21 U/L — SIGNIFICANT CHANGE UP (ref 10–45)
ANION GAP SERPL CALC-SCNC: 13 MMOL/L — SIGNIFICANT CHANGE UP (ref 5–17)
AST SERPL-CCNC: 28 U/L — SIGNIFICANT CHANGE UP (ref 10–40)
BASOPHILS # BLD AUTO: 0.05 K/UL — SIGNIFICANT CHANGE UP (ref 0–0.2)
BASOPHILS NFR BLD AUTO: 0.8 % — SIGNIFICANT CHANGE UP (ref 0–2)
BILIRUB SERPL-MCNC: 0.4 MG/DL — SIGNIFICANT CHANGE UP (ref 0.2–1.2)
BUN SERPL-MCNC: 19 MG/DL — SIGNIFICANT CHANGE UP (ref 7–23)
CALCIUM SERPL-MCNC: 9.9 MG/DL — SIGNIFICANT CHANGE UP (ref 8.4–10.5)
CHLORIDE SERPL-SCNC: 100 MMOL/L — SIGNIFICANT CHANGE UP (ref 96–108)
CO2 SERPL-SCNC: 29 MMOL/L — SIGNIFICANT CHANGE UP (ref 22–31)
CREAT SERPL-MCNC: 0.57 MG/DL — SIGNIFICANT CHANGE UP (ref 0.5–1.3)
EGFR: 90 ML/MIN/1.73M2 — SIGNIFICANT CHANGE UP
EOSINOPHIL # BLD AUTO: 0.24 K/UL — SIGNIFICANT CHANGE UP (ref 0–0.5)
EOSINOPHIL NFR BLD AUTO: 3.6 % — SIGNIFICANT CHANGE UP (ref 0–6)
GLUCOSE SERPL-MCNC: 79 MG/DL — SIGNIFICANT CHANGE UP (ref 70–99)
HCT VFR BLD CALC: 42.6 % — SIGNIFICANT CHANGE UP (ref 34.5–45)
HGB BLD-MCNC: 14.2 G/DL — SIGNIFICANT CHANGE UP (ref 11.5–15.5)
IMM GRANULOCYTES NFR BLD AUTO: 0.5 % — SIGNIFICANT CHANGE UP (ref 0–0.9)
LDH SERPL L TO P-CCNC: 315 U/L — HIGH (ref 50–242)
LYMPHOCYTES # BLD AUTO: 1.04 K/UL — SIGNIFICANT CHANGE UP (ref 1–3.3)
LYMPHOCYTES # BLD AUTO: 15.7 % — SIGNIFICANT CHANGE UP (ref 13–44)
MCHC RBC-ENTMCNC: 28.6 PG — SIGNIFICANT CHANGE UP (ref 27–34)
MCHC RBC-ENTMCNC: 33.3 G/DL — SIGNIFICANT CHANGE UP (ref 32–36)
MCV RBC AUTO: 85.7 FL — SIGNIFICANT CHANGE UP (ref 80–100)
MONOCYTES # BLD AUTO: 1.02 K/UL — HIGH (ref 0–0.9)
MONOCYTES NFR BLD AUTO: 15.4 % — HIGH (ref 2–14)
NEUTROPHILS # BLD AUTO: 4.24 K/UL — SIGNIFICANT CHANGE UP (ref 1.8–7.4)
NEUTROPHILS NFR BLD AUTO: 64 % — SIGNIFICANT CHANGE UP (ref 43–77)
NRBC # BLD: 0 /100 WBCS — SIGNIFICANT CHANGE UP (ref 0–0)
PLATELET # BLD AUTO: 239 K/UL — SIGNIFICANT CHANGE UP (ref 150–400)
POTASSIUM SERPL-MCNC: 3.9 MMOL/L — SIGNIFICANT CHANGE UP (ref 3.5–5.3)
POTASSIUM SERPL-SCNC: 3.9 MMOL/L — SIGNIFICANT CHANGE UP (ref 3.5–5.3)
PROT SERPL-MCNC: 7.3 G/DL — SIGNIFICANT CHANGE UP (ref 6–8.3)
RBC # BLD: 4.97 M/UL — SIGNIFICANT CHANGE UP (ref 3.8–5.2)
RBC # FLD: 13.9 % — SIGNIFICANT CHANGE UP (ref 10.3–14.5)
SODIUM SERPL-SCNC: 142 MMOL/L — SIGNIFICANT CHANGE UP (ref 135–145)
WBC # BLD: 6.62 K/UL — SIGNIFICANT CHANGE UP (ref 3.8–10.5)
WBC # FLD AUTO: 6.62 K/UL — SIGNIFICANT CHANGE UP (ref 3.8–10.5)

## 2024-10-28 ENCOUNTER — RESULT REVIEW (OUTPATIENT)
Age: 84
End: 2024-10-28

## 2024-10-28 ENCOUNTER — APPOINTMENT (OUTPATIENT)
Dept: INFUSION THERAPY | Facility: HOSPITAL | Age: 84
End: 2024-10-28

## 2024-10-28 ENCOUNTER — APPOINTMENT (OUTPATIENT)
Dept: HEMATOLOGY ONCOLOGY | Facility: CLINIC | Age: 84
End: 2024-10-28

## 2024-10-28 DIAGNOSIS — C82.00 FOLLICULAR LYMPHOMA GRADE I, UNSPECIFIED SITE: ICD-10-CM

## 2024-10-28 LAB
ALBUMIN SERPL ELPH-MCNC: 4 G/DL — SIGNIFICANT CHANGE UP (ref 3.3–5)
ALP SERPL-CCNC: 60 U/L — SIGNIFICANT CHANGE UP (ref 40–120)
ALT FLD-CCNC: 14 U/L — SIGNIFICANT CHANGE UP (ref 10–45)
ANION GAP SERPL CALC-SCNC: 8 MMOL/L — SIGNIFICANT CHANGE UP (ref 5–17)
AST SERPL-CCNC: 23 U/L — SIGNIFICANT CHANGE UP (ref 10–40)
BASOPHILS # BLD AUTO: 0.05 K/UL — SIGNIFICANT CHANGE UP (ref 0–0.2)
BASOPHILS NFR BLD AUTO: 0.6 % — SIGNIFICANT CHANGE UP (ref 0–2)
BILIRUB SERPL-MCNC: 0.3 MG/DL — SIGNIFICANT CHANGE UP (ref 0.2–1.2)
BUN SERPL-MCNC: 20 MG/DL — SIGNIFICANT CHANGE UP (ref 7–23)
CALCIUM SERPL-MCNC: 9.9 MG/DL — SIGNIFICANT CHANGE UP (ref 8.4–10.5)
CHLORIDE SERPL-SCNC: 101 MMOL/L — SIGNIFICANT CHANGE UP (ref 96–108)
CO2 SERPL-SCNC: 31 MMOL/L — SIGNIFICANT CHANGE UP (ref 22–31)
CREAT SERPL-MCNC: 0.56 MG/DL — SIGNIFICANT CHANGE UP (ref 0.5–1.3)
EGFR: 90 ML/MIN/1.73M2 — SIGNIFICANT CHANGE UP
EOSINOPHIL # BLD AUTO: 0.22 K/UL — SIGNIFICANT CHANGE UP (ref 0–0.5)
EOSINOPHIL NFR BLD AUTO: 2.8 % — SIGNIFICANT CHANGE UP (ref 0–6)
GLUCOSE SERPL-MCNC: 100 MG/DL — HIGH (ref 70–99)
HCT VFR BLD CALC: 43 % — SIGNIFICANT CHANGE UP (ref 34.5–45)
HGB BLD-MCNC: 14.3 G/DL — SIGNIFICANT CHANGE UP (ref 11.5–15.5)
IMM GRANULOCYTES NFR BLD AUTO: 0.5 % — SIGNIFICANT CHANGE UP (ref 0–0.9)
LDH SERPL L TO P-CCNC: 176 U/L — SIGNIFICANT CHANGE UP (ref 50–242)
LYMPHOCYTES # BLD AUTO: 0.98 K/UL — LOW (ref 1–3.3)
LYMPHOCYTES # BLD AUTO: 12.5 % — LOW (ref 13–44)
MCHC RBC-ENTMCNC: 29 PG — SIGNIFICANT CHANGE UP (ref 27–34)
MCHC RBC-ENTMCNC: 33.3 G/DL — SIGNIFICANT CHANGE UP (ref 32–36)
MCV RBC AUTO: 87.2 FL — SIGNIFICANT CHANGE UP (ref 80–100)
MONOCYTES # BLD AUTO: 1.05 K/UL — HIGH (ref 0–0.9)
MONOCYTES NFR BLD AUTO: 13.4 % — SIGNIFICANT CHANGE UP (ref 2–14)
NEUTROPHILS # BLD AUTO: 5.48 K/UL — SIGNIFICANT CHANGE UP (ref 1.8–7.4)
NEUTROPHILS NFR BLD AUTO: 70.2 % — SIGNIFICANT CHANGE UP (ref 43–77)
NRBC # BLD: 0 /100 WBCS — SIGNIFICANT CHANGE UP (ref 0–0)
PLATELET # BLD AUTO: 226 K/UL — SIGNIFICANT CHANGE UP (ref 150–400)
POTASSIUM SERPL-MCNC: 4.2 MMOL/L — SIGNIFICANT CHANGE UP (ref 3.5–5.3)
POTASSIUM SERPL-SCNC: 4.2 MMOL/L — SIGNIFICANT CHANGE UP (ref 3.5–5.3)
PROT SERPL-MCNC: 6.9 G/DL — SIGNIFICANT CHANGE UP (ref 6–8.3)
RBC # BLD: 4.93 M/UL — SIGNIFICANT CHANGE UP (ref 3.8–5.2)
RBC # FLD: 14.1 % — SIGNIFICANT CHANGE UP (ref 10.3–14.5)
SODIUM SERPL-SCNC: 140 MMOL/L — SIGNIFICANT CHANGE UP (ref 135–145)
WBC # BLD: 7.82 K/UL — SIGNIFICANT CHANGE UP (ref 3.8–10.5)
WBC # FLD AUTO: 7.82 K/UL — SIGNIFICANT CHANGE UP (ref 3.8–10.5)

## 2024-11-08 ENCOUNTER — APPOINTMENT (OUTPATIENT)
Dept: CARDIOLOGY | Facility: CLINIC | Age: 84
End: 2024-11-08
Payer: MEDICARE

## 2024-11-08 VITALS
TEMPERATURE: 97.3 F | BODY MASS INDEX: 27.09 KG/M2 | HEART RATE: 64 BPM | OXYGEN SATURATION: 95 % | HEIGHT: 60 IN | WEIGHT: 138 LBS | SYSTOLIC BLOOD PRESSURE: 140 MMHG | DIASTOLIC BLOOD PRESSURE: 60 MMHG

## 2024-11-08 DIAGNOSIS — E78.5 HYPERLIPIDEMIA, UNSPECIFIED: ICD-10-CM

## 2024-11-08 DIAGNOSIS — I48.0 PAROXYSMAL ATRIAL FIBRILLATION: ICD-10-CM

## 2024-11-08 DIAGNOSIS — I35.1 NONRHEUMATIC AORTIC (VALVE) INSUFFICIENCY: ICD-10-CM

## 2024-11-08 DIAGNOSIS — I10 ESSENTIAL (PRIMARY) HYPERTENSION: ICD-10-CM

## 2024-11-08 DIAGNOSIS — E11.9 TYPE 2 DIABETES MELLITUS W/OUT COMPLICATIONS: ICD-10-CM

## 2024-11-08 DIAGNOSIS — C85.90 NON-HODGKIN LYMPHOMA, UNSPECIFIED, UNSPECIFIED SITE: ICD-10-CM

## 2024-11-08 PROCEDURE — 93000 ELECTROCARDIOGRAM COMPLETE: CPT

## 2024-11-08 PROCEDURE — 99214 OFFICE O/P EST MOD 30 MIN: CPT

## 2024-11-08 PROCEDURE — G2211 COMPLEX E/M VISIT ADD ON: CPT

## 2024-11-11 LAB
ALBUMIN SERPL ELPH-MCNC: 4.2 G/DL
ALP BLD-CCNC: 60 U/L
ALT SERPL-CCNC: 14 U/L
AST SERPL-CCNC: 17 U/L
BILIRUB DIRECT SERPL-MCNC: 0.2 MG/DL
BILIRUB INDIRECT SERPL-MCNC: 0.4 MG/DL
BILIRUB SERPL-MCNC: 0.5 MG/DL
CHOLEST SERPL-MCNC: 178 MG/DL
CK SERPL-CCNC: 49 U/L
ESTIMATED AVERAGE GLUCOSE: 128 MG/DL
HBA1C MFR BLD HPLC: 6.1 %
HDLC SERPL-MCNC: 58 MG/DL
LDLC SERPL CALC-MCNC: 103 MG/DL
NONHDLC SERPL-MCNC: 120 MG/DL
PROT SERPL-MCNC: 6.6 G/DL
TRIGL SERPL-MCNC: 94 MG/DL

## 2024-11-22 ENCOUNTER — OUTPATIENT (OUTPATIENT)
Dept: OUTPATIENT SERVICES | Facility: HOSPITAL | Age: 84
LOS: 1 days | Discharge: HOME | End: 2024-11-22

## 2024-11-22 DIAGNOSIS — C82.00 FOLLICULAR LYMPHOMA GRADE I, UNSPECIFIED SITE: ICD-10-CM

## 2024-11-22 DIAGNOSIS — L72.9 FOLLICULAR CYST OF THE SKIN AND SUBCUTANEOUS TISSUE, UNSPECIFIED: Chronic | ICD-10-CM

## 2024-11-22 DIAGNOSIS — Z98.89 OTHER SPECIFIED POSTPROCEDURAL STATES: Chronic | ICD-10-CM

## 2024-11-26 ENCOUNTER — APPOINTMENT (OUTPATIENT)
Dept: HEMATOLOGY ONCOLOGY | Facility: CLINIC | Age: 84
End: 2024-11-26
Payer: MEDICARE

## 2024-11-26 ENCOUNTER — RESULT REVIEW (OUTPATIENT)
Age: 84
End: 2024-11-26

## 2024-11-26 VITALS
DIASTOLIC BLOOD PRESSURE: 70 MMHG | WEIGHT: 137.79 LBS | TEMPERATURE: 97.2 F | BODY MASS INDEX: 26.91 KG/M2 | HEART RATE: 57 BPM | SYSTOLIC BLOOD PRESSURE: 150 MMHG | OXYGEN SATURATION: 97 % | RESPIRATION RATE: 16 BRPM

## 2024-11-26 DIAGNOSIS — Z29.89 ENCOUNTER. FOR OTHER SPECIFIED PROPHYLACTIC MEASURES: ICD-10-CM

## 2024-11-26 DIAGNOSIS — C82.00 FOLLICULAR LYMPHOMA GRADE I, UNSPECIFIED SITE: ICD-10-CM

## 2024-11-26 DIAGNOSIS — Z92.3 PERSONAL HISTORY OF IRRADIATION: ICD-10-CM

## 2024-11-26 DIAGNOSIS — C82.30: ICD-10-CM

## 2024-11-26 LAB
BASOPHILS # BLD AUTO: 0.03 K/UL — SIGNIFICANT CHANGE UP (ref 0–0.2)
BASOPHILS NFR BLD AUTO: 0.5 % — SIGNIFICANT CHANGE UP (ref 0–2)
EOSINOPHIL # BLD AUTO: 0.18 K/UL — SIGNIFICANT CHANGE UP (ref 0–0.5)
EOSINOPHIL NFR BLD AUTO: 3.2 % — SIGNIFICANT CHANGE UP (ref 0–6)
HCT VFR BLD CALC: 41.8 % — SIGNIFICANT CHANGE UP (ref 34.5–45)
HGB BLD-MCNC: 13.6 G/DL — SIGNIFICANT CHANGE UP (ref 11.5–15.5)
IMM GRANULOCYTES NFR BLD AUTO: 0.2 % — SIGNIFICANT CHANGE UP (ref 0–0.9)
LYMPHOCYTES # BLD AUTO: 0.94 K/UL — LOW (ref 1–3.3)
LYMPHOCYTES # BLD AUTO: 16.9 % — SIGNIFICANT CHANGE UP (ref 13–44)
MCHC RBC-ENTMCNC: 28.8 PG — SIGNIFICANT CHANGE UP (ref 27–34)
MCHC RBC-ENTMCNC: 32.5 G/DL — SIGNIFICANT CHANGE UP (ref 32–36)
MCV RBC AUTO: 88.6 FL — SIGNIFICANT CHANGE UP (ref 80–100)
MONOCYTES # BLD AUTO: 0.75 K/UL — SIGNIFICANT CHANGE UP (ref 0–0.9)
MONOCYTES NFR BLD AUTO: 13.5 % — SIGNIFICANT CHANGE UP (ref 2–14)
NEUTROPHILS # BLD AUTO: 3.66 K/UL — SIGNIFICANT CHANGE UP (ref 1.8–7.4)
NEUTROPHILS NFR BLD AUTO: 65.7 % — SIGNIFICANT CHANGE UP (ref 43–77)
NRBC # BLD: 0 /100 WBCS — SIGNIFICANT CHANGE UP (ref 0–0)
PLATELET # BLD AUTO: 218 K/UL — SIGNIFICANT CHANGE UP (ref 150–400)
RBC # BLD: 4.72 M/UL — SIGNIFICANT CHANGE UP (ref 3.8–5.2)
RBC # FLD: 13.3 % — SIGNIFICANT CHANGE UP (ref 10.3–14.5)
WBC # BLD: 5.57 K/UL — SIGNIFICANT CHANGE UP (ref 3.8–10.5)
WBC # FLD AUTO: 5.57 K/UL — SIGNIFICANT CHANGE UP (ref 3.8–10.5)

## 2024-11-26 PROCEDURE — 99213 OFFICE O/P EST LOW 20 MIN: CPT

## 2024-11-26 PROCEDURE — G2211 COMPLEX E/M VISIT ADD ON: CPT

## 2024-11-29 LAB
ALBUMIN SERPL ELPH-MCNC: 4.1 G/DL
ALP BLD-CCNC: 58 U/L
ALT SERPL-CCNC: 14 U/L
ANION GAP SERPL CALC-SCNC: 10 MMOL/L
AST SERPL-CCNC: 20 U/L
BILIRUB SERPL-MCNC: 0.4 MG/DL
BUN SERPL-MCNC: 19 MG/DL
CALCIUM SERPL-MCNC: 9.5 MG/DL
CHLORIDE SERPL-SCNC: 103 MMOL/L
CO2 SERPL-SCNC: 29 MMOL/L
CREAT SERPL-MCNC: 0.56 MG/DL
DEPRECATED KAPPA LC FREE/LAMBDA SER: 1.23 RATIO
EGFR: 90 ML/MIN/1.73M2
GLUCOSE SERPL-MCNC: 107 MG/DL
IGA SER QL IEP: 166 MG/DL
IGG SER QL IEP: 744 MG/DL
IGM SER QL IEP: 194 MG/DL
KAPPA LC CSF-MCNC: 1.15 MG/DL
KAPPA LC SERPL-MCNC: 1.42 MG/DL
LDH SERPL-CCNC: 109 U/L
POTASSIUM SERPL-SCNC: 4.4 MMOL/L
PROT SERPL-MCNC: 6.5 G/DL
SODIUM SERPL-SCNC: 142 MMOL/L

## 2025-01-16 ENCOUNTER — APPOINTMENT (OUTPATIENT)
Dept: NUCLEAR MEDICINE | Facility: IMAGING CENTER | Age: 85
End: 2025-01-16

## 2025-01-16 ENCOUNTER — OUTPATIENT (OUTPATIENT)
Dept: OUTPATIENT SERVICES | Facility: HOSPITAL | Age: 85
LOS: 1 days | End: 2025-01-16
Payer: MEDICARE

## 2025-01-16 DIAGNOSIS — L72.9 FOLLICULAR CYST OF THE SKIN AND SUBCUTANEOUS TISSUE, UNSPECIFIED: Chronic | ICD-10-CM

## 2025-01-16 DIAGNOSIS — Z98.89 OTHER SPECIFIED POSTPROCEDURAL STATES: Chronic | ICD-10-CM

## 2025-01-16 DIAGNOSIS — C82.30 FOLLICULAR LYMPHOMA GRADE IIIA, UNSPECIFIED SITE: ICD-10-CM

## 2025-01-16 PROCEDURE — A9552: CPT

## 2025-01-16 PROCEDURE — 78815 PET IMAGE W/CT SKULL-THIGH: CPT | Mod: 26,PS

## 2025-01-16 PROCEDURE — 78815 PET IMAGE W/CT SKULL-THIGH: CPT

## 2025-01-23 ENCOUNTER — OUTPATIENT (OUTPATIENT)
Dept: OUTPATIENT SERVICES | Facility: HOSPITAL | Age: 85
LOS: 1 days | Discharge: HOME | End: 2025-01-23

## 2025-01-23 DIAGNOSIS — Z98.89 OTHER SPECIFIED POSTPROCEDURAL STATES: Chronic | ICD-10-CM

## 2025-01-23 DIAGNOSIS — C82.00 FOLLICULAR LYMPHOMA GRADE I, UNSPECIFIED SITE: ICD-10-CM

## 2025-01-23 DIAGNOSIS — L72.9 FOLLICULAR CYST OF THE SKIN AND SUBCUTANEOUS TISSUE, UNSPECIFIED: Chronic | ICD-10-CM

## 2025-01-27 ENCOUNTER — APPOINTMENT (OUTPATIENT)
Dept: HEMATOLOGY ONCOLOGY | Facility: CLINIC | Age: 85
End: 2025-01-27
Payer: MEDICARE

## 2025-01-27 ENCOUNTER — RESULT REVIEW (OUTPATIENT)
Age: 85
End: 2025-01-27

## 2025-01-27 VITALS
TEMPERATURE: 96.8 F | OXYGEN SATURATION: 98 % | RESPIRATION RATE: 16 BRPM | SYSTOLIC BLOOD PRESSURE: 185 MMHG | BODY MASS INDEX: 27.64 KG/M2 | HEART RATE: 58 BPM | DIASTOLIC BLOOD PRESSURE: 69 MMHG | WEIGHT: 141.54 LBS

## 2025-01-27 DIAGNOSIS — I48.0 PAROXYSMAL ATRIAL FIBRILLATION: ICD-10-CM

## 2025-01-27 DIAGNOSIS — R60.0 LOCALIZED EDEMA: ICD-10-CM

## 2025-01-27 DIAGNOSIS — Z92.22 PERSONAL HISTORY OF MONOCLONAL DRUG THERAPY: ICD-10-CM

## 2025-01-27 DIAGNOSIS — Z92.3 PERSONAL HISTORY OF IRRADIATION: ICD-10-CM

## 2025-01-27 DIAGNOSIS — R93.89 ABNORMAL FINDINGS ON DIAGNOSTIC IMAGING OF OTHER SPECIFIED BODY STRUCTURES: ICD-10-CM

## 2025-01-27 DIAGNOSIS — C82.00 FOLLICULAR LYMPHOMA GRADE I, UNSPECIFIED SITE: ICD-10-CM

## 2025-01-27 LAB
BASOPHILS # BLD AUTO: 0.04 K/UL — SIGNIFICANT CHANGE UP (ref 0–0.2)
BASOPHILS NFR BLD AUTO: 0.6 % — SIGNIFICANT CHANGE UP (ref 0–2)
EOSINOPHIL # BLD AUTO: 0.2 K/UL — SIGNIFICANT CHANGE UP (ref 0–0.5)
EOSINOPHIL NFR BLD AUTO: 3.2 % — SIGNIFICANT CHANGE UP (ref 0–6)
HCT VFR BLD CALC: 43.5 % — SIGNIFICANT CHANGE UP (ref 34.5–45)
HGB BLD-MCNC: 14.2 G/DL — SIGNIFICANT CHANGE UP (ref 11.5–15.5)
IMM GRANULOCYTES NFR BLD AUTO: 0 % — SIGNIFICANT CHANGE UP (ref 0–0.9)
LYMPHOCYTES # BLD AUTO: 0.95 K/UL — LOW (ref 1–3.3)
LYMPHOCYTES # BLD AUTO: 15.3 % — SIGNIFICANT CHANGE UP (ref 13–44)
MCHC RBC-ENTMCNC: 29 PG — SIGNIFICANT CHANGE UP (ref 27–34)
MCHC RBC-ENTMCNC: 32.6 G/DL — SIGNIFICANT CHANGE UP (ref 32–36)
MCV RBC AUTO: 89 FL — SIGNIFICANT CHANGE UP (ref 80–100)
MONOCYTES # BLD AUTO: 0.81 K/UL — SIGNIFICANT CHANGE UP (ref 0–0.9)
MONOCYTES NFR BLD AUTO: 13 % — SIGNIFICANT CHANGE UP (ref 2–14)
NEUTROPHILS # BLD AUTO: 4.21 K/UL — SIGNIFICANT CHANGE UP (ref 1.8–7.4)
NEUTROPHILS NFR BLD AUTO: 67.9 % — SIGNIFICANT CHANGE UP (ref 43–77)
NRBC # BLD: 0 /100 WBCS — SIGNIFICANT CHANGE UP (ref 0–0)
NRBC BLD-RTO: 0 /100 WBCS — SIGNIFICANT CHANGE UP (ref 0–0)
PLATELET # BLD AUTO: 217 K/UL — SIGNIFICANT CHANGE UP (ref 150–400)
RBC # BLD: 4.89 M/UL — SIGNIFICANT CHANGE UP (ref 3.8–5.2)
RBC # FLD: 13.2 % — SIGNIFICANT CHANGE UP (ref 10.3–14.5)
WBC # BLD: 6.21 K/UL — SIGNIFICANT CHANGE UP (ref 3.8–10.5)
WBC # FLD AUTO: 6.21 K/UL — SIGNIFICANT CHANGE UP (ref 3.8–10.5)

## 2025-01-27 PROCEDURE — G2211 COMPLEX E/M VISIT ADD ON: CPT

## 2025-01-27 PROCEDURE — 99213 OFFICE O/P EST LOW 20 MIN: CPT

## 2025-01-28 DIAGNOSIS — C82.30: ICD-10-CM

## 2025-01-30 LAB
ALBUMIN SERPL ELPH-MCNC: 4.3 G/DL
ALP BLD-CCNC: 60 U/L
ALT SERPL-CCNC: 13 U/L
ANION GAP SERPL CALC-SCNC: 9 MMOL/L
AST SERPL-CCNC: 18 U/L
BILIRUB SERPL-MCNC: 0.4 MG/DL
BUN SERPL-MCNC: 19 MG/DL
CALCIUM SERPL-MCNC: 9.6 MG/DL
CHLORIDE SERPL-SCNC: 102 MMOL/L
CO2 SERPL-SCNC: 30 MMOL/L
CREAT SERPL-MCNC: 0.58 MG/DL
DEPRECATED KAPPA LC FREE/LAMBDA SER: 1.31 RATIO
EGFR: 89 ML/MIN/1.73M2
GLUCOSE SERPL-MCNC: 91 MG/DL
IGA SER QL IEP: 163 MG/DL
IGG SER QL IEP: 807 MG/DL
IGM SER QL IEP: 190 MG/DL
KAPPA LC CSF-MCNC: 1.17 MG/DL
KAPPA LC SERPL-MCNC: 1.53 MG/DL
LDH SERPL-CCNC: 120 U/L
POTASSIUM SERPL-SCNC: 4.4 MMOL/L
PROT SERPL-MCNC: 6.6 G/DL
SODIUM SERPL-SCNC: 142 MMOL/L

## 2025-02-02 PROBLEM — Z92.22 HISTORY OF MONOCLONAL DRUG THERAPY: Status: ACTIVE | Noted: 2025-02-02

## 2025-03-11 ENCOUNTER — APPOINTMENT (OUTPATIENT)
Dept: INTERNAL MEDICINE | Facility: CLINIC | Age: 85
End: 2025-03-11
Payer: MEDICARE

## 2025-03-11 VITALS
OXYGEN SATURATION: 96 % | BODY MASS INDEX: 27.68 KG/M2 | TEMPERATURE: 97.8 F | HEIGHT: 60 IN | HEART RATE: 83 BPM | SYSTOLIC BLOOD PRESSURE: 138 MMHG | WEIGHT: 141 LBS | DIASTOLIC BLOOD PRESSURE: 70 MMHG

## 2025-03-11 DIAGNOSIS — M62.838 OTHER MUSCLE SPASM: ICD-10-CM

## 2025-03-11 PROCEDURE — G2211 COMPLEX E/M VISIT ADD ON: CPT

## 2025-03-11 PROCEDURE — 99213 OFFICE O/P EST LOW 20 MIN: CPT

## 2025-03-14 ENCOUNTER — RESULT REVIEW (OUTPATIENT)
Age: 85
End: 2025-03-14

## 2025-03-14 ENCOUNTER — OUTPATIENT (OUTPATIENT)
Dept: OUTPATIENT SERVICES | Facility: HOSPITAL | Age: 85
LOS: 1 days | End: 2025-03-14
Payer: MEDICARE

## 2025-03-14 ENCOUNTER — APPOINTMENT (OUTPATIENT)
Dept: CT IMAGING | Facility: IMAGING CENTER | Age: 85
End: 2025-03-14
Payer: MEDICARE

## 2025-03-14 ENCOUNTER — APPOINTMENT (OUTPATIENT)
Dept: CARDIOLOGY | Facility: CLINIC | Age: 85
End: 2025-03-14
Payer: MEDICARE

## 2025-03-14 VITALS
DIASTOLIC BLOOD PRESSURE: 74 MMHG | HEIGHT: 60 IN | BODY MASS INDEX: 28.47 KG/M2 | TEMPERATURE: 97.3 F | SYSTOLIC BLOOD PRESSURE: 160 MMHG | OXYGEN SATURATION: 97 % | RESPIRATION RATE: 16 BRPM | HEART RATE: 60 BPM | WEIGHT: 145 LBS

## 2025-03-14 DIAGNOSIS — C82.00 FOLLICULAR LYMPHOMA GRADE I, UNSPECIFIED SITE: ICD-10-CM

## 2025-03-14 DIAGNOSIS — I48.0 PAROXYSMAL ATRIAL FIBRILLATION: ICD-10-CM

## 2025-03-14 DIAGNOSIS — R51.9 HEADACHE, UNSPECIFIED: ICD-10-CM

## 2025-03-14 DIAGNOSIS — E11.9 TYPE 2 DIABETES MELLITUS W/OUT COMPLICATIONS: ICD-10-CM

## 2025-03-14 DIAGNOSIS — Z98.89 OTHER SPECIFIED POSTPROCEDURAL STATES: Chronic | ICD-10-CM

## 2025-03-14 DIAGNOSIS — L72.9 FOLLICULAR CYST OF THE SKIN AND SUBCUTANEOUS TISSUE, UNSPECIFIED: Chronic | ICD-10-CM

## 2025-03-14 DIAGNOSIS — Z13.228 ENCOUNTER FOR SCREENING FOR OTHER METABOLIC DISORDERS: ICD-10-CM

## 2025-03-14 DIAGNOSIS — I10 ESSENTIAL (PRIMARY) HYPERTENSION: ICD-10-CM

## 2025-03-14 DIAGNOSIS — E78.5 HYPERLIPIDEMIA, UNSPECIFIED: ICD-10-CM

## 2025-03-14 DIAGNOSIS — I35.1 NONRHEUMATIC AORTIC (VALVE) INSUFFICIENCY: ICD-10-CM

## 2025-03-14 DIAGNOSIS — I77.810 THORACIC AORTIC ECTASIA: ICD-10-CM

## 2025-03-14 DIAGNOSIS — M54.2 CERVICALGIA: ICD-10-CM

## 2025-03-14 PROCEDURE — 70450 CT HEAD/BRAIN W/O DYE: CPT | Mod: 26

## 2025-03-14 PROCEDURE — G2211 COMPLEX E/M VISIT ADD ON: CPT

## 2025-03-14 PROCEDURE — 93000 ELECTROCARDIOGRAM COMPLETE: CPT

## 2025-03-14 PROCEDURE — 70450 CT HEAD/BRAIN W/O DYE: CPT

## 2025-03-14 PROCEDURE — 99214 OFFICE O/P EST MOD 30 MIN: CPT

## 2025-03-14 RX ORDER — AMLODIPINE BESYLATE 2.5 MG/1
2.5 TABLET ORAL DAILY
Qty: 90 | Refills: 1 | Status: ACTIVE | COMMUNITY
Start: 2025-03-14 | End: 1900-01-01

## 2025-03-17 ENCOUNTER — APPOINTMENT (OUTPATIENT)
Dept: MRI IMAGING | Facility: IMAGING CENTER | Age: 85
End: 2025-03-17
Payer: MEDICARE

## 2025-03-17 ENCOUNTER — OUTPATIENT (OUTPATIENT)
Dept: OUTPATIENT SERVICES | Facility: HOSPITAL | Age: 85
LOS: 1 days | End: 2025-03-17
Payer: MEDICARE

## 2025-03-17 DIAGNOSIS — L72.9 FOLLICULAR CYST OF THE SKIN AND SUBCUTANEOUS TISSUE, UNSPECIFIED: Chronic | ICD-10-CM

## 2025-03-17 DIAGNOSIS — Z98.89 OTHER SPECIFIED POSTPROCEDURAL STATES: Chronic | ICD-10-CM

## 2025-03-17 DIAGNOSIS — R51.9 HEADACHE, UNSPECIFIED: ICD-10-CM

## 2025-03-17 LAB
ALBUMIN SERPL ELPH-MCNC: 4.3 G/DL
ALP BLD-CCNC: 63 U/L
ALT SERPL-CCNC: 15 U/L
ANION GAP SERPL CALC-SCNC: 12 MMOL/L
APPEARANCE: CLEAR
AST SERPL-CCNC: 20 U/L
BACTERIA: NEGATIVE /HPF
BASOPHILS # BLD AUTO: 0.04 K/UL
BASOPHILS NFR BLD AUTO: 0.5 %
BILIRUB DIRECT SERPL-MCNC: 0.1 MG/DL
BILIRUB INDIRECT SERPL-MCNC: 0.2 MG/DL
BILIRUB SERPL-MCNC: 0.3 MG/DL
BILIRUBIN URINE: NEGATIVE
BLOOD URINE: NEGATIVE
BUN SERPL-MCNC: 20 MG/DL
CALCIUM SERPL-MCNC: 9.7 MG/DL
CAST: 0 /LPF
CHLORIDE SERPL-SCNC: 102 MMOL/L
CHOLEST SERPL-MCNC: 168 MG/DL
CK SERPL-CCNC: 64 U/L
CO2 SERPL-SCNC: 28 MMOL/L
COLOR: YELLOW
CREAT SERPL-MCNC: 0.54 MG/DL
EGFRCR SERPLBLD CKD-EPI 2021: 91 ML/MIN/1.73M2
EOSINOPHIL # BLD AUTO: 0.26 K/UL
EOSINOPHIL NFR BLD AUTO: 3.2 %
EPITHELIAL CELLS: 1 /HPF
ERYTHROCYTE [SEDIMENTATION RATE] IN BLOOD BY WESTERGREN METHOD: 30 MM/HR
ESTIMATED AVERAGE GLUCOSE: 137 MG/DL
GLUCOSE QUALITATIVE U: NEGATIVE MG/DL
GLUCOSE SERPL-MCNC: 102 MG/DL
HBA1C MFR BLD HPLC: 6.4 %
HCT VFR BLD CALC: 45.5 %
HDLC SERPL-MCNC: 61 MG/DL
HGB BLD-MCNC: 14.5 G/DL
IMM GRANULOCYTES NFR BLD AUTO: 0.4 %
KETONES URINE: NEGATIVE MG/DL
LDLC SERPL CALC-MCNC: 90 MG/DL
LEUKOCYTE ESTERASE URINE: ABNORMAL
LYMPHOCYTES # BLD AUTO: 1.17 K/UL
LYMPHOCYTES NFR BLD AUTO: 14.4 %
MAGNESIUM SERPL-MCNC: 2.1 MG/DL
MAN DIFF?: NORMAL
MCHC RBC-ENTMCNC: 28.5 PG
MCHC RBC-ENTMCNC: 31.9 G/DL
MCV RBC AUTO: 89.4 FL
MICROSCOPIC-UA: NORMAL
MONOCYTES # BLD AUTO: 0.95 K/UL
MONOCYTES NFR BLD AUTO: 11.7 %
NEUTROPHILS # BLD AUTO: 5.69 K/UL
NEUTROPHILS NFR BLD AUTO: 69.8 %
NITRITE URINE: NEGATIVE
NONHDLC SERPL-MCNC: 107 MG/DL
PH URINE: 6
PLATELET # BLD AUTO: 251 K/UL
POTASSIUM SERPL-SCNC: 4.3 MMOL/L
PROT SERPL-MCNC: 6.8 G/DL
PROTEIN URINE: NEGATIVE MG/DL
RBC # BLD: 5.09 M/UL
RBC # FLD: 13.4 %
RED BLOOD CELLS URINE: 0 /HPF
SODIUM SERPL-SCNC: 141 MMOL/L
SPECIFIC GRAVITY URINE: 1.01
T3FREE SERPL-MCNC: 2.69 PG/ML
T4 FREE SERPL-MCNC: 1.1 NG/DL
TRIGL SERPL-MCNC: 94 MG/DL
TSH SERPL-ACNC: 2.63 UIU/ML
UROBILINOGEN URINE: 0.2 MG/DL
WBC # FLD AUTO: 8.14 K/UL
WHITE BLOOD CELLS URINE: 0 /HPF

## 2025-03-17 PROCEDURE — 70551 MRI BRAIN STEM W/O DYE: CPT | Mod: 26

## 2025-03-17 PROCEDURE — 72141 MRI NECK SPINE W/O DYE: CPT | Mod: 26

## 2025-03-17 PROCEDURE — 70551 MRI BRAIN STEM W/O DYE: CPT

## 2025-03-17 PROCEDURE — 72141 MRI NECK SPINE W/O DYE: CPT

## 2025-03-18 ENCOUNTER — NON-APPOINTMENT (OUTPATIENT)
Age: 85
End: 2025-03-18

## 2025-03-24 DIAGNOSIS — R51.9 HEADACHE, UNSPECIFIED: ICD-10-CM

## 2025-04-14 ENCOUNTER — OUTPATIENT (OUTPATIENT)
Dept: OUTPATIENT SERVICES | Facility: HOSPITAL | Age: 85
LOS: 1 days | End: 2025-04-14
Payer: MEDICARE

## 2025-04-14 ENCOUNTER — APPOINTMENT (OUTPATIENT)
Dept: ULTRASOUND IMAGING | Facility: IMAGING CENTER | Age: 85
End: 2025-04-14
Payer: MEDICARE

## 2025-04-14 DIAGNOSIS — Z98.89 OTHER SPECIFIED POSTPROCEDURAL STATES: Chronic | ICD-10-CM

## 2025-04-14 DIAGNOSIS — L72.9 FOLLICULAR CYST OF THE SKIN AND SUBCUTANEOUS TISSUE, UNSPECIFIED: Chronic | ICD-10-CM

## 2025-04-14 DIAGNOSIS — C82.30 FOLLICULAR LYMPHOMA GRADE IIIA, UNSPECIFIED SITE: ICD-10-CM

## 2025-04-14 PROCEDURE — 76882 US LMTD JT/FCL EVL NVASC XTR: CPT | Mod: 26,RT

## 2025-04-14 PROCEDURE — 76536 US EXAM OF HEAD AND NECK: CPT | Mod: 26

## 2025-04-14 PROCEDURE — 76882 US LMTD JT/FCL EVL NVASC XTR: CPT

## 2025-04-14 PROCEDURE — 76536 US EXAM OF HEAD AND NECK: CPT

## 2025-04-24 ENCOUNTER — OUTPATIENT (OUTPATIENT)
Dept: OUTPATIENT SERVICES | Facility: HOSPITAL | Age: 85
LOS: 1 days | Discharge: HOME | End: 2025-04-24

## 2025-04-24 DIAGNOSIS — Z98.89 OTHER SPECIFIED POSTPROCEDURAL STATES: Chronic | ICD-10-CM

## 2025-04-24 DIAGNOSIS — C82.00 FOLLICULAR LYMPHOMA GRADE I, UNSPECIFIED SITE: ICD-10-CM

## 2025-04-24 DIAGNOSIS — L72.9 FOLLICULAR CYST OF THE SKIN AND SUBCUTANEOUS TISSUE, UNSPECIFIED: Chronic | ICD-10-CM

## 2025-04-28 ENCOUNTER — APPOINTMENT (OUTPATIENT)
Dept: HEMATOLOGY ONCOLOGY | Facility: CLINIC | Age: 85
End: 2025-04-28
Payer: MEDICARE

## 2025-04-28 ENCOUNTER — RESULT REVIEW (OUTPATIENT)
Age: 85
End: 2025-04-28

## 2025-04-28 VITALS
TEMPERATURE: 97.2 F | SYSTOLIC BLOOD PRESSURE: 177 MMHG | BODY MASS INDEX: 283.31 KG/M2 | OXYGEN SATURATION: 99 % | HEART RATE: 78 BPM | WEIGHT: 293 LBS | DIASTOLIC BLOOD PRESSURE: 77 MMHG | RESPIRATION RATE: 16 BRPM

## 2025-04-28 DIAGNOSIS — Z92.22 PERSONAL HISTORY OF MONOCLONAL DRUG THERAPY: ICD-10-CM

## 2025-04-28 DIAGNOSIS — C82.00 FOLLICULAR LYMPHOMA GRADE I, UNSPECIFIED SITE: ICD-10-CM

## 2025-04-28 DIAGNOSIS — Z92.3 PERSONAL HISTORY OF IRRADIATION: ICD-10-CM

## 2025-04-28 LAB
ALBUMIN SERPL ELPH-MCNC: 4.4 G/DL
ALP BLD-CCNC: 64 U/L
ALT SERPL-CCNC: 26 U/L
ANION GAP SERPL CALC-SCNC: 13 MMOL/L
AST SERPL-CCNC: 24 U/L
BASOPHILS # BLD AUTO: 0.03 K/UL — SIGNIFICANT CHANGE UP (ref 0–0.2)
BASOPHILS NFR BLD AUTO: 0.5 % — SIGNIFICANT CHANGE UP (ref 0–2)
BILIRUB SERPL-MCNC: 0.3 MG/DL
BUN SERPL-MCNC: 20 MG/DL
CALCIUM SERPL-MCNC: 9.8 MG/DL
CHLORIDE SERPL-SCNC: 102 MMOL/L
CO2 SERPL-SCNC: 29 MMOL/L
CREAT SERPL-MCNC: 0.55 MG/DL
EGFRCR SERPLBLD CKD-EPI 2021: 90 ML/MIN/1.73M2
EOSINOPHIL # BLD AUTO: 0.22 K/UL — SIGNIFICANT CHANGE UP (ref 0–0.5)
EOSINOPHIL NFR BLD AUTO: 3.7 % — SIGNIFICANT CHANGE UP (ref 0–6)
GLUCOSE SERPL-MCNC: 79 MG/DL
HCT VFR BLD CALC: 42.8 % — SIGNIFICANT CHANGE UP (ref 34.5–45)
HGB BLD-MCNC: 14.1 G/DL — SIGNIFICANT CHANGE UP (ref 11.5–15.5)
IMM GRANULOCYTES NFR BLD AUTO: 0.3 % — SIGNIFICANT CHANGE UP (ref 0–0.9)
LDH SERPL-CCNC: 110 U/L
LYMPHOCYTES # BLD AUTO: 1.05 K/UL — SIGNIFICANT CHANGE UP (ref 1–3.3)
LYMPHOCYTES # BLD AUTO: 17.8 % — SIGNIFICANT CHANGE UP (ref 13–44)
MCHC RBC-ENTMCNC: 28.9 PG — SIGNIFICANT CHANGE UP (ref 27–34)
MCHC RBC-ENTMCNC: 32.9 G/DL — SIGNIFICANT CHANGE UP (ref 32–36)
MCV RBC AUTO: 87.7 FL — SIGNIFICANT CHANGE UP (ref 80–100)
MONOCYTES # BLD AUTO: 0.82 K/UL — SIGNIFICANT CHANGE UP (ref 0–0.9)
MONOCYTES NFR BLD AUTO: 13.9 % — SIGNIFICANT CHANGE UP (ref 2–14)
NEUTROPHILS # BLD AUTO: 3.75 K/UL — SIGNIFICANT CHANGE UP (ref 1.8–7.4)
NEUTROPHILS NFR BLD AUTO: 63.8 % — SIGNIFICANT CHANGE UP (ref 43–77)
NRBC BLD AUTO-RTO: 0 /100 WBCS — SIGNIFICANT CHANGE UP (ref 0–0)
PLATELET # BLD AUTO: 226 K/UL — SIGNIFICANT CHANGE UP (ref 150–400)
POTASSIUM SERPL-SCNC: 4.3 MMOL/L
PROT SERPL-MCNC: 6.8 G/DL
RBC # BLD: 4.88 M/UL — SIGNIFICANT CHANGE UP (ref 3.8–5.2)
RBC # FLD: 13.6 % — SIGNIFICANT CHANGE UP (ref 10.3–14.5)
RETICS #: 55.1 K/UL — SIGNIFICANT CHANGE UP (ref 25–125)
RETICS/RBC NFR: 1.1 % — SIGNIFICANT CHANGE UP (ref 0.5–2.5)
SODIUM SERPL-SCNC: 144 MMOL/L
WBC # BLD: 5.89 K/UL — SIGNIFICANT CHANGE UP (ref 3.8–10.5)
WBC # FLD AUTO: 5.89 K/UL — SIGNIFICANT CHANGE UP (ref 3.8–10.5)

## 2025-04-28 PROCEDURE — 99213 OFFICE O/P EST LOW 20 MIN: CPT

## 2025-04-28 PROCEDURE — G2211 COMPLEX E/M VISIT ADD ON: CPT

## 2025-05-02 LAB
DEPRECATED KAPPA LC FREE/LAMBDA SER: 1.12 RATIO
IGA SER QL IEP: 180 MG/DL
IGG SER QL IEP: 817 MG/DL
IGM SER QL IEP: 182 MG/DL
KAPPA LC CSF-MCNC: 1.33 MG/DL
KAPPA LC SERPL-MCNC: 1.49 MG/DL

## 2025-06-05 ENCOUNTER — APPOINTMENT (OUTPATIENT)
Dept: CARDIOLOGY | Facility: CLINIC | Age: 85
End: 2025-06-05
Payer: MEDICARE

## 2025-06-05 VITALS
HEIGHT: 60 IN | OXYGEN SATURATION: 96 % | TEMPERATURE: 98 F | DIASTOLIC BLOOD PRESSURE: 70 MMHG | WEIGHT: 143 LBS | HEART RATE: 60 BPM | BODY MASS INDEX: 28.07 KG/M2 | SYSTOLIC BLOOD PRESSURE: 138 MMHG

## 2025-06-05 DIAGNOSIS — Z13.228 ENCOUNTER FOR SCREENING FOR OTHER METABOLIC DISORDERS: ICD-10-CM

## 2025-06-05 DIAGNOSIS — E11.9 TYPE 2 DIABETES MELLITUS W/OUT COMPLICATIONS: ICD-10-CM

## 2025-06-05 DIAGNOSIS — I10 ESSENTIAL (PRIMARY) HYPERTENSION: ICD-10-CM

## 2025-06-05 DIAGNOSIS — C82.00 FOLLICULAR LYMPHOMA GRADE I, UNSPECIFIED SITE: ICD-10-CM

## 2025-06-05 DIAGNOSIS — R51.9 HEADACHE, UNSPECIFIED: ICD-10-CM

## 2025-06-05 DIAGNOSIS — I77.810 THORACIC AORTIC ECTASIA: ICD-10-CM

## 2025-06-05 DIAGNOSIS — I48.0 PAROXYSMAL ATRIAL FIBRILLATION: ICD-10-CM

## 2025-06-05 DIAGNOSIS — M54.2 CERVICALGIA: ICD-10-CM

## 2025-06-05 DIAGNOSIS — I72.2 ANEURYSM OF RENAL ARTERY: ICD-10-CM

## 2025-06-05 DIAGNOSIS — E78.5 HYPERLIPIDEMIA, UNSPECIFIED: ICD-10-CM

## 2025-06-05 DIAGNOSIS — I35.1 NONRHEUMATIC AORTIC (VALVE) INSUFFICIENCY: ICD-10-CM

## 2025-06-05 PROCEDURE — G2211 COMPLEX E/M VISIT ADD ON: CPT

## 2025-06-05 PROCEDURE — 93000 ELECTROCARDIOGRAM COMPLETE: CPT

## 2025-06-05 PROCEDURE — 99214 OFFICE O/P EST MOD 30 MIN: CPT

## 2025-06-09 LAB
ALBUMIN SERPL ELPH-MCNC: 4.2 G/DL
ALP BLD-CCNC: 60 U/L
ALT SERPL-CCNC: 26 U/L
ANION GAP SERPL CALC-SCNC: 15 MMOL/L
APPEARANCE: CLEAR
AST SERPL-CCNC: 21 U/L
BACTERIA: NEGATIVE /HPF
BASOPHILS # BLD AUTO: 0.03 K/UL
BASOPHILS NFR BLD AUTO: 0.6 %
BILIRUB DIRECT SERPL-MCNC: 0.17 MG/DL
BILIRUB INDIRECT SERPL-MCNC: 0.3 MG/DL
BILIRUB SERPL-MCNC: 0.5 MG/DL
BILIRUBIN URINE: NEGATIVE
BLOOD URINE: NEGATIVE
BUN SERPL-MCNC: 17 MG/DL
CALCIUM SERPL-MCNC: 9.5 MG/DL
CAST: 0 /LPF
CHLORIDE SERPL-SCNC: 101 MMOL/L
CHOLEST SERPL-MCNC: 180 MG/DL
CK SERPL-CCNC: 68 U/L
CO2 SERPL-SCNC: 25 MMOL/L
COLOR: YELLOW
CREAT SERPL-MCNC: 0.53 MG/DL
EGFRCR SERPLBLD CKD-EPI 2021: 91 ML/MIN/1.73M2
EOSINOPHIL # BLD AUTO: 0.15 K/UL
EOSINOPHIL NFR BLD AUTO: 3 %
EPITHELIAL CELLS: 1 /HPF
ESTIMATED AVERAGE GLUCOSE: 131 MG/DL
GLUCOSE QUALITATIVE U: NEGATIVE MG/DL
GLUCOSE SERPL-MCNC: 101 MG/DL
HBA1C MFR BLD HPLC: 6.2 %
HCT VFR BLD CALC: 44.7 %
HDLC SERPL-MCNC: 57 MG/DL
HGB BLD-MCNC: 14.2 G/DL
IMM GRANULOCYTES NFR BLD AUTO: 0.4 %
KETONES URINE: NEGATIVE MG/DL
LDLC SERPL-MCNC: 106 MG/DL
LEUKOCYTE ESTERASE URINE: ABNORMAL
LYMPHOCYTES # BLD AUTO: 1.22 K/UL
LYMPHOCYTES NFR BLD AUTO: 24.1 %
MAGNESIUM SERPL-MCNC: 2 MG/DL
MAN DIFF?: NORMAL
MCHC RBC-ENTMCNC: 28.5 PG
MCHC RBC-ENTMCNC: 31.8 G/DL
MCV RBC AUTO: 89.6 FL
MICROSCOPIC-UA: NORMAL
MONOCYTES # BLD AUTO: 0.67 K/UL
MONOCYTES NFR BLD AUTO: 13.2 %
NEUTROPHILS # BLD AUTO: 2.98 K/UL
NEUTROPHILS NFR BLD AUTO: 58.7 %
NITRITE URINE: NEGATIVE
NONHDLC SERPL-MCNC: 122 MG/DL
PH URINE: 7
PLATELET # BLD AUTO: 231 K/UL
POTASSIUM SERPL-SCNC: 4.2 MMOL/L
PROT SERPL-MCNC: 6.5 G/DL
PROTEIN URINE: NEGATIVE MG/DL
RBC # BLD: 4.99 M/UL
RBC # FLD: 14 %
RED BLOOD CELLS URINE: 0 /HPF
REVIEW: NORMAL
SODIUM SERPL-SCNC: 141 MMOL/L
SPECIFIC GRAVITY URINE: 1.01
T3FREE SERPL-MCNC: 3.2 PG/ML
T4 FREE SERPL-MCNC: 1.1 NG/DL
TRIGL SERPL-MCNC: 89 MG/DL
TSH SERPL-ACNC: 1.8 UIU/ML
UROBILINOGEN URINE: 0.2 MG/DL
WBC # FLD AUTO: 5.07 K/UL
WHITE BLOOD CELLS URINE: 0 /HPF

## 2025-06-26 NOTE — ED PROVIDER NOTE - ATTENDING WITH...
Show Applicator Variable?: Yes Medical Necessity Information: It is in your best interest to select a reason for this procedure from the list below. All of these items fulfill various CMS LCD requirements except the new and changing color options. Spray Paint Text: The liquid nitrogen was applied to the skin utilizing a spray paint frosting technique. Spray Paint Technique: No Medical Necessity Clause: This procedure was medically necessary because the lesions that were treated were: Consent: The patient's consent was obtained including but not limited to risks of crusting, scabbing, blistering, scarring, darker or lighter pigmentary change, recurrence, incomplete removal and infection. Detail Level: Detailed Post-Care Instructions: I reviewed with the patient in detail post-care instructions. Patient is to wear sunprotection, and avoid picking at any of the treated lesions. Pt may apply Vaseline to crusted or scabbing areas. Resident

## 2025-07-08 ENCOUNTER — APPOINTMENT (OUTPATIENT)
Dept: ELECTROPHYSIOLOGY | Facility: CLINIC | Age: 85
End: 2025-07-08
Payer: MEDICARE

## 2025-07-08 VITALS
HEART RATE: 56 BPM | OXYGEN SATURATION: 96 % | SYSTOLIC BLOOD PRESSURE: 160 MMHG | WEIGHT: 142 LBS | DIASTOLIC BLOOD PRESSURE: 60 MMHG | BODY MASS INDEX: 27.88 KG/M2 | HEIGHT: 60 IN

## 2025-07-08 PROCEDURE — 93000 ELECTROCARDIOGRAM COMPLETE: CPT

## 2025-07-08 PROCEDURE — 99214 OFFICE O/P EST MOD 30 MIN: CPT

## 2025-07-25 NOTE — ED PROVIDER NOTE - EKG ADDITIONAL QUESTION - PERFORMED INDEPENDENT VISUALIZATION
total knee or total hip replacement is considered outpatient surgery by the government right now.  You will have your surgery under spinal or general anesthetic.    It will take roughly an hour and half to 2 to perform.  You will be going home the same day after surgery.    We will get you up and walking an hour or so after surgery in recovery room and will arrange for home health and physical therapy to come to your house by the next day.    You will receive home health and home physical therapy for 2 weeks and then outpatient after that.   You would need to have family members to help you at home otherwise you can not have surgery because this is considered outpatient by the government   It will take roughly 3-6 months for complete healing to occur  Most joint replacements studies have shown improvement up to 18 months after surgery  There is a mandatory class that you have to attend prior to surgery where you will be given instructions by therapist, nurse, home health  Cardiac clearance prior to having surgery/the heart doctor will decide your risks involved having surgery and if you can withstand the operation      Procedure, common risks and benefits,alternatives discussed in details.  All questions answered.  Patient expressed understanding.  Patient instructed to call for any questions that could arise in the future.     Most common Risks:  Infection/2%  Leg-length discrepancy/most common with total hip replacement  Dislocation/most common with total hip replacement/2% risk of the hip coming out of the socket.  If that happens you would need to have it pulled back under sedation.  If this continues to happen recurrent dislocation you may need repeated surgery  Neuro-vascular injury ( resulting in loss of motor and sensory functions)  you have a slight increase risk of having what we call peroneal nerve palsy which results in a footdrop.  70-80% of foot drops recuperate within 6 months.  Almost all people  with total knee replacement are slightly numb on the outside of the knee.    Pain  Blood clot  Fat clot  Loss of motion.  Total knees have the tendency to lose motion if you do not exercise and do therapy.  You have to work through the pain to achieve motion  Fracture of bone  Failure of procedure to achieve its intended purpose.  Total knees are 80% successful in decreasing pain increasing function.  Total hips are 90% successful in decreasing pain and increasing function  Failure of hardware/they last approximately 15 years/prosthesis is made out of metal and plastic they could wear out with time  Non-union or mal-union of bone  Malalignment  Death/you go see cardiologist before surgery   Already cleared by Cardiology Dr. Fine    Patient instructions for joint replacement    Before surgery  1.  Shower with Hibiclens soap/antibacterial for 3 days prior to surgery to decrease risk of infection  2.  Stop all blood thinners/aspirin, Coumadin, warfarin, Plavix, Eliquis, Xarelto etc 5 days prior to surgery  3.  No eating or drinking after midnight before surgery. Would like you to drink a bottle of Powerade, Gatorade, or Pedialyte the day before surgery prior to midnight, so that you do not get dehydrated waiting for surgery the next day.  4.  Take Tylenol 650 mg the night prior to surgery.  5.  Stop all semaglutides (Monjauro, Ozempic, Trulicity, Wegovy, etc.). 7-10 days prior to surgery.  6. Stop all NSAIDs (Motrin, ibuprofen, Aleve, Mobic, Celebrex, Voltaren, nabumetone, Relafen, sulindac etc.), all natural products, and vitamins except vitamin D for 7-10 days prior to surgery.    Ask physicians for prescription of Celebrex or Mobic if needed    After surgery at home  1.  Take Tylenol 650 mg 3 times a day for 14 days then as needed for mild pain.  You may resume all your home medications the 1st day after surgery  2.  Take gabapentin 300 mg nightly for 6 weeks/if you are on pregabalin or Lyrica you can use that  instead  3.  Take Celebrex 200 mg or meloxicam 15 mg daily-you may substitute with Aleve 2 tablets twice a day with food or ibuprofen 600 mg twice a day with food or any other anti-inflammatory if you were on prior to surgery for 6 weeks unless having cardiac issues to take for 2 weeks only  4.  Must take aspirin 81 mg twice a day for 6 weeks unless you are on other blood thinners/Plavix, Eliquis, Xarelto, Coumadin etc to start the next day after surgery  5.  Must wear compressive stockings for 6 weeks minimum to decrease the risk of blood clot and swelling  6.  Hydrocodone/Norco or oxycodone/Percocet will be prescribed to take every 6 hr as needed for breakthrough pain/you may cut pills in half.  You will also have something for nausea to take on as needed will be prescribed  7.  May apply ice on the knee to help with decreasing pain  8.  Keep wound dry for 2 weeks  than you will be allowed to shower in 24 hr and get the wound wet.  No soaking of the wound in the tub or swimming for 4 weeks after surgery  9.  No driving for 4 weeks unless specified by physician  10.  Avoid touching the wound or surrounding area /at least 2 inches on each side of the surgical incision until staples are removed/stitches   11.  May change the surgical dressing if extremely bloody or has drainage on it. May clean the wound with peroxide or Betadine and apply sterile dressing and Ace wrap over it  12.  Leave hospital dressing on for 3 days then may change by applying sterile 4 x 4 and Ace wrap after cleaning with Betadine or peroxide.  May leave this dressing change to home health nurses    Nickel allergy we need link Orth[facility] total knee replacement  Proceed with the right total knee replacement   Already have a walker  She already has a shower chair                                    Yes

## 2025-08-26 ENCOUNTER — RESULT REVIEW (OUTPATIENT)
Age: 85
End: 2025-08-26

## 2025-08-26 ENCOUNTER — APPOINTMENT (OUTPATIENT)
Dept: HEMATOLOGY ONCOLOGY | Facility: CLINIC | Age: 85
End: 2025-08-26
Payer: MEDICARE

## 2025-08-26 ENCOUNTER — APPOINTMENT (OUTPATIENT)
Dept: HEMATOLOGY ONCOLOGY | Facility: CLINIC | Age: 85
End: 2025-08-26

## 2025-08-26 VITALS
SYSTOLIC BLOOD PRESSURE: 198 MMHG | WEIGHT: 144.62 LBS | HEART RATE: 53 BPM | BODY MASS INDEX: 28.24 KG/M2 | DIASTOLIC BLOOD PRESSURE: 67 MMHG | TEMPERATURE: 96.8 F | RESPIRATION RATE: 16 BRPM | OXYGEN SATURATION: 98 %

## 2025-08-26 VITALS — SYSTOLIC BLOOD PRESSURE: 191 MMHG | DIASTOLIC BLOOD PRESSURE: 70 MMHG

## 2025-08-26 DIAGNOSIS — Z92.3 PERSONAL HISTORY OF IRRADIATION: ICD-10-CM

## 2025-08-26 DIAGNOSIS — Z92.22 PERSONAL HISTORY OF MONOCLONAL DRUG THERAPY: ICD-10-CM

## 2025-08-26 DIAGNOSIS — I48.0 PAROXYSMAL ATRIAL FIBRILLATION: ICD-10-CM

## 2025-08-26 DIAGNOSIS — C82.30: ICD-10-CM

## 2025-08-26 PROCEDURE — 99214 OFFICE O/P EST MOD 30 MIN: CPT

## 2025-08-26 PROCEDURE — G2211 COMPLEX E/M VISIT ADD ON: CPT

## 2025-08-27 LAB
ALBUMIN SERPL ELPH-MCNC: 4.3 G/DL
ALP BLD-CCNC: 52 U/L
ALT SERPL-CCNC: 23 U/L
ANION GAP SERPL CALC-SCNC: 12 MMOL/L
AST SERPL-CCNC: 25 U/L
BILIRUB SERPL-MCNC: 0.4 MG/DL
BUN SERPL-MCNC: 17 MG/DL
CALCIUM SERPL-MCNC: 9.7 MG/DL
CHLORIDE SERPL-SCNC: 101 MMOL/L
CO2 SERPL-SCNC: 28 MMOL/L
CREAT SERPL-MCNC: 0.59 MG/DL
DEPRECATED KAPPA LC FREE/LAMBDA SER: 1.06 RATIO
EGFRCR SERPLBLD CKD-EPI 2021: 88 ML/MIN/1.73M2
GLUCOSE SERPL-MCNC: 102 MG/DL
IGA SERPL-MCNC: 166 MG/DL
IGG SERPL-MCNC: 772 MG/DL
IGM SERPL-MCNC: 174 MG/DL
KAPPA LC CSF-MCNC: 1.34 MG/DL
KAPPA LC SERPL-MCNC: 1.42 MG/DL
LDH SERPL-CCNC: 132 U/L
POTASSIUM SERPL-SCNC: 4.4 MMOL/L
PROT SERPL-MCNC: 6.7 G/DL
SODIUM SERPL-SCNC: 141 MMOL/L

## 2025-08-29 ENCOUNTER — APPOINTMENT (OUTPATIENT)
Dept: ULTRASOUND IMAGING | Facility: IMAGING CENTER | Age: 85
End: 2025-08-29
Payer: MEDICARE

## 2025-08-29 ENCOUNTER — OUTPATIENT (OUTPATIENT)
Dept: OUTPATIENT SERVICES | Facility: HOSPITAL | Age: 85
LOS: 1 days | End: 2025-08-29
Payer: MEDICARE

## 2025-08-29 ENCOUNTER — APPOINTMENT (OUTPATIENT)
Dept: MAMMOGRAPHY | Facility: IMAGING CENTER | Age: 85
End: 2025-08-29
Payer: MEDICARE

## 2025-08-29 DIAGNOSIS — Z00.8 ENCOUNTER FOR OTHER GENERAL EXAMINATION: ICD-10-CM

## 2025-08-29 DIAGNOSIS — Z98.89 OTHER SPECIFIED POSTPROCEDURAL STATES: Chronic | ICD-10-CM

## 2025-08-29 DIAGNOSIS — L72.9 FOLLICULAR CYST OF THE SKIN AND SUBCUTANEOUS TISSUE, UNSPECIFIED: Chronic | ICD-10-CM

## 2025-08-29 PROCEDURE — 77063 BREAST TOMOSYNTHESIS BI: CPT | Mod: 26

## 2025-08-29 PROCEDURE — 77067 SCR MAMMO BI INCL CAD: CPT

## 2025-08-29 PROCEDURE — 76641 ULTRASOUND BREAST COMPLETE: CPT | Mod: 26,50,GA

## 2025-08-29 PROCEDURE — 77067 SCR MAMMO BI INCL CAD: CPT | Mod: 26

## 2025-08-29 PROCEDURE — 77066 DX MAMMO INCL CAD BI: CPT

## 2025-08-29 PROCEDURE — 76641 ULTRASOUND BREAST COMPLETE: CPT

## 2025-08-29 PROCEDURE — G0279: CPT

## 2025-08-29 PROCEDURE — 77063 BREAST TOMOSYNTHESIS BI: CPT

## 2025-09-03 ENCOUNTER — APPOINTMENT (OUTPATIENT)
Dept: ULTRASOUND IMAGING | Facility: IMAGING CENTER | Age: 85
End: 2025-09-03
Payer: MEDICARE

## 2025-09-03 ENCOUNTER — OUTPATIENT (OUTPATIENT)
Dept: OUTPATIENT SERVICES | Facility: HOSPITAL | Age: 85
LOS: 1 days | End: 2025-09-03
Payer: MEDICARE

## 2025-09-03 DIAGNOSIS — Z98.89 OTHER SPECIFIED POSTPROCEDURAL STATES: Chronic | ICD-10-CM

## 2025-09-03 DIAGNOSIS — C82.30 FOLLICULAR LYMPHOMA GRADE IIIA, UNSPECIFIED SITE: ICD-10-CM

## 2025-09-03 DIAGNOSIS — L72.9 FOLLICULAR CYST OF THE SKIN AND SUBCUTANEOUS TISSUE, UNSPECIFIED: Chronic | ICD-10-CM

## 2025-09-03 PROCEDURE — 76882 US LMTD JT/FCL EVL NVASC XTR: CPT

## 2025-09-03 PROCEDURE — 76882 US LMTD JT/FCL EVL NVASC XTR: CPT | Mod: 26,RT

## 2025-09-05 ENCOUNTER — APPOINTMENT (OUTPATIENT)
Dept: CARDIOLOGY | Facility: CLINIC | Age: 85
End: 2025-09-05
Payer: MEDICARE

## 2025-09-05 VITALS
HEIGHT: 60 IN | BODY MASS INDEX: 28.07 KG/M2 | OXYGEN SATURATION: 96 % | HEART RATE: 52 BPM | WEIGHT: 143 LBS | SYSTOLIC BLOOD PRESSURE: 150 MMHG | TEMPERATURE: 97.6 F | DIASTOLIC BLOOD PRESSURE: 60 MMHG

## 2025-09-05 DIAGNOSIS — M54.2 CERVICALGIA: ICD-10-CM

## 2025-09-05 DIAGNOSIS — R06.02 SHORTNESS OF BREATH: ICD-10-CM

## 2025-09-05 DIAGNOSIS — Z12.39 ENCOUNTER FOR OTHER SCREENING FOR MALIGNANT NEOPLASM OF BREAST: ICD-10-CM

## 2025-09-05 DIAGNOSIS — I48.0 PAROXYSMAL ATRIAL FIBRILLATION: ICD-10-CM

## 2025-09-05 DIAGNOSIS — R51.9 HEADACHE, UNSPECIFIED: ICD-10-CM

## 2025-09-05 DIAGNOSIS — I77.810 THORACIC AORTIC ECTASIA: ICD-10-CM

## 2025-09-05 DIAGNOSIS — Z13.228 ENCOUNTER FOR SCREENING FOR OTHER METABOLIC DISORDERS: ICD-10-CM

## 2025-09-05 DIAGNOSIS — R79.89 OTHER SPECIFIED ABNORMAL FINDINGS OF BLOOD CHEMISTRY: ICD-10-CM

## 2025-09-05 DIAGNOSIS — E04.1 NONTOXIC SINGLE THYROID NODULE: ICD-10-CM

## 2025-09-05 DIAGNOSIS — E11.9 TYPE 2 DIABETES MELLITUS W/OUT COMPLICATIONS: ICD-10-CM

## 2025-09-05 DIAGNOSIS — E78.5 HYPERLIPIDEMIA, UNSPECIFIED: ICD-10-CM

## 2025-09-05 DIAGNOSIS — I10 ESSENTIAL (PRIMARY) HYPERTENSION: ICD-10-CM

## 2025-09-05 DIAGNOSIS — I35.1 NONRHEUMATIC AORTIC (VALVE) INSUFFICIENCY: ICD-10-CM

## 2025-09-05 DIAGNOSIS — I72.2 ANEURYSM OF RENAL ARTERY: ICD-10-CM

## 2025-09-05 DIAGNOSIS — C82.00 FOLLICULAR LYMPHOMA GRADE I, UNSPECIFIED SITE: ICD-10-CM

## 2025-09-05 PROCEDURE — G2211 COMPLEX E/M VISIT ADD ON: CPT

## 2025-09-05 PROCEDURE — 93000 ELECTROCARDIOGRAM COMPLETE: CPT

## 2025-09-05 PROCEDURE — 99214 OFFICE O/P EST MOD 30 MIN: CPT

## 2025-09-05 RX ORDER — AMLODIPINE BESYLATE 2.5 MG/1
2.5 TABLET ORAL DAILY
Qty: 90 | Refills: 1 | Status: ACTIVE | COMMUNITY
Start: 2025-09-05 | End: 1900-01-01

## 2025-09-08 PROBLEM — R79.89 ELEVATED BRAIN NATRIURETIC PEPTIDE (BNP) LEVEL: Status: ACTIVE | Noted: 2025-09-08

## 2025-09-08 LAB
ALBUMIN SERPL ELPH-MCNC: 4.7 G/DL
ALP BLD-CCNC: 60 U/L
ALT SERPL-CCNC: 21 U/L
ANION GAP SERPL CALC-SCNC: 13 MMOL/L
APPEARANCE: CLEAR
AST SERPL-CCNC: 25 U/L
BACTERIA: NEGATIVE /HPF
BASOPHILS # BLD AUTO: 0.02 K/UL
BASOPHILS NFR BLD AUTO: 0.3 %
BILIRUB DIRECT SERPL-MCNC: 0.17 MG/DL
BILIRUB INDIRECT SERPL-MCNC: 0.3 MG/DL
BILIRUB SERPL-MCNC: 0.5 MG/DL
BILIRUBIN URINE: NEGATIVE
BLOOD URINE: NEGATIVE
BUN SERPL-MCNC: 15 MG/DL
CALCIUM SERPL-MCNC: 10.3 MG/DL
CAST: 0 /LPF
CHLORIDE SERPL-SCNC: 100 MMOL/L
CHOLEST SERPL-MCNC: 172 MG/DL
CK SERPL-CCNC: 111 U/L
CO2 SERPL-SCNC: 28 MMOL/L
COLOR: YELLOW
CREAT SERPL-MCNC: 0.59 MG/DL
EGFRCR SERPLBLD CKD-EPI 2021: 88 ML/MIN/1.73M2
EOSINOPHIL # BLD AUTO: 0.21 K/UL
EOSINOPHIL NFR BLD AUTO: 3.3 %
EPITHELIAL CELLS: 2 /HPF
ESTIMATED AVERAGE GLUCOSE: 134 MG/DL
GLUCOSE QUALITATIVE U: NEGATIVE MG/DL
GLUCOSE SERPL-MCNC: 107 MG/DL
HBA1C MFR BLD HPLC: 6.3 %
HCT VFR BLD CALC: 45.8 %
HDLC SERPL-MCNC: 58 MG/DL
HGB BLD-MCNC: 15 G/DL
IMM GRANULOCYTES NFR BLD AUTO: 0.2 %
KETONES URINE: NEGATIVE MG/DL
LDLC SERPL-MCNC: 96 MG/DL
LEUKOCYTE ESTERASE URINE: ABNORMAL
LYMPHOCYTES # BLD AUTO: 1.28 K/UL
LYMPHOCYTES NFR BLD AUTO: 20.4 %
MAGNESIUM SERPL-MCNC: 2.2 MG/DL
MAN DIFF?: NORMAL
MCHC RBC-ENTMCNC: 29 PG
MCHC RBC-ENTMCNC: 32.8 G/DL
MCV RBC AUTO: 88.4 FL
MICROSCOPIC-UA: NORMAL
MONOCYTES # BLD AUTO: 0.84 K/UL
MONOCYTES NFR BLD AUTO: 13.4 %
NEUTROPHILS # BLD AUTO: 3.91 K/UL
NEUTROPHILS NFR BLD AUTO: 62.4 %
NITRITE URINE: NEGATIVE
NONHDLC SERPL-MCNC: 114 MG/DL
NT-PROBNP SERPL-MCNC: 1347 PG/ML
PH URINE: 6.5
PHOSPHATE SERPL-MCNC: 4.3 MG/DL
PLATELET # BLD AUTO: 243 K/UL
POTASSIUM SERPL-SCNC: 4.5 MMOL/L
PROT SERPL-MCNC: 7.4 G/DL
PROTEIN URINE: NEGATIVE MG/DL
RBC # BLD: 5.18 M/UL
RBC # FLD: 13.5 %
RED BLOOD CELLS URINE: 0 /HPF
SODIUM SERPL-SCNC: 142 MMOL/L
SPECIFIC GRAVITY URINE: 1.01
T4 FREE SERPL-MCNC: 1.1 NG/DL
TRIGL SERPL-MCNC: 103 MG/DL
TSH SERPL-ACNC: 2.02 UIU/ML
UROBILINOGEN URINE: 0.2 MG/DL
WBC # FLD AUTO: 6.27 K/UL
WHITE BLOOD CELLS URINE: 0 /HPF

## 2025-09-08 RX ORDER — EMPAGLIFLOZIN 10 MG/1
10 TABLET, FILM COATED ORAL DAILY
Qty: 30 | Refills: 0 | Status: ACTIVE | COMMUNITY
Start: 2025-09-08 | End: 1900-01-01

## 2025-09-11 ENCOUNTER — APPOINTMENT (OUTPATIENT)
Dept: CARDIOLOGY | Facility: CLINIC | Age: 85
End: 2025-09-11
Payer: MEDICARE

## 2025-09-11 PROCEDURE — 93306 TTE W/DOPPLER COMPLETE: CPT

## 2025-09-11 PROCEDURE — 93978 VASCULAR STUDY: CPT

## 2025-09-12 ENCOUNTER — APPOINTMENT (OUTPATIENT)
Dept: CT IMAGING | Facility: IMAGING CENTER | Age: 85
End: 2025-09-12

## 2025-09-12 PROCEDURE — 75574 CT ANGIO HRT W/3D IMAGE: CPT | Mod: 26

## 2025-09-12 PROCEDURE — 71250 CT THORAX DX C-: CPT | Mod: 26,59

## 2025-09-19 DIAGNOSIS — R79.89 OTHER SPECIFIED ABNORMAL FINDINGS OF BLOOD CHEMISTRY: ICD-10-CM

## 2025-09-23 LAB
ANION GAP SERPL CALC-SCNC: 14 MMOL/L
BUN SERPL-MCNC: 17 MG/DL
CALCIUM SERPL-MCNC: 9.3 MG/DL
CHLORIDE SERPL-SCNC: 100 MMOL/L
CO2 SERPL-SCNC: 27 MMOL/L
CREAT SERPL-MCNC: 0.66 MG/DL
EGFRCR SERPLBLD CKD-EPI 2021: 86 ML/MIN/1.73M2
GLUCOSE SERPL-MCNC: 102 MG/DL
NT-PROBNP SERPL-MCNC: 683 PG/ML
POTASSIUM SERPL-SCNC: 4.1 MMOL/L
SODIUM SERPL-SCNC: 141 MMOL/L

## 2025-09-26 LAB
ANION GAP SERPL CALC-SCNC: 14 MMOL/L
BUN SERPL-MCNC: 20 MG/DL
CALCIUM SERPL-MCNC: 9.6 MG/DL
CHLORIDE SERPL-SCNC: 99 MMOL/L
CO2 SERPL-SCNC: 27 MMOL/L
CREAT SERPL-MCNC: 0.66 MG/DL
EGFRCR SERPLBLD CKD-EPI 2021: 86 ML/MIN/1.73M2
GLUCOSE SERPL-MCNC: 93 MG/DL
NT-PROBNP SERPL-MCNC: 626 PG/ML
POTASSIUM SERPL-SCNC: 3.8 MMOL/L
SODIUM SERPL-SCNC: 141 MMOL/L